# Patient Record
Sex: FEMALE | Race: WHITE | Employment: OTHER | URBAN - METROPOLITAN AREA
[De-identification: names, ages, dates, MRNs, and addresses within clinical notes are randomized per-mention and may not be internally consistent; named-entity substitution may affect disease eponyms.]

---

## 2014-05-15 LAB — EF %, EXTERNAL: NORMAL

## 2017-08-31 LAB — EF %, EXTERNAL: NORMAL

## 2018-05-02 ENCOUNTER — OFFICE VISIT (OUTPATIENT)
Dept: INTERNAL MEDICINE CLINIC | Age: 68
End: 2018-05-02

## 2018-05-02 VITALS
TEMPERATURE: 97.5 F | HEART RATE: 85 BPM | DIASTOLIC BLOOD PRESSURE: 99 MMHG | RESPIRATION RATE: 19 BRPM | OXYGEN SATURATION: 98 % | SYSTOLIC BLOOD PRESSURE: 178 MMHG | HEIGHT: 60 IN | BODY MASS INDEX: 26.03 KG/M2 | WEIGHT: 132.6 LBS

## 2018-05-02 DIAGNOSIS — I42.8 OTHER CARDIOMYOPATHY (HCC): Primary | Chronic | ICD-10-CM

## 2018-05-02 DIAGNOSIS — Z12.11 COLON CANCER SCREENING: ICD-10-CM

## 2018-05-02 DIAGNOSIS — I10 ESSENTIAL HYPERTENSION: ICD-10-CM

## 2018-05-02 DIAGNOSIS — E78.2 MIXED HYPERLIPIDEMIA: ICD-10-CM

## 2018-05-02 DIAGNOSIS — I50.22 CHRONIC SYSTOLIC HEART FAILURE (HCC): Chronic | ICD-10-CM

## 2018-05-02 DIAGNOSIS — Z11.59 ENCOUNTER FOR HEPATITIS C SCREENING TEST FOR LOW RISK PATIENT: ICD-10-CM

## 2018-05-02 DIAGNOSIS — G24.9 DYSTONIA: ICD-10-CM

## 2018-05-02 DIAGNOSIS — E11.65 TYPE 2 DIABETES MELLITUS WITH HYPERGLYCEMIA, WITHOUT LONG-TERM CURRENT USE OF INSULIN (HCC): ICD-10-CM

## 2018-05-02 RX ORDER — ASPIRIN 81 MG/1
81 TABLET ORAL DAILY
COMMUNITY

## 2018-05-02 RX ORDER — GLIPIZIDE 5 MG/1
TABLET ORAL
Refills: 1 | COMMUNITY
Start: 2018-03-08 | End: 2018-06-06 | Stop reason: SDUPTHER

## 2018-05-02 RX ORDER — MIRTAZAPINE 15 MG/1
TABLET, FILM COATED ORAL
Refills: 5 | COMMUNITY
Start: 2018-04-01 | End: 2018-11-08 | Stop reason: SDUPTHER

## 2018-05-02 NOTE — MR AVS SNAPSHOT
[address] [address] 
[phone] Patient: Chucky Harvey MRN: [id number] AVF:3/9/2443 Visit Information Date & Time Provider Department Dept. Phone Encounter #  
 5/2/2018  2:15 PM Heath Nagy MD [address] [address] Internal Medicine [phone] [id number] Follow-up Instructions Return in about 1 month (around 6/2/2018) for Medicare Wellness Visit- 30 minute appointment. Upcoming Health Maintenance Date Due Hepatitis C Screening 1950 FOOT EXAM Q1 5/3/1960 MICROALBUMIN Q1 5/3/1960 EYE EXAM RETINAL OR DILATED Q1 5/3/1960 DTaP/Tdap/Td series (1 - Tdap) 5/3/1971 BREAST CANCER SCRN MAMMOGRAM 5/3/2000 FOBT Q 1 YEAR AGE 50-75 5/3/2000 ZOSTER VACCINE AGE 60> 3/3/2010 HEMOGLOBIN A1C Q6M 10/23/2010 GLAUCOMA SCREENING Q2Y 5/3/2015 Bone Densitometry (Dexa) Screening 5/3/2015 Pneumococcal 65+ Low/Medium Risk (1 of 2 - PCV13) 5/3/2015 LIPID PANEL Q1 5/15/2015 MEDICARE YEARLY EXAM 5/2/2018 Influenza Age 5 to Adult 8/1/2018 Allergies as of 5/2/2018  Review Complete On: 5/2/2018 By: Heath Nagy MD  
  
 Severity Noted Reaction Type Reactions Codeine  04/21/2010    Hives Pcn [Penicillins]  04/21/2010    Hives Current Immunizations  Never Reviewed No immunizations on file. Not reviewed this visit You Were Diagnosed With   
  
 Codes Comments Other cardiomyopathy ([facility]    -  Primary ICD-10-CM: I42.8 ICD-9-CM: 425. 4 Chronic systolic heart failure (HCC)     ICD-10-CM: I50.22 ICD-9-CM: 428.22 Type 2 diabetes mellitus with hyperglycemia, without long-term current use of insulin (HCC)     ICD-10-CM: E11.65 ICD-9-CM: 250.00, 790.29 Essential hypertension     ICD-10-CM: I10 
ICD-9-CM: 401.9 Mixed hyperlipidemia     ICD-10-CM: E78.2 ICD-9-CM: 272.2 Dystonia     ICD-10-CM: G24.9 ICD-9-CM: 781.0 Colon cancer screening     ICD-10-CM: Z12.11 ICD-9-CM: V76.51 Encounter for hepatitis C screening test for low risk patient     ICD-10-CM: Z11.59 
ICD-9-CM: V73.89 Vitals BP Pulse Temp Resp Height(growth percentile) Weight(growth percentile) (!) 178/99 (BP 1 Location: Right arm, BP Patient Position: Sitting) 85 97.5 °F (36.4 °C) (Oral) 19 5' 0.08\" (1.526 m) 132 lb 9.6 oz (60.1 kg) SpO2 BMI OB Status Smoking Status 98% 25.83 kg/m2 Hysterectomy Never Smoker Vitals History BMI and BSA Data Body Mass Index Body Surface Area  
 25.83 kg/m 2 1.6 m 2 Preferred Pharmacy Pharmacy Name Phone [facility]/PHARMACY #[id number] [address]/ [facility] [phone] Your Updated Medication List  
  
   
This list is accurate as of 5/2/18  3:30 PM.  Always use your most recent med list.  
  
  
  
  
 aspirin delayed-release 81 mg tablet Take  by mouth daily. benazepril 20 mg tablet Commonly known as:  LOTENSIN Take 1 Tab by mouth daily. carvedilol 25 mg tablet Commonly known as:  Rocio Solid Take 1 Tab by mouth two (2) times daily (with meals). furosemide 40 mg tablet Commonly known as:  LASIX Take 1 Tab by mouth daily. glipiZIDE 5 mg tablet Commonly known as:  GLUCOTROL  
TAKE 1/2 TABLET BY MOUTH ONCE A DAY  
  
 metFORMIN 1,000 mg tablet Commonly known as:  GLUCOPHAGE Take 1 Tab by mouth two (2) times daily (with meals). mirtazapine 15 mg tablet Commonly known as:  REMERON  
TAKE 1 TABLET BY MOUTH AT BEDTIME FOR 1 WEEK, THEN 2 TABLETS NIGHTLY  
  
 simvastatin 20 mg tablet Commonly known as:  ZOCOR Take 1 Tab by mouth nightly. We Performed the Following CBC WITH AUTOMATED DIFF [32727 CPT(R)] HEMOGLOBIN A1C WITH EAG [04552 CPT(R)] HEPATITIS C AB [86469 CPT(R)] LIPID PANEL [02777 CPT(R)] METABOLIC PANEL, COMPREHENSIVE [23920 CPT(R)] MICROALBUMIN, UR, RAND W/ MICROALB/CREAT RATIO [id number] CPT(R)] OCCULT BLOOD, IMMUNOASSAY (FIT) [id number] CPT(R)] REFERRAL TO CARDIOLOGY [YMX88 Custom] REFERRAL TO NEUROLOGY [AVY63 Custom] TSH 3RD GENERATION [38835 CPT(R)] Follow-up Instructions Return in about 1 month (around 6/2/2018) for Medicare Wellness Visit- 30 minute appointment. Referral Information Referral ID Referred By Referred To  
  
 [id number] Espinoza Wilkerson MD   
   [address] [facility] [address] [facility], [address] Phone: [phone] Fax: [phone] Visits Status Start Date End Date 1 New Request 5/2/18 5/2/19 If your referral has a status of pending review or denied, additional information will be sent to support the outcome of this decision. Referral ID Referred By Referred To  
 [id number] Cathy Archuleta MD  
   [place] [address] [address] [facility], [address] [place] [address] Avenue Phone: [phone] Fax: [phone] Visits Status Start Date End Date 1 New Request 5/2/18 5/2/19 If your referral has a status of pending review or denied, additional information will be sent to support the outcome of this decision. Introducing [facility] & HEALTH SERVICES! [facility] introduces [employer] patient portal. Now you can access parts of your medical record, email your doctor's office, and request medication refills online. 1. In your internet browser, go to [email or web address] 2. Click on the First Time User? Click Here link in the Sign In box. You will see the New Member Sign Up page. 3. Enter your Mind[employer]ites Access Code exactly as it appears below. You will not need to use this code after youve completed the sign-up process. If you do not sign up before the expiration date, you must request a new code. · Mind[employer] Access Code: [email or web address] Expires: 7/31/2018  1:47 PM 
 
 4. Enter the last four digits of your Social Security Number (xxxx) and Date of Birth (mm/dd/yyyy) as indicated and click Submit. You will be taken to the next sign-up page. 5. Create a [facility] ID. This will be your [facility] login ID and cannot be changed, so think of one that is secure and easy to remember. 6. Create a [facility] password. You can change your password at any time. 7. Enter your Password Reset Question and Answer. This can be used at a later time if you forget your password. 8. Enter your e-mail address. You will receive e-mail notification when new information is available in [address] 9. Click Sign Up. You can now view and download portions of your medical record. 10. Click the Download Summary menu link to download a portable copy of your medical information. If you have questions, please visit the Frequently Asked Questions section of the [facility] website. Remember, [facility] is NOT to be used for urgent needs. For medical emergencies, dial 911. Now available from your iPhone and Android! Please provide this summary of care documentation to your next provider. Your primary care clinician is listed as Eron Mccabe. If you have any questions after today's visit, please call [phone].

## 2018-05-02 NOTE — PROGRESS NOTES
New Patient Evaluation    Jillian Celeste is a 79 y.o. female. They are here to establish care with the group and me as a primary care provider. She has CHF--diagnosed in 2011. She was managed by [facility] at a time. She had some improvement on medications. She stopped seeing doctors and taking medications for 3 years. Then had a severe shortness of breath issue in 2014. She then was seeing cardiology again. Dr. Wally White. She was back on medications and stopped shortly after. In 2017 she had another exacerbation of CHF and has since being back with cardiology. In Jan of 2017 the patient fell outside of her home. She hit her head, had hand pain and broke her collarbone. She went to [facility] and was d/c'd. She returned with pain and was found to have a broken hand and collarbone. Her speech changed as well. She fell again in June of 2017 and broke her tailbone. It was at that time that she had another CHF exacerbation. She had a defibrillator placed in 2017. In Feb, the patient went in the hospital.  She had a Cr of 3.05 on routine blood work. She was referred to nephrology. She has seen Dr. Ricke Meckel for this. Her kidney function has been better. She sees Dr. Ricke Meckel again in June 26th. He removed all blood pressure medications. She takes her blood pressures at home. They average 140's/70's. She actively avoids sodium and per the daughter is \"pretty good about'. She has diabetes as well. She has had this many years. She has seen Dr. Zena Dawkins for IM and endocrine. She has a most recent A1c of 5.5. She checks sugars daily on M/W/F. Her level runs from 70's to low 100's. She sees Dr. Deidre Montoya for neurology. 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 She is on Remeron. This helps her sleep. She also sees Dr. Ladi Couch for dermatology. She had a skin cancer. She will continue to follow.          Patient Active Problem List    Diagnosis Date Noted    Chronic systolic heart failure (HCC)     Essential hypertension, benign     Cardiomyopathy ([facility]     DM (diabetes mellitus) ([facility] 02/28/2011    HTN (hypertension) 02/28/2011    Pure hypercholesterolemia 02/28/2011    Chronic systolic heart failure (HCC)     Cardiomyopathy (HCC)      Current Outpatient Prescriptions   Medication Sig Dispense Refill    glipiZIDE (GLUCOTROL) 5 mg tablet TAKE 1/2 TABLET BY MOUTH ONCE A DAY  1    mirtazapine (REMERON) 15 mg tablet TAKE 1 TABLET BY MOUTH AT BEDTIME FOR 1 WEEK, THEN 2 TABLETS NIGHTLY  5    aspirin delayed-release 81 mg tablet Take  by mouth daily.  simvastatin (ZOCOR) 20 mg tablet Take 1 Tab by mouth nightly. 30 Tab 0    carvedilol (COREG) 25 mg tablet Take 1 Tab by mouth two (2) times daily (with meals). 60 Tab 6    benazepril (LOTENSIN) 20 mg tablet Take 1 Tab by mouth daily. 30 Tab 6    metformin (GLUCOPHAGE) 1,000 mg tablet Take 1 Tab by mouth two (2) times daily (with meals). 60 Tab 0    furosemide (LASIX) 40 mg tablet Take 1 Tab by mouth daily.  30 Tab 6     Allergies   Allergen Reactions    Codeine Hives    Pcn [Penicillins] Hives     Past Medical History:   Diagnosis Date    Calculus of kidney     Cardiomyopathy ([facility]     idiopathic    Chronic systolic heart failure [facility])     April 2010 ef 20%, by Dec 2010 up to 50% on meds, cath with normal cors    Diabetes [facility])     Essential hypertension, benign     Hypercholesterolemia      Past Surgical History:   Procedure Laterality Date    HX HYSTERECTOMY      HX OTHER SURGICAL  10/2017    difibulator     HX UROLOGICAL      stone removal     Family History   Problem Relation Age of Onset    Stroke Mother     Hypertension Mother     Lung Disease Father      emphysema    Heart Disease Brother     Stroke Brother     Cancer Brother      bone    Cancer Maternal Grandfather      colon ca    Cancer Other      breast ca     Social History   Substance Use Topics    Smoking status: Never Smoker    Smokeless tobacco: Never Used    Alcohol use No        Health Maintenance   Topic Date Due    Hepatitis C Screening  1950    FOOT EXAM Q1  05/03/1960    MICROALBUMIN Q1  05/03/1960    EYE EXAM RETINAL OR DILATED Q1  05/03/1960    DTaP/Tdap/Td series (1 - Tdap) 05/03/1971    BREAST CANCER SCRN MAMMOGRAM  05/03/2000    FOBT Q 1 YEAR AGE 50-75  05/03/2000    ZOSTER VACCINE AGE 60>  03/03/2010    HEMOGLOBIN A1C Q6M  10/23/2010    GLAUCOMA SCREENING Q2Y  05/03/2015    Bone Densitometry (Dexa) Screening  05/03/2015    Pneumococcal 65+ Low/Medium Risk (1 of 2 - PCV13) 05/03/2015    LIPID PANEL Q1  05/15/2015    MEDICARE YEARLY EXAM  05/02/2018    Influenza Age 9 to Adult  08/01/2018       Review of Systems   Constitutional: Negative. Respiratory: Negative. Cardiovascular: Negative. Gastrointestinal: Negative. Visit Vitals    BP (!) 178/99 (BP 1 Location: Right arm, BP Patient Position: Sitting)    Pulse 85    Temp 97.5 °F (36.4 °C) (Oral)    Resp 19    Ht 5' 0.08\" (1.526 m)    Wt 132 lb 9.6 oz (60.1 kg)    SpO2 98%    BMI 25.83 kg/m2       Physical Exam   Constitutional: No distress. Cardiovascular: Normal rate and regular rhythm. Pulmonary/Chest: Effort normal and breath sounds normal. She has no wheezes. Musculoskeletal: Normal range of motion. ASSESSMENT/PLAN    Diagnoses and all orders for this visit:    1. Other cardiomyopathy ([address]  -     CBC WITH AUTOMATED DIFF  -     REFERRAL TO CARDIOLOGY    2. Chronic systolic heart failure ([address]    3. Type 2 diabetes mellitus with hyperglycemia, without long-term current use of insulin (HCC)  -     TSH 3RD GENERATION  -     HEMOGLOBIN A1C WITH EAG  -     MICROALBUMIN, UR, RAND W/ MICROALB/CREAT RATIO    4. Essential hypertension  -     METABOLIC PANEL, COMPREHENSIVE    5. Mixed hyperlipidemia  -     LIPID PANEL    6. Dystonia  -     [address] [facility]    7.  Colon cancer screening  -     OCCULT BLOOD, IMMUNOASSAY (FIT)    8. Encounter for hepatitis C screening test for low risk patient  -     HEPATITIS C AB      Follow-up Disposition:  Return in about 1 month (around 6/2/2018) for Medicare Wellness Visit- 30 minute appointment.    -Discussed with the patient to continue the current plan of care. We will obtain baseline labwork and determine if any adjustments need to be done. We will also await the records of the previous PCP to ascertain further details of the patient's history. The patient agrees with and understands the plan of care. All questions have been answered.

## 2018-05-03 ENCOUNTER — HOSPITAL ENCOUNTER (OUTPATIENT)
Dept: LAB | Age: 68
Discharge: HOME | End: 2018-05-03
Payer: MEDICARE

## 2018-05-03 PROCEDURE — 85025 COMPLETE CBC W/AUTO DIFF WBC: CPT

## 2018-05-03 PROCEDURE — 83036 HEMOGLOBIN GLYCOSYLATED A1C: CPT

## 2018-05-03 PROCEDURE — 84443 ASSAY THYROID STIM HORMONE: CPT

## 2018-05-03 PROCEDURE — 36415 COLL VENOUS BLD VENIPUNCTURE: CPT

## 2018-05-03 PROCEDURE — 80053 COMPREHEN METABOLIC PANEL: CPT

## 2018-05-03 PROCEDURE — 86803 HEPATITIS C AB TEST: CPT

## 2018-05-03 PROCEDURE — 80061 LIPID PANEL: CPT

## 2018-05-03 PROCEDURE — 82043 UR ALBUMIN QUANTITATIVE: CPT

## 2018-05-04 LAB
ALBUMIN SERPL-MCNC: 4.1 G/DL (ref 3.6–4.8)
ALBUMIN/CREAT UR: 939.6 MG/G CREAT (ref 0–30)
ALBUMIN/GLOB SERPL: 1.5 {RATIO} (ref 1.2–2.2)
ALP SERPL-CCNC: 67 IU/L (ref 39–117)
ALT SERPL-CCNC: 19 IU/L (ref 0–32)
AST SERPL-CCNC: 23 IU/L (ref 0–40)
BASOPHILS # BLD AUTO: 0 X10E3/UL (ref 0–0.2)
BASOPHILS NFR BLD AUTO: 1 %
BILIRUB SERPL-MCNC: 0.3 MG/DL (ref 0–1.2)
BUN SERPL-MCNC: 37 MG/DL (ref 8–27)
BUN/CREAT SERPL: 26 (ref 12–28)
CALCIUM SERPL-MCNC: 9.1 MG/DL (ref 8.7–10.3)
CHLORIDE SERPL-SCNC: 101 MMOL/L (ref 96–106)
CHOLEST SERPL-MCNC: 141 MG/DL (ref 100–199)
CO2 SERPL-SCNC: 24 MMOL/L (ref 18–29)
CREAT SERPL-MCNC: 1.4 MG/DL (ref 0.57–1)
CREAT UR-MCNC: 25 MG/DL
EOSINOPHIL # BLD AUTO: 0.2 X10E3/UL (ref 0–0.4)
EOSINOPHIL NFR BLD AUTO: 4 %
ERYTHROCYTE [DISTWIDTH] IN BLOOD BY AUTOMATED COUNT: 14.6 % (ref 12.3–15.4)
EST. AVERAGE GLUCOSE BLD GHB EST-MCNC: 120 MG/DL
GFR SERPLBLD CREATININE-BSD FMLA CKD-EPI: 39 ML/MIN/1.73
GFR SERPLBLD CREATININE-BSD FMLA CKD-EPI: 45 ML/MIN/1.73
GLOBULIN SER CALC-MCNC: 2.8 G/DL (ref 1.5–4.5)
GLUCOSE SERPL-MCNC: 119 MG/DL (ref 65–99)
HBA1C MFR BLD: 5.8 % (ref 4.8–5.6)
HCT VFR BLD AUTO: 34.9 % (ref 34–46.6)
HCV AB S/CO SERPL IA: <0.1 S/CO RATIO (ref 0–0.9)
HDLC SERPL-MCNC: 33 MG/DL
HGB BLD-MCNC: 11.4 G/DL (ref 11.1–15.9)
IMM GRANULOCYTES # BLD: 0 X10E3/UL (ref 0–0.1)
IMM GRANULOCYTES NFR BLD: 0 %
LDLC SERPL CALC-MCNC: 76 MG/DL (ref 0–99)
LYMPHOCYTES # BLD AUTO: 1.4 X10E3/UL (ref 0.7–3.1)
LYMPHOCYTES NFR BLD AUTO: 29 %
MCH RBC QN AUTO: 29.6 PG (ref 26.6–33)
MCHC RBC AUTO-ENTMCNC: 32.7 G/DL (ref 31.5–35.7)
MCV RBC AUTO: 91 FL (ref 79–97)
MICROALBUMIN UR-MCNC: 234.9 UG/ML
MONOCYTES # BLD AUTO: 0.5 X10E3/UL (ref 0.1–0.9)
MONOCYTES NFR BLD AUTO: 10 %
NEUTROPHILS # BLD AUTO: 2.7 X10E3/UL (ref 1.4–7)
NEUTROPHILS NFR BLD AUTO: 56 %
PLATELET # BLD AUTO: 184 X10E3/UL (ref 150–379)
POTASSIUM SERPL-SCNC: 4.6 MMOL/L (ref 3.5–5.2)
PROT SERPL-MCNC: 6.9 G/DL (ref 6–8.5)
RBC # BLD AUTO: 3.85 X10E6/UL (ref 3.77–5.28)
SODIUM SERPL-SCNC: 141 MMOL/L (ref 134–144)
TRIGL SERPL-MCNC: 158 MG/DL (ref 0–149)
TSH SERPL DL<=0.005 MIU/L-ACNC: 3.68 UIU/ML (ref 0.45–4.5)
VLDLC SERPL CALC-MCNC: 32 MG/DL (ref 5–40)
WBC # BLD AUTO: 4.8 X10E3/UL (ref 3.4–10.8)

## 2018-05-10 ENCOUNTER — OFFICE VISIT (OUTPATIENT)
Dept: CARDIOLOGY CLINIC | Age: 68
End: 2018-05-10

## 2018-05-10 ENCOUNTER — TELEPHONE (OUTPATIENT)
Dept: CARDIOLOGY CLINIC | Age: 68
End: 2018-05-10

## 2018-05-10 VITALS
BODY MASS INDEX: 26.11 KG/M2 | HEART RATE: 85 BPM | RESPIRATION RATE: 16 BRPM | DIASTOLIC BLOOD PRESSURE: 70 MMHG | HEIGHT: 60 IN | SYSTOLIC BLOOD PRESSURE: 110 MMHG | OXYGEN SATURATION: 98 % | WEIGHT: 133 LBS

## 2018-05-10 DIAGNOSIS — I50.22 CHRONIC SYSTOLIC HEART FAILURE (HCC): Primary | Chronic | ICD-10-CM

## 2018-05-10 DIAGNOSIS — Z95.810 AICD (AUTOMATIC CARDIOVERTER/DEFIBRILLATOR) PRESENT: ICD-10-CM

## 2018-05-10 DIAGNOSIS — I13.0 CARDIORENAL SYNDROME WITH RENAL FAILURE, STAGE 1-4 OR UNSPECIFIED CHRONIC KIDNEY DISEASE, WITH HEART FAILURE (HCC): ICD-10-CM

## 2018-05-10 DIAGNOSIS — I10 ESSENTIAL HYPERTENSION, BENIGN: ICD-10-CM

## 2018-05-10 DIAGNOSIS — I42.9 CARDIOMYOPATHY, UNSPECIFIED TYPE (HCC): ICD-10-CM

## 2018-05-10 PROBLEM — E11.21 TYPE 2 DIABETES WITH NEPHROPATHY (HCC): Status: ACTIVE | Noted: 2018-05-10

## 2018-05-10 RX ORDER — BUMETANIDE 0.5 MG/1
0.5 TABLET ORAL
Qty: 30 TAB | Refills: 5 | Status: SHIPPED | OUTPATIENT
Start: 2018-05-10 | End: 2018-05-25 | Stop reason: SDUPTHER

## 2018-05-10 NOTE — PATIENT INSTRUCTIONS
You will need to follow up in clinic with Dr. Meng Lr in 2-3 weeks. Please schedule an Echo and pacer check prior to office visit. Okay for same day appointment. Please have lab work completed a few days prior to follow up in office. Please schedule an appointment with Dr. Ran Quintero in 3 months. There were changes made to your medications at this appointment. Please note the following:  START Bumex 0.5 mg.   Please take 0.5 mg of Bumex for 2 days. If your weight is not below 130 lbs call the office. Weigh yourself everyday. If your weight is 130 lbs, 4 lbs above baseline (126 lbs), take a dose of Bumex. If you are below 130 lbs do not take your Bumex.

## 2018-05-10 NOTE — MR AVS SNAPSHOT
[address] [facility] [address] [facility] 57 
[phone] Patient: Shun Santacruz MRN: [id number] RET:3/7/[birth year] Visit Information Date & Time Provider Department Dept. Phone Encounter #  
 5/10/2018 10:00 AM Mohinder Garrison MD [facility] [phone] [id number] Your Appointments 5/11/2018  1:00 PM  
New Patient with Pablo Cohn MD  
[facility]) Appt Note: new pt referred by Dr. Deidre Pettit (P)[phone], dystonia [address] [facility] [address] [facility] [address]  
[address] [facility] [address] [facility] [address] [address]  
  
    
 6/27/2018  3:00 PM  
PHYSICAL with Neva Chaidez MD  
[facility]) Appt Note: CPE Per Dr Anna Lloyd [address] [facility] [address] [facility] [address]  
[address] [facility] Upcoming Health Maintenance Date Due  
 FOOT EXAM Q1 5/3/1960 EYE EXAM RETINAL OR DILATED Q1 5/3/1960 DTaP/Tdap/Td series (1 - Tdap) 5/3/1971 BREAST CANCER SCRN MAMMOGRAM 5/3/2000 FOBT Q 1 YEAR AGE 50-75 5/3/2000 ZOSTER VACCINE AGE 60> 3/3/2010 GLAUCOMA SCREENING Q2Y 5/3/2015 Bone Densitometry (Dexa) Screening 5/3/2015 Pneumococcal 65+ Low/Medium Risk (1 of 2 - PCV13) 5/3/2015 MEDICARE YEARLY EXAM 5/2/2018 Influenza Age 5 to Adult 8/1/2018 HEMOGLOBIN A1C Q6M 11/3/2018 MICROALBUMIN Q1 5/3/2019 LIPID PANEL Q1 5/3/2019 Allergies as of 5/10/2018  Review Complete On: 5/10/2018 By: Mohinder Garrison MD  
  
 Severity Noted Reaction Type Reactions Codeine  04/21/2010    Hives Pcn [Penicillins]  04/21/2010    Hives Current Immunizations  Never Reviewed No immunizations on file. Not reviewed this visit You Were Diagnosed With   
  
 Codes Comments Chronic systolic heart failure (HCC)    -  Primary ICD-10-CM: Y20.32 ICD-9-CM: 428.22 Cardiomyopathy, unspecified type ([facility]     ICD-10-CM: I42.9 ICD-9-CM: 425.4 Vitals BP Pulse Resp Height(growth percentile) Weight(growth percentile) SpO2  
 110/70 (BP 1 Location: Left arm, BP Patient Position: Sitting) 85 16 5' (1.524 m) 133 lb (60.3 kg) 98% BMI OB Status Smoking Status 25.97 kg/m2 Hysterectomy Never Smoker Vitals History BMI and BSA Data Body Mass Index Body Surface Area  
 25.97 kg/m 2 1.6 m 2 Preferred Pharmacy Pharmacy Name Phone [facility]/PHARMACY #[id number] [address]/ [facility] [phone] Your Updated Medication List  
  
   
This list is accurate as of 5/10/18 10:52 AM.  Always use your most recent med list.  
  
  
  
  
 aspirin delayed-release 81 mg tablet Take  by mouth daily. bumetanide 0.5 mg tablet Commonly known as:  Crenshaw Cooke Take 1 Tab by mouth daily as needed. Take 1 tab daily as needed for weight > 130lbs  
  
 carvedilol 25 mg tablet Commonly known as:  Brady Peoples Take 1 Tab by mouth two (2) times daily (with meals). furosemide 40 mg tablet Commonly known as:  LASIX Take 1 Tab by mouth daily. glipiZIDE 5 mg tablet Commonly known as:  GLUCOTROL  
TAKE 1/2 TABLET BY MOUTH ONCE A DAY  
  
 mirtazapine 15 mg tablet Commonly known as:  REMERON  
TAKE 1 TABLET BY MOUTH AT BEDTIME FOR 1 WEEK, THEN 2 TABLETS NIGHTLY  
  
 simvastatin 20 mg tablet Commonly known as:  ZOCOR Take 1 Tab by mouth nightly. Prescriptions Sent to Pharmacy Refills  
 bumetanide (BUMEX) 0.5 mg tablet 5 Sig: Take 1 Tab by mouth daily as needed. Take 1 tab daily as needed for weight > 130lbs Class: Normal  
 Pharmacy: [facility]/pharmacy #[id number] - [address]  #: [phone] Route: Oral  
  
We Performed the Following AMB POC EKG ROUTINE W/ 12 LEADS, INTER & REP [82455 CPT(R)] HEMOGLOBIN [id number] CPT(R)] MAGNESIUM [id number] CPT(R)] METABOLIC PANEL, BASIC [36896 CPT(R)] To-Do List   
 05/27/2018 ECHO:  2D ECHO COMPLETE ADULT (TTE) W OR WO CONTR Patient Instructions You will need to follow up in clinic with Dr. Kathy Lynn in 2-3 weeks. Please schedule an Echo and pacer check prior to office visit. Okay for same day appointment. Please have lab work completed a few days prior to follow up in office. Please schedule an appointment with Dr. Domenic Srinivasan in 3 months. There were changes made to your medications at this appointment. Please note the following: START Bumex 0.5 mg.  
Please take 0.5 mg of Bumex for 2 days. If your weight is not below 130 lbs call the office. Weigh yourself everyday. If your weight is 130 lbs, 4 lbs above baseline (126 lbs), take a dose of Bumex. If you are below 130 lbs do not take your Bumex. Introducing [facility] & HEALTH SERVICES! Dear Petra Mazariegos: 
Thank you for requesting a [employer] account. Our records indicate that you already have an active [employer] account. You can access your account anytime at [email or web address] Did you know that you can access your hospital and ER discharge instructions at any time in [employer]? You can also review all of your test results from your hospital stay or ER visit. Additional Information If you have questions, please visit the Frequently Asked Questions section of the [employer] website at [email or web address] Remember, [employer] is NOT to be used for urgent needs. For medical emergencies, dial 911. Now available from your iPhone and Android! Please provide this summary of care documentation to your next provider. Your primary care clinician is listed as Gabriel Callejas. If you have any questions after today's visit, please call [phone].

## 2018-05-10 NOTE — PROGRESS NOTES
BMP, Mg, Hgb ordered  Echo ordered    Bumex 0.5 mg, daily PRN ordered  Verbal order per Dr. Veena Herron

## 2018-05-10 NOTE — PROGRESS NOTES
Den Joseph     1950       Sage Rivas MD, [facility]  Date of Visit-5/10/2018   PCP is Trey Case MD   [facility]  [facility]  HPI:  Den Joseph is a 76 y.o. female   I had seen Ms. Noreen Johnson in 2011, she had an EF of 20% in 2010 that had improved. I have not seen her since 2011, in the meantime she has gotten her care at [address] In 2010 she had normal coronaries by cath. She is now seeing Trey Case MD for PCP and had her first visit 1 week ago. He has requested records from her several other physicians but these are not yet available in [address] [place] [address]. His note indicates that she has had CHF exacerbation and acute renal failure up to a creatinine of 3. She does not take an ACE and seems to have had multiple CHF admissions in the past year. She also had an AICD placed in October 2017. Soc: non smoker, no alcohol, drinks 1 cup of coffee per day, 2 children, retired. Fam: brother with heart disease, mother passed of stroke. The pt is here with her daughter today. Her daughter feels that she was overmedicated at [address] as the pt was very out of it and fatigued. Now the pt is only on Coreg. Her daughter reports that her EF is around 23%. They have noticed that her weight has gone up and she is feeling short of breath. The pt has also noticed some swelling in her right ankle. Her daughter reports that at one point she stopped taking all her medications and fell on January 2017 and broke her left hand and collarbone at that time. She also hit her head at that time because she had slurred speech. The pt reports that she is short of breath and her weight has gone up 6 lbs from baseline in the last week to week and a half. She believes that her kidney's are fine. The pt denies any chest pain or pressure. She states that she has not had a cath since hers in 2010. Th pt reports that she has the defibrillator because of her low EF.  She has not been on digoxin in the past. When she has gone in for her increased fluid retention she usually stays in the hospital for about 5 days to be diuresed. The pt states that she is diabetic and her sugars are under good control. Her baseline weight by her home scales in 125. Creatine 2/2018 was 2.9 had gone up to 3.05. She has anemia with a HGB of 9.4 in February. Previous defibrillator checks with Dr. Bryan Fischer at [facility], her device is a [employer] AICD. Was discharged on 4/2017 on Bumex, BiDil, ACE and Coreg for CHF. Denies chest pain, edema, syncope, has no tachycardia, palpitations or sense of arrhythmia. EKG: Sinus at 85 with anterior T wave inversion. Assessment/Plan:     1. Cardiomyopathy and chronic systolic CHF NYHA class II since at least 2010. They deny cath's since the one I did in 2010 so I assume that this is a non-ischemic cardiomyopathy. 2. Now in acute mild exacerbation with 6 lbs weight gain. I don't find a lot of edema or ascites and she has had significant renal failure so we are going to cautious on diuretics. Will use Bumex 0.5 mg for 2 days and then use a baseline weight of 126. She will take bumex on days she is 130 or above. 2. Cardiorenal syndrome at risk for complete renal failure or hyperkalemia. Avoid ACE, avoid BiDil regimen as it seemed to cause hypotension. 3. Follow up with Dr. Pankaj Burton closely. I went over the mechanism of cardiorenal syndrome and CHF with them. 4. On statin and aspirin, unclear if she has CAD, will need records from [facility]. 5. [employer] AICD. Will check device now here with device check in 2 weeks and an appointment with Dr. Homar Cook in 3 months    6. Follow up in 2 weeks with labs, echo, pacer check and see response to diuretic.          Future Appointments  Date Time Provider Chava Gabriel   5/31/2018 2:45 PM PACEMAKER3, [address]   5/31/2018 3:00 PM ECHOTWO, [address]   5/31/2018 3:20 PM Anju Ely  [address] 6/27/2018 3:00 PM Marina Dukes MD [id number] [facility]   8/7/2018 3:40 PM Radha Mcdonald  [address] 8/13/2018 3:00 PM Parker Benitez MD [address]      Patient Instructions   You will need to follow up in clinic with Dr. Giovana Yap in 2-3 weeks. Please schedule an Echo and pacer check prior to office visit. Okay for same day appointment. Please have lab work completed a few days prior to follow up in office. Please schedule an appointment with Dr. Camila Hayes in 3 months. There were changes made to your medications at this appointment. Please note the following:  START Bumex 0.5 mg.   Please take 0.5 mg of Bumex for 2 days. If your weight is not below 130 lbs call the office. Weigh yourself everyday. If your weight is 130 lbs, 4 lbs above baseline (126 lbs), take a dose of Bumex. If you are below 130 lbs do not take your Bumex. Key CAD CHF Meds             bumetanide (BUMEX) 0.5 mg tablet  (Taking/Discontinued) Take 1 Tab by mouth daily as needed. Take 1 tab daily as needed for weight > 130lbs    aspirin delayed-release 81 mg tablet  (Taking) Take  by mouth daily. simvastatin (ZOCOR) 20 mg tablet  (Taking) Take 1 Tab by mouth nightly. carvedilol (COREG) 25 mg tablet  (Taking) Take 1 Tab by mouth two (2) times daily (with meals). furosemide (LASIX) 40 mg tablet (Discontinued) Take 1 Tab by mouth daily. Impression:   1. Chronic systolic heart failure (HCC)    2. Cardiomyopathy, unspecified type ([address]    3. Cardiorenal syndrome with renal failure, stage 1-4 or unspecified chronic kidney disease, with heart failure ([address]    4. AICD (automatic cardioverter/defibrillator) present       Review of Systems   Constitutional: Negative for diaphoresis, fever and malaise/fatigue. HENT: Negative for ear pain, hearing loss, nosebleeds and tinnitus. Eyes: Negative for blurred vision, double vision and pain.    Respiratory: Positive for shortness of breath. Negative for cough, hemoptysis, sputum production, wheezing and stridor. Cardiovascular: Positive for leg swelling. Negative for chest pain, palpitations, orthopnea and claudication. Gastrointestinal: Positive for diarrhea. Negative for abdominal pain, blood in stool, constipation, heartburn, melena, nausea and vomiting. Genitourinary: Negative for dysuria, frequency and urgency. Musculoskeletal: Positive for joint pain. Negative for back pain, falls, myalgias and neck pain. Skin: Negative for rash. Neurological: Negative for dizziness, sensory change, seizures, loss of consciousness, weakness and headaches. Endo/Heme/Allergies: Does not bruise/bleed easily. Psychiatric/Behavioral: Positive for memory loss. Negative for depression and hallucinations. The patient is not nervous/anxious and does not have insomnia. see supplement sheet, initialed and to be scanned by staff  Past Medical History:   Diagnosis Date    Calculus of kidney     Cardiomyopathy [facility]     idiopathic    Chronic systolic heart failure [facility])     April 2010 ef 20%, by Dec 2010 up to 50% on meds, cath with normal cors    Diabetes [facility])     Essential hypertension, benign     Hypercholesterolemia         Family History   Problem Relation Age of Onset    Stroke Mother     Hypertension Mother     Lung Disease Father      emphysema    Heart Disease Brother     Stroke Brother     Cancer Brother      bone    Cancer Maternal Grandfather      colon ca    Cancer Other      breast ca     Social Hx= reports that she has never smoked. She has never used smokeless tobacco. She reports that she does not drink alcohol or use illicit drugs. Exam and Labs:  /70 (BP 1 Location: Left arm, BP Patient Position: Sitting)  Pulse 85  Resp 16  Ht 5' (1.524 m)  Wt 133 lb (60.3 kg)  SpO2 98%  BMI 25.97 kg/b1Hqiwvsyzcxswdk:  NAD, comfortable  Head: NC,AT. Eyes: No scleral icterus.  Neck:  Neck supple. No JVD present. Throat: moist mucous membranes. Chest: Effort normal & normal respiratory excursion . Neurological: alert, conversant and oriented . Skin: Skin is not cold. No obvious systemic rash noted. Not diaphoretic. No erythema. Psychiatric:  Grossly normal mood and affect. Behavior appears normal. Extremities:  no clubbing or cyanosis. Abdomen: non tender, non distended. Lungs:breath sounds normal. No stridor. distress, wheezes or  Rales. Heart: normal rate, regular rhythm, normal S1, S2, no murmurs, rubs, clicks or gallops , PMI non displaced. Edema: Edema is none. Lab Results   Component Value Date/Time    Cholesterol, total 141 05/03/2018 08:27 AM    HDL Cholesterol 33 (L) 05/03/2018 08:27 AM    LDL, calculated 76 05/03/2018 08:27 AM    Triglyceride 158 (H) 05/03/2018 08:27 AM    CHOL/HDL Ratio 5.1 (H) 04/22/2010 12:40 AM     Lab Results   Component Value Date/Time    Sodium 141 05/03/2018 08:27 AM    Potassium 4.6 05/03/2018 08:27 AM    Chloride 101 05/03/2018 08:27 AM    CO2 24 05/03/2018 08:27 AM    Anion gap 10 05/21/2010 09:43 AM    Glucose 119 (H) 05/03/2018 08:27 AM    BUN 37 (H) 05/03/2018 08:27 AM    Creatinine 1.40 (H) 05/03/2018 08:27 AM    BUN/Creatinine ratio 26 05/03/2018 08:27 AM    GFR est AA 45 (L) 05/03/2018 08:27 AM    GFR est non-AA 39 (L) 05/03/2018 08:27 AM    Calcium 9.1 05/03/2018 08:27 AM      Wt Readings from Last 3 Encounters:   05/10/18 133 lb (60.3 kg)   05/02/18 132 lb 9.6 oz (60.1 kg)   07/28/11 145 lb (65.8 kg)      BP Readings from Last 3 Encounters:   05/10/18 110/70   05/02/18 (!) 178/99   07/28/11 122/68      Current Outpatient Prescriptions   Medication Sig    bumetanide (BUMEX) 0.5 mg tablet Take 1 Tab by mouth daily as needed.  Take 1 tab daily as needed for weight > 130lbs    glipiZIDE (GLUCOTROL) 5 mg tablet TAKE 1/2 TABLET BY MOUTH ONCE A DAY    mirtazapine (REMERON) 15 mg tablet TAKE 1 TABLET BY MOUTH AT BEDTIME FOR 1 WEEK, THEN 2 TABLETS NIGHTLY    aspirin delayed-release 81 mg tablet Take  by mouth daily.  simvastatin (ZOCOR) 20 mg tablet Take 1 Tab by mouth nightly.  carvedilol (COREG) 25 mg tablet Take 1 Tab by mouth two (2) times daily (with meals).  furosemide (LASIX) 40 mg tablet Take 1 Tab by mouth daily. No current facility-administered medications for this visit. Impression see above.       Written by Paula Pack, as dictated by Kvng Saucedo MD.

## 2018-05-10 NOTE — TELEPHONE ENCOUNTER
Faxed Request for Medical Records to [facility] at fax number [phone]. Fax confirmation received.

## 2018-05-11 ENCOUNTER — OFFICE VISIT (OUTPATIENT)
Dept: NEUROLOGY | Age: 68
End: 2018-05-11

## 2018-05-11 VITALS
DIASTOLIC BLOOD PRESSURE: 90 MMHG | BODY MASS INDEX: 26.31 KG/M2 | HEIGHT: 60 IN | OXYGEN SATURATION: 98 % | HEART RATE: 69 BPM | RESPIRATION RATE: 14 BRPM | SYSTOLIC BLOOD PRESSURE: 140 MMHG | WEIGHT: 134 LBS

## 2018-05-11 DIAGNOSIS — G25.5 HEMICHOREA: Primary | ICD-10-CM

## 2018-05-11 RX ORDER — TETRABENAZINE 12.5 MG/1
TABLET, COATED ORAL
Qty: 60 TAB | Refills: 1 | Status: SHIPPED | OUTPATIENT
Start: 2018-05-11 | End: 2019-08-21

## 2018-05-11 NOTE — PATIENT INSTRUCTIONS

## 2018-05-11 NOTE — PROGRESS NOTES
Chief Complaint   Patient presents with    Neurologic Problem         HISTORY OF PRESENT ILLNESS  Emily Judd is a 76 y.o. female who came in for neurological evaluation requested by Dr. Marimar Silvestre. She tells me that she has had abnormal involuntary movements involving her left leg and arm for the past year or so and relates it to starting after a fall. She has seen Dr. Faustino Hodgkin in the past and was diagnosed with Chorea. Upon reviewing records, it appears that she has had MRI scan of the brain which showed T1 hyperintensity in the basal ganglia region on the right side and the cause was suspected to be hyperglycemia. Her diabetes control is now much better but the tremors have not improved. It does not bother her but will makes her uneasy socially. She is currently not on any medications in this regard. She is on Remeron which was apparently started to help her sleep and possibly with the pain that she also has on the left side. Other medical problems include congestive heart failure, status post defibrillator placement and hypertension. Past Medical History:   Diagnosis Date    Calculus of kidney     Cardiomyopathy [facility])     idiopathic    Chronic systolic heart failure [facility])     April 2010 ef 20%, by Dec 2010 up to 50% on meds, cath with normal cors    Diabetes [facility])     Essential hypertension, benign     Hypercholesterolemia      Current Outpatient Prescriptions   Medication Sig    Tetrabenazine 12.5 mg tab daily for 1 wk, then 2 daily    bumetanide (BUMEX) 0.5 mg tablet Take 1 Tab by mouth daily as needed. Take 1 tab daily as needed for weight > 130lbs    glipiZIDE (GLUCOTROL) 5 mg tablet TAKE 1/2 TABLET BY MOUTH ONCE A DAY    mirtazapine (REMERON) 15 mg tablet TAKE 1 TABLET BY MOUTH AT BEDTIME FOR 1 WEEK, THEN 2 TABLETS NIGHTLY    aspirin delayed-release 81 mg tablet Take  by mouth daily.  simvastatin (ZOCOR) 20 mg tablet Take 1 Tab by mouth nightly.     carvedilol (COREG) 25 mg tablet Take 1 Tab by mouth two (2) times daily (with meals).  furosemide (LASIX) 40 mg tablet Take 1 Tab by mouth daily. No current facility-administered medications for this visit. Allergies   Allergen Reactions    Codeine Hives    Pcn [Penicillins] Hives     Family History   Problem Relation Age of Onset    Stroke Mother     Hypertension Mother     Lung Disease Father      emphysema    Heart Disease Brother     Stroke Brother     Cancer Brother      bone    Cancer Maternal Grandfather      colon ca    Cancer Other      breast ca     Social History   Substance Use Topics    Smoking status: Never Smoker    Smokeless tobacco: Never Used    Alcohol use No     Past Surgical History:   Procedure Laterality Date    CARDIAC SURG PROCEDURE UNLIST      HX HYSTERECTOMY      HX ORTHOPAEDIC      HX OTHER SURGICAL  10/2017    difibulator     HX UROLOGICAL      stone removal         REVIEW OF SYSTEMS  Review of Systems - History obtained from the patient  Psychological ROS: negative  ENT ROS: negative  Hematological and Lymphatic ROS: negative  Endocrine ROS: negative  Respiratory ROS: no cough, shortness of breath, or wheezing  Cardiovascular ROS: no chest pain or dyspnea on exertion  Gastrointestinal ROS: no abdominal pain, change in bowel habits, or black or bloody stools  Genito-Urinary ROS: no dysuria, trouble voiding, or hematuria  Musculoskeletal ROS: negative  Dermatological ROS: negative      PHYSICAL EXAMINATION:    Visit Vitals    /90    Pulse 69    Resp 14    Ht 5' (1.524 m)    Wt 60.8 kg (134 lb)    SpO2 98%    BMI 26.17 kg/m2     General:  Well defined, nourished, and groomed individual in no acute distress. Neck: Supple, nontender, thyroid within normal limits, no JVD, no bruits, no pain with resistance to active range of motion. Heart: Regular rate and rhythm, no murmurs, rub, or gallop. Normal S1S2.   Lungs:  Clear to auscultation bilaterally with equal chest expansion, no cough, no wheeze  Musculoskeletal:  Extremities revealed no edema and had full range of motion of joints. Psych:  Good mood and bright affect    NEUROLOGICAL EXAMINATION:     Mental Status:   Alert and oriented to person, place, and time with recent and remote memory intact. Attention span and concentration are normal. Speech is fluent with a full fund of knowledge. Cranial Nerves:    II, III, IV, VI:  Visual acuity grossly intact. Visual fields are normal.    Pupils are equal, round, and reactive to light and accommodation. Extra-ocular movements are full and fluid. Fundoscopic exam was benign, no ptosis or nystagmus. V-XII: Hearing is grossly intact. Facial features are symmetric, with normal sensation and strength. The palate rises symmetrically and the tongue protrudes midline. Sternocleidomastoids 5/5. Motor Examination: Normal tone, bulk, and strength. 5/5 muscle strength throughout. No cogwheel rigidity or clonus present. Sensory exam:  Normal throughout to pinprick, temperature, and vibration sense. Normal proprioception. Coordination:  Finger to nose and rapid arm movement testing was amelia on the right side. She has choreiform movements in the left arm and leg. No resting or intention tremor    Gait and Station: Ambulates with the help of a walker. No Rhomberg or pronator drift. No muscle wasting or fasiculations noted. Reflexes:  DTRs 2+ throughout. Toes downgoing. 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 Chucky Harvey has choreiform movements involving the left upper and lower extremity. I agree that this could be related to hyperglycemia induced basal ganglia injury.    She is currently on Remeron which has not made any difference and uses it primarily for sleep  We will try her on tetrabenazine 12.5 mg daily for 1 week and then 25 mg daily  The side effect profile of this medication was reviewed including somnolence, mood issues, gait abnormality and suicidality. I do not notice any QT prolongation on EKG but she does have congestive heart failure but I have asked her daughter to discuss with cardiology at the next visit regarding using this medication, in case we need to use increased dosage    Thank you for allowing me to participate in the care of Ms. Felipe De Guzman. Please feel free to contact me if you have any questions. I will be happy to follow to follow her along with you.       Luis Miguel Zhang MD  Diplomate, American Board of Psychiatry & Neurology (Neurology)  Tony Paredes Board of Psychiatry & Neurology (Clinical Neurophysiology)  Diplomate, American Board of Electrodiagnostic Medicine

## 2018-05-14 ENCOUNTER — TELEPHONE (OUTPATIENT)
Dept: CARDIOLOGY CLINIC | Age: 68
End: 2018-05-14

## 2018-05-14 NOTE — TELEPHONE ENCOUNTER
Pt's daughter calling in regards to the fluid medication Dr. Giovana Yap gave the pt when she was in the office on 5/10. Pt's daughter states that the fluid has not gone anywhere and was told to call back if there was no decrease after taking meds. SHe can be reached @ [phone]. Thanks!

## 2018-05-14 NOTE — TELEPHONE ENCOUNTER
Verified patient with two types of identifiers. Spoke with pt's daughter, Jake Mendez. Reports that pt has lost a pound, and gained a pound, more or less maintaining her weight. Daughter concerned regarding fluid build up.      Will update MD and return call with recommendation

## 2018-05-15 NOTE — TELEPHONE ENCOUNTER
Pt daughter Alejandra Saavedra called to report her mom has gained 2 Ibs of fluid over night.   Pt daughter is very worried and would like a call back as soon as possible at [phone]  Thanks

## 2018-05-15 NOTE — TELEPHONE ENCOUNTER
Verified patient with two types of identifiers. Spoke with Hira Collado, pt's daughter. Per Dr. Arben Foster patient is to take Bumex 1.5 mg, daily until pt's weight is <130 lbs. When pt's weight is below 130 lbs, pt is to take Bumex 0.5 mg, daily. If pt's weight is unchanged or increased daughter is to call office Thursday am with update and we will see pt in office. Verbalizes understanding. And will call with any questions or concerns.

## 2018-05-15 NOTE — TELEPHONE ENCOUNTER
Increase bumex but doubling dose to 1.5 mg daily until weight below 130#  Future Appointments  Date Time Provider Chava Richi   5/31/2018 2:45  [place], [address]   5/31/2018 3:00 PM CHRISTINE STOREY [address] 5/31/2018 3:20 PM Uriel Tom  [address] 6/27/2018 3:00 PM Benedicto Whitehead [place] [address] [facility]   8/7/2018 3:40 PM Narciso Gasotn  [address] 8/13/2018 3:00 PM Lo Fitch [facility]

## 2018-05-16 DIAGNOSIS — Z78.0 POST-MENOPAUSAL: Primary | ICD-10-CM

## 2018-05-16 DIAGNOSIS — Z12.39 SCREENING FOR BREAST CANCER: ICD-10-CM

## 2018-05-16 DIAGNOSIS — M85.9 DISORDER OF BONE DENSITY AND STRUCTURE, UNSPECIFIED: ICD-10-CM

## 2018-05-23 ENCOUNTER — TELEPHONE (OUTPATIENT)
Dept: NEUROLOGY | Age: 68
End: 2018-05-23

## 2018-05-23 NOTE — TELEPHONE ENCOUNTER
[facility] checking on status of forms for Tetrabenazine that were faxed over to office.     Their phone number is [phone]

## 2018-05-25 ENCOUNTER — HOSPITAL ENCOUNTER (OUTPATIENT)
Dept: LAB | Age: 68
Discharge: HOME | End: 2018-05-25
Payer: MEDICARE

## 2018-05-25 ENCOUNTER — TELEPHONE (OUTPATIENT)
Dept: CARDIOLOGY CLINIC | Age: 68
End: 2018-05-25

## 2018-05-25 PROCEDURE — 80048 BASIC METABOLIC PNL TOTAL CA: CPT

## 2018-05-25 PROCEDURE — 36415 COLL VENOUS BLD VENIPUNCTURE: CPT

## 2018-05-25 PROCEDURE — 83735 ASSAY OF MAGNESIUM: CPT

## 2018-05-25 PROCEDURE — 85018 HEMOGLOBIN: CPT

## 2018-05-25 RX ORDER — BUMETANIDE 1 MG/1
1 TABLET ORAL DAILY
Qty: 30 TAB | Refills: 0 | Status: SHIPPED | OUTPATIENT
Start: 2018-05-25 | End: 2018-06-20 | Stop reason: SDUPTHER

## 2018-05-25 NOTE — TELEPHONE ENCOUNTER
Pt daughter Kam Dillon called to discuss pt medication Bumex and her weight. Pt daughter can be reached at #[phone].     Thanks

## 2018-05-25 NOTE — TELEPHONE ENCOUNTER
Verified patient with two types of identifiers. Patient's daughter, Josh Rock reports that pt's weight is maintaining 130 lbs. Sometimes up a pound or so then sometimes down a pound or so. Patient has been taking 2-3 (0.5 mg) Bumex each day and is almost out of medication. Will send in refill to pharmacy. Instructed for patient to take Bumex 1 mg daily and monitor weight. Current weight is 129 lbs Patient has appointment with Dr. Giovana Yap on Thursday 5/31/18. Ordered labs were completed today. Will discuss diuretic with MD at [address] Daughter to call office if pt has more weight gain over the weekend with update. Verbalizes understanding. And will call with any questions or concerns.       Will update MD.

## 2018-05-26 LAB
BUN SERPL-MCNC: 60 MG/DL (ref 8–27)
BUN/CREAT SERPL: 31 (ref 12–28)
CALCIUM SERPL-MCNC: 9.4 MG/DL (ref 8.7–10.3)
CHLORIDE SERPL-SCNC: 98 MMOL/L (ref 96–106)
CO2 SERPL-SCNC: 24 MMOL/L (ref 18–29)
CREAT SERPL-MCNC: 1.92 MG/DL (ref 0.57–1)
GFR SERPLBLD CREATININE-BSD FMLA CKD-EPI: 26 ML/MIN/1.73
GFR SERPLBLD CREATININE-BSD FMLA CKD-EPI: 30 ML/MIN/1.73
GLUCOSE SERPL-MCNC: 128 MG/DL (ref 65–99)
HGB BLD-MCNC: 11.3 G/DL (ref 11.1–15.9)
INTERPRETATION: NORMAL
MAGNESIUM SERPL-MCNC: 2.2 MG/DL (ref 1.6–2.3)
POTASSIUM SERPL-SCNC: 5 MMOL/L (ref 3.5–5.2)
SODIUM SERPL-SCNC: 137 MMOL/L (ref 134–144)

## 2018-05-31 ENCOUNTER — CLINICAL SUPPORT (OUTPATIENT)
Dept: CARDIOLOGY CLINIC | Age: 68
End: 2018-05-31

## 2018-05-31 ENCOUNTER — TELEPHONE (OUTPATIENT)
Dept: NEUROLOGY | Age: 68
End: 2018-05-31

## 2018-05-31 ENCOUNTER — OFFICE VISIT (OUTPATIENT)
Dept: CARDIOLOGY CLINIC | Age: 68
End: 2018-05-31

## 2018-05-31 VITALS
WEIGHT: 131 LBS | HEIGHT: 61 IN | SYSTOLIC BLOOD PRESSURE: 150 MMHG | BODY MASS INDEX: 24.73 KG/M2 | HEART RATE: 79 BPM | RESPIRATION RATE: 16 BRPM | DIASTOLIC BLOOD PRESSURE: 70 MMHG | OXYGEN SATURATION: 99 %

## 2018-05-31 DIAGNOSIS — I10 ESSENTIAL HYPERTENSION, BENIGN: ICD-10-CM

## 2018-05-31 DIAGNOSIS — I42.0 DILATED CARDIOMYOPATHY (HCC): Chronic | ICD-10-CM

## 2018-05-31 DIAGNOSIS — Z95.810 PRESENCE OF AUTOMATIC CARDIOVERTER/DEFIBRILLATOR (AICD): Primary | ICD-10-CM

## 2018-05-31 DIAGNOSIS — I42.9 CARDIOMYOPATHY, UNSPECIFIED TYPE (HCC): ICD-10-CM

## 2018-05-31 DIAGNOSIS — I13.0 CARDIORENAL SYNDROME WITH RENAL FAILURE, STAGE 1-4 OR UNSPECIFIED CHRONIC KIDNEY DISEASE, WITH HEART FAILURE (HCC): ICD-10-CM

## 2018-05-31 DIAGNOSIS — I50.22 CHRONIC SYSTOLIC HEART FAILURE (HCC): Chronic | ICD-10-CM

## 2018-05-31 DIAGNOSIS — E11.21 TYPE 2 DIABETES WITH NEPHROPATHY (HCC): ICD-10-CM

## 2018-05-31 DIAGNOSIS — E78.00 PURE HYPERCHOLESTEROLEMIA: ICD-10-CM

## 2018-05-31 DIAGNOSIS — I50.22 CHRONIC SYSTOLIC HEART FAILURE (HCC): Primary | Chronic | ICD-10-CM

## 2018-05-31 NOTE — MR AVS SNAPSHOT
[address] [facility] [address] [place] 
[phone] Patient: Shaun Villatoro MRN: [id number] LUB:[id number]/4/3792 Visit Information Date & Time Provider Department Dept. Phone Encounter #  
 5/31/2018  3:20 PM Petra Alvares MD [facility] [phone] [id number] Your Appointments 6/27/2018  3:00 PM  
PHYSICAL with Julieta Zuñiga MD  
[address] [address] Internal Medicine [address] [facility]) Appt Note: CPE Per Dr Corrinne Mill [address] [facility] [address]  
[facility] [address] [address] [address] [address]  
  
    
 8/7/2018  3:40 PM  
New Patient with Adrienne Alexis MD  
[facility] ([address]) Appt Note: 3 month  f/u  
 [address] [place]  
[phone]  
  
   
 [address] [address]  
  
    
 8/13/2018  3:00 PM  
Follow Up with Summer Dickson MD  
[facility] [address] [place] [address]) Appt Note: 3 month follow up [address] [facility] [address]  
[phone]  
  
   
 [address] [place] [address] [address] [address]  
  
    
 6/20/2019  3:30 PM  
PACEMAKER with Jurgen Stewart [facility] ([place] SCHEDULING) Appt Note: irina sci I(CD/rc/Sab  annual b   LAT  
 [address] [place]  
[phone]  
  
   
 [address] [place]  
  
    
 6/20/2019  3:40 PM  
ESTABLISHED PATIENT with Petra Alvares MD  
[facility] ([address]) Appt Note: irina sci I(CD/rc/Sab  annual b   LAT  
 [address] [place]  
[address] [address]  
  
    
  
 9/12/2018  1:45 PM  
REMOTE OFFICE VISIT with Davy Silva  
 [facility] ([place] SCHEDULING) Appt Note: bsci ICD/rc b 5-31-18  
 [address] [place] [address]  
[address] [place] [address]  
  
    
 12/12/2018 11:30 AM  
REMOTE OFFICE VISIT with [facility] SCHEDULING) Appt Note: LAT ICD/rc  
 [address] [facility] [address] [place]  
[phone]  
  
    
 3/18/2019  9:15 AM  
REMOTE OFFICE VISIT with [facility] ([place] SCHEDULING) Appt Note: LAT ICD/rc  
 [address] [facility] [address] [place]  
[phone] Upcoming Health Maintenance Date Due  
 FOOT EXAM Q1 5/3/1960 EYE EXAM RETINAL OR DILATED Q1 5/3/1960 DTaP/Tdap/Td series (1 - Tdap) 5/3/1971 BREAST CANCER SCRN MAMMOGRAM 5/3/2000 FOBT Q 1 YEAR AGE 50-75 5/3/2000 ZOSTER VACCINE AGE 60> 3/3/2010 GLAUCOMA SCREENING Q2Y 5/3/2015 Bone Densitometry (Dexa) Screening 5/3/2015 Pneumococcal 65+ High/Highest Risk (1 of 2 - PCV13) 5/3/2015 MEDICARE YEARLY EXAM 5/2/2018 Influenza Age 5 to Adult 8/1/2018 HEMOGLOBIN A1C Q6M 11/3/2018 MICROALBUMIN Q1 5/3/2019 LIPID PANEL Q1 5/3/2019 Allergies as of 5/31/2018  Review Complete On: 5/31/2018 By: Gertrude Queen Severity Noted Reaction Type Reactions Codeine  04/21/2010    Hives Pcn [Penicillins]  04/21/2010    Hives Current Immunizations  Never Reviewed No immunizations on file. Not reviewed this visit Vitals BP Pulse Resp Height(growth percentile) Weight(growth percentile) SpO2  
 150/70 (BP 1 Location: Left arm, BP Patient Position: Sitting) 79 16 5' 1\" (1.549 m) 131 lb (59.4 kg) 99% BMI OB Status Smoking Status 24.75 kg/m2 Hysterectomy Never Smoker BMI and BSA Data Body Mass Index Body Surface Area 24.75 kg/m 2 1.6 m 2 Preferred Pharmacy Pharmacy Name Phone [id number] [address]/ Elieser Webb. [facility] [phone] Your Updated Medication List  
  
   
This list is accurate as of 5/31/18  4:33 PM.  Always use your most recent med list.  
  
  
  
  
 aspirin delayed-release 81 mg tablet Take  by mouth daily. bumetanide 1 mg tablet Commonly known as:  Jaya Patron Take 1 Tab by mouth daily. carvedilol 25 mg tablet Commonly known as:  Abel Beets Take 1 Tab by mouth two (2) times daily (with meals). glipiZIDE 5 mg tablet Commonly known as:  GLUCOTROL  
TAKE 1/2 TABLET BY MOUTH ONCE A DAY  
  
 mirtazapine 15 mg tablet Commonly known as:  REMERON  
TAKE 1 TABLET BY MOUTH AT BEDTIME  
  
 simvastatin 20 mg tablet Commonly known as:  ZOCOR Take 1 Tab by mouth nightly. Tetrabenazine 12.5 mg Tab  
daily for 1 wk, then 2 daily Patient Instructions You will need to follow up in clinic with Dr. Victoria Lazo in 3 months. Introducing [facility] SERVICES! Dear Xavier Dunaway: 
Thank you for requesting a [employer]r account. Our records indicate that you already have an active Jobr account. You can access your account anytime at [email or web address]. "Shahab P. Tabatabai, Broker"/Glowpoint Did you know that you can access your hospital and ER discharge instructions at any time in Jobr? You can also review all of your test results from your hospital stay or ER visit. Additional Information If you have questions, please visit the Frequently Asked Questions section of the Jobr website at [email or web address] "Shahab P. Tabatabai, Broker"/[employer]low[employer]/. Remember, [employer]r is NOT to be used for urgent needs. For medical emergencies, dial 911. Now available from your iPhone and Android! Please provide this summary of care documentation to your next provider. Your primary care clinician is listed as Kelsea Carlisle. If you have any questions after today's visit, please call [phone].

## 2018-05-31 NOTE — TELEPHONE ENCOUNTER
Re: Tetrabenazine    PA submitted via [employer] to [employer]. Pending status:  \"[facility] is reviewing your PA request and will respond within 24-72 hours. To check for an update later, open this request from your dashboard. \"    Will update when determination is made.

## 2018-06-04 ENCOUNTER — TELEPHONE (OUTPATIENT)
Dept: NEUROLOGY | Age: 68
End: 2018-06-04

## 2018-06-04 NOTE — TELEPHONE ENCOUNTER
Re: Tetrabenazine    Approved by [employer] via [employer] [facility]. DON WILSON Josias ALMEIDA Case ID: [id number] Need help? Call us at [phone]   Outcome   Approved on May 31   CaseId:[id number];Status:Approved; Review Type:Prior Auth; Coverage Start Date:05/31/2018; Coverage End Date:05/31/2019; Auth good 5/31/18 - 5/31/19. Faxed approval to [facility]. Fax confirmation received 6/4/18. Forward to nurse for [address] [address] [place].

## 2018-06-05 ENCOUNTER — TELEPHONE (OUTPATIENT)
Dept: INTERNAL MEDICINE CLINIC | Age: 68
End: 2018-06-05

## 2018-06-05 DIAGNOSIS — E11.65 TYPE 2 DIABETES MELLITUS WITH HYPERGLYCEMIA, WITHOUT LONG-TERM CURRENT USE OF INSULIN (HCC): Primary | ICD-10-CM

## 2018-06-05 RX ORDER — GLIPIZIDE 5 MG/1
TABLET ORAL
Qty: 30 TAB | Refills: 1 | Status: CANCELLED | OUTPATIENT
Start: 2018-06-05

## 2018-06-05 NOTE — TELEPHONE ENCOUNTER
Rebekah w/ Marcel Mendoza verified patient 2 id's would like refill for Glipizide 5 mg , takes 1/2 tab daily to be sent to [facility] on file . She inquires about the Glipizide 2.5 mg , informed available only as 24 hr . She will discuss at upcoming appt.

## 2018-06-06 RX ORDER — GLIPIZIDE 5 MG/1
TABLET ORAL
Qty: 45 TAB | Refills: 1 | Status: SHIPPED | OUTPATIENT
Start: 2018-06-06 | End: 2018-10-09 | Stop reason: SDUPTHER

## 2018-06-20 NOTE — TELEPHONE ENCOUNTER
Request for bumex 1 mg, daily. Last office visit 5/31/18,   Next office visit 8/30/18.      Will ask MD regarding okay to refill

## 2018-06-20 NOTE — TELEPHONE ENCOUNTER
Pt called requesting refill on selected medication. #30 day supply. Pt can be reached at #[phone].   Thanks

## 2018-06-21 RX ORDER — BUMETANIDE 1 MG/1
1 TABLET ORAL DAILY
Qty: 30 TAB | Refills: 5 | Status: SHIPPED | OUTPATIENT
Start: 2018-06-21 | End: 2018-08-07

## 2018-06-24 PROBLEM — I13.10 CARDIORENAL SYNDROME: Status: ACTIVE | Noted: 2018-06-24

## 2018-06-27 ENCOUNTER — OFFICE VISIT (OUTPATIENT)
Dept: INTERNAL MEDICINE CLINIC | Age: 68
End: 2018-06-27

## 2018-06-27 VITALS
WEIGHT: 133.6 LBS | RESPIRATION RATE: 17 BRPM | SYSTOLIC BLOOD PRESSURE: 143 MMHG | DIASTOLIC BLOOD PRESSURE: 80 MMHG | OXYGEN SATURATION: 96 % | TEMPERATURE: 98.1 F | HEIGHT: 61 IN | HEART RATE: 86 BPM | BODY MASS INDEX: 25.22 KG/M2

## 2018-06-27 DIAGNOSIS — I50.22 CHRONIC SYSTOLIC HEART FAILURE (HCC): Primary | Chronic | ICD-10-CM

## 2018-06-27 DIAGNOSIS — Z12.39 BREAST CANCER SCREENING: ICD-10-CM

## 2018-06-27 DIAGNOSIS — M81.0 POSTMENOPAUSAL BONE LOSS: ICD-10-CM

## 2018-06-27 DIAGNOSIS — Z23 ENCOUNTER FOR IMMUNIZATION: ICD-10-CM

## 2018-06-27 DIAGNOSIS — I10 ESSENTIAL HYPERTENSION, BENIGN: ICD-10-CM

## 2018-06-27 DIAGNOSIS — E11.21 TYPE 2 DIABETES WITH NEPHROPATHY (HCC): ICD-10-CM

## 2018-06-27 DIAGNOSIS — Z23 NEED FOR TDAP VACCINATION: ICD-10-CM

## 2018-06-27 DIAGNOSIS — E78.00 PURE HYPERCHOLESTEROLEMIA: ICD-10-CM

## 2018-06-27 NOTE — PROGRESS NOTES
This is a Subsequent Medicare Annual Wellness Visit providing Personalized Prevention Plan Services (PPPS) (Performed 12 months after initial AWV and PPPS )    I have reviewed the patient's medical history in detail and updated the computerized patient record. She has had follow up with Dr. Lynsey Kimble. Stable CHF. She drinks 2-3 16oz bottles of water every 24 hours. This seems to maintain her hydration without her having fluid overload symptoms. 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 At that time, the patient was prescribed tetrabenazine. She was unable to get this medication as it was too expensive for her. She is currently working with neurology in order to be able to obtain the medication. She was supposed to see nephrology [facility]). She had missed that appointment and this was subsequently rescheduled. History     Past Medical History:   Diagnosis Date    Calculus of kidney     Cardiomyopathy [facility])     idiopathic    Chronic systolic heart failure [facility])     April 2010 ef 20%, by Dec 2010 up to 50% on meds, cath with normal cors    Diabetes [facility])     Essential hypertension, benign     Hypercholesterolemia       Past Surgical History:   Procedure Laterality Date    CARDIAC SURG PROCEDURE UNLIST      HX HYSTERECTOMY      HX ORTHOPAEDIC      HX OTHER SURGICAL  10/2017    difibulator     HX UROLOGICAL      stone removal     Current Outpatient Prescriptions   Medication Sig Dispense Refill    bumetanide (BUMEX) 1 mg tablet Take 1 Tab by mouth daily. 30 Tab 5    glipiZIDE (GLUCOTROL) 5 mg tablet TAKE 1/2 TABLET BY MOUTH ONCE A DAY 45 Tab 1    Tetrabenazine 12.5 mg tab daily for 1 wk, then 2 daily 60 Tab 1    mirtazapine (REMERON) 15 mg tablet TAKE 1 TABLET BY MOUTH AT BEDTIME  5    aspirin delayed-release 81 mg tablet Take  by mouth daily.  simvastatin (ZOCOR) 20 mg tablet Take 1 Tab by mouth nightly.  30 Tab 0    carvedilol (COREG) 25 mg tablet Take 1 Tab by mouth two (2) times daily (with meals). 60 Tab 6     Allergies   Allergen Reactions    Codeine Hives    Pcn [Penicillins] Hives     Family History   Problem Relation Age of Onset    Stroke Mother     Hypertension Mother     Lung Disease Father      emphysema    Heart Disease Brother     Stroke Brother     Cancer Brother      bone    Cancer Maternal Grandfather      colon ca    Cancer Other      breast ca     Social History   Substance Use Topics    Smoking status: Never Smoker    Smokeless tobacco: Never Used    Alcohol use No     Patient Active Problem List   Diagnosis Code    Chronic systolic heart failure (HCC) I50.22    Cardiomyopathy ([facility] [address] I42.9    Pure hypercholesterolemia E78.00    Essential hypertension, benign I10    Type 2 diabetes with nephropathy ([facility] [address] E11.21    Cardiorenal syndrome I13.10       Depression Risk Factor Screening:     PHQ over the last two weeks 5/2/2018   Little interest or pleasure in doing things Not at all   Feeling down, depressed or hopeless Not at all   Total Score PHQ 2 0     Alcohol Risk Factor Screening: You do not drink alcohol or very rarely. Functional Ability and Level of Safety:     Hearing Loss   Hearing is good. Activities of Daily Living   Self-care. Requires assistance with: no ADLs    Fall Risk     Fall Risk Assessment, last 12 mths 5/2/2018   Able to walk? Yes   Fall in past 12 months? No     Abuse Screen   Patient is not abused    Review of Systems   Pertinent items are noted in HPI. Physical Examination     Evaluation of Cognitive Function:  Mood/affect:  happy  Appearance: age appropriate  Family member/caregiver input: The patient's daughter corroborates the assessment.     Visit Vitals    /80 (BP 1 Location: Right arm, BP Patient Position: Sitting)    Pulse 86    Temp 98.1 °F (36.7 °C) (Oral)    Resp 17    Ht 5' 1\" (1.549 m)    Wt 133 lb 9.6 oz (60.6 kg)    SpO2 96%    BMI 25.24 kg/m2     General appearance: alert, cooperative, no distress, appears stated age  Lungs: clear to auscultation bilaterally  Heart: regular rate and rhythm, S1, S2 normal, no murmur, click, rub or gallop    Patient Care Team:  Tre Knowles MD as PCP - General (Internal Medicine)  Lita Heredia MD (Cardiology)    Advice/Referrals/Counseling   Education and counseling provided:  Are appropriate based on today's review and evaluation      Assessment/Plan   Diagnoses and all orders for this visit:    1. Chronic systolic heart failure ([address]    2. Essential hypertension, benign    3. Type 2 diabetes with nephropathy (HCC)  -      DIABETES FOOT EXAM    4. Pure hypercholesterolemia    5. Need for Tdap vaccination  -     diph,Pertuss,Acell,,Tet Vac-PF (ADACEL) 2 Lf-(2.5-5-3-5 mcg)-5Lf/0.5 mL susp; 0.5 mL by IntraMUSCular route once for 1 dose. 6. Postmenopausal bone loss  -     DEXA BONE DENSITY STUDY AXIAL; Future    7. Breast cancer screening  -     [facility] MAMMO BI SCREENING INCL CAD; Future    8. Encounter for immunization  -     pneumococcal 13 ariane conj dip (PREVNAR-13) 0.5 mL syrg injection; 0.5 mL by IntraMUSCular route once for 1 dose. Follow-up Disposition:  Return in about 3 months (around 9/27/2018) for Follow up. Lashell Davila

## 2018-08-07 ENCOUNTER — OFFICE VISIT (OUTPATIENT)
Dept: CARDIOLOGY CLINIC | Age: 68
End: 2018-08-07

## 2018-08-07 VITALS
HEART RATE: 83 BPM | SYSTOLIC BLOOD PRESSURE: 122 MMHG | WEIGHT: 137 LBS | DIASTOLIC BLOOD PRESSURE: 64 MMHG | BODY MASS INDEX: 25.86 KG/M2 | HEIGHT: 61 IN

## 2018-08-07 DIAGNOSIS — E11.21 TYPE 2 DIABETES WITH NEPHROPATHY (HCC): ICD-10-CM

## 2018-08-07 DIAGNOSIS — Z95.810 PRESENCE OF AUTOMATIC CARDIOVERTER/DEFIBRILLATOR (AICD): Primary | ICD-10-CM

## 2018-08-07 DIAGNOSIS — I50.22 CHRONIC SYSTOLIC HEART FAILURE (HCC): ICD-10-CM

## 2018-08-07 DIAGNOSIS — I42.0 DILATED CARDIOMYOPATHY (HCC): ICD-10-CM

## 2018-08-07 DIAGNOSIS — I10 ESSENTIAL HYPERTENSION, BENIGN: ICD-10-CM

## 2018-08-07 RX ORDER — CARVEDILOL 12.5 MG/1
12.5 TABLET ORAL 2 TIMES DAILY WITH MEALS
COMMUNITY
End: 2021-09-07 | Stop reason: SDUPTHER

## 2018-08-07 RX ORDER — TORSEMIDE 20 MG/1
50 TABLET ORAL DAILY
COMMUNITY
End: 2019-08-27 | Stop reason: SDUPTHER

## 2018-08-07 NOTE — PROGRESS NOTES
Cardiac Electrophysiology OFFICE Consultation Note     Subjective: Rupinder Alejandro is a 76 y.o. patient who is seen as a new patient, is s/p [employer] single lead ICD (DOI 10/10/2017) for NICM & CHF (NYHA II). She was kindly referred by Dr. Sanjeev Nash for device management. Last remote check on 05/31/2018 showed proper function, no pacing required, programmed VVI 40. Generator longevity approx 12 years. No ICD shocks. Her CHF (NYHA II) symptoms have been stable, though she has been concerned about some weight gain. Weight today +4 lbs compared to 06/27/2018. She denies chest pain, palpitations, PND, orthopnea, lightheadedness, or syncope. Able to ambulate without difficulty with walker. Bilateral lower extremity edema at the end of the day, resolves by morning. Dr. Yareli Zimmerman (nephrology) recently changed diuretic, now taking Torsemide 40 mg po bid per patient; she states this has not made much difference. Renal function monitored by Dr. Yareli Zimmerman. Previous:  Echo (05/31/2018): LVEF 25%, LV mildly dilated, severe diffuse LV hypokinesis with distinct regional wall motion abnormalities, LV wall thickness upper limits of normal.  LA moderately dilated. Mild MR. Trivial AR, TR, & AZ.       Problem List  Date Reviewed: 8/7/2018          Codes Class Noted    Cardiorenal syndrome ICD-10-CM: I13.10  ICD-9-CM: 404.90  6/24/2018        Type 2 diabetes with nephropathy [facility]) ICD-10-CM: E11.21  ICD-9-CM: 250.40, 583.81  5/10/2018        Essential hypertension, benign ICD-10-CM: I10  ICD-9-CM: 401.1  Unknown        Pure hypercholesterolemia ICD-10-CM: E78.00  ICD-9-CM: 272.0  2/28/2011        Chronic systolic heart failure (HCC) (Chronic) ICD-10-CM: I50.22  ICD-9-CM: 428.22  Unknown        Cardiomyopathy ([facility] [address] (Chronic) ICD-10-CM: I42.9  ICD-9-CM: 425.4  Unknown              Current Outpatient Prescriptions   Medication Sig Dispense Refill    torsemide (DEMADEX) 20 mg tablet Take 40 mg by mouth daily.  carvedilol (COREG) 12.5 mg tablet Take 12.5 mg by mouth two (2) times daily (with meals).  glipiZIDE (GLUCOTROL) 5 mg tablet TAKE 1/2 TABLET BY MOUTH ONCE A DAY 45 Tab 1    Tetrabenazine 12.5 mg tab daily for 1 wk, then 2 daily (Patient taking differently: Take 1 tablet every day) 60 Tab 1    mirtazapine (REMERON) 15 mg tablet TAKE 1 TABLET BY MOUTH AT BEDTIME  5    aspirin delayed-release 81 mg tablet Take  by mouth daily.  simvastatin (ZOCOR) 20 mg tablet Take 1 Tab by mouth nightly. 30 Tab 0     Allergies   Allergen Reactions    Codeine Hives    Pcn [Penicillins] Hives     Past Medical History:   Diagnosis Date    Calculus of kidney     Cardiomyopathy ([facility] [address]     idiopathic    Chronic systolic heart failure [facility])     April 2010 ef 20%, by Dec 2010 up to 50% on meds, cath with normal cors    Diabetes [facility])     Essential hypertension, benign     Hypercholesterolemia      Past Surgical History:   Procedure Laterality Date    CARDIAC SURG PROCEDURE UNLIST      HX HYSTERECTOMY      HX ORTHOPAEDIC      HX OTHER SURGICAL  10/2017    difibulator     HX UROLOGICAL      stone removal     Family History   Problem Relation Age of Onset    Stroke Mother     Hypertension Mother     Lung Disease Father      emphysema    Heart Disease Brother     Stroke Brother     Cancer Brother      bone    Cancer Maternal Grandfather      colon ca    Cancer Other      breast ca     Social History   Substance Use Topics    Smoking status: Never Smoker    Smokeless tobacco: Never Used    Alcohol use No        Review of Systems:   Constitutional: Negative for fever, chills, weight loss, malaise/fatigue. + slight weight gain  HEENT: Negative for nosebleeds, vision changes. Respiratory: Negative for cough, hemoptysis  Cardiovascular: Negative for chest pain, palpitations, orthopnea, claudication, + leg swelling, no syncope, and no PND.    Gastrointestinal: Negative for nausea, vomiting, diarrhea, blood in stool and melena. Genitourinary: Negative for dysuria, and hematuria. Musculoskeletal: Negative for myalgias, arthralgia. Skin: Negative for rash. Heme: Does not bleed or bruise easily. Neurological: Negative for speech change and focal weakness     Objective:     Visit Vitals    /64 (BP 1 Location: Left arm, BP Patient Position: Sitting)    Pulse 83    Ht 5' 1\" (1.549 m)    Wt 137 lb (62.1 kg)    BMI 25.89 kg/m2      Physical Exam:   Constitutional: well-developed and well-nourished. No respiratory distress. Head: Normocephalic and atraumatic. Eyes: Pupils are equal, round  ENT: hearing normal  Neck: supple. No JVD present. Cardiovascular: Normal rate, regular rhythm. Exam reveals no gallop and no friction rub. No murmur heard. Pulmonary/Chest: Effort normal and breath sounds normal. No wheezes. Abdominal: Soft, no tenderness. Musculoskeletal: No edema. FROM  Neurological: Alert,oriented. Skin: Skin is warm and dry, unremarkable left sided ICD site  Psychiatric: normal mood and affect. Behavior is normal. Judgment and thought content normal.      EKG (05/10/2018): NSR, QRSd 98 ms. Diffuse anterior T wave inversions. Assessment/Plan:          ICD-10-CM ICD-9-CM    1. Presence of automatic cardioverter/defibrillator (AICD) Z95.810 V45.02    2. Chronic systolic heart failure (HCC) I50.22 428.22    3. Dilated cardiomyopathy (HCC) I42.0 425.4    4. Essential hypertension, benign I10 401.1    5. Type 2 diabetes with nephropathy (HCC) E11.21 250.40      583.81    NYHA class 2  She uses walker today  Ms. Iesha Johnston has NICM with stable EF & CHF (NYHA II) symptoms. Her nephrologist is currently managing her diuresis and monitor creatinine GFR. Reviewed daily weights, indications for increased diuretic requirement. Currently taking Carvedilol 12.5 mg po bid; no ACE/ARB due to decreased renal function. No hydralazine or Isordil, as BP does not allow.     Recent ICD check showed proper function, no pacing required. Review of ECG shows no delayed conduction, QRSd 98 ms so no indication for biventricular device upgrade at this time. Continue remote checks q 3 months, annual follow up in EP clinic. Continue visits with Dr. Thuy Larson as previously scheduled. Future Appointments  Date Time Provider Chava Gabriel   9/4/2018 10:40 AM Charlotte Huizar  [address]   9/6/2018 10:00 AM Marissa James MD [facility] [place] SCHED   9/12/2018 1:45 PM REMOTE1, [address]   9/28/2018 9:00 AM Nona Bucio MD [address] [facility]   12/12/2018 11:30 AM REMOTE1, [facility] ALLEY SCHED   3/18/2019 9:15 AM REMOTE1, [address]   6/20/2019 3:30 PM PACEMAKER3, [address]   6/20/2019 3:40 PM Charlotte Huizar  [address]       Follow-up Disposition:  Return in about 1 year (around 8/7/2019). Thank you for involving me in this patient's care and please call with further concerns or questions. Mortimer Hug, M.D.   Electrophysiology/Cardiology  [address] [facility] [place]  [facility] [address]  [phone]

## 2018-08-07 NOTE — PROGRESS NOTES
Verified patient with two types of identifiers. Verified medications with the patient. Verified pharmacy with patient. Per Dr Cornelious Felty discontinued all medications not taken.

## 2018-08-07 NOTE — MR AVS SNAPSHOT
[address] [facility] [address] 
[phone] Patient: Homer Aguayo MRN: [id number] LVT:6/9/4416 Visit Information Date & Time Provider Department Dept. Phone Encounter #  
 8/7/2018 10:00 AM Christopher Coughlin MD [facility] Shan Ruiz [phone] [id number] Your Appointments 8/7/2018 10:00 AM  
New Patient with Christopher Coughlin MD  
[address] ([facility] Appt Note: 3 month  f/u; 3 month f/u; pt's daughter r/s  
 [address] Count includes the [facility] [address]  
[address] [facility]  
  
    
 9/4/2018 10:40 AM  
ESTABLISHED PATIENT with Cruz Jose MD  
[facility] ([facility]) Appt Note: 3 month f/u; 3 month f/u; pt's daughter r/s from 8/30 to 9/4  
 [address]  
[address] [facility] [address] [address]  
  
    
 9/6/2018 10:00 AM  
Follow Up with Montana Chavarria MD  
[facility] at [address] [facility]) Appt Note: 3 month follow up KRU 5/11; r/s \"same\" 3 mo f/up $ 06/29/[address] [address]  
[phone]  
  
   
 [facility] [address] [facility] [address]  
  
    
 9/28/2018  9:00 AM  
ROUTINE CARE with Shaun Carias MD  
[address] [facility]) Appt Note: f/u 3m  
 [address]  
[address] [address] [facility] [address]  
  
    
 6/20/2019  3:30 PM  
PACEMAKER with Erma Clarke [facility] ([facility] SCH[facility]) Appt Note: irina sci I(CD/rc/Sab  annual b   LAT  
 [address]  
[address] [address]  
  
    
 6/20/2019  3:40 PM  
 ESTABLISHED PATIENT with Radha Ramirez MD  
[facility] (ALLEY SCHEDULING) Appt Note: irina sci I(CD/rc/Sab  annual b   LAT  
 [address] [place]  
[phone]  
  
    
  
 9/12/2018  1:45 PM  
REMOTE OFFICE VISIT with Towana Ormond [facility] (ALLEY SCHEDULING) Appt Note: bsci ICD/rc b 5-31-18  
 [address] [place]  
[address] [facility] [address]  
  
    
 12/12/2018 11:30 AM  
REMOTE OFFICE VISIT with Towana Ormond [facility] ([facility]) Appt Note: LAT ICD/rc  
 [address] [facility] [address] [place]  
[phone]  
  
    
 3/18/2019  9:15 AM  
REMOTE OFFICE VISIT with Towana Ormond [facility] (ALLEY SCHEDULING) Appt Note: LAT ICD/rc  
 [address] [facility] [address] [place]  
[phone] Upcoming Health Maintenance Date Due  
 EYE EXAM RETINAL OR DILATED Q1 5/3/1960 DTaP/Tdap/Td series (1 - Tdap) 5/3/1971 BREAST CANCER SCRN MAMMOGRAM 5/3/2000 FOBT Q 1 YEAR AGE 50-75 5/3/2000 ZOSTER VACCINE AGE 60> 3/3/2010 GLAUCOMA SCREENING Q2Y 5/3/2015 Bone Densitometry (Dexa) Screening 5/3/2015 Pneumococcal 65+ High/Highest Risk (1 of 2 - PCV13) 5/3/2015 Influenza Age 5 to Adult 8/1/2018 HEMOGLOBIN A1C Q6M 11/3/2018 MICROALBUMIN Q1 5/3/2019 LIPID PANEL Q1 5/3/2019 FOOT EXAM Q1 6/27/2019 MEDICARE YEARLY EXAM 6/28/2019 Allergies as of 8/7/2018  Review Complete On: 8/7/2018 By: Patric Baumgarten, MD  
  
 Severity Noted Reaction Type Reactions Codeine  04/21/2010    Hives Pcn [Penicillins]  04/21/2010    Hives Current Immunizations  Never Reviewed No immunizations on file. Not reviewed this visit You Were Diagnosed With   
  
 Codes Comments Presence of automatic cardioverter/defibrillator (AICD)    -  Primary ICD-10-CM: Z95.810 ICD-9-CM: V45.02 Chronic systolic heart failure (HCC)     ICD-10-CM: I50.22 ICD-9-CM: 428.22 Dilated cardiomyopathy ([facility] [address]     ICD-10-CM: I42.0 ICD-9-CM: 425.4 Essential hypertension, benign     ICD-10-CM: I10 
ICD-9-CM: 401.1 Vitals BP Pulse Height(growth percentile) Weight(growth percentile) BMI OB Status 122/64 (BP 1 Location: Left arm, BP Patient Position: Sitting) 83 5' 1\" (1.549 m) 137 lb (62.1 kg) 25.89 kg/m2 Hysterectomy Smoking Status Never Smoker Vitals History BMI and BSA Data Body Mass Index Body Surface Area  
 25.89 kg/m 2 1.63 m 2 Preferred Pharmacy Pharmacy Name Phone [facility]/PHARMACY #[id number] [address] Reyes/ Elieser Pederson [facility] [phone] Your Updated Medication List  
  
   
This list is accurate as of 8/7/18  9:59 AM.  Always use your most recent med list.  
  
  
  
  
 aspirin delayed-release 81 mg tablet Take  by mouth daily. COREG 12.5 mg tablet Generic drug:  carvedilol Take 12.5 mg by mouth two (2) times daily (with meals). glipiZIDE 5 mg tablet Commonly known as:  GLUCOTROL  
TAKE 1/2 TABLET BY MOUTH ONCE A DAY  
  
 mirtazapine 15 mg tablet Commonly known as:  REMERON  
TAKE 1 TABLET BY MOUTH AT BEDTIME  
  
 simvastatin 20 mg tablet Commonly known as:  ZOCOR Take 1 Tab by mouth nightly. Tetrabenazine 12.5 mg Tab  
daily for 1 wk, then 2 daily  
  
 torsemide 20 mg tablet Commonly known as:  DEMADEX Take 40 mg by mouth daily. Introducing [facility]! Dear [facility]: 
Thank you for requesting a [employer] account. Our records indicate that you already have an active [employer] account. You can access your account anytime at [email or web address]. [employer]/[email or web address] Did you know that you can access your hospital and ER discharge instructions at any time in [employer]? You can also review all of your test results from your hospital stay or ER visit. Additional Information If you have questions, please visit the Frequently Asked Questions section of the [employer] website at [email or web address]/[email or web address] Fit For A Kid!t/. Remember, [employer] is NOT to be used for urgent needs. For medical emergencies, dial 911. Now available from your iPhone and Android! Please provide this summary of care documentation to your next provider. Your primary care clinician is listed as Pamella Ceballos. If you have any questions after today's visit, please call [phone].

## 2018-08-08 ENCOUNTER — TELEPHONE (OUTPATIENT)
Dept: INTERNAL MEDICINE CLINIC | Age: 68
End: 2018-08-08

## 2018-08-08 DIAGNOSIS — E78.00 PURE HYPERCHOLESTEROLEMIA: ICD-10-CM

## 2018-08-08 DIAGNOSIS — E11.21 TYPE 2 DIABETES WITH NEPHROPATHY (HCC): Primary | ICD-10-CM

## 2018-08-08 DIAGNOSIS — I42.0 DILATED CARDIOMYOPATHY (HCC): Chronic | ICD-10-CM

## 2018-08-08 DIAGNOSIS — I10 ESSENTIAL HYPERTENSION, BENIGN: ICD-10-CM

## 2018-08-08 NOTE — TELEPHONE ENCOUNTER
Patient's daughter requesting a labslip for her mother's 3mo diabetes check. She would like to pick it up next week. Appt isn't until October, but she wants to have it now.

## 2018-09-04 ENCOUNTER — OFFICE VISIT (OUTPATIENT)
Dept: CARDIOLOGY CLINIC | Age: 68
End: 2018-09-04

## 2018-09-04 VITALS
HEIGHT: 61 IN | SYSTOLIC BLOOD PRESSURE: 140 MMHG | HEART RATE: 83 BPM | WEIGHT: 137 LBS | BODY MASS INDEX: 25.86 KG/M2 | DIASTOLIC BLOOD PRESSURE: 80 MMHG | OXYGEN SATURATION: 97 % | RESPIRATION RATE: 18 BRPM

## 2018-09-04 DIAGNOSIS — I10 ESSENTIAL HYPERTENSION, BENIGN: ICD-10-CM

## 2018-09-04 DIAGNOSIS — I13.0 CARDIORENAL SYNDROME WITH RENAL FAILURE, STAGE 1-4 OR UNSPECIFIED CHRONIC KIDNEY DISEASE, WITH HEART FAILURE (HCC): ICD-10-CM

## 2018-09-04 DIAGNOSIS — E78.00 PURE HYPERCHOLESTEROLEMIA: ICD-10-CM

## 2018-09-04 DIAGNOSIS — I50.22 CHRONIC SYSTOLIC HEART FAILURE (HCC): Primary | Chronic | ICD-10-CM

## 2018-09-04 DIAGNOSIS — I42.0 DILATED CARDIOMYOPATHY (HCC): Chronic | ICD-10-CM

## 2018-09-04 DIAGNOSIS — E11.21 TYPE 2 DIABETES WITH NEPHROPATHY (HCC): ICD-10-CM

## 2018-09-04 NOTE — MR AVS SNAPSHOT
[address] [facility] [address] [place] 
[phone] Patient: Ilsa Bowens MRN: [id number] DJC:[birth year] Visit Information Date & Time Provider Department Dept. Phone Encounter #  
 9/4/2018 10:40 AM Lauren Huff MD [facility] Meron Bejarano [phone] [id number] Your Appointments 10/5/2018  9:00 AM  
ROUTINE CARE with Celestino Newman MD  
[facility]) Appt Note: f/u  
 [address] [place]  
[address] [address] [facility] [place] [address]  
  
    
 6/20/2019  3:30 PM  
PACEMAKER with Micky Keen [facility] (ALLEY SCHEDULING) Appt Note: irina sci I(CD/rc/Sab  annual b   LAT  
 [address] [place]  
[phone]  
  
   
 [address]  
  
    
 6/20/2019  3:40 PM  
ESTABLISHED PATIENT with Lauren Huff MD  
[facility] ([facility]) Appt Note: irina sci I(CD/rc/Sab  annual b   LAT  
 [address] [place]  
[address] [address]  
  
    
  
 9/12/2018  1:45 PM  
REMOTE OFFICE VISIT with Genie Jay [facility] (ALLEY SCHEDULING) Appt Note: bsci ICD/rc b 5-31-18  
 [address] [place]  
[address] [address]  
  
    
 12/12/2018 11:30 AM  
REMOTE OFFICE VISIT with Genie Jay [facility] (ALLEY SCHEDULING) Appt Note: LAT ICD/rc  
 [address] [facility] [address] [place]  
[phone]  
  
    
 3/18/2019  9:15 AM  
REMOTE OFFICE VISIT with Genie Jay [facility] (ALLEY SCHEDULING) Appt Note: LAT ICD/rc  
 [address] [facility]  
[phone] Upcoming Health Maintenance Date Due  
 EYE EXAM RETINAL OR DILATED Q1 5/3/1960 DTaP/Tdap/Td series (1 - Tdap) 5/3/1971 BREAST CANCER SCRN MAMMOGRAM 5/3/2000 FOBT Q 1 YEAR AGE 50-75 5/3/2000 ZOSTER VACCINE AGE 60> 3/3/2010 GLAUCOMA SCREENING Q2Y 5/3/2015 Bone Densitometry (Dexa) Screening 5/3/2015 Pneumococcal 65+ High/Highest Risk (1 of 2 - PCV13) 5/3/2015 Influenza Age 5 to Adult 8/1/2018 HEMOGLOBIN A1C Q6M 11/3/2018 MICROALBUMIN Q1 5/3/2019 LIPID PANEL Q1 5/3/2019 FOOT EXAM Q1 6/27/2019 MEDICARE YEARLY EXAM 6/28/2019 Allergies as of 9/4/2018  Review Complete On: 9/4/2018 By: Mulugeta Roque MD  
  
 Severity Noted Reaction Type Reactions Codeine  04/21/2010    Hives Pcn [Penicillins]  04/21/2010    Hives Current Immunizations  Never Reviewed No immunizations on file. Not reviewed this visit You Were Diagnosed With   
  
 Codes Comments Chronic systolic heart failure (HCC)    -  Primary ICD-10-CM: Z51.83 ICD-9-CM: 428.22 Dilated cardiomyopathy ([facility] [address]     ICD-10-CM: I42.0 ICD-9-CM: 425.4 Pure hypercholesterolemia     ICD-10-CM: E78.00 ICD-9-CM: 272.0 Essential hypertension, benign     ICD-10-CM: I10 
ICD-9-CM: 401.1 Cardiorenal syndrome with renal failure, stage 1-4 or unspecified chronic kidney disease, with heart failure (HCC)     ICD-10-CM: I13.0 ICD-9-CM: 404.91 Type 2 diabetes with nephropathy (HCC)     ICD-10-CM: E11.21 
ICD-9-CM: 250.40, 583.81 Vitals BP Pulse Resp Height(growth percentile) Weight(growth percentile) SpO2  
 140/80 (BP 1 Location: Left arm, BP Patient Position: Sitting) 83 18 5' 1\" (1.549 m) 137 lb (62.1 kg) 97% BMI OB Status Smoking Status 25.89 kg/m2 Hysterectomy Never Smoker Vitals History BMI and BSA Data Body Mass Index Body Surface Area  
 25.89 kg/m 2 1.63 m 2 Preferred Pharmacy Pharmacy Name Phone [facility]/PHARMACY #[id number] Vivien Christopher [facility]/ Elieser Pederson [facility] [phone] Your Updated Medication List  
  
   
This list is accurate as of 9/4/18 11:25 AM.  Always use your most recent med list.  
  
  
  
  
 aspirin delayed-release 81 mg tablet Take  by mouth daily. COREG 12.5 mg tablet Generic drug:  carvedilol Take 12.5 mg by mouth two (2) times daily (with meals). glipiZIDE 5 mg tablet Commonly known as:  GLUCOTROL  
TAKE 1/2 TABLET BY MOUTH ONCE A DAY  
  
 mirtazapine 15 mg tablet Commonly known as:  REMERON  
TAKE 1 TABLET BY MOUTH AT BEDTIME  
  
 simvastatin 20 mg tablet Commonly known as:  ZOCOR Take 1 Tab by mouth nightly. Tetrabenazine 12.5 mg Tab  
daily for 1 wk, then 2 daily  
  
 torsemide 20 mg tablet Commonly known as:  DEMADEX Take 50 mg by mouth daily. Patient Instructions Follow up in 4 months. Introducing [facility]! Dear [facility]: 
Thank you for requesting a [employer] account. Our records indicate that you already have an active [employer] account. You can access your account anytime at [email or web address] Butt. Smart[employer]ants [employer]/Lil Monkey Butt Did you know that you can access your hospital and ER discharge instructions at any time in [employer]? You can also review all of your test results from your hospital stay or ER visit. Additional Information If you have questions, please visit the Frequently Asked Questions section of the [employer] website at [email or web address] Monkey Butt. Smarty[employer]ants Vitamins/Lil Monkey Butt/. Remember, [employer] is NOT to be used for urgent needs. For medical emergencies, dial 911. Now available from your iPhone and Android! Please provide this summary of care documentation to your next provider. Your primary care clinician is listed as Evan Alejandra. If you have any questions after today's visit, please call [phone].

## 2018-09-04 NOTE — PROGRESS NOTES
Jon Callejas     1950       Sage Khoury MD, [facility]  Date of Visit-9/4/2018   PCP is Sukumar Duenas MD   [address] [place] [address] [facility]  [facility]  HPI:  Jon Callejas is a 76 y.o. female   1 m CHF fu  Seen in 2011 with an EF of 20% and previous normal coronaries, had noted fluid increase when seen for the first time on 5/10/18 as she was changing doctors. We increased her Bumex and were cautious in the use of diuretics due to her renal failure. We arranged for her to have follow up with EP clinic. She had blood work that showed a GFR of 26 and a normal Hgb and Mag.  s/p [employer] single lead ICD (DOI 10/10/2017) for NICM & CHF (NYHA II). Dr. Kaitlynn Smith (nephrology) recently changed diuretic, now taking Torsemide 40 mg po bid per patient      Today returns noting. Roselia Roberto She's feeling well. She's not noticed any lower extremity edema, shortness of breath or chest pain. She said she'd gone up in weight so her diuretic was increased. She's here with her daughter in law. We looked through records and she only gained about a pound a month over the past six months. It wasn't all of a sudden. I think this is not CHF, but just weight gain. She denies other cardiac symptoms, palpitations. Assessment/Plan:       1. CHF, ejection fraction 25%. Stable, NYHA2, chronic systolic heart failure. Has AICD. No ACE due to CKD  2. CKD3. Continue diuretics. I don't think she needs acceleration of diuretic. She's not in heart failure, she has clear lungs, no edema and no complaints of shortness of breath. 3. Cardiorenal syndrome, stable. Follows with Dr. Kaitlynn Smith. Has blood work with Dr. Osmany beltran. 4. High potency statin. Continue statin and aspirin. 5. AICD. Seeing Dr. Nataliia Reagan in regular follow up. 6. Overall continue diuretic, beta blocker, aspirin, cholesterol agent and follow up in six months. Patient Instructions   Follow up in 4 months.      Key CAD CHF Meds             torsemide (DEMADEX) 20 mg tablet  (Taking) Take 50 mg by mouth daily. carvedilol (COREG) 12.5 mg tablet  (Taking) Take 12.5 mg by mouth two (2) times daily (with meals). aspirin delayed-release 81 mg tablet  (Taking) Take  by mouth daily. simvastatin (ZOCOR) 20 mg tablet  (Taking) Take 1 Tab by mouth nightly. Impression:   1. Chronic systolic heart failure ([address]    2. Dilated cardiomyopathy ([address]    3. Pure hypercholesterolemia    4. Essential hypertension, benign    5. Cardiorenal syndrome with renal failure, stage 1-4 or unspecified chronic kidney disease, with heart failure ([address]    6. Type 2 diabetes with nephropathy (HCC)       Cardiac History:   2011 with an EF of 20% and previous normal coronaries  ECHO 5-31-18 EF 25%  Cardiorenal syndrome creat 2.9 2/2018  Anemia hgb 9.4 Feb 2018  AICD for NICM--Track the Bet  Non smoker, 2 children  Family hx brother with heart disease , mother passed of stroke     ROS-except as noted above. . A complete cardiac and respiratory are reviewed and negative except as above ; Resp-denies wheezing  or productive cough,. Const- No unusual weight loss or fever; Neuro-no recent seizure or CVA ; GI- No BRBPR, abdom pain, bloating ; - no  hematuria   see supplement sheet, initialed and to be scanned by staff  Past Medical History:   Diagnosis Date    Calculus of kidney     Cardiomyopathy ([address]     idiopathic    Chronic systolic heart failure [facility])     April 2010 ef 20%, by Dec 2010 up to 50% on meds, cath with normal cors    Diabetes [facility])     Essential hypertension, benign     Hypercholesterolemia       Social Hx= reports that she has never smoked. She has never used smokeless tobacco. She reports that she does not drink alcohol or use illicit drugs.      Exam and Labs:  /80 (BP 1 Location: Left arm, BP Patient Position: Sitting)  Pulse 83  Resp 18  Ht 5' 1\" (1.549 m)  Wt 137 lb (62.1 kg)  SpO2 97%  BMI 25.89 kg/y9Xoywjstyxbrqbv:  NAD, comfortable  Head: NC,AT. Eyes: No scleral icterus. Neck:  Neck supple. No JVD present. Throat: moist mucous membranes. Chest: Effort normal & normal respiratory excursion . Neurological: alert, conversant and oriented . Skin: Skin is not cold. No obvious systemic rash noted. Not diaphoretic. No erythema. Psychiatric:  Grossly normal mood and affect. Behavior appears normal. Extremities:  no clubbing or cyanosis. Abdomen: non distended    Lungs:breath sounds normal. No stridor. distress, wheezes or  Rales. Heart:    normal rate, regular rhythm, normal S1, S2, no murmurs, rubs, clicks or gallops , PMI non displaced. Edema: Edema is none. Lab Results   Component Value Date/Time    Cholesterol, total 141 05/03/2018 08:27 AM    HDL Cholesterol 33 (L) 05/03/2018 08:27 AM    LDL, calculated 76 05/03/2018 08:27 AM    Triglyceride 158 (H) 05/03/2018 08:27 AM    CHOL/HDL Ratio 5.1 (H) 04/22/2010 12:40 AM     Lab Results   Component Value Date/Time    Sodium 137 05/25/2018 07:51 AM    Potassium 5.0 05/25/2018 07:51 AM    Chloride 98 05/25/2018 07:51 AM    CO2 24 05/25/2018 07:51 AM    Anion gap 10 05/21/2010 09:43 AM    Glucose 128 (H) 05/25/2018 07:51 AM    BUN 60 (H) 05/25/2018 07:51 AM    Creatinine 1.92 (H) 05/25/2018 07:51 AM    BUN/Creatinine ratio 31 (H) 05/25/2018 07:51 AM    GFR est AA 30 (L) 05/25/2018 07:51 AM    GFR est non-AA 26 (L) 05/25/2018 07:51 AM    Calcium 9.4 05/25/2018 07:51 AM      Wt Readings from Last 3 Encounters:   09/04/18 137 lb (62.1 kg)   08/07/18 137 lb (62.1 kg)   06/27/18 133 lb 9.6 oz (60.6 kg)      BP Readings from Last 3 Encounters:   09/04/18 140/80   08/07/18 122/64   06/27/18 143/80      Current Outpatient Prescriptions   Medication Sig    torsemide (DEMADEX) 20 mg tablet Take 50 mg by mouth daily.  carvedilol (COREG) 12.5 mg tablet Take 12.5 mg by mouth two (2) times daily (with meals).     glipiZIDE (GLUCOTROL) 5 mg tablet TAKE 1/2 TABLET BY MOUTH ONCE A DAY    mirtazapine (REMERON) 15 mg tablet TAKE 1 TABLET BY MOUTH AT BEDTIME    aspirin delayed-release 81 mg tablet Take  by mouth daily.  simvastatin (ZOCOR) 20 mg tablet Take 1 Tab by mouth nightly.  Tetrabenazine 12.5 mg tab daily for 1 wk, then 2 daily (Patient taking differently: Take 1 tablet every day)     No current facility-administered medications for this visit. Impression see above.

## 2018-09-12 ENCOUNTER — OFFICE VISIT (OUTPATIENT)
Dept: CARDIOLOGY CLINIC | Age: 68
End: 2018-09-12

## 2018-09-12 DIAGNOSIS — Z95.810 PRESENCE OF AUTOMATIC CARDIOVERTER/DEFIBRILLATOR (AICD): Primary | ICD-10-CM

## 2018-09-14 ENCOUNTER — HOSPITAL ENCOUNTER (OUTPATIENT)
Dept: LAB | Age: 68
Discharge: HOME | End: 2018-09-14
Payer: MEDICARE

## 2018-09-14 PROCEDURE — 80061 LIPID PANEL: CPT

## 2018-09-14 PROCEDURE — 83036 HEMOGLOBIN GLYCOSYLATED A1C: CPT

## 2018-09-14 PROCEDURE — 80053 COMPREHEN METABOLIC PANEL: CPT

## 2018-09-14 PROCEDURE — 36415 COLL VENOUS BLD VENIPUNCTURE: CPT

## 2018-09-15 LAB
ALBUMIN SERPL-MCNC: 4.3 G/DL (ref 3.6–4.8)
ALBUMIN/GLOB SERPL: 1.5 {RATIO} (ref 1.2–2.2)
ALP SERPL-CCNC: 75 IU/L (ref 39–117)
ALT SERPL-CCNC: 16 IU/L (ref 0–32)
AST SERPL-CCNC: 20 IU/L (ref 0–40)
BILIRUB SERPL-MCNC: 0.3 MG/DL (ref 0–1.2)
BUN SERPL-MCNC: 53 MG/DL (ref 8–27)
BUN/CREAT SERPL: 28 (ref 12–28)
CALCIUM SERPL-MCNC: 9.3 MG/DL (ref 8.7–10.3)
CHLORIDE SERPL-SCNC: 93 MMOL/L (ref 96–106)
CHOLEST SERPL-MCNC: 163 MG/DL (ref 100–199)
CO2 SERPL-SCNC: 21 MMOL/L (ref 20–29)
CREAT SERPL-MCNC: 1.89 MG/DL (ref 0.57–1)
EST. AVERAGE GLUCOSE BLD GHB EST-MCNC: 194 MG/DL
GLOBULIN SER CALC-MCNC: 2.9 G/DL (ref 1.5–4.5)
GLUCOSE SERPL-MCNC: 122 MG/DL (ref 65–99)
HBA1C MFR BLD: 8.4 % (ref 4.8–5.6)
HDLC SERPL-MCNC: 28 MG/DL
LDLC SERPL CALC-MCNC: ABNORMAL MG/DL (ref 0–99)
POTASSIUM SERPL-SCNC: 4.3 MMOL/L (ref 3.5–5.2)
PROT SERPL-MCNC: 7.2 G/DL (ref 6–8.5)
SODIUM SERPL-SCNC: 138 MMOL/L (ref 134–144)
TRIGL SERPL-MCNC: 428 MG/DL (ref 0–149)
VLDLC SERPL CALC-MCNC: ABNORMAL MG/DL (ref 5–40)

## 2018-10-09 ENCOUNTER — OFFICE VISIT (OUTPATIENT)
Dept: INTERNAL MEDICINE CLINIC | Age: 68
End: 2018-10-09

## 2018-10-09 VITALS
TEMPERATURE: 98.6 F | WEIGHT: 137.4 LBS | BODY MASS INDEX: 25.94 KG/M2 | OXYGEN SATURATION: 96 % | RESPIRATION RATE: 19 BRPM | HEIGHT: 61 IN | DIASTOLIC BLOOD PRESSURE: 80 MMHG | SYSTOLIC BLOOD PRESSURE: 110 MMHG | HEART RATE: 86 BPM

## 2018-10-09 DIAGNOSIS — M81.0 POSTMENOPAUSAL BONE LOSS: ICD-10-CM

## 2018-10-09 DIAGNOSIS — Z23 ENCOUNTER FOR IMMUNIZATION: ICD-10-CM

## 2018-10-09 DIAGNOSIS — Z12.39 BREAST CANCER SCREENING: ICD-10-CM

## 2018-10-09 DIAGNOSIS — E78.1 HYPERTRIGLYCERIDEMIA: ICD-10-CM

## 2018-10-09 DIAGNOSIS — E11.65 TYPE 2 DIABETES MELLITUS WITH HYPERGLYCEMIA, WITHOUT LONG-TERM CURRENT USE OF INSULIN (HCC): Primary | ICD-10-CM

## 2018-10-09 RX ORDER — GLIPIZIDE 5 MG/1
5 TABLET ORAL DAILY
Qty: 90 TAB | Refills: 1 | Status: SHIPPED | OUTPATIENT
Start: 2018-10-09 | End: 2019-04-15 | Stop reason: SDUPTHER

## 2018-10-09 NOTE — PATIENT INSTRUCTIONS
Vaccine Information Statement    Influenza (Flu) Vaccine (Inactivated or Recombinant): What you need to know    Many Vaccine Information Statements are available in Turkish and other languages. See www.immunize.org/vis  Hojas de Información Sobre Vacunas están disponibles en Español y en muchos otros idiomas. Visite www.immunize.org/vis    1. Why get vaccinated? Influenza (flu) is a contagious disease that spreads around the [place] every year, usually between October and May. Flu is caused by influenza viruses, and is spread mainly by coughing, sneezing, and close contact. Anyone can get flu. Flu strikes suddenly and can last several days. Symptoms vary by age, but can include:   fever/chills   sore throat   muscle aches   fatigue   cough   headache    runny or stuffy nose    Flu can also lead to pneumonia and blood infections, and cause diarrhea and seizures in children. If you have a medical condition, such as heart or lung disease, flu can make it worse. Flu is more dangerous for some people. Infants and young children, people 72years of age and older, pregnant women, and people with certain health conditions or a weakened immune system are at greatest risk. Each year thousands of people in the [facility] die from flu, and many more are hospitalized. Flu vaccine can:   keep you from getting flu,   make flu less severe if you do get it, and   keep you from spreading flu to your family and other people. 2. Inactivated and recombinant flu vaccines    A dose of flu vaccine is recommended every flu season. Children 6 months through 6years of age may need two doses during the same flu season. Everyone else needs only one dose each flu season.        Some inactivated flu vaccines contain a very small amount of a mercury-based preservative called thimerosal. Studies have not shown thimerosal in vaccines to be harmful, but flu vaccines that do not contain thimerosal are available. There is no live flu virus in flu shots. They cannot cause the flu. There are many flu viruses, and they are always changing. Each year a new flu vaccine is made to protect against three or four viruses that are likely to cause disease in the upcoming flu season. But even when the vaccine doesnt exactly match these viruses, it may still provide some protection    Flu vaccine cannot prevent:   flu that is caused by a virus not covered by the vaccine, or   illnesses that look like flu but are not. It takes about 2 weeks for protection to develop after vaccination, and protection lasts through the flu season. 3. Some people should not get this vaccine    Tell the person who is giving you the vaccine:     If you have any severe, life-threatening allergies. If you ever had a life-threatening allergic reaction after a dose of flu vaccine, or have a severe allergy to any part of this vaccine, you may be advised not to get vaccinated. Most, but not all, types of flu vaccine contain a small amount of egg protein.  If you ever had Guillain-Barré Syndrome (also called GBS). Some people with a history of GBS should not get this vaccine. This should be discussed with your doctor.  If you are not feeling well. It is usually okay to get flu vaccine when you have a mild illness, but you might be asked to come back when you feel better. 4. Risks of a vaccine reaction    With any medicine, including vaccines, there is a chance of reactions. These are usually mild and go away on their own, but serious reactions are also possible. Most people who get a flu shot do not have any problems with it.      Minor problems following a flu shot include:    soreness, redness, or swelling where the shot was given     hoarseness   sore, red or itchy eyes   cough   fever   aches   headache   itching   fatigue  If these problems occur, they usually begin soon after the shot and last 1 or 2 days. More serious problems following a flu shot can include the following:     There may be a small increased risk of Guillain-Barré Syndrome (GBS) after inactivated flu vaccine. This risk has been estimated at 1 or 2 additional cases per million people vaccinated. This is much lower than the risk of severe complications from flu, which can be prevented by flu vaccine.  Young children who get the flu shot along with pneumococcal vaccine (PCV13) and/or DTaP vaccine at the same time might be slightly more likely to have a seizure caused by fever. Ask your doctor for more information. Tell your doctor if a child who is getting flu vaccine has ever had a seizure. Problems that could happen after any injected vaccine:      People sometimes faint after a medical procedure, including vaccination. Sitting or lying down for about 15 minutes can help prevent fainting, and injuries caused by a fall. Tell your doctor if you feel dizzy, or have vision changes or ringing in the ears.  Some people get severe pain in the shoulder and have difficulty moving the arm where a shot was given. This happens very rarely.  Any medication can cause a severe allergic reaction. Such reactions from a vaccine are very rare, estimated at about 1 in a million doses, and would happen within a few minutes to a few hours after the vaccination. As with any medicine, there is a very remote chance of a vaccine causing a serious injury or death. The safety of vaccines is always being monitored. For more information, visit: www.cdc.gov/vaccinesafety/    5. What if there is a serious reaction? What should I look for?  Look for anything that concerns you, such as signs of a severe allergic reaction, very high fever, or unusual behavior.     Signs of a severe allergic reaction can include hives, swelling of the face and throat, difficulty breathing, a fast heartbeat, dizziness, and weakness - usually within a few minutes to a few hours after the vaccination. What should I do?  If you think it is a severe allergic reaction or other emergency that cant wait, call 9-1-1 and get the person to the nearest hospital. Otherwise, call your doctor.  Reactions should be reported to the Vaccine Adverse Event Reporting System (VAERS). Your doctor should file this report, or you can do it yourself through  the VAERS web site at www.vaers. [facility].gov, or by calling 7-498.578.5037. VAERS does not give medical advice. 6. The National Vaccine Injury Compensation Program    The [facility] Vaccine Injury Compensation Program (VICP) is a federal program that was created to compensate people who may have been injured by certain vaccines. Persons who believe they may have been injured by a vaccine can learn about the program and about filing a claim by calling 6-705.603.3779 or visiting the [employer] website at www.[facility].gov/vaccinecompensation. There is a time limit to file a claim for compensation. 7. How can I learn more?  Ask your healthcare provider. He or she can give you the vaccine package insert or suggest other sources of information.  Call your local or state health department.  Contact the Centers for Disease Control and Prevention (CDC):  - Call 8-356.437.9414 (1-800-CDC-INFO) or  - Visit CDCs website at www.cdc.gov/flu    Vaccine Information Statement   Inactivated Influenza Vaccine   8/7/2015  42 [address] 256LI-    Department of Health and Human Services  Centers for Disease Control and Prevention    Office Use Only

## 2018-10-09 NOTE — PROGRESS NOTES
Jake Kaur is a 76 y.o. female who presents for routine immunizations. She denies any symptoms , reactions or allergies that would exclude them from being immunized today. Risks and adverse reactions were discussed and the VIS was given to them. All questions were addressed. She was observed for 5 min post injection. There were no reactions observed.     Sandy Barrientos

## 2018-10-09 NOTE — PROGRESS NOTES
Follow Up Visit    Christiano Johnson is a 76 y.o. female. she presents for Diabetes and Hypertension    Endocrine Review  She is seen for diabetes. Since last visit: she has had a vacation 2-3 weeks ago and noted some worsening of her blood sugars. Home glucose monitoring: is performed regularly, fasting values range in the 150's to 190's. She reports medication compliance: noncompliant some of the time. She reports the following medication side effects: none. She is currently taking 2.5mg of glipizide daily. Diabetic diet compliance: compliant most of the time. Further diabetic ROS: no chest pain or dyspnea, no numbness, tingling or pain in extremities, no hypoglycemia. Lab review: labs reviewed, I note that glycosylated hemoglobin normal, abnormal.  She has had an increase in her A1c to 8.4 from a last level of 5.8. She does not feel as if she has eaten large amounts of carbohydrate. Discussed with the patient. Additionally, labs show a very much elevated triglyceride level. She has a level of greater than 400 at this time. She notes again that she has tried to improve her diet. Her last triglyceride level was 158 in May. Patient Active Problem List   Diagnosis Code    Chronic systolic heart failure (HCC) I50.22    Cardiomyopathy ([facility] [place] [address] I42.9    Pure hypercholesterolemia E78.00    Essential hypertension, benign I10    Type 2 diabetes with nephropathy ([facility] E11.21    Cardiorenal syndrome I13.10         Prior to Admission medications    Medication Sig Start Date End Date Taking? Authorizing Provider   torsemide (DEMADEX) 20 mg tablet Take 50 mg by mouth daily. Yes Historical Provider   carvedilol (COREG) 12.5 mg tablet Take 12.5 mg by mouth two (2) times daily (with meals).    Yes Historical Provider   glipiZIDE (GLUCOTROL) 5 mg tablet TAKE 1/2 TABLET BY MOUTH ONCE A DAY 6/6/18  Yes Virginie Dominguez MD   mirtazapine (REMERON) 15 mg tablet TAKE 1 TABLET BY MOUTH AT BEDTIME 4/1/18  Yes Historical Provider   aspirin delayed-release 81 mg tablet Take  by mouth daily. Yes Historical Provider   simvastatin (ZOCOR) 20 mg tablet Take 1 Tab by mouth nightly. 8/23/11  Yes Krystyna Doran MD   Tetrabenazine 12.5 mg tab daily for 1 wk, then 2 daily  Patient taking differently: Take 1 tablet every day 5/11/18   Deidre Soriano MD         Health Maintenance   Topic Date Due    EYE EXAM RETINAL OR DILATED Q1  05/03/1960    DTaP/Tdap/Td series (1 - Tdap) 05/03/1971    Shingrix Vaccine Age 50> (1 of 2) 05/03/2000    BREAST CANCER SCRN MAMMOGRAM  05/03/2000    FOBT Q 1 YEAR AGE 50-75  05/03/2000    GLAUCOMA SCREENING Q2Y  05/03/2015    Bone Densitometry (Dexa) Screening  05/03/2015    Pneumococcal 65+ High/Highest Risk (1 of 2 - PCV13) 05/03/2015    Influenza Age 9 to Adult  08/01/2018    HEMOGLOBIN A1C Q6M  03/14/2019    MICROALBUMIN Q1  05/03/2019    FOOT EXAM Q1  06/27/2019    MEDICARE YEARLY EXAM  06/28/2019    LIPID PANEL Q1  09/14/2019    Hepatitis C Screening  Completed       Review of Systems   Constitutional: Negative. Respiratory: Negative. Cardiovascular: Negative. Gastrointestinal: Negative. Visit Vitals    /80 (BP 1 Location: Right arm, BP Patient Position: Sitting)    Pulse 86    Temp 98.6 °F (37 °C) (Oral)    Resp 19    Ht 5' 1\" (1.549 m)    Wt 137 lb 6.4 oz (62.3 kg)    SpO2 96%    BMI 25.96 kg/m2       Physical Exam   HENT:   Mouth/Throat: Oropharynx is clear and moist.   Cardiovascular: Normal rate and regular rhythm. Pulmonary/Chest: Effort normal and breath sounds normal. She has no wheezes. ASSESSMENT/PLAN    Diagnoses and all orders for this visit:    1. Type 2 diabetes mellitus with hyperglycemia, without long-term current use of insulin (HCC) - Discussed with the patient and her daughter. For now, we will increase her glipizide to 5mg daily.   She will need to check her blood sugars daily.    -     glipiZIDE (GLUCOTROL) 5 mg tablet; Take 1 Tab by mouth daily. 2. Encounter for immunization  -     diphtheria-pertussis, acellular,-tetanus (BOOSTRIX TDAP) 2.5-8-5 Lf-mcg-Lf/0.5mL susp susp; 0.5 mL by IntraMUSCular route once for 1 dose. -     pneumococcal 13 ariane conj dip (PREVNAR-13) 0.5 mL syrg injection; 0.5 mL by IntraMUSCular route once for 1 dose. -     Influenza Vaccine Inactivated (IIV)(FLUAD), Subunit, Adjuvanted, IM, (29058)    3. Hypertriglyceridemia - I am as yet unsure if this is a spurious value. We will repeat this at the end of Oct.    -     LIPID PANEL    4. Breast cancer screening  -     [facility] MAMMO BI SCREENING INCL CAD; Future    5. Postmenopausal bone loss  -     DEXA BONE DENSITY STUDY AXIAL; Future        Follow-up Disposition:  Return in about 3 months (around 1/9/2019), or if symptoms worsen or fail to improve.

## 2018-10-09 NOTE — MR AVS SNAPSHOT
[address] [facility] [address] [place] 
[phone] Patient: Venecia Nino MRN: [id number] Visit Information Date & Time Provider Department Dept. Phone Encounter #  
 10/9/2018  9:00 AM Dona Galeana MD [address] [address] Internal Medicine [phone] [id number] Your Appointments 1/8/2019  8:40 AM  
ESTABLISHED PATIENT with Achille Claude, MD  
[facility] (AL[facility] SCHEDULING) Appt Note: 4 month follow up.  
 [address] [facility] [address] [place]  
[address] [address]  
  
    
 6/20/2019  3:30 PM  
PACEMAKER with Cam Helm [facility] (AL[facility] SCHEDULING) Appt Note: irina sci I(CD/rc/Sab  annual b   LAT  
 [address] [place]  
[phone]  
  
   
 [address]  
  
    
 6/20/2019  3:40 PM  
ESTABLISHED PATIENT with Achille Claude, MD  
[facility] ([facility]) Appt Note: irina sci I(CD/rc/Sab  annual b   LAT  
 [address] [place]  
[phone]  
  
    
  
 12/12/2018 11:30 AM  
REMOTE OFFICE VISIT with Ann William [facility] (AL[facility] SCHEDULING) Appt Note: LAT ICD/rc  
 [address] [facility] [address] [address]  
[address] [address]  
  
    
 3/18/2019  9:15 AM  
REMOTE OFFICE VISIT with Kaprica Securitynik[employer] Power [facility] (AL[facility] SCHEDULING) Appt Note: LAT ICD/rc  
 [address] [facility] [address] [place]  
[phone] Upcoming Health Maintenance Date Due  
 EYE EXAM RETINAL OR DILATED Q1 5/3/1960 DTaP/Tdap/Td series (1 - Tdap) 5/3/1971 Shingrix Vaccine Age 50> (1 of 2) 5/3/2000 BREAST CANCER SCRN MAMMOGRAM 5/3/2000 FOBT Q 1 YEAR AGE 50-75 5/3/2000 GLAUCOMA SCREENING Q2Y 5/3/2015 Bone Densitometry (Dexa) Screening 5/3/2015 Pneumococcal 65+ High/Highest Risk (1 of 2 - PCV13) 5/3/2015 Influenza Age 5 to Adult 8/1/2018 HEMOGLOBIN A1C Q6M 3/14/2019 MICROALBUMIN Q1 5/3/2019 FOOT EXAM Q1 6/27/2019 MEDICARE YEARLY EXAM 6/28/2019 LIPID PANEL Q1 9/14/2019 Allergies as of 10/9/2018  Review Complete On: 10/9/2018 By: Marika Maier MD  
  
 Severity Noted Reaction Type Reactions Codeine  04/21/2010    Hives Pcn [Penicillins]  04/21/2010    Hives Current Immunizations  Reviewed on 10/9/2018 Name Date Influenza Vaccine (Tri) Adjuvanted 10/9/2018 Reviewed by Sandy Barrientos on 10/9/2018 at  9:10 AM  
You Were Diagnosed With   
  
 Codes Comments Encounter for immunization    -  Primary ICD-10-CM: M00 ICD-9-CM: V03.89 Type 2 diabetes mellitus with hyperglycemia, without long-term current use of insulin (HCC)     ICD-10-CM: E11.65 ICD-9-CM: 250.00, 790.29 Hypertriglyceridemia     ICD-10-CM: E78.1 ICD-9-CM: 272.1 Mammogram reminder not needed forever     ICD-10-CM: Z53.8 ICD-9-CM: V64.3 Breast cancer screening     ICD-10-CM: Z12.31 
ICD-9-CM: V76.10 Postmenopausal bone loss     ICD-10-CM: M81.0 ICD-9-CM: 733.01 Vitals BP Pulse Temp Resp Height(growth percentile) Weight(growth percentile) 110/80 (BP 1 Location: Right arm, BP Patient Position: Sitting) 86 98.6 °F (37 °C) (Oral) 19 5' 1\" (1.549 m) 137 lb 6.4 oz (62.3 kg) SpO2 BMI OB Status Smoking Status 96% 25.96 kg/m2 Hysterectomy Never Smoker Vitals History BMI and BSA Data Body Mass Index Body Surface Area  
 25.96 kg/m 2 1.64 m 2 Preferred Pharmacy Pharmacy Name Phone [id number] [phone] Your Updated Medication List  
  
   
 This list is accurate as of 10/9/18  9:40 AM.  Always use your most recent med list.  
  
  
  
  
 aspirin delayed-release 81 mg tablet Take  by mouth daily. COREG 12.5 mg tablet Generic drug:  carvedilol Take 12.5 mg by mouth two (2) times daily (with meals). diphtheria-pertussis (acellular)-tetanus 2.5-8-5 Lf-mcg-Lf/0.5mL Susp susp Commonly known as:  BOOSTRIX TDAP  
0.5 mL by IntraMUSCular route once for 1 dose. glipiZIDE 5 mg tablet Commonly known as:  Terri Gondola Take 1 Tab by mouth daily. mirtazapine 15 mg tablet Commonly known as:  REMERON  
TAKE 1 TABLET BY MOUTH AT BEDTIME pneumococcal 13 ariane conj dip 0.5 mL Syrg injection Commonly known as:  PREVNAR-13  
0.5 mL by IntraMUSCular route once for 1 dose. simvastatin 20 mg tablet Commonly known as:  ZOCOR Take 1 Tab by mouth nightly. tetrabenazine 12.5 mg Tab tablet Commonly known as:  Eugene Glass  
daily for 1 wk, then 2 daily  
  
 torsemide 20 mg tablet Commonly known as:  DEMADEX Take 50 mg by mouth daily. Prescriptions Printed Refills diphtheria-pertussis, acellular,-tetanus (BOOSTRIX TDAP) 2.5-8-5 Lf-mcg-Lf/0.5mL susp susp 0 Si.5 mL by IntraMUSCular route once for 1 dose. Class: Print Route: IntraMUSCular  
 pneumococcal 13 ariane conj dip (PREVNAR-13) 0.5 mL syrg injection 0 Si.5 mL by IntraMUSCular route once for 1 dose. Class: Print Route: IntraMUSCular Prescriptions Sent to Pharmacy Refills  
 glipiZIDE (GLUCOTROL) 5 mg tablet 1 Sig: Take 1 Tab by mouth daily. Class: Normal  
 Pharmacy: [id number] [address] Ph #: [phone] Route: Oral  
  
We Performed the Following INFLUENZA VACCINE INACTIVATED (IIV), SUBUNIT, ADJUVANTED, IM [id number] CPT(R)] LIPID PANEL [00945 CPT(R)] To-Do List   
 10/19/2018   Imaging:  JOSE CARLOS MAMMO BI SCREENING INCL CAD   
  
 04/09/2019 Imaging:  DEXA BONE DENSITY STUDY AXIAL Patient Instructions Vaccine Information Statement Influenza (Flu) Vaccine (Inactivated or Recombinant): What you need to know Many Vaccine Information Statements are available in Slovak and other languages. See www.immunize.org/vis Hojas de Información Sobre Vacunas están disponibles en Español y en muchos otros idiomas. Visite www.immunize.org/vis 1. Why get vaccinated? Influenza (flu) is a contagious disease that spreads around the [facility] every year, usually between October and May. Flu is caused by influenza viruses, and is spread mainly by coughing, sneezing, and close contact. Anyone can get flu. Flu strikes suddenly and can last several days. Symptoms vary by age, but can include: 
 fever/chills  sore throat  muscle aches  fatigue  cough  headache  runny or stuffy nose Flu can also lead to pneumonia and blood infections, and cause diarrhea and seizures in children. If you have a medical condition, such as heart or lung disease, flu can make it worse. Flu is more dangerous for some people. Infants and young children, people 72years of age and older, pregnant women, and people with certain health conditions or a weakened immune system are at greatest risk. Each year thousands of people in the [facility] die from flu, and many more are hospitalized. Flu vaccine can: 
 keep you from getting flu, 
 make flu less severe if you do get it, and 
 keep you from spreading flu to your family and other people. 2. Inactivated and recombinant flu vaccines A dose of flu vaccine is recommended every flu season. Children 6 months through 6years of age may need two doses during the same flu season. Everyone else needs only one dose each flu season.   
 
 
Some inactivated flu vaccines contain a very small amount of a mercury-based preservative called thimerosal. Studies have not shown thimerosal in vaccines to be harmful, but flu vaccines that do not contain thimerosal are available. There is no live flu virus in flu shots. They cannot cause the flu. There are many flu viruses, and they are always changing. Each year a new flu vaccine is made to protect against three or four viruses that are likely to cause disease in the upcoming flu season. But even when the vaccine doesnt exactly match these viruses, it may still provide some protection Flu vaccine cannot prevent: 
 flu that is caused by a virus not covered by the vaccine, or 
 illnesses that look like flu but are not. It takes about 2 weeks for protection to develop after vaccination, and protection lasts through the flu season. 3. Some people should not get this vaccine Tell the person who is giving you the vaccine:  If you have any severe, life-threatening allergies. If you ever had a life-threatening allergic reaction after a dose of flu vaccine, or have a severe allergy to any part of this vaccine, you may be advised not to get vaccinated. Most, but not all, types of flu vaccine contain a small amount of egg protein.  If you ever had Guillain-Barré Syndrome (also called GBS). Some people with a history of GBS should not get this vaccine. This should be discussed with your doctor.  If you are not feeling well. It is usually okay to get flu vaccine when you have a mild illness, but you might be asked to come back when you feel better. 4. Risks of a vaccine reaction With any medicine, including vaccines, there is a chance of reactions. These are usually mild and go away on their own, but serious reactions are also possible. Most people who get a flu shot do not have any problems with it. Minor problems following a flu shot include:  
 soreness, redness, or swelling where the shot was given  hoarseness  sore, red or itchy eyes  cough  fever  aches  headache  itching  fatigue If these problems occur, they usually begin soon after the shot and last 1 or 2 days. More serious problems following a flu shot can include the following:  There may be a small increased risk of Guillain-Barré Syndrome (GBS) after inactivated flu vaccine. This risk has been estimated at 1 or 2 additional cases per million people vaccinated. This is much lower than the risk of severe complications from flu, which can be prevented by flu vaccine.  Young children who get the flu shot along with pneumococcal vaccine (PCV13) and/or DTaP vaccine at the same time might be slightly more likely to have a seizure caused by fever. Ask your doctor for more information. Tell your doctor if a child who is getting flu vaccine has ever had a seizure. Problems that could happen after any injected vaccine:  People sometimes faint after a medical procedure, including vaccination. Sitting or lying down for about 15 minutes can help prevent fainting, and injuries caused by a fall. Tell your doctor if you feel dizzy, or have vision changes or ringing in the ears.  Some people get severe pain in the shoulder and have difficulty moving the arm where a shot was given. This happens very rarely.  Any medication can cause a severe allergic reaction. Such reactions from a vaccine are very rare, estimated at about 1 in a million doses, and would happen within a few minutes to a few hours after the vaccination. As with any medicine, there is a very remote chance of a vaccine causing a serious injury or death. The safety of vaccines is always being monitored. For more information, visit: www.cdc.gov/vaccinesafety/ 
 
5. What if there is a serious reaction? What should I look for?  Look for anything that concerns you, such as signs of a severe allergic reaction, very high fever, or unusual behavior.  
 
Signs of a severe allergic reaction can include hives, swelling of the face and throat, difficulty breathing, a fast heartbeat, dizziness, and weakness  usually within a few minutes to a few hours after the vaccination. What should I do?  If you think it is a severe allergic reaction or other emergency that cant wait, call 9-1-1 and get the person to the nearest hospital. Otherwise, call your doctor.  Reactions should be reported to the Vaccine Adverse Event Reporting System (VAERS). Your doctor should file this report, or you can do it yourself through  the VAERS web site at www.vaers. [facility].gov, or by calling 7-138.895.1748. VAERS does not give medical advice. 6. The National Vaccine Injury Compensation Program 
 
The [facility] Vaccine Injury Compensation Program (VICP) is a federal program that was created to compensate people who may have been injured by certain vaccines. Persons who believe they may have been injured by a vaccine can learn about the program and about filing a claim by calling 7-815.206.4871 or visiting the [employer] website at www.[facility].gov/vaccinecompensation. There is a time limit to file a claim for compensation. 7. How can I learn more?  Ask your healthcare provider. He or she can give you the vaccine package insert or suggest other sources of information.  Call your local or state health department.  Contact the Centers for Disease Control and Prevention (CDC): 
- Call 4-535.473.6509 (1-800-CDC-INFO) or 
- Visit CDCs website at www.cdc.gov/flu Vaccine Information Statement Inactivated Influenza Vaccine 8/7/2015 
[address] [facility] [id number] [facility] and [facility] Centers for Disease Control and Prevention Office Use Only Introducing [facility] & HEALTH SERVICES! Dear Karrie Pedersen: 
Thank you for requesting a [employer] account. Our records indicate that you already have an active [employer] account. You can access your account anytime at [email or web address] Did you know that you can access your hospital and ER discharge instructions at any time in ImageTag? You can also review all of your test results from your hospital stay or ER visit. Additional Information If you have questions, please visit the Frequently Asked Questions section of the Image[employer] website at [email or web address] Remember, ImageTag is NOT to be used for urgent needs. For medical emergencies, dial 911. Now available from your iPhone and Android! Please provide this summary of care documentation to your next provider. Your primary care clinician is listed as Jesse Padilla. If you have any questions after today's visit, please call [phone].

## 2018-10-29 ENCOUNTER — HOSPITAL ENCOUNTER (OUTPATIENT)
Dept: LAB | Age: 68
Discharge: HOME | End: 2018-10-29
Payer: MEDICARE

## 2018-10-29 PROCEDURE — 80061 LIPID PANEL: CPT

## 2018-10-29 PROCEDURE — 36415 COLL VENOUS BLD VENIPUNCTURE: CPT

## 2018-10-30 LAB
CHOLEST SERPL-MCNC: 136 MG/DL (ref 100–199)
HDLC SERPL-MCNC: 25 MG/DL
LDLC SERPL CALC-MCNC: 45 MG/DL (ref 0–99)
TRIGL SERPL-MCNC: 332 MG/DL (ref 0–149)
VLDLC SERPL CALC-MCNC: 66 MG/DL (ref 5–40)

## 2018-11-09 RX ORDER — MIRTAZAPINE 15 MG/1
TABLET, FILM COATED ORAL
Qty: 45 TAB | Refills: 5 | Status: SHIPPED | OUTPATIENT
Start: 2018-11-09 | End: 2022-05-13 | Stop reason: SDUPTHER

## 2018-12-06 NOTE — MR AVS SNAPSHOT
[place] [address] [address] [place] [address] 
[phone] Patient: Shlomo Billingsley MRN: [id number] Visit Information Date & Time Provider Department Dept. Phone Encounter #  
 5/11/2018  1:00 PM David Bonner MD [facility] at [address] [id number] Follow-up Instructions Return in about 3 months (around 8/11/2018). Your Appointments 5/31/2018  2:45 PM  
PROCEDURE with Vanessa Adan [facility] ([place] SCHED[facility]) Appt Note: 2 to 3 week f/u withc echo dx chf  pacer clinic  see Dr Avila Thomas after  
 [address]  
[phone]  
  
   
 [address]  [address] [address]  
  
    
 5/31/2018  3:00 PM  
ECHO CARDIOGRAMS 2D with John Chaney [facility] ([place] SCHEDULING) Appt Note: 2 to 3 week f/u withc echo dx chf  pacer clinic  see Dr Avila Thomas after  
 [address]  
[address] [facility]  
  
    
 5/31/2018  3:20 PM  
ESTABLISHED PATIENT with Author Gera MD  
[facility] ([place] SCHEDULING) Appt Note: 2 to 3 week f/u withc echo dx chf  pacer clinic  see Dr Avila Thomas after  
 [address]  
[address] [facility] [address]  
  
    
 6/27/2018  3:00 PM  
PHYSICAL with Tomer Morse MD  
[facility]) Appt Note: CPE Per Dr Deangelo Crawley [address] [facility] [address]  
[address] [address] [address] [address]  
  
    
 8/7/2018  3:40 PM  
New Patient with Xuan Reaves MD  
[facility] ([facility] Appt Note: 3 month  f/u  
 [address]  [address] [address] Telephone Encounter by Henrietta Chung NCMA at 09/15/17 03:26 PM     Author:  Henrietta Chung NCMA Service:  (none) Author Type:  Certified Medical Assistant     Filed:  09/15/17 03:27 PM Encounter Date:  9/15/2017 Status:  Signed     :  Henrietta Chung NCMA (Certified Medical Assistant)            Patient states that Jose England) never got the referral for PT.  [facility] [phone].[MA1.1M]      Revision History        User Key Date/Time User Provider Type Action    > MA1.1 09/15/17 03:27 PM Henrietta Chung NCMA Certified Medical Assistant Sign    M - Manual             [address] Upcoming Health Maintenance Date Due  
 FOOT EXAM Q1 5/3/1960 EYE EXAM RETINAL OR DILATED Q1 5/3/1960 DTaP/Tdap/Td series (1 - Tdap) 5/3/1971 BREAST CANCER SCRN MAMMOGRAM 5/3/2000 FOBT Q 1 YEAR AGE 50-75 5/3/2000 ZOSTER VACCINE AGE 60> 3/3/2010 GLAUCOMA SCREENING Q2Y 5/3/2015 Bone Densitometry (Dexa) Screening 5/3/2015 Pneumococcal 65+ High/Highest Risk (1 of 2 - PCV13) 5/3/2015 MEDICARE YEARLY EXAM 5/2/2018 Influenza Age 5 to Adult 8/1/2018 HEMOGLOBIN A1C Q6M 11/3/2018 MICROALBUMIN Q1 5/3/2019 LIPID PANEL Q1 5/3/2019 Allergies as of 5/11/2018  Review Complete On: 5/11/2018 By: Cecilia August MD  
  
 Severity Noted Reaction Type Reactions Codeine  04/21/2010    Hives Pcn [Penicillins]  04/21/2010    Hives Current Immunizations  Never Reviewed No immunizations on file. Not reviewed this visit You Were Diagnosed With   
  
 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  
 140/90 69 14 5' (1.524 m) 134 lb (60.8 kg) 98% BMI OB Status Smoking Status 26.17 kg/m2 Hysterectomy Never Smoker Vitals History BMI and BSA Data Body Mass Index Body Surface Area  
 26.17 kg/m 2 1.6 m 2 Preferred Pharmacy Pharmacy Name Phone [id number]/ Elieser Pederson [facility] [phone] Your Updated Medication List  
  
   
This list is accurate as of 5/11/18  1:40 PM.  Always use your most recent med list.  
  
  
  
  
 aspirin delayed-release 81 mg tablet Take  by mouth daily. bumetanide 0.5 mg tablet Commonly known as:  Jaya Patron Take 1 Tab by mouth daily as needed. Take 1 tab daily as needed for weight > 130lbs  
  
 carvedilol 25 mg tablet Commonly known as:  Agusto Place Take 1 Tab by mouth two (2) times daily (with meals). furosemide 40 mg tablet Commonly known as:  LASIX Take 1 Tab by mouth daily. glipiZIDE 5 mg tablet Commonly known as:  GLUCOTROL  
TAKE 1/2 TABLET BY MOUTH ONCE A DAY  
  
 mirtazapine 15 mg tablet Commonly known as:  REMERON  
TAKE 1 TABLET BY MOUTH AT BEDTIME FOR 1 WEEK, THEN 2 TABLETS NIGHTLY  
  
 simvastatin 20 mg tablet Commonly known as:  ZOCOR Take 1 Tab by mouth nightly. Tetrabenazine 12.5 mg Tab  
daily for 1 wk, then 2 daily Prescriptions Sent to Pharmacy Refills Tetrabenazine 12.5 mg tab 1 Sig: daily for 1 wk, then 2 daily Class: Normal  
 Pharmacy: [facility]/pharmacy #[id number] - Marlene MENDEZ [facility] #: [phone] Follow-up Instructions Return in about 3 months (around 8/11/2018). Patient Instructions A Healthy Lifestyle: Care Instructions Your Care Instructions A healthy lifestyle can help you feel good, stay at a healthy weight, and have plenty of energy for both work and play. A healthy lifestyle is something you can share with your whole family. A healthy lifestyle also can lower your risk for serious health problems, such as high blood pressure, heart disease, and diabetes. You can follow a few steps listed below to improve your health and the health of your family. Follow-up care is a key part of your treatment and safety. Be sure to make and go to all appointments, and call your doctor if you are having problems. It's also a good idea to know your test results and keep a list of the medicines you take. How can you care for yourself at home? · Do not eat too much sugar, fat, or fast foods. You can still have dessert and treats now and then. The goal is moderation. · Start small to improve your eating habits.  Pay attention to portion sizes, drink less juice and soda pop, and eat more fruits and vegetables. ¨ Eat a healthy amount of food. A 3-ounce serving of meat, for example, is about the size of a deck of cards. Fill the rest of your plate with vegetables and whole grains. ¨ Limit the amount of soda and sports drinks you have every day. Drink more water when you are thirsty. ¨ Eat at least 5 servings of fruits and vegetables every day. It may seem like a lot, but it is not hard to reach this goal. A serving or helping is 1 piece of fruit, 1 cup of vegetables, or 2 cups of leafy, raw vegetables. Have an apple or some carrot sticks as an afternoon snack instead of a candy bar. Try to have fruits and/or vegetables at every meal. 
· Make exercise part of your daily routine. You may want to start with simple activities, such as walking, bicycling, or slow swimming. Try to be active 30 to 60 minutes every day. You do not need to do all 30 to 60 minutes all at once. For example, you can exercise 3 times a day for 10 or 20 minutes. Moderate exercise is safe for most people, but it is always a good idea to talk to your doctor before starting an exercise program. 
· Keep moving. Clent Cramp the lawn, work in the garden, or yetu. Take the stairs instead of the elevator at work. · If you smoke, quit. People who smoke have an increased risk for heart attack, stroke, cancer, and other lung illnesses. Quitting is hard, but there are ways to boost your chance of quitting tobacco for good. ¨ Use nicotine gum, patches, or lozenges. ¨ Ask your doctor about stop-smoking programs and medicines. ¨ Keep trying. In addition to reducing your risk of diseases in the future, you will notice some benefits soon after you stop using tobacco. If you have shortness of breath or asthma symptoms, they will likely get better within a few weeks after you quit. · Limit how much alcohol you drink.  Moderate amounts of alcohol (up to 2 drinks a day for men, 1 drink a day for women) are okay. But drinking too much can lead to liver problems, high blood pressure, and other health problems. Family health If you have a family, there are many things you can do together to improve your health. · Eat meals together as a family as often as possible. · Eat healthy foods. This includes fruits, vegetables, lean meats and dairy, and whole grains. · Include your family in your fitness plan. Most people think of activities such as jogging or tennis as the way to fitness, but there are many ways you and your family can be more active. Anything that makes you breathe hard and gets your heart pumping is exercise. Here are some tips: 
¨ Walk to do errands or to take your child to school or the bus. ¨ Go for a family bike ride after dinner instead of watching TV. Where can you learn more? Go to http://avi-magdi.info/. Enter E013 in the search box to learn more about \"A Healthy Lifestyle: Care Instructions. \" Current as of: May 12, 2017 Content Version: 11.4 © 7785-8385 [employer]. Care instructions adapted under license by [employer] (which disclaims liability or warranty for this information). If you have questions about a medical condition or this instruction, always ask your healthcare professional. [address] any warranty or liability for your use of this information. Introducing [facility]! Dear Lc Zapata: 
Thank you for requesting a [employer] account. Our records indicate that you already have an active [employer] account. You can access your account anytime at [email or web address]. [employer] Did you know that you can access your hospital and ER discharge instructions at any time in [employer]? You can also review all of your test results from your hospital stay or ER visit. Additional Information If you have questions, please visit the Frequently Asked Questions section of the [employer] website at [email or web address] Remember, Nov[employer] is NOT to be used for urgent needs. For medical emergencies, dial 911. Now available from your iPhone and Android! Please provide this summary of care documentation to your next provider. Your primary care clinician is listed as Rigo Ruiz. If you have any questions after today's visit, please call [phone].

## 2018-12-12 ENCOUNTER — OFFICE VISIT (OUTPATIENT)
Dept: CARDIOLOGY CLINIC | Age: 68
End: 2018-12-12

## 2018-12-12 DIAGNOSIS — Z95.810 PRESENCE OF AUTOMATIC CARDIOVERTER/DEFIBRILLATOR (AICD): Primary | ICD-10-CM

## 2019-01-10 ENCOUNTER — OFFICE VISIT (OUTPATIENT)
Dept: CARDIOLOGY CLINIC | Age: 69
End: 2019-01-10

## 2019-01-10 VITALS
BODY MASS INDEX: 25.3 KG/M2 | OXYGEN SATURATION: 98 % | WEIGHT: 134 LBS | HEART RATE: 82 BPM | DIASTOLIC BLOOD PRESSURE: 70 MMHG | RESPIRATION RATE: 16 BRPM | SYSTOLIC BLOOD PRESSURE: 130 MMHG | HEIGHT: 61 IN

## 2019-01-10 DIAGNOSIS — I13.0 CARDIORENAL SYNDROME WITH RENAL FAILURE, STAGE 1-4 OR UNSPECIFIED CHRONIC KIDNEY DISEASE, WITH HEART FAILURE (HCC): ICD-10-CM

## 2019-01-10 DIAGNOSIS — Z95.810 PRESENCE OF AUTOMATIC CARDIOVERTER/DEFIBRILLATOR (AICD): ICD-10-CM

## 2019-01-10 DIAGNOSIS — I42.0 DILATED CARDIOMYOPATHY (HCC): ICD-10-CM

## 2019-01-10 DIAGNOSIS — I50.22 CHRONIC SYSTOLIC HEART FAILURE (HCC): Primary | ICD-10-CM

## 2019-01-10 DIAGNOSIS — E11.21 TYPE 2 DIABETES WITH NEPHROPATHY (HCC): ICD-10-CM

## 2019-01-10 DIAGNOSIS — I10 ESSENTIAL HYPERTENSION, BENIGN: ICD-10-CM

## 2019-01-10 DIAGNOSIS — E78.00 PURE HYPERCHOLESTEROLEMIA: ICD-10-CM

## 2019-01-10 NOTE — PROGRESS NOTES
Shira Alexander     1950       Sage Oh MD, [facility]  Date of Visit-1/10/2019   PCP is Rena Long MD   [address] [place] [address] [facility] [place]  [facility]  HPI:  Shira Alexander is a 76 y.o. female   F/u of CHF  Seen in 2011 with an EF of 20% and previous normal coronaries, had noted fluid increase when seen for the first time on 5/10/18 as she was changing doctors. We increased her Bumex and were cautious in the use of diuretics due to her renal failure. We arranged for her to have follow up with EP clinic. She had blood work that showed a GFR of 26 and a normal Hgb and Mag.  s/p [employer] single lead ICD (DOI 10/10/2017) for NICM & CHF (NYHA II).  Dr. Jarrell Allen (nephrology) recently changed diuretic, now taking Torsemide 40 mg po bid per patient    Overall the pt states she is doing well. Pt's weight has been stable and home BP has been at goal.  Pt does not experience any dizziness when standing up. Pt is accompanied by her daughter. Denies chest pain, edema, syncope or shortness of breath at rest, has no tachycardia, palpitations or sense of arrhythmia. Assessment/Plan:     1. Chronic systolic heart failure (HCC)  EF 25%. Stable. NYHA 1-II. Chronic. No ACE due to CKD. Continue BB. 2. Dilated cardiomyopathy ([address]  Stable. As above Non-ischemic cardiomyopathy (NICM)   Wt Readings from Last 3 Encounters:   01/10/19 134 lb (60.8 kg)   10/09/18 137 lb 6.4 oz (62.3 kg)   09/04/18 137 lb (62.1 kg)      3. Cardiorenal syndrome with renal failure, stage 1-4 or unspecified chronic kidney disease, with heart failure ([address]  F/u with Dr. Jarrell Allen. Stable kidney function. Lab Results   Component Value Date/Time    Creatinine 1.89 (H) 09/14/2018 08:43 AM      4. Presence of automatic cardioverter/defibrillator (AICD)  Pacer check showed no arrhythmia. Device checked 12/12/18 showed no detection or discharges.      5. Type 2 diabetes with nephropathy ([facility]  Followed by PCP. Lab Results   Component Value Date/Time    Hemoglobin A1c 8.4 (H) 09/14/2018 08:43 AM    Hemoglobin A1c, External 5.5 03/27/2018         6. Pure hypercholesterolemia  Discussed last lipids. TG is at lower and relates to Diabetes. LDL is at goal and HDL is low. 7. Essential hypertension, benign  Stable at goal.    BP Readings from Last 3 Encounters:   01/10/19 130/70   10/09/18 110/80   09/04/18 140/80      F/u in 4 months with same medications. Impression:   1. Chronic systolic heart failure ([facility]    2. Dilated cardiomyopathy ([facility]    3. Cardiorenal syndrome with renal failure, stage 1-4 or unspecified chronic kidney disease, with heart failure ([facility]    4. Presence of automatic cardioverter/defibrillator (AICD)    5. Type 2 diabetes with nephropathy (HCC)    6. Pure hypercholesterolemia    7. Essential hypertension, benign       Cardiac History:   2011 with an EF of 20% and previous normal coronaries  ECHO 5-31-18 EF 25%  Cardiorenal syndrome creat 2.9 2/2018  Anemia hgb 9.4 Feb 2018  AICD for NICM--DATY  Non smoker, 2 children  Family hx brother with heart disease , mother passed of stroke     ROS-except as noted above. . A complete cardiac and respiratory are reviewed and negative except as above ; Resp-denies wheezing  or productive cough,. Const- No unusual weight loss or fever; Neuro-no recent seizure or CVA ; GI- No BRBPR, abdom pain, bloating ; - no  hematuria   see supplement sheet, initialed and to be scanned by staff  Past Medical History:   Diagnosis Date    Calculus of kidney     Cardiomyopathy ([facility]     idiopathic    Chronic systolic heart failure [facility])     April 2010 ef 20%, by Dec 2010 up to 50% on meds, cath with normal cors    Diabetes [facility])     Essential hypertension, benign     Hypercholesterolemia       Social Hx= reports that  has never smoked.  she has never used smokeless tobacco. She reports that she does not drink alcohol or use drugs.     Exam and Labs:  /70 (BP 1 Location: Left arm, BP Patient Position: Sitting)   Pulse 82   Resp 16   Ht 5' 1\" (1.549 m)   Wt 134 lb (60.8 kg)   SpO2 98%   BMI 25.32 kg/m² Constitutional:  NAD, comfortable  Head: NC,AT. Eyes: No scleral icterus. Neck:  Neck supple. No JVD present. Throat: moist mucous membranes. Chest: Effort normal & normal respiratory excursion . Neurological: alert, conversant and oriented . Skin: Skin is not cold. No obvious systemic rash noted. Not diaphoretic. No erythema. Psychiatric:  Grossly normal mood and affect. Behavior appears normal. Extremities:  no clubbing or cyanosis. Abdomen: non distended    Lungs:breath sounds normal. No stridor. distress, wheezes or  Rales. Heart: normal rate, regular rhythm, normal S1, S2, no murmurs, rubs, clicks or gallops , PMI non displaced. Edema: Edema is none.   Lab Results   Component Value Date/Time    Cholesterol, total 136 10/29/2018 10:34 AM    HDL Cholesterol 25 (L) 10/29/2018 10:34 AM    LDL, calculated 45 10/29/2018 10:34 AM    Triglyceride 332 (H) 10/29/2018 10:34 AM    CHOL/HDL Ratio 5.1 (H) 04/22/2010 12:40 AM     Lab Results   Component Value Date/Time    Sodium 138 09/14/2018 08:43 AM    Potassium 4.3 09/14/2018 08:43 AM    Chloride 93 (L) 09/14/2018 08:43 AM    CO2 21 09/14/2018 08:43 AM    Anion gap 10 05/21/2010 09:43 AM    Glucose 122 (H) 09/14/2018 08:43 AM    BUN 53 (H) 09/14/2018 08:43 AM    Creatinine 1.89 (H) 09/14/2018 08:43 AM    BUN/Creatinine ratio 28 09/14/2018 08:43 AM    GFR est AA 31 (L) 09/14/2018 08:43 AM    GFR est non-AA 27 (L) 09/14/2018 08:43 AM    Calcium 9.3 09/14/2018 08:43 AM      Wt Readings from Last 3 Encounters:   01/10/19 134 lb (60.8 kg)   10/09/18 137 lb 6.4 oz (62.3 kg)   09/04/18 137 lb (62.1 kg)      BP Readings from Last 3 Encounters:   01/10/19 130/70   10/09/18 110/80   09/04/18 140/80      Current Outpatient Medications   Medication Sig    mirtazapine (REMERON) 15 mg tablet TAKE 1 TABLET BY MOUTH AT BEDTIME FOR 1 WEEK, THEN 2 TABLETS NIGHTLY    glipiZIDE (GLUCOTROL) 5 mg tablet Take 1 Tab by mouth daily.  torsemide (DEMADEX) 20 mg tablet Take 50 mg by mouth daily.  carvedilol (COREG) 12.5 mg tablet Take 12.5 mg by mouth two (2) times daily (with meals).  Tetrabenazine 12.5 mg tab daily for 1 wk, then 2 daily (Patient taking differently: Take 1 tablet every day)    aspirin delayed-release 81 mg tablet Take  by mouth daily.  simvastatin (ZOCOR) 20 mg tablet Take 1 Tab by mouth nightly. No current facility-administered medications for this visit. Impression see above.       Written by Nena Hogue, as dictated by Chace Capps MD.

## 2019-01-10 NOTE — PROGRESS NOTES
Visit Vitals /70 (BP 1 Location: Left arm, BP Patient Position: Sitting) Pulse 82 Resp 16 Ht 5' 1\" (1.549 m) Wt 134 lb (60.8 kg) SpO2 98% BMI 25.32 kg/m²

## 2019-01-15 ENCOUNTER — OFFICE VISIT (OUTPATIENT)
Dept: INTERNAL MEDICINE CLINIC | Age: 69
End: 2019-01-15

## 2019-01-15 VITALS
RESPIRATION RATE: 17 BRPM | BODY MASS INDEX: 26.24 KG/M2 | HEART RATE: 80 BPM | WEIGHT: 139 LBS | HEIGHT: 61 IN | TEMPERATURE: 98.9 F | OXYGEN SATURATION: 98 % | SYSTOLIC BLOOD PRESSURE: 130 MMHG | DIASTOLIC BLOOD PRESSURE: 81 MMHG

## 2019-01-15 DIAGNOSIS — E78.1 HYPERTRIGLYCERIDEMIA: ICD-10-CM

## 2019-01-15 DIAGNOSIS — I10 ESSENTIAL HYPERTENSION, BENIGN: ICD-10-CM

## 2019-01-15 DIAGNOSIS — E11.21 TYPE 2 DIABETES WITH NEPHROPATHY (HCC): Primary | ICD-10-CM

## 2019-01-15 NOTE — PROGRESS NOTES
Follow Up Visit    Geri Rausch is a 76 y.o. female. she presents for Diabetes and Cholesterol Problem      Diabetes: The patient has been doing well since the change to her glipizide. She is taking 5mg of this tablet daily. No ill effects as per her and her daughter's report. She has been monitoring sugars. These have been between 120's and 150's. She is eating healthily. Triglycerides: We discussed these as she did have a significant elevation in triglyceride levels at her previous testing. This is likely secondary to her blood sugars worsening. Repeat test in October showed an improvement in the triglyceride level. We will repeat this. She has seen cardiology, doing well per Dr. Krysta Birch    CKD: Followed by Dr. Pankaj Burton. She has not been seen recently and needs an appointment. They will call for this. Patient Active Problem List   Diagnosis Code    Chronic systolic heart failure (HCC) I50.22    Cardiomyopathy ([facility] I42.9    Pure hypercholesterolemia E78.00    Essential hypertension, benign I10    Type 2 diabetes with nephropathy ([facility] E11.21    Cardiorenal syndrome I13.10         Prior to Admission medications    Medication Sig Start Date End Date Taking? Authorizing Provider   mirtazapine (REMERON) 15 mg tablet TAKE 1 TABLET BY MOUTH AT BEDTIME FOR 1 WEEK, THEN 2 TABLETS NIGHTLY 11/9/18  Yes Xavier Dotson MD   glipiZIDE (GLUCOTROL) 5 mg tablet Take 1 Tab by mouth daily. 10/9/18  Yes Xavier Dotson MD   torsemide [facility]) 20 mg tablet Take 50 mg by mouth daily. Yes Provider, Historical   carvedilol (COREG) 12.5 mg tablet Take 12.5 mg by mouth two (2) times daily (with meals). Yes Provider, Historical   Tetrabenazine 12.5 mg tab daily for 1 wk, then 2 daily  Patient taking differently: Take 1 tablet every day 5/11/18  Yes Arya Ramírez MD   aspirin delayed-release 81 mg tablet Take  by mouth daily.    Yes Provider, Historical   simvastatin (ZOCOR) 20 mg tablet Take 1 Tab by mouth nightly. 8/23/11  Yes Suhail Cazares MD         Health Maintenance   Topic Date Due    EYE EXAM RETINAL OR DILATED  05/03/1960    DTaP/Tdap/Td series (1 - Tdap) 05/03/1971    Shingrix Vaccine Age 50> (1 of 2) 05/03/2000    BREAST CANCER SCRN MAMMOGRAM  05/03/2000    FOBT Q 1 YEAR AGE 50-75  05/03/2000    GLAUCOMA SCREENING Q2Y  05/03/2015    Bone Densitometry (Dexa) Screening  05/03/2015    Pneumococcal 65+ Low/Medium Risk (1 of 2 - PCV13) 05/03/2015    HEMOGLOBIN A1C Q6M  03/14/2019    MICROALBUMIN Q1  05/03/2019    FOOT EXAM Q1  06/27/2019    MEDICARE YEARLY EXAM  06/28/2019    LIPID PANEL Q1  10/29/2019    Hepatitis C Screening  Completed    Influenza Age 5 to Adult  Completed       Review of Systems   Constitutional: Negative. Respiratory: Negative. Cardiovascular: Negative. Gastrointestinal: Negative. Visit Vitals  /81 (BP 1 Location: Right arm, BP Patient Position: Sitting)   Pulse 80   Temp 98.9 °F (37.2 °C) (Oral)   Resp 17   Ht 5' 1\" (1.549 m)   Wt 139 lb (63 kg)   SpO2 98%   BMI 26.26 kg/m²       Physical Exam   Constitutional: She is oriented to person, place, and time. No distress. Cardiovascular: Normal rate and regular rhythm. Pulmonary/Chest: Effort normal and breath sounds normal.   Neurological: She is alert and oriented to person, place, and time. ASSESSMENT/PLAN    Diagnoses and all orders for this visit:    1. Type 2 diabetes with nephropathy ([facility] -we will repeat her A1c today. Likewise, she will have her chemistry panel evaluated. We will forward this to Dr. Rebecca Dela Cruz if necessary.  -     HEMOGLOBIN A1C WITH EAG  -     METABOLIC PANEL, COMPREHENSIVE    2. Essential hypertension, benign    3. Hypertriglyceridemia -we will repeat the lipid panel for triglycerides given her improvement in blood sugars.  -     LIPID PANEL        Follow-up Disposition:  Return in about 3 months (around 4/15/2019) for Follow up Diabetes.

## 2019-01-18 ENCOUNTER — HOSPITAL ENCOUNTER (OUTPATIENT)
Dept: LAB | Age: 69
Discharge: HOME | End: 2019-01-18
Payer: MEDICARE

## 2019-01-18 PROCEDURE — 36415 COLL VENOUS BLD VENIPUNCTURE: CPT

## 2019-01-18 PROCEDURE — 83036 HEMOGLOBIN GLYCOSYLATED A1C: CPT

## 2019-01-18 PROCEDURE — 80053 COMPREHEN METABOLIC PANEL: CPT

## 2019-01-18 PROCEDURE — 80061 LIPID PANEL: CPT

## 2019-01-19 LAB
ALBUMIN SERPL-MCNC: 4.4 G/DL (ref 3.6–4.8)
ALBUMIN/GLOB SERPL: 1.5 {RATIO} (ref 1.2–2.2)
ALP SERPL-CCNC: 81 IU/L (ref 39–117)
ALT SERPL-CCNC: 13 IU/L (ref 0–32)
AST SERPL-CCNC: 18 IU/L (ref 0–40)
BILIRUB SERPL-MCNC: 0.3 MG/DL (ref 0–1.2)
BUN SERPL-MCNC: 43 MG/DL (ref 8–27)
BUN/CREAT SERPL: 21 (ref 12–28)
CALCIUM SERPL-MCNC: 9.3 MG/DL (ref 8.7–10.3)
CHLORIDE SERPL-SCNC: 96 MMOL/L (ref 96–106)
CHOLEST SERPL-MCNC: 153 MG/DL (ref 100–199)
CO2 SERPL-SCNC: 19 MMOL/L (ref 20–29)
CREAT SERPL-MCNC: 2.03 MG/DL (ref 0.57–1)
EST. AVERAGE GLUCOSE BLD GHB EST-MCNC: 180 MG/DL
GLOBULIN SER CALC-MCNC: 2.9 G/DL (ref 1.5–4.5)
GLUCOSE SERPL-MCNC: 255 MG/DL (ref 65–99)
HBA1C MFR BLD: 7.9 % (ref 4.8–5.6)
HDLC SERPL-MCNC: 24 MG/DL
LDLC SERPL CALC-MCNC: ABNORMAL MG/DL (ref 0–99)
POTASSIUM SERPL-SCNC: 4.8 MMOL/L (ref 3.5–5.2)
PROT SERPL-MCNC: 7.3 G/DL (ref 6–8.5)
SODIUM SERPL-SCNC: 141 MMOL/L (ref 134–144)
TRIGL SERPL-MCNC: 566 MG/DL (ref 0–149)
VLDLC SERPL CALC-MCNC: ABNORMAL MG/DL (ref 5–40)

## 2019-03-18 ENCOUNTER — OFFICE VISIT (OUTPATIENT)
Dept: CARDIOLOGY CLINIC | Age: 69
End: 2019-03-18

## 2019-03-18 DIAGNOSIS — Z95.810 PRESENCE OF AUTOMATIC CARDIOVERTER/DEFIBRILLATOR (AICD): Primary | ICD-10-CM

## 2019-04-15 DIAGNOSIS — E11.65 TYPE 2 DIABETES MELLITUS WITH HYPERGLYCEMIA, WITHOUT LONG-TERM CURRENT USE OF INSULIN (HCC): ICD-10-CM

## 2019-04-15 RX ORDER — GLIPIZIDE 5 MG/1
TABLET ORAL
Qty: 90 TAB | Refills: 1 | Status: SHIPPED | OUTPATIENT
Start: 2019-04-15 | End: 2019-06-26 | Stop reason: DRUGHIGH

## 2019-06-19 ENCOUNTER — TELEPHONE (OUTPATIENT)
Dept: CARDIOLOGY CLINIC | Age: 69
End: 2019-06-19

## 2019-06-19 ENCOUNTER — TELEPHONE (OUTPATIENT)
Dept: INTERNAL MEDICINE CLINIC | Age: 69
End: 2019-06-19

## 2019-06-19 DIAGNOSIS — E78.00 PURE HYPERCHOLESTEROLEMIA: ICD-10-CM

## 2019-06-19 DIAGNOSIS — E11.21 TYPE 2 DIABETES WITH NEPHROPATHY (HCC): ICD-10-CM

## 2019-06-19 DIAGNOSIS — I10 ESSENTIAL HYPERTENSION, BENIGN: ICD-10-CM

## 2019-06-19 DIAGNOSIS — I50.22 CHRONIC SYSTOLIC HEART FAILURE (HCC): Primary | Chronic | ICD-10-CM

## 2019-06-19 NOTE — TELEPHONE ENCOUNTER
melony-daughter calling to r/s the appointment that was scheduled for tomorrow with dr ONEIL. I did offer her the slot for tomorrow with the NP but she stated they need a Tuesday. Alma Rosa Tom     Phone: [phone]

## 2019-06-19 NOTE — TELEPHONE ENCOUNTER
Ova Line, daughter [phone]     Patient's daughter would like to bring her mother in for labwork this Friday morning. She has rescheduled her missed appt from April to next Weds the 26th. Would you please let daughter know by tomorrow afternoon that the labslip is ready?

## 2019-06-19 NOTE — TELEPHONE ENCOUNTER
Called Pt. Two patient identifiers confirmed. Rescheduled pt for 7/23/2019 for Dr. Jim Peres. Pt verbalized understanding of information discussed w/ no further questions at this time.

## 2019-06-21 ENCOUNTER — HOSPITAL ENCOUNTER (OUTPATIENT)
Dept: LAB | Age: 69
Discharge: HOME | End: 2019-06-21
Payer: MEDICARE

## 2019-06-21 PROCEDURE — 85025 COMPLETE CBC W/AUTO DIFF WBC: CPT

## 2019-06-21 PROCEDURE — 83036 HEMOGLOBIN GLYCOSYLATED A1C: CPT

## 2019-06-21 PROCEDURE — 80061 LIPID PANEL: CPT

## 2019-06-21 PROCEDURE — 80053 COMPREHEN METABOLIC PANEL: CPT

## 2019-06-21 PROCEDURE — 36415 COLL VENOUS BLD VENIPUNCTURE: CPT

## 2019-06-22 LAB
ALBUMIN SERPL-MCNC: 4.1 G/DL (ref 3.6–4.8)
ALBUMIN/GLOB SERPL: 1.3 {RATIO} (ref 1.2–2.2)
ALP SERPL-CCNC: 86 IU/L (ref 39–117)
ALT SERPL-CCNC: 17 IU/L (ref 0–32)
AST SERPL-CCNC: 17 IU/L (ref 0–40)
BASOPHILS # BLD AUTO: 0 X10E3/UL (ref 0–0.2)
BASOPHILS NFR BLD AUTO: 1 %
BILIRUB SERPL-MCNC: 0.3 MG/DL (ref 0–1.2)
BUN SERPL-MCNC: 39 MG/DL (ref 8–27)
BUN/CREAT SERPL: 27 (ref 12–28)
CALCIUM SERPL-MCNC: 9.1 MG/DL (ref 8.7–10.3)
CHLORIDE SERPL-SCNC: 98 MMOL/L (ref 96–106)
CHOLEST SERPL-MCNC: 152 MG/DL (ref 100–199)
CO2 SERPL-SCNC: 22 MMOL/L (ref 20–29)
CREAT SERPL-MCNC: 1.47 MG/DL (ref 0.57–1)
EOSINOPHIL # BLD AUTO: 0.2 X10E3/UL (ref 0–0.4)
EOSINOPHIL NFR BLD AUTO: 3 %
ERYTHROCYTE [DISTWIDTH] IN BLOOD BY AUTOMATED COUNT: 13.9 % (ref 12.3–15.4)
EST. AVERAGE GLUCOSE BLD GHB EST-MCNC: 200 MG/DL
GLOBULIN SER CALC-MCNC: 3.1 G/DL (ref 1.5–4.5)
GLUCOSE SERPL-MCNC: 142 MG/DL (ref 65–99)
HBA1C MFR BLD: 8.6 % (ref 4.8–5.6)
HCT VFR BLD AUTO: 36.1 % (ref 34–46.6)
HDLC SERPL-MCNC: 26 MG/DL
HGB BLD-MCNC: 11.9 G/DL (ref 11.1–15.9)
IMM GRANULOCYTES # BLD AUTO: 0 X10E3/UL (ref 0–0.1)
IMM GRANULOCYTES NFR BLD AUTO: 0 %
LDLC SERPL CALC-MCNC: ABNORMAL MG/DL (ref 0–99)
LYMPHOCYTES # BLD AUTO: 1.9 X10E3/UL (ref 0.7–3.1)
LYMPHOCYTES NFR BLD AUTO: 29 %
MCH RBC QN AUTO: 30.7 PG (ref 26.6–33)
MCHC RBC AUTO-ENTMCNC: 33 G/DL (ref 31.5–35.7)
MCV RBC AUTO: 93 FL (ref 79–97)
MONOCYTES # BLD AUTO: 0.7 X10E3/UL (ref 0.1–0.9)
MONOCYTES NFR BLD AUTO: 10 %
NEUTROPHILS # BLD AUTO: 3.8 X10E3/UL (ref 1.4–7)
NEUTROPHILS NFR BLD AUTO: 57 %
PLATELET # BLD AUTO: 193 X10E3/UL (ref 150–450)
POTASSIUM SERPL-SCNC: 4.6 MMOL/L (ref 3.5–5.2)
PROT SERPL-MCNC: 7.2 G/DL (ref 6–8.5)
RBC # BLD AUTO: 3.87 X10E6/UL (ref 3.77–5.28)
SODIUM SERPL-SCNC: 140 MMOL/L (ref 134–144)
TRIGL SERPL-MCNC: 442 MG/DL (ref 0–149)
VLDLC SERPL CALC-MCNC: ABNORMAL MG/DL (ref 5–40)
WBC # BLD AUTO: 6.5 X10E3/UL (ref 3.4–10.8)

## 2019-06-26 ENCOUNTER — OFFICE VISIT (OUTPATIENT)
Dept: INTERNAL MEDICINE CLINIC | Age: 69
End: 2019-06-26

## 2019-06-26 VITALS
BODY MASS INDEX: 25.6 KG/M2 | DIASTOLIC BLOOD PRESSURE: 80 MMHG | HEIGHT: 61 IN | HEART RATE: 81 BPM | OXYGEN SATURATION: 96 % | RESPIRATION RATE: 18 BRPM | TEMPERATURE: 97.6 F | WEIGHT: 135.6 LBS | SYSTOLIC BLOOD PRESSURE: 143 MMHG

## 2019-06-26 DIAGNOSIS — E11.21 TYPE 2 DIABETES WITH NEPHROPATHY (HCC): Primary | ICD-10-CM

## 2019-06-26 RX ORDER — GLIPIZIDE 10 MG/1
10 TABLET, FILM COATED, EXTENDED RELEASE ORAL DAILY
Qty: 30 TAB | Refills: 3 | Status: SHIPPED | OUTPATIENT
Start: 2019-06-26 | End: 2019-10-22 | Stop reason: SDUPTHER

## 2019-06-26 NOTE — PROGRESS NOTES
Follow Up Visit    Abhi Hopson is a 71 y.o. female. she presents for Diabetes and Hypertension    Diabetes: The patient is presently on glipizide. She takes 5mg once daily. Noted worsened A1c (7.9 to 8.6). She reports eating carbs and fried foods. Discussed the need to modify her diet. Triglycerides:  Remain elevated but somewhat improved from last testing. Likely due to sugars    Cardiology/Htn: She saw Dr. Debby Torres in Jan, was supposed to return in 4 months. Did not come back. She has appointments upcoming with both him and Dr. Sofía King. Dr. Demetris Valenzuela has retired, she needs a new nephrologist.  She was assigned one through that office. Will look into it. Patient Active Problem List   Diagnosis Code    Chronic systolic heart failure (HCC) I50.22    Cardiomyopathy ([facility] I42.9    Pure hypercholesterolemia E78.00    Essential hypertension, benign I10    Type 2 diabetes with nephropathy ([facility] [address] E11.21    Cardiorenal syndrome I13.10    Hypertriglyceridemia E78.1         Prior to Admission medications    Medication Sig Start Date End Date Taking? Authorizing Provider   glipiZIDE (GLUCOTROL) 5 mg tablet TAKE 1 TABLET BY MOUTH EVERY DAY 4/15/19  Yes Jessica Rodgers MD   mirtazapine (REMERON) 15 mg tablet TAKE 1 TABLET BY MOUTH AT BEDTIME FOR 1 WEEK, THEN 2 TABLETS NIGHTLY 11/9/18  Yes Jessica Rodgers MD   torsemide [facility]) 20 mg tablet Take 50 mg by mouth daily. Yes Provider, Historical   carvedilol (COREG) 12.5 mg tablet Take 12.5 mg by mouth two (2) times daily (with meals). Yes Provider, Historical   Tetrabenazine 12.5 mg tab daily for 1 wk, then 2 daily  Patient taking differently: Take 1 tablet every day 5/11/18  Yes Makayla Rodriguez MD   aspirin delayed-release 81 mg tablet Take  by mouth daily. Yes Provider, Historical   simvastatin (ZOCOR) 20 mg tablet Take 1 Tab by mouth nightly.  8/23/11  Yes Leelee Mckeon MD         Health Maintenance   Topic Date Due    EYE EXAM RETINAL OR DILATED  05/03/1960    DTaP/Tdap/Td series (1 - Tdap) 05/03/1971    Shingrix Vaccine Age 50> (1 of 2) 05/03/2000    BREAST CANCER SCRN MAMMOGRAM  05/03/2000    FOBT Q 1 YEAR AGE 50-75  05/03/2000    GLAUCOMA SCREENING Q2Y  05/03/2015    Bone Densitometry (Dexa) Screening  05/03/2015    Pneumococcal 65+ years (1 of 2 - PCV13) 05/03/2015    MICROALBUMIN Q1  05/03/2019    FOOT EXAM Q1  06/27/2019    MEDICARE YEARLY EXAM  06/28/2019    Influenza Age 9 to Adult  08/01/2019    HEMOGLOBIN A1C Q6M  12/21/2019    LIPID PANEL Q1  06/21/2020    Hepatitis C Screening  Completed       Review of Systems   Constitutional: Negative. HENT: Negative. Respiratory: Negative for cough. Cardiovascular: Negative for chest pain and palpitations. Gastrointestinal: Negative. Musculoskeletal: Negative. All other systems reviewed and are negative. Visit Vitals  /80 (BP 1 Location: Right arm, BP Patient Position: Sitting)   Pulse 81   Temp 97.6 °F (36.4 °C) (Oral)   Resp 18   Ht 5' 1\" (1.549 m)   Wt 135 lb 9.6 oz (61.5 kg)   SpO2 96%   BMI 25.62 kg/m²       Physical Exam   Constitutional: She appears well-developed and well-nourished. Cardiovascular: Normal rate and regular rhythm. Pulmonary/Chest: Effort normal and breath sounds normal.         ASSESSMENT/PLAN    Diagnoses and all orders for this visit:    1. Type 2 diabetes with nephropathy (HCC) -appears stable at this time. We will refill her glipizide. Also will see about a new nephrologist for her.  -     glipiZIDE SR (GLUCOTROL XL) 10 mg CR tablet; Take 1 Tab by mouth daily. Follow-up and Dispositions    · Return in about 1 month (around 7/26/2019) for Medicare Wellness Visit- 30 minute appointment.

## 2019-07-26 ENCOUNTER — HOSPITAL ENCOUNTER (OUTPATIENT)
Dept: LAB | Age: 69
Discharge: HOME | End: 2019-07-26
Payer: MEDICARE

## 2019-07-26 ENCOUNTER — OFFICE VISIT (OUTPATIENT)
Dept: INTERNAL MEDICINE CLINIC | Age: 69
End: 2019-07-26

## 2019-07-26 VITALS
OXYGEN SATURATION: 98 % | HEIGHT: 61 IN | TEMPERATURE: 98.2 F | HEART RATE: 73 BPM | RESPIRATION RATE: 18 BRPM | DIASTOLIC BLOOD PRESSURE: 71 MMHG | WEIGHT: 136.4 LBS | BODY MASS INDEX: 25.75 KG/M2 | SYSTOLIC BLOOD PRESSURE: 139 MMHG

## 2019-07-26 DIAGNOSIS — Z12.11 COLON CANCER SCREENING: ICD-10-CM

## 2019-07-26 DIAGNOSIS — E11.21 TYPE 2 DIABETES WITH NEPHROPATHY (HCC): ICD-10-CM

## 2019-07-26 DIAGNOSIS — Z23 ENCOUNTER FOR IMMUNIZATION: ICD-10-CM

## 2019-07-26 DIAGNOSIS — Z00.00 MEDICARE ANNUAL WELLNESS VISIT, SUBSEQUENT: Primary | ICD-10-CM

## 2019-07-26 DIAGNOSIS — Z12.39 BREAST CANCER SCREENING: ICD-10-CM

## 2019-07-26 PROCEDURE — 82043 UR ALBUMIN QUANTITATIVE: CPT

## 2019-07-26 RX ORDER — METFORMIN HYDROCHLORIDE 500 MG/1
500 TABLET ORAL 2 TIMES DAILY WITH MEALS
Qty: 60 TAB | Refills: 3 | Status: SHIPPED | OUTPATIENT
Start: 2019-07-26 | End: 2019-11-18 | Stop reason: SDUPTHER

## 2019-07-26 NOTE — PROGRESS NOTES
This is the Subsequent Medicare Annual Wellness Exam, performed 12 months or more after the Initial AWV or the last Subsequent AWV    I have reviewed the patient's medical history in detail and updated the computerized patient record. Endocrine Review  She is seen for diabetes. Since last visit: she has had poorly controlled blood sugars. Home glucose monitoring: is performed sporadically. She reports medication compliance: compliant all of the time. She reports the following medication side effects: none. Diabetic diet compliance: noncompliant some of the time. Further diabetic ROS: no hypoglycemia. Lab review: orders written for new lab studies as appropriate; see orders. History     Past Medical History:   Diagnosis Date    Calculus of kidney     Cardiomyopathy [facility])     idiopathic    Chronic systolic heart failure [facility])     April 2010 ef 20%, by Dec 2010 up to 50% on meds, cath with normal cors    Diabetes [facility])     Essential hypertension, benign     Hypercholesterolemia       Past Surgical History:   Procedure Laterality Date    CARDIAC SURG PROCEDURE UNLIST      HX HYSTERECTOMY      HX ORTHOPAEDIC      HX OTHER SURGICAL  10/2017    difibulator     HX UROLOGICAL      stone removal     Current Outpatient Medications   Medication Sig Dispense Refill    glipiZIDE SR (GLUCOTROL XL) 10 mg CR tablet Take 1 Tab by mouth daily. 30 Tab 3    mirtazapine (REMERON) 15 mg tablet TAKE 1 TABLET BY MOUTH AT BEDTIME FOR 1 WEEK, THEN 2 TABLETS NIGHTLY 45 Tab 5    torsemide (DEMADEX) 20 mg tablet Take 50 mg by mouth daily.  carvedilol (COREG) 12.5 mg tablet Take 12.5 mg by mouth two (2) times daily (with meals).  Tetrabenazine 12.5 mg tab daily for 1 wk, then 2 daily (Patient taking differently: Take 1 tablet every day) 60 Tab 1    aspirin delayed-release 81 mg tablet Take  by mouth daily.  simvastatin (ZOCOR) 20 mg tablet Take 1 Tab by mouth nightly.  30 Tab 0     Allergies Allergen Reactions    Codeine Hives    Pcn [Penicillins] Hives     Family History   Problem Relation Age of Onset    Stroke Mother     Hypertension Mother     Lung Disease Father         emphysema    Heart Disease Brother     Stroke Brother     Cancer Brother         bone    Cancer Maternal Grandfather         colon ca    Cancer Other         breast ca     Social History     Tobacco Use    Smoking status: Never Smoker    Smokeless tobacco: Never Used   Substance Use Topics    Alcohol use: No     Patient Active Problem List   Diagnosis Code    Chronic systolic heart failure (HCC) I50.22    Cardiomyopathy ([facility] [address] I42.9    Pure hypercholesterolemia E78.00    Essential hypertension, benign I10    Type 2 diabetes with nephropathy ([facility] [address] E11.21    Cardiorenal syndrome I13.10    Hypertriglyceridemia E78.1       Depression Risk Factor Screening:     3 most recent PHQ Screens 6/26/2019   Little interest or pleasure in doing things Not at all   Feeling down, depressed, irritable, or hopeless Not at all   Total Score PHQ 2 0     Alcohol Risk Factor Screening: You do not drink alcohol or very rarely. Functional Ability and Level of Safety:   Hearing Loss  Hearing is good. Activities of Daily Living  The home contains: no safety equipment. Patient does total self care    Fall Risk  Fall Risk Assessment, last 12 mths 10/9/2018   Able to walk? Yes   Fall in past 12 months? No       Abuse Screen  Patient is not abused    Cognitive Screening   Evaluation of Cognitive Function:  Has your family/caregiver stated any concerns about your memory: no  Normal    Patient Care Team   Patient Care Team:  Karrie Israel MD as PCP - General (Internal Medicine)  Tarah Calderón MD (Cardiology)  Rodrigue Child MD (Cardiology)    Assessment/Plan   Education and counseling provided:  Are appropriate based on today's review and evaluation    Diagnoses and all orders for this visit:    1.  Medicare annual wellness visit, subsequent    2. Breast cancer screening  -     [facility] MAMMO BI SCREENING INCL CAD; Future    3. Encounter for immunization  -     pneumococcal 13 ariane conj dip (PREVNAR-13) 0.5 mL syrg injection; 0.5 mL by IntraMUSCular route once for 1 dose. 4. Colon cancer screening  -     REFERRAL TO GASTROENTEROLOGY    5. Type 2 diabetes with nephropathy ([facility] [address] - given elevated A1c, we will add Metformin to her regimen. Follow up in 6 weeks for blood testing.  -     REFERRAL TO OPHTHALMOLOGY  -     MICROALBUMIN, UR, RAND W/ MICROALB/CREAT RATIO  -      DIABETES FOOT EXAM  -     metFORMIN (GLUCOPHAGE) 500 mg tablet; Take 1 Tab by mouth two (2) times daily (with meals). -     REFERRAL TO NUTRITION      Follow-up and Dispositions    · Return in about 6 weeks (around 9/6/2019), or if symptoms worsen or fail to improve, for Follow up Diabetes.          Health Maintenance Due   Topic Date Due    EYE EXAM RETINAL OR DILATED  05/03/1960    DTaP/Tdap/Td series (1 - Tdap) 05/03/1971    Shingrix Vaccine Age 50> (1 of 2) 05/03/2000    BREAST CANCER SCRN MAMMOGRAM  05/03/2000    FOBT Q 1 YEAR AGE 50-75  05/03/2000    GLAUCOMA SCREENING Q2Y  05/03/2015    Bone Densitometry (Dexa) Screening  05/03/2015    Pneumococcal 65+ years (1 of 2 - PCV13) 05/03/2015    MICROALBUMIN Q1  05/03/2019    FOOT EXAM Q1  06/27/2019    MEDICARE YEARLY EXAM  06/28/2019

## 2019-07-26 NOTE — PATIENT INSTRUCTIONS
Medicare Wellness Visit, Female     The best way to live healthy is to have a lifestyle where you eat a well-balanced diet, exercise regularly, limit alcohol use, and quit all forms of tobacco/nicotine, if applicable. Regular preventive services are another way to keep healthy. Preventive services (vaccines, screening tests, monitoring & exams) can help personalize your care plan, which helps you manage your own care. Screening tests can find health problems at the earliest stages, when they are easiest to treat. [facility] follows the current, evidence-based guidelines published by the [facility] ([facility]) when recommending preventive services for our patients. Because we follow these guidelines, sometimes recommendations change over time as research supports it. (For example, mammograms used to be recommended annually. Even though Medicare will still pay for an annual mammogram, the newer guidelines recommend a mammogram every two years for women of average risk.)  Of course, you and your doctor may decide to screen more often for some diseases, based on your risk and your health status. Preventive services for you include:  - Medicare offers their members a free annual wellness visit, which is time for you and your primary care provider to discuss and plan for your preventive service needs. Take advantage of this benefit every year!  -All adults over the age of 72 should receive the recommended pneumonia vaccines. Current USPSTF guidelines recommend a series of two vaccines for the best pneumonia protection.   -All adults should have a flu vaccine yearly and a tetanus vaccine every 10 years. All adults age 61 and older should receive a shingles vaccine once in their lifetime.    -A bone mass density test is recommended when a woman turns 65 to screen for osteoporosis. This test is only recommended one time, as a screening.  Some providers will use this same test as a disease monitoring tool if you already have osteoporosis. -All adults age 38-68 who are overweight should have a diabetes screening test once every three years.   -Other screening tests and preventive services for persons with diabetes include: an eye exam to screen for diabetic retinopathy, a kidney function test, a foot exam, and stricter control over your cholesterol.   -Cardiovascular screening for adults with routine risk involves an electrocardiogram (ECG) at intervals determined by your doctor.   -Colorectal cancer screenings should be done for adults age 54-65 with no increased risk factors for colorectal cancer. There are a number of acceptable methods of screening for this type of cancer. Each test has its own benefits and drawbacks. Discuss with your doctor what is most appropriate for you during your annual wellness visit. The different tests include: colonoscopy (considered the best screening method), a fecal occult blood test, a fecal DNA test, and sigmoidoscopy. -Breast cancer screenings are recommended every other year for women of normal risk, age 54-69.  -Cervical cancer screenings for women over age 72 are only recommended with certain risk factors.   -All adults born between [facility] should be screened once for Hepatitis C.      Here is a list of your current Health Maintenance items (your personalized list of preventive services) with a due date:  Health Maintenance Due   Topic Date Due    Eye Exam  05/03/1960    DTaP/Tdap/Td  (1 - Tdap) 05/03/1971    Shingles Vaccine (1 of 2) 05/03/2000    Mammogram  05/03/2000    Stool testing for trace blood  05/03/2000    Glaucoma Screening   05/03/2015    Bone Mineral Density   05/03/2015    Pneumococcal Vaccine (1 of 2 - PCV13) 05/03/2015    Albumin Urine Test  05/03/2019    Diabetic Foot Care  06/27/2019    Annual Well Visit  06/28/2019

## 2019-07-27 LAB
ALBUMIN/CREAT UR: 706.6 MG/G CREAT (ref 0–30)
CREAT UR-MCNC: 16.7 MG/DL
MICROALBUMIN UR-MCNC: 118 UG/ML

## 2019-08-21 ENCOUNTER — OFFICE VISIT (OUTPATIENT)
Dept: CARDIOLOGY CLINIC | Age: 69
End: 2019-08-21

## 2019-08-21 VITALS
WEIGHT: 136 LBS | DIASTOLIC BLOOD PRESSURE: 70 MMHG | BODY MASS INDEX: 25.68 KG/M2 | SYSTOLIC BLOOD PRESSURE: 120 MMHG | HEART RATE: 82 BPM | HEIGHT: 61 IN | OXYGEN SATURATION: 99 % | RESPIRATION RATE: 18 BRPM

## 2019-08-21 DIAGNOSIS — I42.0 DILATED CARDIOMYOPATHY (HCC): ICD-10-CM

## 2019-08-21 DIAGNOSIS — I13.0 CARDIORENAL SYNDROME WITH RENAL FAILURE, STAGE 1-4 OR UNSPECIFIED CHRONIC KIDNEY DISEASE, WITH HEART FAILURE (HCC): ICD-10-CM

## 2019-08-21 DIAGNOSIS — Z95.810 PRESENCE OF AUTOMATIC CARDIOVERTER/DEFIBRILLATOR (AICD): ICD-10-CM

## 2019-08-21 DIAGNOSIS — I10 ESSENTIAL HYPERTENSION, BENIGN: ICD-10-CM

## 2019-08-21 DIAGNOSIS — I50.22 CHRONIC SYSTOLIC HEART FAILURE (HCC): Primary | ICD-10-CM

## 2019-08-21 NOTE — Clinical Note
8/21/19 Patient: Arash Polk YOB: 1950 Date of Visit: 8/21/2019 Rita Goins MD 
[address] [facility] Internal Medicine [place] [id number] VIA In Basket Dear Rita Goins MD, Thank you for referring Ms. Adrian Giles to [facility] for evaluation. My notes for this consultation are attached. If you have questions, please do not hesitate to call me. I look forward to following your patient along with you.  
 
 
Sincerely, 
 
Ilir Trammell MD

## 2019-08-21 NOTE — PROGRESS NOTES
Visit Vitals  /70 (BP 1 Location: Left arm, BP Patient Position: Sitting)   Pulse 82   Resp 18   Ht 5' 1\" (1.549 m)   Wt 136 lb (61.7 kg)   SpO2 99%   BMI 25.70 kg/m²

## 2019-08-21 NOTE — PROGRESS NOTES
Suly Aguirre     1950       Sage Rock MD, [facility]  Date of Visit-8/21/2019   PCP is Imelda Griffiths MD   [facility] [place]  [facility]  HPI:  Suly Aguirre is a 71 y.o. female   F/u from January. EF 20% and normal coronaries in the past.   Seen in 2011 with an EF of 20% and previous normal coronaries, had noted fluid increase when seen for the first time on 5/10/18 as she was changing doctors. We increased her Bumex and were cautious in the use of diuretics due to her renal failure. We arranged for her to have follow up with EP clinic. She had blood work that showed a GFR of 26 and a normal Hgb and Mag.  s/p [employer] single lead ICD (DOI 10/10/2017) for NICM & CHF (NYHA II).  Dr. Bolivar Singh (nephrology) . Here in f/u of systolic HF. Since last visit has followed up with DM with PCP. Pt is present with her daughter. Pt uses a walker. Pt's daughter states that the most strenuous work the pt does is cooking, and the pt states that she has no problems while doing it. Denies chest pain, edema, syncope or shortness of breath at rest, has no tachycardia, palpitations or sense of arrhythmia. EKG: Sinus at 84 with minimal T-wave changes. Assessment/Plan:     1. Chronic systolic heart failure (HCC)  NYHA 2. Tolerating meds well. F/u in 6 months  No ACE due to CKD. Continue BB. 2. Dilated cardiomyopathy (HCC)  Non-ischemic cardiomyopathy (NICM) stable  Key CAD CHF Meds             torsemide (DEMADEX) 20 mg tablet (Taking) Take 50 mg by mouth daily. carvedilol (COREG) 12.5 mg tablet (Taking) Take 12.5 mg by mouth two (2) times daily (with meals). aspirin delayed-release 81 mg tablet (Taking) Take  by mouth daily. simvastatin (ZOCOR) 20 mg tablet (Taking) Take 1 Tab by mouth nightly.           3. Cardiorenal syndrome with renal failure, stage 1-4 or unspecified chronic kidney disease, with heart failure [facility])  Lab Results   Component Value Date/Time    Creatinine 1.47 (H) 06/21/2019 12:52 PM       4. Essential hypertension, benign controlled  BP Readings from Last 6 Encounters:   08/21/19 120/70   07/26/19 139/71   06/26/19 143/80   01/15/19 130/81   01/10/19 130/70   10/09/18 110/80         5. Presence of automatic cardioverter/defibrillator (AICD)  F/u with Dr. Angie Burgess in October.   - AMB POC EKG ROUTINE W/ 12 LEADS, INTER & REP        Future Appointments   Date Time Provider Chava Gabriel   9/6/2019 10:00 AM Alberto Mukherjee MD [id number] [facility]   10/22/2019  8:40 AM Cruz Cervantes  [address]   2/21/2020  9:20 AM Earl Miller  [address]      Patient Instructions   You will need to follow up in clinic with Dr. Venita Brooks in 6 months. Impression:   1. Chronic systolic heart failure ([address]    2. Dilated cardiomyopathy ([address]    3. Cardiorenal syndrome with renal failure, stage 1-4 or unspecified chronic kidney disease, with heart failure ([address]    4. Essential hypertension, benign    5. Presence of automatic cardioverter/defibrillator (AICD)       Cardiac History:   2011 with an EF of 20% and previous normal coronaries  ECHO 5-31-18 EF 25%  Cardiorenal syndrome creat 2.9 2/2018  Anemia hgb 9.4 Feb 2018  AICD for NICM--[employer]  Non smoker, 2 children  Family hx brother with heart disease , mother passed of stroke     ROS-except as noted above. . A complete cardiac and respiratory are reviewed and negative except as above ; Resp-denies wheezing  or productive cough,.  Const- No unusual weight loss or fever; Neuro-no recent seizure or CVA ; GI- No BRBPR, abdom pain, bloating ; - no  hematuria   see supplement sheet, initialed and to be scanned by staff  Past Medical History:   Diagnosis Date    Calculus of kidney     Cardiomyopathy ([address]     idiopathic    Chronic systolic heart failure [facility])     April 2010 ef 20%, by Dec 2010 up to 50% on meds, cath with normal cors    Diabetes [facility])     Essential hypertension, benign     Hypercholesterolemia       Social Hx= reports that she has never smoked. She has never used smokeless tobacco. She reports that she does not drink alcohol or use drugs. Exam and Labs:  /70 (BP 1 Location: Left arm, BP Patient Position: Sitting)   Pulse 82   Resp 18   Ht 5' 1\" (1.549 m)   Wt 136 lb (61.7 kg)   SpO2 99%   BMI 25.70 kg/m² Constitutional:  NAD, comfortable  Head: NC,AT. Eyes: No scleral icterus. Neck:  Neck supple. No JVD present. Throat: moist mucous membranes. Chest: Effort normal & normal respiratory excursion . Neurological: alert, conversant and oriented . Skin: Skin is not cold. No obvious systemic rash noted. Not diaphoretic. No erythema. Psychiatric:  Grossly normal mood and affect. Behavior appears normal. Extremities:  no clubbing or cyanosis. Abdomen: non distended    Lungs:breath sounds normal. No stridor. distress, wheezes or  Rales. Heart: normal rate, regular rhythm, normal S1, S2, no murmurs, rubs, clicks or gallops , PMI non displaced. Edema: Edema is none.   Lab Results   Component Value Date/Time    Cholesterol, total 152 06/21/2019 12:52 PM    HDL Cholesterol 26 (L) 06/21/2019 12:52 PM    LDL, calculated Comment 06/21/2019 12:52 PM    Triglyceride 442 (H) 06/21/2019 12:52 PM    CHOL/HDL Ratio 5.1 (H) 04/22/2010 12:40 AM     Lab Results   Component Value Date/Time    Sodium 140 06/21/2019 12:52 PM    Potassium 4.6 06/21/2019 12:52 PM    Chloride 98 06/21/2019 12:52 PM    CO2 22 06/21/2019 12:52 PM    Anion gap 10 05/21/2010 09:43 AM    Glucose 142 (H) 06/21/2019 12:52 PM    BUN 39 (H) 06/21/2019 12:52 PM    Creatinine 1.47 (H) 06/21/2019 12:52 PM    BUN/Creatinine ratio 27 06/21/2019 12:52 PM    GFR est AA 42 (L) 06/21/2019 12:52 PM    GFR est non-AA 36 (L) 06/21/2019 12:52 PM    Calcium 9.1 06/21/2019 12:52 PM      Wt Readings from Last 3 Encounters:   08/21/19 136 lb (61.7 kg)   07/26/19 136 lb 6.4 oz (61.9 kg)   06/26/19 135 lb 9.6 oz (61.5 kg)      BP Readings from Last 3 Encounters:   08/21/19 120/70   07/26/19 139/71   06/26/19 143/80      Current Outpatient Medications   Medication Sig    metFORMIN (GLUCOPHAGE) 500 mg tablet Take 1 Tab by mouth two (2) times daily (with meals).  glipiZIDE SR (GLUCOTROL XL) 10 mg CR tablet Take 1 Tab by mouth daily.  mirtazapine (REMERON) 15 mg tablet TAKE 1 TABLET BY MOUTH AT BEDTIME FOR 1 WEEK, THEN 2 TABLETS NIGHTLY    torsemide (DEMADEX) 20 mg tablet Take 50 mg by mouth daily.  carvedilol (COREG) 12.5 mg tablet Take 12.5 mg by mouth two (2) times daily (with meals).  aspirin delayed-release 81 mg tablet Take  by mouth daily.  simvastatin (ZOCOR) 20 mg tablet Take 1 Tab by mouth nightly. No current facility-administered medications for this visit. Impression see above.       Written by Terrie Espinoza, as dictated by Ana Laura Cardoza MD.

## 2019-08-27 DIAGNOSIS — I50.22 CHRONIC SYSTOLIC CONGESTIVE HEART FAILURE (HCC): Primary | ICD-10-CM

## 2019-08-27 RX ORDER — TORSEMIDE 20 MG/1
50 TABLET ORAL DAILY
Qty: 75 TAB | Refills: 3 | Status: SHIPPED | OUTPATIENT
Start: 2019-08-27 | End: 2019-12-19

## 2019-08-27 NOTE — TELEPHONE ENCOUNTER
Request for torsemide 50mg daily. Last office visit 8-21-19, next office visit 2-21-20.  Refills per verbal order from Dr. Ino Aggarwal.

## 2019-09-06 ENCOUNTER — OFFICE VISIT (OUTPATIENT)
Dept: INTERNAL MEDICINE CLINIC | Age: 69
End: 2019-09-06

## 2019-09-06 VITALS
SYSTOLIC BLOOD PRESSURE: 130 MMHG | TEMPERATURE: 98.7 F | HEIGHT: 61 IN | RESPIRATION RATE: 17 BRPM | WEIGHT: 136.4 LBS | HEART RATE: 92 BPM | BODY MASS INDEX: 25.75 KG/M2 | DIASTOLIC BLOOD PRESSURE: 78 MMHG | OXYGEN SATURATION: 98 %

## 2019-09-06 DIAGNOSIS — E11.21 TYPE 2 DIABETES WITH NEPHROPATHY (HCC): ICD-10-CM

## 2019-09-06 DIAGNOSIS — A08.4 VIRAL GASTROENTERITIS: Primary | ICD-10-CM

## 2019-09-06 RX ORDER — ONDANSETRON 8 MG/1
8 TABLET, ORALLY DISINTEGRATING ORAL
Qty: 30 TAB | Refills: 0 | Status: SHIPPED | OUTPATIENT
Start: 2019-09-06 | End: 2019-10-22

## 2019-09-06 NOTE — PROGRESS NOTES
Follow Up Visit    Agustín Arce is a 71 y.o. female. she presents for Diabetes and GI Problem    Endocrine Review  She is seen for diabetes. Since last visit: she has continued management of diabetes. Home glucose monitoring: is not performed. She reports medication compliance: compliant most of the time. She reports the following medication side effects: none. Diabetic diet compliance: compliant most of the time. Further diabetic ROS: no chest pain or dyspnea, no numbness, tingling or pain in extremities, no hypoglycemia. Lab review: orders written for new lab studies as appropriate; see orders. Eye exam: pending. We discussed the need to take her glipizide prior to breakfast and to ensure that she has the food available when she takes the medication. She has had some recent nausea. Thought to be a stomach virus. She has been improving but the nausea persists. Patient Active Problem List   Diagnosis Code    Chronic systolic heart failure (HCC) I50.22    Cardiomyopathy ([facility] I42.9    Pure hypercholesterolemia E78.00    Essential hypertension, benign I10    [facility] E11.21    Cardiorenal syndrome I13.10    Hypertriglyceridemia E78.1         Prior to Admission medications    Medication Sig Start Date End Date Taking? Authorizing Provider   ondansetron (ZOFRAN ODT) 8 mg disintegrating tablet Take 1 Tab by mouth every eight (8) hours as needed for Nausea. 9/6/19  Yes Gomez Xavier MD   torsemide [facility]) 20 mg tablet Take 2.5 Tabs by mouth daily. 8/27/19  Yes Ariane Dodson MD   metFORMIN (GLUCOPHAGE) 500 mg tablet Take 1 Tab by mouth two (2) times daily (with meals). 7/26/19  Yes Gomez Xavier MD   glipiZIDE SR (GLUCOTROL XL) 10 mg CR tablet Take 1 Tab by mouth daily.  6/26/19  Yes Gomez Xavier MD   mirtazapine (REMERON) 15 mg tablet TAKE 1 TABLET BY MOUTH AT BEDTIME FOR 1 WEEK, THEN 2 TABLETS NIGHTLY 11/9/18  Yes Gomez Xavier MD carvedilol (COREG) 12.5 mg tablet Take 12.5 mg by mouth two (2) times daily (with meals). Yes Provider, Historical   aspirin delayed-release 81 mg tablet Take  by mouth daily. Yes Provider, Historical   simvastatin (ZOCOR) 20 mg tablet Take 1 Tab by mouth nightly. 8/23/11  Yes Zuleyma Beavers MD         Health Maintenance   Topic Date Due    EYE EXAM RETINAL OR DILATED  05/03/1960    DTaP/Tdap/Td series (1 - Tdap) 05/03/1971    Shingrix Vaccine Age 50> (1 of 2) 05/03/2000    BREAST CANCER SCRN MAMMOGRAM  05/03/2000    FOBT Q 1 YEAR AGE 50-75  05/03/2000    GLAUCOMA SCREENING Q2Y  05/03/2015    Bone Densitometry (Dexa) Screening  05/03/2015    Influenza Age 5 to Adult  08/01/2019    HEMOGLOBIN A1C Q6M  12/21/2019    LIPID PANEL Q1  06/21/2020    MEDICARE YEARLY EXAM  07/26/2020    FOOT EXAM Q1  07/26/2020    MICROALBUMIN Q1  07/26/2020    Pneumococcal 65+ years (2 of 2 - PPSV23) 07/28/2020    Hepatitis C Screening  Completed       Review of Systems   Constitutional: Negative. Respiratory: Negative. Cardiovascular: Negative. Gastrointestinal: Negative. Visit Vitals  /78 (BP 1 Location: Right arm, BP Patient Position: Sitting)   Pulse 92   Temp 98.7 °F (37.1 °C) (Oral)   Resp 17   Ht 5' 1\" (1.549 m)   Wt 136 lb 6.4 oz (61.9 kg)   SpO2 98%   BMI 25.77 kg/m²       Physical Exam   Constitutional: She is oriented to person, place, and time. No distress. Cardiovascular: Normal rate and regular rhythm. Pulmonary/Chest: Effort normal and breath sounds normal.   Neurological: She is alert and oriented to person, place, and time. ASSESSMENT/PLAN    Diagnoses and all orders for this visit:    1. Viral gastroenteritis  -     ondansetron (ZOFRAN ODT) 8 mg disintegrating tablet; Take 1 Tab by mouth every eight (8) hours as needed for Nausea.     2. Type 2 diabetes with nephropathy (HCC)  -     HEMOGLOBIN A1C WITH EAG           Follow-up and Dispositions    · Return in about 6 weeks (around 10/18/2019).

## 2019-09-20 ENCOUNTER — HOSPITAL ENCOUNTER (OUTPATIENT)
Dept: LAB | Age: 69
Discharge: HOME | End: 2019-09-20
Payer: MEDICARE

## 2019-09-20 PROCEDURE — 36415 COLL VENOUS BLD VENIPUNCTURE: CPT

## 2019-09-20 PROCEDURE — 83036 HEMOGLOBIN GLYCOSYLATED A1C: CPT

## 2019-09-21 LAB
EST. AVERAGE GLUCOSE BLD GHB EST-MCNC: 148 MG/DL
HBA1C MFR BLD: 6.8 % (ref 4.8–5.6)

## 2019-10-18 ENCOUNTER — OFFICE VISIT (OUTPATIENT)
Dept: INTERNAL MEDICINE CLINIC | Age: 69
End: 2019-10-18

## 2019-10-18 VITALS
SYSTOLIC BLOOD PRESSURE: 110 MMHG | RESPIRATION RATE: 18 BRPM | DIASTOLIC BLOOD PRESSURE: 70 MMHG | WEIGHT: 134.4 LBS | HEIGHT: 61 IN | HEART RATE: 79 BPM | TEMPERATURE: 97.8 F | OXYGEN SATURATION: 98 % | BODY MASS INDEX: 25.37 KG/M2

## 2019-10-18 DIAGNOSIS — Z23 ENCOUNTER FOR IMMUNIZATION: ICD-10-CM

## 2019-10-18 DIAGNOSIS — E11.21 TYPE 2 DIABETES WITH NEPHROPATHY (HCC): Primary | ICD-10-CM

## 2019-10-18 NOTE — PROGRESS NOTES
Alyse Block is a 71 y.o. female who presents for routine immunizations. She denies any symptoms , reactions or allergies that would exclude them from being immunized today. Risks and adverse reactions were discussed and the VIS was given to them. All questions were addressed. She was observed for 5 min post injection. There were no reactions observed.     Ival Moment

## 2019-10-18 NOTE — Clinical Note
Jairo Blanca, I'm sending Ms. Washington Geronimo to you. She is generally controlled but has been elevated post-prandially. Hoping we can improve her diet generally. Joan Conley Thanks!

## 2019-10-18 NOTE — PATIENT INSTRUCTIONS
Vaccine Information Statement    Influenza (Flu) Vaccine (Inactivated or Recombinant): What You Need to Know    Many Vaccine Information Statements are available in Urdu and other languages. See www.immunize.org/vis  Hojas de información sobre vacunas están disponibles en español y en muchos otros idiomas. Visite www.immunize.org/vis    1. Why get vaccinated? Influenza vaccine can prevent influenza (flu). Flu is a contagious disease that spreads around the [place] [facility] every year, usually between October and May. Anyone can get the flu, but it is more dangerous for some people. Infants and young children, people 72years of age and older, pregnant women, and people with certain health conditions or a weakened immune system are at greatest risk of flu complications. Pneumonia, bronchitis, sinus infections and ear infections are examples of flu-related complications. If you have a medical condition, such as heart disease, cancer or diabetes, flu can make it worse. Flu can cause fever and chills, sore throat, muscle aches, fatigue, cough, headache, and runny or stuffy nose. Some people may have vomiting and diarrhea, though this is more common in children than adults. Each year thousands of people in the [facility] die from flu, and many more are hospitalized. Flu vaccine prevents millions of illnesses and flu-related visits to the doctor each year. 2. Influenza vaccines     CDC recommends everyone 10months of age and older get vaccinated every flu season. Children 6 months through 6years of age may need 2 doses during a single flu season. Everyone else needs only 1 dose each flu season. It takes about 2 weeks for protection to develop after vaccination. There are many flu viruses, and they are always changing. Each year a new flu vaccine is made to protect against three or four viruses that are likely to cause disease in the upcoming flu season.  Even when the vaccine doesnt exactly match these viruses, it may still provide some protection. Influenza vaccine does not cause flu. Influenza vaccine may be given at the same time as other vaccines. 3. Talk with your health care provider    Tell your vaccine provider if the person getting the vaccine:   Has had an allergic reaction after a previous dose of influenza vaccine, or has any severe, life-threatening allergies.  Has ever had Guillain-Barré Syndrome (also called GBS). In some cases, your health care provider may decide to postpone influenza vaccination to a future visit. People with minor illnesses, such as a cold, may be vaccinated. People who are moderately or severely ill should usually wait until they recover before getting influenza vaccine. Your health care provider can give you more information. 4. Risks of a reaction     Soreness, redness, and swelling where shot is given, fever, muscle aches, and headache can happen after influenza vaccine.  There may be a very small increased risk of Guillain-Barré Syndrome (GBS) after inactivated influenza vaccine (the flu shot). [facility] children who get the flu shot along with pneumococcal vaccine (PCV13), and/or DTaP vaccine at the same time might be slightly more likely to have a seizure caused by fever. Tell your health care provider if a child who is getting flu vaccine has ever had a seizure. People sometimes faint after medical procedures, including vaccination. Tell your provider if you feel dizzy or have vision changes or ringing in the ears. As with any medicine, there is a very remote chance of a vaccine causing a severe allergic reaction, other serious injury, or death. 5. What if there is a serious problem? An allergic reaction could occur after the vaccinated person leaves the clinic.  If you see signs of a severe allergic reaction (hives, swelling of the face and throat, difficulty breathing, a fast heartbeat, dizziness, or weakness), call 9-1-1 and get the person to the nearest hospital.    For other signs that concern you, call your health care provider. Adverse reactions should be reported to the Vaccine Adverse Event Reporting System (VAERS). Your health care provider will usually file this report, or you can do it yourself. Visit the VAERS website at www.vaers. [facility].gov or call 1-842.761.2220. VAERS is only for reporting reactions, and VAERS staff do not give medical advice. 6. The National Vaccine Injury Compensation Program    The [facility] Vaccine Injury Compensation Program (VICP) is a federal program that was created to compensate people who may have been injured by certain vaccines. Visit the VICP website at www.hrsa.gov/vaccinecompensation or call 2-615.693.1234 to learn about the program and about filing a claim. There is a time limit to file a claim for compensation. 7. How can I learn more?  Ask your health care provider.  Call your local or state health department.  Contact the Centers for Disease Control and Prevention (CDC):  - Call 9-316.887.4458 (1-800-CDC-INFO) or  - Visit CDCs influenza website at www.cdc.gov/flu    Vaccine Information Statement (Interim)  Inactivated Influenza Vaccine   8/15/2019  [address] April Shaniakami [id number]   Department of Health and Human Services  Centers for Disease Control and Prevention    Office Use Only

## 2019-10-18 NOTE — PROGRESS NOTES
Follow Up Visit    Julio Mendoza is a 71 y.o. female. she presents for Diabetes    Endocrine Review  She is seen for diabetes. The patient feels that they have been doing better since the last visit. Home glucose monitoring: is performed regularly. She has noted that her post-prandial sugars are elevated. She reports medication compliance: compliant most of the time. She reports the following medication side effects: none. Diabetic diet compliance: noncompliant some of the time. Further diabetic ROS: no chest pain or dyspnea, no numbness, tingling or pain in extremities, no hypoglycemia. Lab review: A1c today is 6.8 (improved from her previous level of 8.4). Patient Active Problem List   Diagnosis Code    Chronic systolic heart failure (HCC) I50.22    Cardiomyopathy ([facility] I42.9    Pure hypercholesterolemia E78.00    Essential hypertension, benign I10    Type 2 diabetes with nephropathy ([facility] E11.21    Cardiorenal syndrome I13.10    Hypertriglyceridemia E78.1         Prior to Admission medications    Medication Sig Start Date End Date Taking? Authorizing Provider   ondansetron (ZOFRAN ODT) 8 mg disintegrating tablet Take 1 Tab by mouth every eight (8) hours as needed for Nausea. 9/6/19  Yes Issac Gagnon MD   torsemide [facility]) 20 mg tablet Take 2.5 Tabs by mouth daily. 8/27/19  Yes Arian Alexander MD   metFORMIN (GLUCOPHAGE) 500 mg tablet Take 1 Tab by mouth two (2) times daily (with meals). 7/26/19  Yes Issac Gagnon MD   glipiZIDE SR (GLUCOTROL XL) 10 mg CR tablet Take 1 Tab by mouth daily. 6/26/19  Yes Issac Gagnon MD   mirtazapine (REMERON) 15 mg tablet TAKE 1 TABLET BY MOUTH AT BEDTIME FOR 1 WEEK, THEN 2 TABLETS NIGHTLY 11/9/18  Yes Issac Gagnon MD   carvedilol (COREG) 12.5 mg tablet Take 12.5 mg by mouth two (2) times daily (with meals). Yes Provider, Historical   aspirin delayed-release 81 mg tablet Take  by mouth daily.    Yes Provider, Historical   simvastatin (ZOCOR) 20 mg tablet Take 1 Tab by mouth nightly. 8/23/11  Yes John Berg MD         Health Maintenance   Topic Date Due    EYE EXAM RETINAL OR DILATED  05/03/1960    DTaP/Tdap/Td series (1 - Tdap) 05/03/1971    Shingrix Vaccine Age 50> (1 of 2) 05/03/2000    BREAST CANCER SCRN MAMMOGRAM  05/03/2000    FOBT Q 1 YEAR AGE 50-75  05/03/2000    GLAUCOMA SCREENING Q2Y  05/03/2015    Bone Densitometry (Dexa) Screening  05/03/2015    Influenza Age 5 to Adult  08/01/2019    HEMOGLOBIN A1C Q6M  03/20/2020    LIPID PANEL Q1  06/21/2020    MEDICARE YEARLY EXAM  07/26/2020    FOOT EXAM Q1  07/26/2020    MICROALBUMIN Q1  07/26/2020    Pneumococcal 65+ years (2 of 2 - PPSV23) 07/28/2020    Hepatitis C Screening  Completed       Review of Systems   Constitutional: Negative. Respiratory: Negative. Cardiovascular: Negative. Visit Vitals  /70 (BP 1 Location: Right arm, BP Patient Position: Sitting)   Pulse 79   Temp 97.8 °F (36.6 °C) (Oral)   Resp 18   Ht 5' 1\" (1.549 m)   Wt 134 lb 6.4 oz (61 kg)   SpO2 98%   BMI 25.39 kg/m²       Physical Exam   Constitutional: No distress. Cardiovascular: Normal rate and regular rhythm. Pulmonary/Chest: Effort normal and breath sounds normal.         ASSESSMENT/PLAN    Diagnoses and all orders for this visit:    1. Type 2 diabetes with nephropathy ([facility] - The patient is having normal blood sugars but is having elevated post-prandial sugars (200's-300's). She is making inappropriate dietary choices as well. I advise she follow up with nutrition prior to our changing her regimen. If diet can improve, hopefully we can improve the situation. Referral to nutrition given previously.      2. Encounter for immunization  -     INFLUENZA VACCINE INACTIVATED (IIV), SUBUNIT, ADJUVANTED, IM  -     ADMIN INFLUENZA VIRUS VAC

## 2019-10-22 ENCOUNTER — CLINICAL SUPPORT (OUTPATIENT)
Dept: CARDIOLOGY CLINIC | Age: 69
End: 2019-10-22

## 2019-10-22 ENCOUNTER — OFFICE VISIT (OUTPATIENT)
Dept: CARDIOLOGY CLINIC | Age: 69
End: 2019-10-22

## 2019-10-22 VITALS
RESPIRATION RATE: 14 BRPM | SYSTOLIC BLOOD PRESSURE: 144 MMHG | HEIGHT: 61 IN | HEART RATE: 78 BPM | DIASTOLIC BLOOD PRESSURE: 70 MMHG | BODY MASS INDEX: 25.3 KG/M2 | WEIGHT: 134 LBS

## 2019-10-22 DIAGNOSIS — E11.21 TYPE 2 DIABETES WITH NEPHROPATHY (HCC): ICD-10-CM

## 2019-10-22 DIAGNOSIS — Z95.810 PRESENCE OF AUTOMATIC CARDIOVERTER/DEFIBRILLATOR (AICD): Primary | ICD-10-CM

## 2019-10-22 DIAGNOSIS — I50.22 CHRONIC SYSTOLIC CONGESTIVE HEART FAILURE (HCC): ICD-10-CM

## 2019-10-22 DIAGNOSIS — I42.0 DILATED CARDIOMYOPATHY (HCC): ICD-10-CM

## 2019-10-22 DIAGNOSIS — I10 ESSENTIAL HYPERTENSION, BENIGN: ICD-10-CM

## 2019-10-22 DIAGNOSIS — Z95.810 AUTOMATIC IMPLANTABLE CARDIAC DEFIBRILLATOR IN SITU: Primary | ICD-10-CM

## 2019-10-22 NOTE — PROGRESS NOTES
Cardiac Electrophysiology OFFICE Consultation Note Subjective: Ben Presley is a 71 y.o. patient who is seen as a new patient, is s/p [employer] single lead ICD (DOI 10/10/2017) for NICM & CHF (NYHA II). Device check today shows proper lead & generator function, approx 12 years generator longevity. No interim detections or discharges. BP slightly elevated today, 144/70 on recheck. States home SBP tends to run 120s. NYHA II chronic systolic CHF symptoms stable, has had no change in activity tolerance. No chest pain, palpitations, PND, orthopnea, lightheadedness, or syncope. Ambulates with walker without difficulty. Previous: 
Echo (05/31/2018): LVEF 25%, LV mildly dilated, severe diffuse LV hypokinesis with distinct regional wall motion abnormalities, LV wall thickness upper limits of normal.  LA moderately dilated. Mild MR. Trivial AR, TR, & TX. Previously saw Dr. Zeeshan Boyce (nephrology), but he has now retired. Primary cardiologist is Dr. Jorge A Malagon. 
 
 
Problem List  Date Reviewed: 10/18/2019 Codes Class Noted Hypertriglyceridemia ICD-10-CM: E78.1 ICD-9-CM: 272.1  1/15/2019 Cardiorenal syndrome ICD-10-CM: I13.10 ICD-9-CM: 404.90  6/24/2018 Type 2 diabetes with nephropathy ([facility] ICD-10-CM: E11.21 
ICD-9-CM: 250.40, 583.81  5/10/2018 Essential hypertension, benign ICD-10-CM: I10 
ICD-9-CM: 401.1  Unknown Pure hypercholesterolemia ICD-10-CM: E78.00 ICD-9-CM: 272.0  2/28/2011 Chronic systolic heart failure (HCC) (Chronic) ICD-10-CM: P94.89 ICD-9-CM: 428.22  Unknown Cardiomyopathy ([facility] (Chronic) ICD-10-CM: I42.9 ICD-9-CM: 425.4  Unknown Current Outpatient Medications Medication Sig Dispense Refill  torsemide (DEMADEX) 20 mg tablet Take 2.5 Tabs by mouth daily. 75 Tab 3  
 metFORMIN (GLUCOPHAGE) 500 mg tablet Take 1 Tab by mouth two (2) times daily (with meals).  60 Tab 3  
  glipiZIDE SR (GLUCOTROL XL) 10 mg CR tablet Take 1 Tab by mouth daily. 30 Tab 3  
 mirtazapine (REMERON) 15 mg tablet TAKE 1 TABLET BY MOUTH AT BEDTIME FOR 1 WEEK, THEN 2 TABLETS NIGHTLY 45 Tab 5  carvedilol (COREG) 12.5 mg tablet Take 12.5 mg by mouth two (2) times daily (with meals).  aspirin delayed-release 81 mg tablet Take  by mouth daily.  simvastatin (ZOCOR) 20 mg tablet Take 1 Tab by mouth nightly. 30 Tab 0  
 ondansetron (ZOFRAN ODT) 8 mg disintegrating tablet Take 1 Tab by mouth every eight (8) hours as needed for Nausea. 30 Tab 0 Allergies Allergen Reactions  Codeine Hives  Pcn [Penicillins] Hives Past Medical History:  
Diagnosis Date  Calculus of kidney  Cardiomyopathy ([facility] idiopathic  Chronic systolic heart failure ([facility] April 2010 ef 20%, by Dec 2010 up to 50% on meds, cath with normal cors  Diabetes ([facility] [address] [address]  Essential hypertension, benign  Hypercholesterolemia Past Surgical History:  
Procedure Laterality Date  CARDIAC SURG PROCEDURE UNLIST  HX HYSTERECTOMY  HX ORTHOPAEDIC    
 HX OTHER SURGICAL  10/2017  
 difibulator  HX UROLOGICAL    
 stone removal  
 
Family History Problem Relation Age of Onset  Stroke Mother  Hypertension Mother  Lung Disease Father   
     emphysema  Heart Disease Brother  Stroke Brother  Cancer Brother   
     bone  Cancer Maternal Grandfather   
     colon ca  Cancer Other   
     breast ca Social History Tobacco Use  Smoking status: Never Smoker  Smokeless tobacco: Never Used Substance Use Topics  Alcohol use: No  
  
 
Review of Systems:  
Constitutional: Negative for fever, chills, weight loss, malaise/fatigue. HEENT: Negative for nosebleeds, vision changes. Respiratory: Negative for cough, hemoptysis Cardiovascular: Negative for chest pain, palpitations, orthopnea, claudication, leg swelling, no syncope, and no PND. + dyspnea with significant exertion Gastrointestinal: Negative for nausea, vomiting, diarrhea, blood in stool and melena. Genitourinary: Negative for dysuria, and hematuria. Musculoskeletal: Negative for myalgias, arthralgia. Skin: Negative for rash. Heme: Does not bleed or bruise easily. Neurological: Negative for speech change and focal weakness Objective:  
 
Visit Vitals /88 (BP 1 Location: Left arm, BP Patient Position: Sitting) Pulse 78 Resp 14 Ht 5' 1\" (1.549 m) Wt 134 lb (60.8 kg) BMI 25.32 kg/m² Physical Exam:  
Constitutional: well-developed and well-nourished. No respiratory distress. Head: Normocephalic and atraumatic. Eyes: Pupils are equal, round ENT: hearing normal 
Neck: supple. No JVD present. Cardiovascular: Normal rate, regular rhythm. Exam reveals no gallop and no friction rub. No murmur heard. Pulmonary/Chest: Effort normal and breath sounds normal. No wheezes. Abdominal: Soft, no tenderness. Musculoskeletal: No edema. Neurological: Alert,oriented. Skin: Skin is warm and dry. Unremarkable left sided ICD site, well healed. Psychiatric: Normal mood and affect. Behavior is normal. Judgment and thought content normal.   
 
EKG (08/21/2019): NSR 84, QRSd 102 ms. Nonspecific T wave abnormalities. Assessment/Plan: ICD-10-CM ICD-9-CM 1. Presence of automatic cardioverter/defibrillator (AICD) Z95.810 V45.02   
2. Chronic systolic congestive heart failure (HCC) I50.22 428.22   
  428.0 3. Dilated cardiomyopathy (HCC) I42.0 425.4 4. Essential hypertension, benign I10 401.1 ICD check today shows proper lead & generator function, approx 12 years generator longevity. No interim detections or discharges. NICM with NYHA II chronic systolic heart failure stable. On carvedilol, but no ACEi/ARB due to renal function. Managed by Dr. Brian Allen. 
 
BP slightly elevated today.   She will continue to check at home, call if she notes elevation at home as well. Would consider hydralazine or Isordil if she continues to note elevation. Continue remote checks q 3 months, annual follow up in EP clinic. Continue visits with Dr. Arlette Stanley as previously scheduled. Future Appointments Date Time Provider Chava Nery 12/20/2019  9:15 [place] [id number] [facility]  
1/29/2020 11:00 AM Daniela Downs, [address]  
2/21/2020  9:20 AM Dalia Harrison  [address]  
4/29/2020  2:00 PM REMOTE1, [address]  
7/29/2020  9:00 AM REMOTE1, CHRISTINE HAGER SCHED  
11/10/2020  8:45 AM PACEMAKER3, CHRISTINE HAGER SCHED  
11/10/2020  9:20 AM Garfield Johnston  [address] Thank you for involving me in this patient's care and please call with further concerns or questions. Nadia Abraham M.D. Electrophysiology/Cardiology [address] [facility] Vascular [facility] [address], [address] 
[phone]                                        [phone]

## 2019-10-22 NOTE — PROGRESS NOTES
Cardiac Electrophysiology OFFICE Note     Subjective: Andrea Magana is a 71 y.o. patient who is seen as a new patient, is s/p [employer] single lead ICD (DOI 10/10/2017) for NICM & CHF (NYHA II). Device check today shows proper lead & generator function, approx 12 years generator longevity. No interim detections or discharges. BP slightly elevated today, 144/70 on recheck. States home SBP tends to run 120s. NYHA II chronic systolic CHF symptoms stable, has had no change in activity tolerance. No chest pain, palpitations, PND, orthopnea, lightheadedness, or syncope. Ambulates with walker without difficulty. Previous:  Echo (05/31/2018): LVEF 25%, LV mildly dilated, severe diffuse LV hypokinesis with distinct regional wall motion abnormalities, LV wall thickness upper limits of normal.  LA moderately dilated. Mild MR. Trivial AR, TR, & OR. Previously saw Dr. Aris Mcdonnell (nephrology), but he has now retired. Primary cardiologist is Dr. Uma Chandra.      Problem List  Date Reviewed: 10/22/2019          Codes Class Noted    Hypertriglyceridemia ICD-10-CM: E78.1  ICD-9-CM: 272.1  1/15/2019        Cardiorenal syndrome ICD-10-CM: I13.10  ICD-9-CM: 404.90  6/24/2018        Type 2 diabetes with nephropathy ([facility] ICD-10-CM: E11.21  ICD-9-CM: 250.40, 583.81  5/10/2018        Essential hypertension, benign ICD-10-CM: I10  ICD-9-CM: 401.1  Unknown        Pure hypercholesterolemia ICD-10-CM: E78.00  ICD-9-CM: 272.0  2/28/2011        Chronic systolic heart failure (HCC) (Chronic) ICD-10-CM: I50.22  ICD-9-CM: 428.22  Unknown        Cardiomyopathy ([facility] (Chronic) ICD-10-CM: I42.9  ICD-9-CM: 425.4  Unknown              Current Outpatient Medications   Medication Sig Dispense Refill    torsemide (DEMADEX) 20 mg tablet Take 2.5 Tabs by mouth daily. 75 Tab 3    metFORMIN (GLUCOPHAGE) 500 mg tablet Take 1 Tab by mouth two (2) times daily (with meals).  60 Tab 3    glipiZIDE SR (GLUCOTROL XL) 10 mg CR tablet Take 1 Tab by mouth daily. 30 Tab 3    mirtazapine (REMERON) 15 mg tablet TAKE 1 TABLET BY MOUTH AT BEDTIME FOR 1 WEEK, THEN 2 TABLETS NIGHTLY 45 Tab 5    carvedilol (COREG) 12.5 mg tablet Take 12.5 mg by mouth two (2) times daily (with meals).  aspirin delayed-release 81 mg tablet Take  by mouth daily.  simvastatin (ZOCOR) 20 mg tablet Take 1 Tab by mouth nightly. 30 Tab 0     Allergies   Allergen Reactions    Codeine Hives    Pcn [Penicillins] Hives     Past Medical History:   Diagnosis Date    Calculus of kidney     Cardiomyopathy ([facility]     idiopathic    Chronic systolic heart failure [facility])     April 2010 ef 20%, by Dec 2010 up to 50% on meds, cath with normal cors    Diabetes [facility])     Essential hypertension, benign     Hypercholesterolemia      Past Surgical History:   Procedure Laterality Date    CARDIAC SURG PROCEDURE UNLIST      HX HYSTERECTOMY      HX ORTHOPAEDIC      HX OTHER SURGICAL  10/2017    difibulator     HX UROLOGICAL      stone removal     Family History   Problem Relation Age of Onset    Stroke Mother     Hypertension Mother     Lung Disease Father         emphysema    Heart Disease Brother     Stroke Brother     Cancer Brother         bone    Cancer Maternal Grandfather         colon ca    Cancer Other         breast ca     Social History     Tobacco Use    Smoking status: Never Smoker    Smokeless tobacco: Never Used   Substance Use Topics    Alcohol use: No        Review of Systems:   Constitutional: Negative for fever, chills, weight loss, malaise/fatigue. HEENT: Negative for nosebleeds, vision changes. Respiratory: Negative for cough, hemoptysis  Cardiovascular: Negative for chest pain, palpitations, orthopnea, claudication, leg swelling, no syncope, and no PND. + dyspnea with significant exertion  Gastrointestinal: Negative for nausea, vomiting, diarrhea, blood in stool and melena. Genitourinary: Negative for dysuria, and hematuria. Musculoskeletal: Negative for myalgias, arthralgia. Skin: Negative for rash. Heme: Does not bleed or bruise easily. Neurological: Negative for speech change and focal weakness     Objective:     Visit Vitals  /70 (BP 1 Location: Left arm, BP Patient Position: Sitting)   Pulse 78   Resp 14   Ht 5' 1\" (1.549 m)   Wt 134 lb (60.8 kg)   BMI 25.32 kg/m²      Physical Exam:   Constitutional: well-developed and well-nourished. No respiratory distress. Head: Normocephalic and atraumatic. Eyes: Pupils are equal, round  ENT: hearing normal  Neck: supple. No JVD present. Cardiovascular: Normal rate, regular rhythm. Exam reveals no gallop and no friction rub. No murmur heard. Pulmonary/Chest: Effort normal and breath sounds normal. No wheezes. Abdominal: Soft, no tenderness. Musculoskeletal: No edema. Neurological: Alert,oriented. Skin: Skin is warm and dry. Unremarkable left sided ICD site, well healed. Psychiatric: Normal mood and affect. Behavior is normal. Judgment and thought content normal.      EKG (08/21/2019): NSR 84, QRSd 102 ms. Nonspecific T wave abnormalities. Assessment/Plan:          ICD-10-CM ICD-9-CM    1. Presence of automatic cardioverter/defibrillator (AICD) Z95.810 V45.02    2. Chronic systolic congestive heart failure (HCC) I50.22 428.22      428.0    3. Dilated cardiomyopathy (HCC) I42.0 425.4    4. Essential hypertension, benign I10 401.1      ICD check today shows proper lead & generator function, approx 12 years generator longevity. No interim detections or discharges. NICM with NYHA II chronic systolic heart failure stable. On carvedilol, but no ACEi/ARB due to renal function. She and her daughter will contact Dr Arnulfo Means for kidney failure follow up    BP slightly elevated today. She will continue to check at home, call if she notes elevation at home as well. Would consider hydralazine or Isordil if she continues to note elevation.   She said she checks it at home and not this high    Continue remote checks q 3 months, annual follow up in EP clinic. Continue visits with Dr. Ann Mary as previously scheduled. Future Appointments   Date Time Provider Chava Richi   12/20/2019  9:15 AM Yin Crenshaw MD [id number] [facility]   1/29/2020 11:00 AM REMOTE1, [address]   2/21/2020  9:20 AM Nicolle Nicolas  [address]   4/29/2020  2:00 PM REMOTE1, [address]   7/29/2020  9:00 AM REMOTE1, CHRISTINE PANIAGUA ALLEY SCHED   11/10/2020  8:45 AM PACEMAKER3, [address]   11/10/2020  9:20 AM Damion Mantilla  [address]       Thank you for involving me in this patient's care and please call with further concerns or questions. Melvern Apley, M.D.   Electrophysiology/Cardiology  [facility] [place]  [facility], [facility] [address], [place] [address]  [address]  [phone]

## 2019-10-25 RX ORDER — GLIPIZIDE 10 MG/1
TABLET, FILM COATED, EXTENDED RELEASE ORAL
Qty: 30 TAB | Refills: 3 | Status: SHIPPED | OUTPATIENT
Start: 2019-10-25 | End: 2020-02-14

## 2019-11-18 DIAGNOSIS — E11.21 TYPE 2 DIABETES WITH NEPHROPATHY (HCC): ICD-10-CM

## 2019-11-18 RX ORDER — METFORMIN HYDROCHLORIDE 500 MG/1
TABLET ORAL
Qty: 60 TAB | Refills: 3 | Status: SHIPPED | OUTPATIENT
Start: 2019-11-18 | End: 2020-03-17

## 2019-12-18 DIAGNOSIS — I50.22 CHRONIC SYSTOLIC CONGESTIVE HEART FAILURE (HCC): ICD-10-CM

## 2019-12-19 RX ORDER — TORSEMIDE 20 MG/1
TABLET ORAL
Qty: 225 TAB | Refills: 4 | Status: SHIPPED | OUTPATIENT
Start: 2019-12-19 | End: 2021-03-19

## 2019-12-19 NOTE — TELEPHONE ENCOUNTER
Request for torsemide 50mg daily. Last office visit 8-21-19, next office visit 2-21-20.  Refills per verbal order from Dr. Uma Chandra.

## 2019-12-20 ENCOUNTER — OFFICE VISIT (OUTPATIENT)
Dept: INTERNAL MEDICINE CLINIC | Age: 69
End: 2019-12-20

## 2019-12-20 VITALS
RESPIRATION RATE: 19 BRPM | BODY MASS INDEX: 25.07 KG/M2 | WEIGHT: 132.8 LBS | HEIGHT: 61 IN | HEART RATE: 78 BPM | TEMPERATURE: 97.1 F | SYSTOLIC BLOOD PRESSURE: 140 MMHG | DIASTOLIC BLOOD PRESSURE: 80 MMHG | OXYGEN SATURATION: 98 %

## 2019-12-20 DIAGNOSIS — E78.1 HYPERTRIGLYCERIDEMIA: ICD-10-CM

## 2019-12-20 DIAGNOSIS — E11.21 TYPE 2 DIABETES WITH NEPHROPATHY (HCC): Primary | ICD-10-CM

## 2019-12-20 RX ORDER — FUROSEMIDE 40 MG/1
TABLET ORAL
COMMUNITY
End: 2019-12-20 | Stop reason: ALTCHOICE

## 2019-12-20 RX ORDER — DIAZEPAM 5 MG/1
TABLET ORAL
COMMUNITY
End: 2019-12-20

## 2019-12-20 NOTE — PROGRESS NOTES
Follow Up Visit    Sharon Connors is a 71 y.o. female. she presents for Diabetes    Endocrine Review  She is seen for diabetes. Since last visit: she has been trying to eat better. Home glucose monitoring: is performed regularly. She reports medication compliance: compliant all of the time. She reports the following medication side effects: none. Diabetic diet compliance: compliant most of the time. Further diabetic ROS: no chest pain, dyspnea or TIA's, no hypoglycemia. Lab review: orders written for new lab studies as appropriate; see orders. Patient Active Problem List   Diagnosis Code    Chronic systolic heart failure (HCC) I50.22    Cardiomyopathy ([facility] I42.9    Pure hypercholesterolemia E78.00    Essential hypertension, benign I10    Type 2 diabetes with nephropathy ([facility] E11.21    Cardiorenal syndrome I13.10    Hypertriglyceridemia E78.1         Prior to Admission medications    Medication Sig Start Date End Date Taking? Authorizing Provider   torsemide (DEMADEX) 20 mg tablet TAKE 2.5 TABLETS BY MOUTH DAILY 12/19/19  Yes Sara Baltazar MD   metFORMIN (GLUCOPHAGE) 500 mg tablet TAKE 1 TABLET BY MOUTH TWICE A DAY WITH MEALS 11/18/19  Yes Geraldine Kearns MD   glipiZIDE SR (GLUCOTROL XL) 10 mg CR tablet TAKE 1 TABLET BY MOUTH EVERY DAY 10/25/19  Yes Geraldine Kearns MD   mirtazapine (REMERON) 15 mg tablet TAKE 1 TABLET BY MOUTH AT BEDTIME FOR 1 WEEK, THEN 2 TABLETS NIGHTLY 11/9/18  Yes Geraldine Kearns MD   carvedilol (COREG) 12.5 mg tablet Take 12.5 mg by mouth two (2) times daily (with meals). Yes Provider, Historical   aspirin delayed-release 81 mg tablet Take  by mouth daily. Yes Provider, Historical   simvastatin (ZOCOR) 20 mg tablet Take 1 Tab by mouth nightly.  8/23/11  Yes Sara Baltazar MD   diazePAM (VALIUM) 5 mg tablet diazepam 5 mg tablet  12/20/19  Provider, Historical   furosemide (LASIX) 40 mg tablet furosemide 40 mg tablet  12/20/19  Provider, Historical Health Maintenance   Topic Date Due    EYE EXAM RETINAL OR DILATED  05/03/1960    DTaP/Tdap/Td series (1 - Tdap) 05/03/1961    Shingrix Vaccine Age 50> (1 of 2) 05/03/2000    BREAST CANCER SCRN MAMMOGRAM  05/03/2000    FOBT Q 1 YEAR AGE 50-75  05/03/2000    GLAUCOMA SCREENING Q2Y  05/03/2015    Bone Densitometry (Dexa) Screening  05/03/2015    HEMOGLOBIN A1C Q6M  03/20/2020    LIPID PANEL Q1  06/21/2020    MEDICARE YEARLY EXAM  07/26/2020    FOOT EXAM Q1  07/26/2020    MICROALBUMIN Q1  07/26/2020    Pneumococcal 65+ years (2 of 2 - PPSV23) 07/28/2020    Hepatitis C Screening  Completed    Influenza Age 5 to Adult  Completed       Review of Systems   Constitutional: Negative. Respiratory: Negative. Cardiovascular: Negative. Genitourinary: Negative. Visit Vitals  /80 (BP 1 Location: Left arm, BP Patient Position: Sitting)   Pulse 78   Temp 97.1 °F (36.2 °C) (Oral)   Resp 19   Ht 5' 1\" (1.549 m)   Wt 132 lb 12.8 oz (60.2 kg)   SpO2 98%   BMI 25.09 kg/m²       Physical Exam  Constitutional:       Appearance: She is well-developed. Cardiovascular:      Rate and Rhythm: Normal rate and regular rhythm. Pulmonary:      Effort: Pulmonary effort is normal.      Breath sounds: Normal breath sounds. ASSESSMENT/PLAN    Diagnoses and all orders for this visit:    1. Type 2 diabetes with nephropathy (HCC)  -     HEMOGLOBIN A1C WITH EAG  -     METABOLIC PANEL, COMPREHENSIVE    2. Hypertriglyceridemia  -     LIPID PANEL        Follow-up and Dispositions    · Return in about 3 months (around 3/20/2020).

## 2019-12-20 NOTE — PROGRESS NOTES
The following screenings were updated at today's office visit: Fall/Depression.          Medications Discontinued During This Encounter   Medication Reason    furosemide (LASIX) 40 mg tablet Alternate Therapy    diazePAM (VALIUM) 5 mg tablet Not A Current Medication     Verbal order read back Dr. Claire cao to remove medication from list.

## 2020-01-16 PROBLEM — N18.30 CKD (CHRONIC KIDNEY DISEASE) STAGE 3, GFR 30-59 ML/MIN (HCC): Status: ACTIVE | Noted: 2020-01-16

## 2020-01-29 ENCOUNTER — OFFICE VISIT (OUTPATIENT)
Dept: CARDIOLOGY CLINIC | Age: 70
End: 2020-01-29

## 2020-01-29 DIAGNOSIS — Z95.810 AUTOMATIC IMPLANTABLE CARDIAC DEFIBRILLATOR IN SITU: Primary | ICD-10-CM

## 2020-02-14 DIAGNOSIS — E11.21 TYPE 2 DIABETES WITH NEPHROPATHY (HCC): ICD-10-CM

## 2020-02-14 RX ORDER — GLIPIZIDE 10 MG/1
TABLET, FILM COATED, EXTENDED RELEASE ORAL
Qty: 30 TAB | Refills: 3 | Status: SHIPPED | OUTPATIENT
Start: 2020-02-14 | End: 2020-06-17

## 2020-02-21 ENCOUNTER — OFFICE VISIT (OUTPATIENT)
Dept: CARDIOLOGY CLINIC | Age: 70
End: 2020-02-21

## 2020-02-21 VITALS
RESPIRATION RATE: 16 BRPM | WEIGHT: 133 LBS | SYSTOLIC BLOOD PRESSURE: 140 MMHG | HEART RATE: 79 BPM | HEIGHT: 61 IN | DIASTOLIC BLOOD PRESSURE: 68 MMHG | OXYGEN SATURATION: 98 % | BODY MASS INDEX: 25.11 KG/M2

## 2020-02-21 DIAGNOSIS — I42.0 DILATED CARDIOMYOPATHY (HCC): ICD-10-CM

## 2020-02-21 DIAGNOSIS — I10 ESSENTIAL HYPERTENSION, BENIGN: ICD-10-CM

## 2020-02-21 DIAGNOSIS — I13.0 CARDIORENAL SYNDROME WITH RENAL FAILURE, STAGE 1-4 OR UNSPECIFIED CHRONIC KIDNEY DISEASE, WITH HEART FAILURE (HCC): ICD-10-CM

## 2020-02-21 DIAGNOSIS — Z95.810 PRESENCE OF AUTOMATIC CARDIOVERTER/DEFIBRILLATOR (AICD): ICD-10-CM

## 2020-02-21 DIAGNOSIS — I50.22 CHRONIC SYSTOLIC HEART FAILURE (HCC): Primary | ICD-10-CM

## 2020-02-21 NOTE — Clinical Note
2/21/20 Patient: Arti Morales YOB: 1950 Date of Visit: 2/21/2020 Cruz Mckeon MD 
[address] [facility] [address] [facility] [address] [phone] VIA In Basket Dear Cruz Mckeon MD, Thank you for referring Ms. Gabriel Taylor to [facility] for evaluation. My notes for this consultation are attached. If you have questions, please do not hesitate to call me. I look forward to following your patient along with you.  
 
 
Sincerely, 
 
Ananda Wynnr, MD

## 2020-02-21 NOTE — PROGRESS NOTES
Julianna Stallworth     1950       Sage Bautista MD, [facility]  Date of Visit-2020   PCP is Brooke Esquivel MD   [facility] [place]  [facility]  HPI:  Julianna Stallworth is a 71 y.o. female   6 month f/u of CHF systolic EF 82% with normal coronaries. Patient saw Dr. Dolores Schroeder since last visit on  with proper function of ICD. He noted elevated BP. Patient is seeing Brooke Esquivel MD regularly for DM. Her Bps have varied from 110-140 in the offices. · Seen in 2010 with an EF of 20% and previous normal coronaries, had pleural effusions with thoracentesis, cath 5/26/10 ef 25%, normal hemodynamics, CO 4.4, normal cors  . .fu echo 5/13/10 ef 35%  · had noted fluid increase when seen for the first time on 5/10/18 as she was changing doctors. We increased her Bumex and were cautious in the use of diuretics due to her renal failure. We arranged for her to have follow up with EP clinic. She had blood work that showed a GFR of 26 and a normal Hgb and Mag.  · Echo 18 EF 25% mod LAE, mild MR  · AICD placed in 2017. s/p [employer] single lead ICD (DOI 10/10/2017) for NICM & CHF (NYHA II).  Dr. Facundo Javier (nephrology) . SOCIAL HISTORY:  Nonsmoker. FAMILY HISTORY:  Grandmother  of heart failure at 58.  Mother  of a stroke in her 62s.  Father  of  COPD in his 62s; he was a smoker. Today,  Overall the pt states she is doing well. She is present with her daughter. Patient states her BP generally run around 120 at home. She denies any wheezing. Denies chest pain, edema, syncope or shortness of breath at rest, has no tachycardia, palpitations or sense of arrhythmia. Assessment/Plan:     Patient Instructions   We will see you back in 6 months. 1. Chronic systolic heart failure (HCC)  EF 25% Non-ischemic cardiomyopathy (NICM)   NYHA II.  Patient seems to be NYHA Class 2 ( Comfortable at rest; Ordinary physical activity results in fatigue, dyspnea, palpitations or anginal pain.)     Asymptomatic currently. We have had to balance her kidney function because of a need to diuretics, but on torsemide she is doing well. There is no ACE due to CKD. We could consider adding an ACE, but she had CKD severe enough to b followed by nephrology. So at this time, we will hold off. Continue Coreg. Note that her creatinine went to 2.9 in 2018 thus no ACE inhibitor. 2. Dilated cardiomyopathy (HCC)  NICM, stable. Key CAD CHF Meds             torsemide (DEMADEX) 20 mg tablet (Taking) TAKE 2.5 TABLETS BY MOUTH DAILY    carvedilol (COREG) 12.5 mg tablet (Taking) Take 12.5 mg by mouth two (2) times daily (with meals). aspirin delayed-release 81 mg tablet (Taking) Take  by mouth daily. simvastatin (ZOCOR) 20 mg tablet (Taking) Take 1 Tab by mouth nightly. 3. Cardiorenal syndrome with renal failure, stage 1-4 or unspecified chronic kidney disease, with heart failure (HCC)  Lab Results   Component Value Date/Time    Creatinine 1.47 (H) 06/21/2019 12:52 PM      4. Essential hypertension, benign  At goal , meds and possible side effects reviewed and patient denies  Key CAD CHF Meds             torsemide (DEMADEX) 20 mg tablet (Taking) TAKE 2.5 TABLETS BY MOUTH DAILY    carvedilol (COREG) 12.5 mg tablet (Taking) Take 12.5 mg by mouth two (2) times daily (with meals). aspirin delayed-release 81 mg tablet (Taking) Take  by mouth daily. simvastatin (ZOCOR) 20 mg tablet (Taking) Take 1 Tab by mouth nightly. BP Readings from Last 6 Encounters:   02/21/20 140/68   12/20/19 140/80   10/22/19 144/70   10/18/19 110/70   09/06/19 130/78   08/21/19 120/70        She has been checking this at home and they run at an acceptable range. 5. Presence of automatic cardioverter/defibrillator (AICD)  Reviewed EP note and device checks. Follow up in 6 months.   Future Appointments   Date Time Provider Chava Gabriel   3/20/2020  9:15 AM Cheko Clarke Luigi Feldman MD [id number] [facility]   4/29/2020  2:00 PM REMOTE1, [address]   7/29/2020  9:00 AM REMOTE1, CHRISTINE HAGER SCHED   8/21/2020 10:40 AM MD ARCELIA Goff SCHED   11/10/2020  8:45 AM PACEMAKER3, CHRISTINE ARCELIA HAGER SCHED   11/10/2020  9:20 AM MD ARCELIA Solis ALLEY SCHED         Impression:   1. Chronic systolic heart failure ([address]    2. Dilated cardiomyopathy ([address]    3. Cardiorenal syndrome with renal failure, stage 1-4 or unspecified chronic kidney disease, with heart failure ([address]    4. Essential hypertension, benign    5. Presence of automatic cardioverter/defibrillator (AICD)       Cardiac History:   2011 with an EF of 20% and previous normal coronaries  ECHO 5-31-18 EF 25%  Cardiorenal syndrome creat 2.9 2/2018  Anemia hgb 9.4 Feb 2018  AICD for NICM--[employer]  Non smoker, 2 children  Family hx brother with heart disease , mother passed of stroke     ROS-except as noted above. . A complete cardiac and respiratory are reviewed and negative except as above ; Resp-denies wheezing  or productive cough,. Const- No unusual weight loss or fever; Neuro-no recent seizure or CVA ; GI- No BRBPR, abdom pain, bloating ; - no  hematuria   see supplement sheet, initialed and to be scanned by staff  Past Medical History:   Diagnosis Date    Calculus of kidney     Cardiomyopathy ([facility]     idiopathic    Chronic systolic heart failure [facility])     April 2010 ef 20%, by Dec 2010 up to 50% on meds, cath with normal cors    Diabetes [facility])     Essential hypertension, benign     Hypercholesterolemia       Social Hx= reports that she has never smoked. She has never used smokeless tobacco. She reports that she does not drink alcohol or use drugs.      Exam and Labs:  /68 (BP 1 Location: Left arm, BP Patient Position: Sitting)   Pulse 79   Resp 16   Ht 5' 1\" (1.549 m)   Wt 133 lb (60.3 kg)   SpO2 98%   BMI 25.13 kg/m² Constitutional:  NAD, comfortable  Head: NC,AT. Eyes: No scleral icterus. Neck:  Neck supple. No JVD present. Throat: moist mucous membranes. Chest: Effort normal & normal respiratory excursion . Neurological: alert, conversant and oriented . Skin: Skin is not cold. No obvious systemic rash noted. Not diaphoretic. No erythema. Psychiatric:  Grossly normal mood and affect. Behavior appears normal. Extremities:  no clubbing or cyanosis. Abdomen: non distended    Lungs:breath sounds normal. No stridor. distress, wheezes or  Rales. Heart: normal rate, regular rhythm, normal S1, S2, no murmurs, rubs, clicks or gallops , PMI non displaced. Edema: Edema is none.   Lab Results   Component Value Date/Time    Cholesterol, total 152 06/21/2019 12:52 PM    HDL Cholesterol 26 (L) 06/21/2019 12:52 PM    LDL, calculated Comment 06/21/2019 12:52 PM    Triglyceride 442 (H) 06/21/2019 12:52 PM    CHOL/HDL Ratio 5.1 (H) 04/22/2010 12:40 AM     Lab Results   Component Value Date/Time    Sodium 140 06/21/2019 12:52 PM    Potassium 4.6 06/21/2019 12:52 PM    Chloride 98 06/21/2019 12:52 PM    CO2 22 06/21/2019 12:52 PM    Anion gap 10 05/21/2010 09:43 AM    Glucose 142 (H) 06/21/2019 12:52 PM    BUN 39 (H) 06/21/2019 12:52 PM    Creatinine 1.47 (H) 06/21/2019 12:52 PM    BUN/Creatinine ratio 27 06/21/2019 12:52 PM    GFR est AA 42 (L) 06/21/2019 12:52 PM    GFR est non-AA 36 (L) 06/21/2019 12:52 PM    Calcium 9.1 06/21/2019 12:52 PM      Wt Readings from Last 3 Encounters:   02/21/20 133 lb (60.3 kg)   12/20/19 132 lb 12.8 oz (60.2 kg)   10/22/19 134 lb (60.8 kg)      BP Readings from Last 3 Encounters:   02/21/20 140/68   12/20/19 140/80   10/22/19 144/70      Current Outpatient Medications   Medication Sig    glipiZIDE SR (GLUCOTROL XL) 10 mg CR tablet TAKE 1 TABLET BY MOUTH EVERY DAY    torsemide (DEMADEX) 20 mg tablet TAKE 2.5 TABLETS BY MOUTH DAILY    metFORMIN (GLUCOPHAGE) 500 mg tablet TAKE 1 TABLET BY MOUTH TWICE A DAY WITH MEALS    mirtazapine (REMERON) 15 mg tablet TAKE 1 TABLET BY MOUTH AT BEDTIME FOR 1 WEEK, THEN 2 TABLETS NIGHTLY    carvedilol (COREG) 12.5 mg tablet Take 12.5 mg by mouth two (2) times daily (with meals).  aspirin delayed-release 81 mg tablet Take  by mouth daily.  simvastatin (ZOCOR) 20 mg tablet Take 1 Tab by mouth nightly. No current facility-administered medications for this visit. Impression see above.       Written by Octavia Monet, as dictated by Julianna Gaviria MD.

## 2020-02-21 NOTE — PROGRESS NOTES
Visit Vitals  /68 (BP 1 Location: Left arm, BP Patient Position: Sitting)   Pulse 79   Resp 16   Ht 5' 1\" (1.549 m)   Wt 133 lb (60.3 kg)   SpO2 98%   BMI 25.13 kg/m²

## 2020-03-17 DIAGNOSIS — E11.21 TYPE 2 DIABETES WITH NEPHROPATHY (HCC): ICD-10-CM

## 2020-03-17 RX ORDER — METFORMIN HYDROCHLORIDE 500 MG/1
TABLET ORAL
Qty: 180 TAB | Refills: 0 | Status: SHIPPED | OUTPATIENT
Start: 2020-03-17 | End: 2020-06-17

## 2020-04-29 ENCOUNTER — OFFICE VISIT (OUTPATIENT)
Dept: CARDIOLOGY CLINIC | Age: 70
End: 2020-04-29

## 2020-04-29 DIAGNOSIS — Z95.810 AUTOMATIC IMPLANTABLE CARDIAC DEFIBRILLATOR IN SITU: Primary | ICD-10-CM

## 2020-06-13 DIAGNOSIS — E11.21 TYPE 2 DIABETES WITH NEPHROPATHY (HCC): ICD-10-CM

## 2020-06-14 DIAGNOSIS — E11.21 TYPE 2 DIABETES WITH NEPHROPATHY (HCC): ICD-10-CM

## 2020-06-17 RX ORDER — GLIPIZIDE 10 MG/1
10 TABLET, FILM COATED, EXTENDED RELEASE ORAL DAILY
Qty: 30 TAB | Refills: 3 | Status: SHIPPED | OUTPATIENT
Start: 2020-06-17 | End: 2020-10-20

## 2020-06-17 RX ORDER — METFORMIN HYDROCHLORIDE 500 MG/1
500 TABLET ORAL 2 TIMES DAILY WITH MEALS
Qty: 60 TAB | Refills: 0 | Status: SHIPPED | OUTPATIENT
Start: 2020-06-17 | End: 2020-07-28

## 2020-07-24 DIAGNOSIS — E11.21 TYPE 2 DIABETES WITH NEPHROPATHY (HCC): ICD-10-CM

## 2020-07-28 RX ORDER — METFORMIN HYDROCHLORIDE 500 MG/1
TABLET ORAL
Qty: 60 TAB | Refills: 0 | Status: SHIPPED | OUTPATIENT
Start: 2020-07-28 | End: 2020-09-01

## 2020-07-29 ENCOUNTER — OFFICE VISIT (OUTPATIENT)
Dept: CARDIOLOGY CLINIC | Age: 70
End: 2020-07-29

## 2020-07-29 DIAGNOSIS — Z95.810 AUTOMATIC IMPLANTABLE CARDIAC DEFIBRILLATOR IN SITU: Primary | ICD-10-CM

## 2020-08-28 ENCOUNTER — OFFICE VISIT (OUTPATIENT)
Dept: CARDIOLOGY CLINIC | Age: 70
End: 2020-08-28
Payer: MEDICARE

## 2020-08-28 VITALS
HEIGHT: 61 IN | DIASTOLIC BLOOD PRESSURE: 78 MMHG | OXYGEN SATURATION: 97 % | WEIGHT: 131 LBS | BODY MASS INDEX: 24.73 KG/M2 | SYSTOLIC BLOOD PRESSURE: 140 MMHG | HEART RATE: 83 BPM | RESPIRATION RATE: 18 BRPM

## 2020-08-28 DIAGNOSIS — Z95.810 PRESENCE OF AUTOMATIC CARDIOVERTER/DEFIBRILLATOR (AICD): ICD-10-CM

## 2020-08-28 DIAGNOSIS — I10 ESSENTIAL HYPERTENSION, BENIGN: ICD-10-CM

## 2020-08-28 DIAGNOSIS — I50.22 CHRONIC SYSTOLIC HEART FAILURE (HCC): Primary | ICD-10-CM

## 2020-08-28 DIAGNOSIS — I42.0 DILATED CARDIOMYOPATHY (HCC): ICD-10-CM

## 2020-08-28 DIAGNOSIS — I13.0 CARDIORENAL SYNDROME WITH RENAL FAILURE, STAGE 1-4 OR UNSPECIFIED CHRONIC KIDNEY DISEASE, WITH HEART FAILURE (HCC): ICD-10-CM

## 2020-08-28 PROCEDURE — G8536 NO DOC ELDER MAL SCRN: HCPCS | Performed by: SPECIALIST

## 2020-08-28 PROCEDURE — 3017F COLORECTAL CA SCREEN DOC REV: CPT | Performed by: SPECIALIST

## 2020-08-28 PROCEDURE — G8420 CALC BMI NORM PARAMETERS: HCPCS | Performed by: SPECIALIST

## 2020-08-28 PROCEDURE — G8427 DOCREV CUR MEDS BY ELIG CLIN: HCPCS | Performed by: SPECIALIST

## 2020-08-28 PROCEDURE — 93000 ELECTROCARDIOGRAM COMPLETE: CPT | Performed by: SPECIALIST

## 2020-08-28 PROCEDURE — 1090F PRES/ABSN URINE INCON ASSESS: CPT | Performed by: SPECIALIST

## 2020-08-28 PROCEDURE — 99214 OFFICE O/P EST MOD 30 MIN: CPT | Performed by: SPECIALIST

## 2020-08-28 PROCEDURE — G8432 DEP SCR NOT DOC, RNG: HCPCS | Performed by: SPECIALIST

## 2020-08-28 PROCEDURE — G8753 SYS BP > OR = 140: HCPCS | Performed by: SPECIALIST

## 2020-08-28 PROCEDURE — G8754 DIAS BP LESS 90: HCPCS | Performed by: SPECIALIST

## 2020-08-28 PROCEDURE — G8400 PT W/DXA NO RESULTS DOC: HCPCS | Performed by: SPECIALIST

## 2020-08-28 PROCEDURE — 1101F PT FALLS ASSESS-DOCD LE1/YR: CPT | Performed by: SPECIALIST

## 2020-08-28 NOTE — Clinical Note
8/28/20 Patient: Viviane George YOB: 1950 Date of Visit: 8/28/2020 Randa Zepeda MD 
[address] [facility] [address] [place] [phone] VIA In Basket Dear Randa Zepeda MD, Thank you for referring Ms. Thomas Barcenas to [facility] for evaluation. My notes for this consultation are attached. If you have questions, please do not hesitate to call me. I look forward to following your patient along with you.  
 
 
Sincerely, 
 
Jess Landaverde MD

## 2020-08-28 NOTE — PROGRESS NOTES
Rodri Dalal     1950       Sage Blake MD, [facility]  Date of Visit-8/28/2020   PCP is Thalia Barcenas MD   [facility]  [facility]  HPI:  Rodri Dalal is a 79 y.o. female   11 month f/u of CHF systolic EF 75% with normal coronaries. Patient saw Dr. Niki Smith on October 22 with proper function of ICD. He noted elevated BP. Patient is seeing Thalia Barcenas MD regularly for DM. Her Bps have varied from 110-140 in the offices. · Seen in 4/2010 with an EF of 20% and previous normal coronaries, had pleural effusions with thoracentesis, cath 5/26/10 ef 25%, normal hemodynamics, CO 4.4, normal cors  . .fu echo 5/13/10 ef 35%  · had noted fluid increase when seen for the first time on 5/10/18 as she was changing doctors. We increased her Bumex and were cautious in the use of diuretics due to her renal failure. We arranged for her to have follow up with EP clinic. She had blood work that showed a GFR of 26 and a normal Hgb and Mag.  · Echo 5-31-18 EF 25% mod LAE, mild MR  · AICD placed in October 2017. s/p [employer] single lead ICD (DOI 10/10/2017) for NICM & CHF (NYHA II).  Dr. Gustavo Schafer (nephrology) . Device check 7/29/20 showed no abnormalities. Pt ambulates with a walker. Pt is accompanied by her daughter. Overall the pt states she is doing well. Pt's home BP's average around 130/70. Denies chest pain, edema, syncope or shortness of breath at rest, has no tachycardia, palpitations or sense of arrhythmia. EKG: SR, Diffuse nonspecific T-abnormality. Assessment/Plan:     Patient Instructions   We will see you back in 6 months. Future Appointments   Date Time Provider Chava Gabriel   11/10/2020  8:45 AM Carlos Williamson BS AMB   11/10/2020  9:20 AM MD ARCELIA Mcclellan BS AMB   2/26/2021  1:40 PM Sage Daniels MD CAVREY BS AMB       1. Chronic systolic heart failure (HCC)  Doing well. NYHA I-II.  Has been stable even during the pandemic. . We have had to balance her kidney function because of a need to diuretics, but on torsemide she is doing well. There is no ACE due to CKD. We could consider adding an ACE, but she had CKD severe enough to b followed by nephrology. So at this time, we will hold off. Continue Coreg. Note that her creatinine went to 2.9 in 2018 thus no ACE inhibitor. 2. Dilated cardiomyopathy (HCC)  NICM, stable    3. Cardiorenal syndrome with renal failure, stage 1-4 or unspecified chronic kidney disease, with heart failure (HCC)  Lab Results   Component Value Date/Time    Creatinine 1.47 (H) 06/21/2019 12:52 PM        4. Essential hypertension, benign  At goal , meds and possible side effects reviewed and patient denies  She has been checking this at home and they run at an acceptable range  At goal , meds and possible side effects reviewed and patient denies  Key CAD CHF Meds             torsemide (DEMADEX) 20 mg tablet (Taking) TAKE 2.5 TABLETS BY MOUTH DAILY    carvedilol (COREG) 12.5 mg tablet (Taking) Take 12.5 mg by mouth two (2) times daily (with meals). aspirin delayed-release 81 mg tablet (Taking) Take  by mouth daily. simvastatin (ZOCOR) 20 mg tablet (Taking/Discontinued) Take 1 Tab by mouth nightly. BP Readings from Last 5 Encounters:   08/28/20 140/78   02/21/20 140/68   12/20/19 140/80   10/22/19 144/70   10/18/19 110/70        5. Presence of automatic cardioverter/defibrillator (AICD)  Reviewed EP note and device checks. F/u in 6 months. Impression:   1. Chronic systolic heart failure ([address]    2. Dilated cardiomyopathy ([address]    3. Cardiorenal syndrome with renal failure, stage 1-4 or unspecified chronic kidney disease, with heart failure ([address]    4. Essential hypertension, benign    5.  Presence of automatic cardioverter/defibrillator (AICD)       Cardiac History:   2011 with an EF of 20% and previous normal coronaries  ECHO 5-31-18 EF 25%  Cardiorenal syndrome creat 2.9 2/2018  Anemia hgb 9.4 Feb 2018  AICD for NICM--[employer]  Non smoker, 2 children  Family hx brother with heart disease , mother passed of stroke     ROS-except as noted above. . A complete cardiac and respiratory are reviewed and negative except as above ; Resp-denies wheezing  or productive cough,. Const- No unusual weight loss or fever; Neuro-no recent seizure or CVA ; GI- No BRBPR, abdom pain, bloating ; - no  hematuria   see supplement sheet, initialed and to be scanned by staff  Past Medical History:   Diagnosis Date    Calculus of kidney     Cardiomyopathy ([facility]     idiopathic    Chronic systolic heart failure [facility])     April 2010 ef 20%, by Dec 2010 up to 50% on meds, cath with normal cors    Diabetes [facility])     Essential hypertension, benign     Hypercholesterolemia       Social Hx= reports that she has never smoked. She has never used smokeless tobacco. She reports that she does not drink alcohol or use drugs. Exam and Labs:  /78 (BP 1 Location: Left arm, BP Patient Position: Sitting)   Pulse 83   Resp 18   Ht 5' 1\" (1.549 m)   Wt 131 lb (59.4 kg)   SpO2 97%   BMI 24.75 kg/m² Constitutional:  NAD, comfortable  Head: NC,AT. Eyes: No scleral icterus. Neck:  Neck supple. No JVD present. Throat: moist mucous membranes. Chest: Effort normal & normal respiratory excursion . Neurological: alert, conversant and oriented . Skin: Skin is not cold. No obvious systemic rash noted. Not diaphoretic. No erythema. Psychiatric:  Grossly normal mood and affect. Behavior appears normal. Extremities:  no clubbing or cyanosis. Abdomen: non distended    Lungs:breath sounds normal. No stridor. distress, wheezes or  Rales. Heart: normal rate, regular rhythm, normal S1, S2, no murmurs, rubs, clicks or gallops , PMI non displaced. Edema: Edema is none.   Lab Results   Component Value Date/Time    Cholesterol, total 152 06/21/2019 12:52 PM    HDL Cholesterol 26 (L) 06/21/2019 12:52 PM LDL, calculated Comment 06/21/2019 12:52 PM    Triglyceride 442 (H) 06/21/2019 12:52 PM    CHOL/HDL Ratio 5.1 (H) 04/22/2010 12:40 AM     Lab Results   Component Value Date/Time    Sodium 140 06/21/2019 12:52 PM    Potassium 4.6 06/21/2019 12:52 PM    Chloride 98 06/21/2019 12:52 PM    CO2 22 06/21/2019 12:52 PM    Anion gap 10 05/21/2010 09:43 AM    Glucose 142 (H) 06/21/2019 12:52 PM    BUN 39 (H) 06/21/2019 12:52 PM    Creatinine 1.47 (H) 06/21/2019 12:52 PM    BUN/Creatinine ratio 27 06/21/2019 12:52 PM    GFR est AA 42 (L) 06/21/2019 12:52 PM    GFR est non-AA 36 (L) 06/21/2019 12:52 PM    Calcium 9.1 06/21/2019 12:52 PM      Wt Readings from Last 3 Encounters:   08/28/20 131 lb (59.4 kg)   02/21/20 133 lb (60.3 kg)   12/20/19 132 lb 12.8 oz (60.2 kg)      BP Readings from Last 3 Encounters:   08/28/20 140/78   02/21/20 140/68   12/20/19 140/80      Current Outpatient Medications   Medication Sig    metFORMIN (GLUCOPHAGE) 500 mg tablet TAKE 1 TAB BY MOUTH TWICE A DAY WITH MEALS    glipiZIDE SR (GLUCOTROL XL) 10 mg CR tablet Take 1 Tab by mouth daily. LABS & FOLLOW UP REQUIRED PRIOR TO NEXT REFILL    torsemide (DEMADEX) 20 mg tablet TAKE 2.5 TABLETS BY MOUTH DAILY    mirtazapine (REMERON) 15 mg tablet TAKE 1 TABLET BY MOUTH AT BEDTIME FOR 1 WEEK, THEN 2 TABLETS NIGHTLY    carvedilol (COREG) 12.5 mg tablet Take 12.5 mg by mouth two (2) times daily (with meals).  aspirin delayed-release 81 mg tablet Take  by mouth daily.  simvastatin (ZOCOR) 20 mg tablet Take 1 Tab by mouth nightly. No current facility-administered medications for this visit. Impression see above.       Written by Jimena Lopez, as dictated by Hubert Mitchell MD.

## 2020-09-16 ENCOUNTER — TELEPHONE (OUTPATIENT)
Dept: INTERNAL MEDICINE CLINIC | Age: 70
End: 2020-09-16

## 2020-09-16 DIAGNOSIS — E78.00 PURE HYPERCHOLESTEROLEMIA: ICD-10-CM

## 2020-09-16 DIAGNOSIS — E11.21 TYPE 2 DIABETES WITH NEPHROPATHY (HCC): Primary | ICD-10-CM

## 2020-09-16 NOTE — TELEPHONE ENCOUNTER
Patient has appt for diabetes f/u next thurs, 9/24. Daughter asked if a labslip could be done, A1C and whatever else Dr. Reyna Infante wants. Will be going to [facility] on 9/22.

## 2020-09-16 NOTE — TELEPHONE ENCOUNTER
Spoke with daughter and let her know that the lab slip was printed. She will pick it on before her appointment.

## 2020-09-23 LAB
ALBUMIN SERPL-MCNC: 4.1 G/DL (ref 3.8–4.8)
ALBUMIN/GLOB SERPL: 1.5 {RATIO} (ref 1.2–2.2)
ALP SERPL-CCNC: 63 IU/L (ref 39–117)
ALT SERPL-CCNC: 10 IU/L (ref 0–32)
AST SERPL-CCNC: 12 IU/L (ref 0–40)
BASOPHILS # BLD AUTO: 0 X10E3/UL (ref 0–0.2)
BASOPHILS NFR BLD AUTO: 1 %
BILIRUB SERPL-MCNC: 0.3 MG/DL (ref 0–1.2)
BUN SERPL-MCNC: 31 MG/DL (ref 8–27)
BUN/CREAT SERPL: 18 (ref 12–28)
CALCIUM SERPL-MCNC: 8.9 MG/DL (ref 8.7–10.3)
CHLORIDE SERPL-SCNC: 99 MMOL/L (ref 96–106)
CHOLEST SERPL-MCNC: 216 MG/DL (ref 100–199)
CO2 SERPL-SCNC: 26 MMOL/L (ref 20–29)
CREAT SERPL-MCNC: 1.75 MG/DL (ref 0.57–1)
EOSINOPHIL # BLD AUTO: 0.2 X10E3/UL (ref 0–0.4)
EOSINOPHIL NFR BLD AUTO: 3 %
ERYTHROCYTE [DISTWIDTH] IN BLOOD BY AUTOMATED COUNT: 12.8 % (ref 11.7–15.4)
EST. AVERAGE GLUCOSE BLD GHB EST-MCNC: 148 MG/DL
GLOBULIN SER CALC-MCNC: 2.7 G/DL (ref 1.5–4.5)
GLUCOSE SERPL-MCNC: 108 MG/DL (ref 65–99)
HBA1C MFR BLD: 6.8 % (ref 4.8–5.6)
HCT VFR BLD AUTO: 32.9 % (ref 34–46.6)
HDLC SERPL-MCNC: 29 MG/DL
HGB BLD-MCNC: 11 G/DL (ref 11.1–15.9)
IMM GRANULOCYTES # BLD AUTO: 0 X10E3/UL (ref 0–0.1)
IMM GRANULOCYTES NFR BLD AUTO: 0 %
LDLC SERPL CALC-MCNC: 100 MG/DL (ref 0–99)
LYMPHOCYTES # BLD AUTO: 2.1 X10E3/UL (ref 0.7–3.1)
LYMPHOCYTES NFR BLD AUTO: 28 %
MCH RBC QN AUTO: 31.1 PG (ref 26.6–33)
MCHC RBC AUTO-ENTMCNC: 33.4 G/DL (ref 31.5–35.7)
MCV RBC AUTO: 93 FL (ref 79–97)
MONOCYTES # BLD AUTO: 0.7 X10E3/UL (ref 0.1–0.9)
MONOCYTES NFR BLD AUTO: 9 %
NEUTROPHILS # BLD AUTO: 4.5 X10E3/UL (ref 1.4–7)
NEUTROPHILS NFR BLD AUTO: 59 %
PLATELET # BLD AUTO: 231 X10E3/UL (ref 150–450)
POTASSIUM SERPL-SCNC: 5.1 MMOL/L (ref 3.5–5.2)
PROT SERPL-MCNC: 6.8 G/DL (ref 6–8.5)
RBC # BLD AUTO: 3.54 X10E6/UL (ref 3.77–5.28)
SODIUM SERPL-SCNC: 139 MMOL/L (ref 134–144)
TRIGL SERPL-MCNC: 514 MG/DL (ref 0–149)
VLDLC SERPL CALC-MCNC: 87 MG/DL (ref 5–40)
WBC # BLD AUTO: 7.5 X10E3/UL (ref 3.4–10.8)

## 2020-09-24 ENCOUNTER — OFFICE VISIT (OUTPATIENT)
Dept: INTERNAL MEDICINE CLINIC | Age: 70
End: 2020-09-24
Payer: MEDICARE

## 2020-09-24 VITALS
WEIGHT: 133.2 LBS | DIASTOLIC BLOOD PRESSURE: 60 MMHG | RESPIRATION RATE: 16 BRPM | HEIGHT: 61 IN | SYSTOLIC BLOOD PRESSURE: 130 MMHG | TEMPERATURE: 97.3 F | OXYGEN SATURATION: 99 % | BODY MASS INDEX: 25.15 KG/M2 | HEART RATE: 87 BPM

## 2020-09-24 DIAGNOSIS — I10 ESSENTIAL HYPERTENSION, BENIGN: ICD-10-CM

## 2020-09-24 DIAGNOSIS — E11.21 TYPE 2 DIABETES WITH NEPHROPATHY (HCC): Primary | ICD-10-CM

## 2020-09-24 DIAGNOSIS — E78.1 HYPERTRIGLYCERIDEMIA: ICD-10-CM

## 2020-09-24 PROBLEM — G24.01 TARDIVE DYSKINESIA: Status: ACTIVE | Noted: 2017-06-27

## 2020-09-24 PROBLEM — M54.2 CERVICALGIA: Status: ACTIVE | Noted: 2017-06-27

## 2020-09-24 PROCEDURE — G8536 NO DOC ELDER MAL SCRN: HCPCS | Performed by: INTERNAL MEDICINE

## 2020-09-24 PROCEDURE — G8754 DIAS BP LESS 90: HCPCS | Performed by: INTERNAL MEDICINE

## 2020-09-24 PROCEDURE — 99214 OFFICE O/P EST MOD 30 MIN: CPT | Performed by: INTERNAL MEDICINE

## 2020-09-24 PROCEDURE — 3017F COLORECTAL CA SCREEN DOC REV: CPT | Performed by: INTERNAL MEDICINE

## 2020-09-24 PROCEDURE — G8427 DOCREV CUR MEDS BY ELIG CLIN: HCPCS | Performed by: INTERNAL MEDICINE

## 2020-09-24 PROCEDURE — 1101F PT FALLS ASSESS-DOCD LE1/YR: CPT | Performed by: INTERNAL MEDICINE

## 2020-09-24 PROCEDURE — G8400 PT W/DXA NO RESULTS DOC: HCPCS | Performed by: INTERNAL MEDICINE

## 2020-09-24 PROCEDURE — G8432 DEP SCR NOT DOC, RNG: HCPCS | Performed by: INTERNAL MEDICINE

## 2020-09-24 PROCEDURE — 2022F DILAT RTA XM EVC RTNOPTHY: CPT | Performed by: INTERNAL MEDICINE

## 2020-09-24 PROCEDURE — G8752 SYS BP LESS 140: HCPCS | Performed by: INTERNAL MEDICINE

## 2020-09-24 PROCEDURE — 1090F PRES/ABSN URINE INCON ASSESS: CPT | Performed by: INTERNAL MEDICINE

## 2020-09-24 PROCEDURE — 3044F HG A1C LEVEL LT 7.0%: CPT | Performed by: INTERNAL MEDICINE

## 2020-09-24 PROCEDURE — G8419 CALC BMI OUT NRM PARAM NOF/U: HCPCS | Performed by: INTERNAL MEDICINE

## 2020-09-24 RX ORDER — SIMVASTATIN 20 MG/1
20 TABLET, FILM COATED ORAL
Qty: 30 TAB | Refills: 1 | Status: SHIPPED | OUTPATIENT
Start: 2020-09-24 | End: 2020-11-16

## 2020-09-24 NOTE — PROGRESS NOTES
Chief Complaint   Patient presents with    Diabetes     Reviewed record in preparation for visit and have obtained necessary documentation. Identified pt with two pt identifiers(name and ). Health Maintenance Due   Topic    Eye Exam Retinal or Dilated     DTaP/Tdap/Td series (1 - Tdap)    Shingrix Vaccine Age 49> (1 of 2)    Breast Cancer Screen Mammogram     FOBT Q1Y Age 54-65    Voncile Searing GLAUCOMA SCREENING Q2Y     Bone Densitometry (Dexa) Screening     Lipid Screen     Medicare Yearly Exam     Foot Exam Q1     MICROALBUMIN Q1     Pneumococcal 65+ years (2 of 2 - PPSV23)    Flu Vaccine (1)    A1C test (Diabetic or Prediabetic)          Chief Complaint   Patient presents with    Diabetes        Wt Readings from Last 3 Encounters:   20 133 lb 3.2 oz (60.4 kg)   20 131 lb (59.4 kg)   20 133 lb (60.3 kg)     Temp Readings from Last 3 Encounters:   20 97.3 °F (36.3 °C) (Oral)   19 97.1 °F (36.2 °C) (Oral)   10/18/19 97.8 °F (36.6 °C) (Oral)     BP Readings from Last 3 Encounters:   20 130/60   20 140/78   20 140/68     Pulse Readings from Last 3 Encounters:   20 87   20 83   20 79           Learning Assessment:  :     Learning Assessment 2018   PRIMARY LEARNER Patient   HIGHEST LEVEL OF EDUCATION - PRIMARY LEARNER  DID NOT GRADUATE HIGH SCHOOL   BARRIERS PRIMARY LEARNER NONE   PRIMARY LANGUAGE ENGLISH   LEARNER PREFERENCE PRIMARY LISTENING   ANSWERED BY pt   RELATIONSHIP SELF       Depression Screening:  :     3 most recent PHQ Screens 2019   Little interest or pleasure in doing things Not at all   Feeling down, depressed, irritable, or hopeless Not at all   Total Score PHQ 2 0       Fall Risk Assessment:  :     Fall Risk Assessment, last 12 mths 2019   Able to walk? Yes   Fall in past 12 months? No       Abuse Screening:  :     Abuse Screening Questionnaire 10/9/2018 2018   Do you ever feel afraid of your partner?  Madina Messina N   Are you in a relationship with someone who physically or mentally threatens you? N N   Is it safe for you to go home? Y Y       Coordination of Care Questionnaire:  :     1) Have you been to an emergency room, urgent care clinic since your last visit? no   Hospitalized since your last visit? no             2) Have you seen or consulted any other health care providers outside of [address] [place] [address] since your last visit? no  (Include any pap smears or colon screenings in this section.)    3) Do you have an Advance Directive on file? no    4) Are you interested in receiving information on Advance Directives? NO      Patient is accompanied by self I have received verbal consent from Elliott Kendall to discuss any/all medical information while they are present in the room. Reviewed record  In preparation for visit and have obtained necessary documentation.

## 2020-09-24 NOTE — PROGRESS NOTES
Follow Up Visit    Julio Mendoza is a 79 y.o. female. she presents for Diabetes    Endocrine Review  She is seen for diabetes. Since last visit: she has been working on appropriate dietary management. Home glucose monitoring: is performed regularly, values are usually normal.   She reports medication compliance: compliant all of the time. She reports the following medication side effects: none. Diabetic diet compliance: compliant most of the time. Further diabetic ROS: no polyuria or polydipsia, no chest pain, dyspnea or TIA's, no numbness, tingling or pain in extremities, no hypoglycemia. Lab review: labs are reviewed, noted a very elevated triglyceride. We have discussed this in the past.  She was on a statin at a time. Otherwise, labs reviewed and discussed with patient, A1c today is 6.8. Patient Active Problem List   Diagnosis Code    Chronic systolic heart failure (HCC) I50.22    Cardiomyopathy ([facility] I42.9    Pure hypercholesterolemia E78.00    Essential hypertension, benign I10    Type 2 diabetes with nephropathy ([facility] E11.21    Cardiorenal syndrome I13.10    Hypertriglyceridemia E78.1    CKD (chronic kidney disease) stage 3, GFR 30-59 ml/min ([facility]) N18.3    Cervicalgia M54.2    Tardive dyskinesia G24.01         Prior to Admission medications    Medication Sig Start Date End Date Taking? Authorizing Provider   metFORMIN (GLUCOPHAGE) 500 mg tablet TAKE 1 TABLET BY MOUTH TWICE A DAY WITH MEALS 9/1/20  Yes Issac Gagnon MD   glipiZIDE SR (GLUCOTROL XL) 10 mg CR tablet Take 1 Tab by mouth daily.  LABS & FOLLOW UP REQUIRED PRIOR TO NEXT REFILL 6/17/20  Yes Issac Gagnon MD   torsemide (DEMADEX) 20 mg tablet TAKE 2.5 TABLETS BY MOUTH DAILY 12/19/19  Yes Arian Alexander MD   mirtazapine (REMERON) 15 mg tablet TAKE 1 TABLET BY MOUTH AT BEDTIME FOR 1 WEEK, THEN 2 TABLETS NIGHTLY 11/9/18  Yes Issac Gagnon MD   carvedilol (COREG) 12.5 mg tablet Take 12.5 mg by mouth two (2) times daily (with meals). Yes Provider, Historical   aspirin delayed-release 81 mg tablet Take  by mouth daily. Yes Provider, Historical   simvastatin (ZOCOR) 20 mg tablet Take 1 Tab by mouth nightly. 8/23/11  Yes Griselda Mu, MD         Health Maintenance   Topic Date Due    Eye Exam Retinal or Dilated  05/03/1960    DTaP/Tdap/Td series (1 - Tdap) 05/03/1971    Shingrix Vaccine Age 50> (1 of 2) 05/03/2000    Breast Cancer Screen Mammogram  05/03/2000    FOBT Q1Y Age 50-75  05/03/2000    GLAUCOMA SCREENING Q2Y  05/03/2015    Bone Densitometry (Dexa) Screening  05/03/2015    Lipid Screen  06/21/2020    Medicare Yearly Exam  07/26/2020    Foot Exam Q1  07/26/2020    MICROALBUMIN Q1  07/26/2020    Flu Vaccine (1) 09/01/2020    A1C test (Diabetic or Prediabetic)  09/20/2020    Pneumococcal 65+ years (2 of 2 - PPSV23) 09/05/2025    Hepatitis C Screening  Completed       Review of Systems   Constitutional: Negative. Respiratory: Negative. Cardiovascular: Negative. Gastrointestinal: Negative. Visit Vitals  /60 (BP 1 Location: Right arm, BP Patient Position: Sitting)   Pulse 87   Temp 97.3 °F (36.3 °C) (Oral)   Resp 16   Ht 5' 1\" (1.549 m)   Wt 133 lb 3.2 oz (60.4 kg)   SpO2 99%   BMI 25.17 kg/m²       Physical Exam  Constitutional:       Appearance: Normal appearance. Cardiovascular:      Rate and Rhythm: Normal rate and regular rhythm. Pulmonary:      Effort: Pulmonary effort is normal.      Breath sounds: Normal breath sounds. Neurological:      Mental Status: She is alert. ASSESSMENT/PLAN    Diagnoses and all orders for this visit:    1. Type 2 diabetes with nephropathy ([address] - Discussed with the patient to continue her present medications. Follow up sugars. 2. Hypertriglyceridemia- given her elevated triglyceride, we will resume her statin. She has not been on this for several years. -     simvastatin (ZOCOR) 20 mg tablet; Take 1 Tab by mouth nightly.     3. Essential hypertension, benign    Follow-up and Dispositions    · Return in about 6 months (around 3/24/2021) for Medicare Wellness Visit- 30 minute appointment.

## 2020-10-18 DIAGNOSIS — E11.21 TYPE 2 DIABETES WITH NEPHROPATHY (HCC): ICD-10-CM

## 2020-10-20 RX ORDER — GLIPIZIDE 10 MG/1
TABLET, FILM COATED, EXTENDED RELEASE ORAL
Qty: 30 TAB | Refills: 3 | Status: SHIPPED | OUTPATIENT
Start: 2020-10-20 | End: 2021-02-24

## 2020-11-18 ENCOUNTER — CLINICAL SUPPORT (OUTPATIENT)
Dept: CARDIOLOGY CLINIC | Age: 70
End: 2020-11-18
Payer: MEDICARE

## 2020-11-18 ENCOUNTER — OFFICE VISIT (OUTPATIENT)
Dept: CARDIOLOGY CLINIC | Age: 70
End: 2020-11-18
Payer: MEDICARE

## 2020-11-18 VITALS
BODY MASS INDEX: 25.3 KG/M2 | DIASTOLIC BLOOD PRESSURE: 76 MMHG | WEIGHT: 134 LBS | RESPIRATION RATE: 16 BRPM | SYSTOLIC BLOOD PRESSURE: 136 MMHG | HEIGHT: 61 IN | HEART RATE: 81 BPM

## 2020-11-18 DIAGNOSIS — Z95.810 AUTOMATIC IMPLANTABLE CARDIAC DEFIBRILLATOR IN SITU: Primary | ICD-10-CM

## 2020-11-18 DIAGNOSIS — I12.9 CKD STAGE 4 SECONDARY TO HYPERTENSION (HCC): ICD-10-CM

## 2020-11-18 DIAGNOSIS — I50.22 CHRONIC SYSTOLIC HEART FAILURE (HCC): ICD-10-CM

## 2020-11-18 DIAGNOSIS — I42.0 DILATED CARDIOMYOPATHY (HCC): ICD-10-CM

## 2020-11-18 DIAGNOSIS — I10 ESSENTIAL HYPERTENSION, BENIGN: ICD-10-CM

## 2020-11-18 DIAGNOSIS — N18.4 CKD STAGE 4 SECONDARY TO HYPERTENSION (HCC): ICD-10-CM

## 2020-11-18 PROCEDURE — 99214 OFFICE O/P EST MOD 30 MIN: CPT | Performed by: INTERNAL MEDICINE

## 2020-11-18 PROCEDURE — 3017F COLORECTAL CA SCREEN DOC REV: CPT | Performed by: INTERNAL MEDICINE

## 2020-11-18 PROCEDURE — 1101F PT FALLS ASSESS-DOCD LE1/YR: CPT | Performed by: INTERNAL MEDICINE

## 2020-11-18 PROCEDURE — G8400 PT W/DXA NO RESULTS DOC: HCPCS | Performed by: INTERNAL MEDICINE

## 2020-11-18 PROCEDURE — G8419 CALC BMI OUT NRM PARAM NOF/U: HCPCS | Performed by: INTERNAL MEDICINE

## 2020-11-18 PROCEDURE — 1090F PRES/ABSN URINE INCON ASSESS: CPT | Performed by: INTERNAL MEDICINE

## 2020-11-18 PROCEDURE — G8752 SYS BP LESS 140: HCPCS | Performed by: INTERNAL MEDICINE

## 2020-11-18 PROCEDURE — 93282 PRGRMG EVAL IMPLANTABLE DFB: CPT | Performed by: INTERNAL MEDICINE

## 2020-11-18 PROCEDURE — G8427 DOCREV CUR MEDS BY ELIG CLIN: HCPCS | Performed by: INTERNAL MEDICINE

## 2020-11-18 PROCEDURE — G8754 DIAS BP LESS 90: HCPCS | Performed by: INTERNAL MEDICINE

## 2020-11-18 PROCEDURE — G8432 DEP SCR NOT DOC, RNG: HCPCS | Performed by: INTERNAL MEDICINE

## 2020-11-18 PROCEDURE — G8536 NO DOC ELDER MAL SCRN: HCPCS | Performed by: INTERNAL MEDICINE

## 2020-11-18 NOTE — PROGRESS NOTES
Cardiac Electrophysiology OFFICE Note     Subjective: Uri Lentz is a [address] y.o. patient who is seen for s/p [employer] single lead ICD (DOI 10/10/2017) for NICM & CHF (NYHA II). This was implanted at [facility] by Dr Kike Avendaño check today shows proper lead & generator function. Generator longevity estimated beginning of service 12 years left. No episodes of VT since 10/22/2019. NICM, last LVEF 25% in 05/2018. NYHA II chronic systolic CHF symptoms stable, has had no change in activity tolerance. On carvedilol, but no ACEi/ARB due to CKD stage IV. BP controlled. No chest pain, palpitations, PND, orthopnea, lightheadedness, or syncope. Ambulates with walker without difficulty. Daughter with her today    Previous:  Echo (05/31/2018): LVEF 25%, LV mildly dilated, severe diffuse LV hypokinesis with distinct regional wall motion abnormalities, LV wall thickness upper limits of normal.  LA moderately dilated. Mild MR. Trivial AR, TR, & OR. [employer] single lead ICD (10/10/2017) at [facility] by Dr. Dionna Vizcarra. Previously saw Dr. Jose Davila (nephrology), but he has now retired; they have since been advised to follow with Dr. Shanique Penn.     Primary cardiologist is Dr. Namita Barclay.      Problem List  Date Reviewed: 9/24/2020          Codes Class Noted    CKD (chronic kidney disease) stage 3, GFR 30-59 ml/min ICD-10-CM: N18.30  ICD-9-CM: 585.3  1/16/2020        Hypertriglyceridemia ICD-10-CM: E78.1  ICD-9-CM: 272.1  1/15/2019        Cardiorenal syndrome ICD-10-CM: I13.10  ICD-9-CM: 404.90  6/24/2018        Type 2 diabetes with nephropathy ([facility] ICD-10-CM: E11.21  ICD-9-CM: 250.40, 583.81  5/10/2018        Cervicalgia ICD-10-CM: M54.2  ICD-9-CM: 723.1  6/27/2017        Tardive dyskinesia ICD-10-CM: G24.01  ICD-9-CM: 333.85  6/27/2017        Essential hypertension, benign ICD-10-CM: I10  ICD-9-CM: 401.1  Unknown        Pure hypercholesterolemia ICD-10-CM: E78.00  ICD-9-CM: 272.0  2/28/2011 Chronic systolic heart failure (HCC) (Chronic) ICD-10-CM: I50.22  ICD-9-CM: 428.22  Unknown        Cardiomyopathy (HCC) (Chronic) ICD-10-CM: I42.9  ICD-9-CM: 425.4  Unknown              Current Outpatient Medications   Medication Sig Dispense Refill    simvastatin (ZOCOR) 20 mg tablet TAKE 1 TABLET BY MOUTH EVERY DAY AT NIGHT 90 Tab 1    glipiZIDE SR (GLUCOTROL XL) 10 mg CR tablet TAKE 1 TABLET BY MOUTH DAILY. LABS & FOLLOW UP REQUIRED PRIOR TO NEXT REFILL 30 Tab 3    metFORMIN (GLUCOPHAGE) 500 mg tablet TAKE 1 TABLET BY MOUTH TWICE A DAY WITH MEALS 60 Tab 3    torsemide (DEMADEX) 20 mg tablet TAKE 2.5 TABLETS BY MOUTH DAILY 225 Tab 4    mirtazapine (REMERON) 15 mg tablet TAKE 1 TABLET BY MOUTH AT BEDTIME FOR 1 WEEK, THEN 2 TABLETS NIGHTLY 45 Tab 5    carvedilol (COREG) 12.5 mg tablet Take 12.5 mg by mouth two (2) times daily (with meals).  aspirin delayed-release 81 mg tablet Take  by mouth daily.        Allergies   Allergen Reactions    Codeine Hives    Pcn [Penicillins] Hives     Past Medical History:   Diagnosis Date    Calculus of kidney     Cardiomyopathy ([facility] [address]     idiopathic    Chronic systolic heart failure [facility])     April 2010 ef 20%, by Dec 2010 up to 50% on meds, cath with normal cors    Diabetes [facility])     Essential hypertension, benign     Hypercholesterolemia      Past Surgical History:   Procedure Laterality Date    CARDIAC SURG PROCEDURE UNLIST      HX HYSTERECTOMY      HX ORTHOPAEDIC      HX OTHER SURGICAL  10/2017    difibulator     HX UROLOGICAL      stone removal     Family History   Problem Relation Age of Onset    Stroke Mother     Hypertension Mother     Lung Disease Father         emphysema    Heart Disease Brother     Stroke Brother     Cancer Brother         bone    Cancer Maternal Grandfather         colon ca    Cancer Other         breast ca     Social History     Tobacco Use    Smoking status: Never Smoker    Smokeless tobacco: Never Used   Substance Use Topics    Alcohol use: No        Review of Systems: All other review of systems otherwise negative. Constitutional: Negative for fever, chills, weight loss, malaise/fatigue. HEENT: Negative for nosebleeds, vision changes. Respiratory: Negative for cough, hemoptysis  Cardiovascular: Negative for chest pain, palpitations, orthopnea, claudication, leg swelling, no syncope, and no PND. + mild dyspnea with significant exertion  Gastrointestinal: Negative for nausea, vomiting, diarrhea, blood in stool and melena. Genitourinary: Negative for dysuria, and hematuria. Musculoskeletal: Negative for myalgias, arthralgia. Skin: Negative for rash. Heme: Does not bleed or bruise easily. Neurological: Negative for speech change and focal weakness. Objective:     Visit Vitals  /76 (BP 1 Location: Left arm, BP Patient Position: Sitting)   Pulse 81   Resp 16   Ht 5' 1\" (1.549 m)   Wt 134 lb (60.8 kg)   BMI 25.32 kg/m²      Physical Exam:   Constitutional: Well-developed and well-nourished. No respiratory distress. Head: Normocephalic and atraumatic. Eyes: Pupils are equal, round. ENT: Hearing grossly normal.  Neck: Supple. No JVD present. Cardiovascular: Normal rate, regular rhythm. Exam reveals no gallop and no friction rub. No murmur heard. Pulmonary/Chest: Effort normal and breath sounds normal. No wheezes. Abdominal: Soft, no tenderness. Musculoskeletal: Moves extremities independently. Ambulates with walker. Vasc/lymphatic: No edema. Neurological: Alert,oriented. Skin: Skin is warm and dry. Unremarkable left sided ICD site, well healed. Psychiatric: Normal mood and affect. Behavior is normal. Judgment and thought content normal.      EKG (08/21/2019): NSR 84, QRSd 102 ms. Nonspecific T wave abnormalities. Assessment/Plan:        ICD-10-CM ICD-9-CM    1. Automatic implantable cardiac defibrillator in situ  Z95.810 V45.02    2.  Chronic systolic heart failure (HCC)  I50.22 428.22 3. Dilated cardiomyopathy (HCC)  I42.0 425.4    4. Cardiorenal syndrome with renal failure, stage 1-4 or unspecified chronic kidney disease, with heart failure (HCC)  I13.0 404.91    5. Essential hypertension, benign  I10 401.1         Single chamber ICD check today shows proper lead & generator function, approx 12 years generator longevity. No interim detections or discharges. NICM (LVEF 25% in 2018) with stable NYHA II chronic systolic heart failure. Continue carvedilol, but no ACEi/ARB due to renal function. BP well controlled. May be able to tolerate bidil if bp remains the same    Continue remote checks q 3 months, annual follow up in EP clinic. Follow up with Dr. Carolina Thomas as previously scheduled. Future Appointments   Date Time Provider Chava Gabriel   2/26/2021  1:40 PM Sage Daniels MD CAVREY BS AMB   3/17/2021  2:15 PM REMOTE1, CHRISTINE PANIAGUA BS AMB   6/23/2021  8:30 AM REMOTE1, CHRISTINE PANIAGUA BS AMB   9/29/2021 10:30 AM REMOTE1, CHRISTINE BOSE AMB   12/9/2021  8:40 AM PACEMAKER3, CHRISTINE PANIAGUA BS AMB   12/9/2021  9:20 AM MD ARCELIA Quintero BS AMB       Thank you for involving me in this patient's care and please call with further concerns or questions. Tre Galeana M.D.   Electrophysiology/Cardiology  [facility] [place]  [address], [facility] [address] , [address]  [phone]

## 2021-01-14 DIAGNOSIS — E11.21 TYPE 2 DIABETES WITH NEPHROPATHY (HCC): ICD-10-CM

## 2021-01-14 RX ORDER — METFORMIN HYDROCHLORIDE 500 MG/1
TABLET ORAL
Qty: 60 TAB | Refills: 3 | Status: SHIPPED | OUTPATIENT
Start: 2021-01-14 | End: 2021-04-29

## 2021-02-23 DIAGNOSIS — E11.21 TYPE 2 DIABETES WITH NEPHROPATHY (HCC): ICD-10-CM

## 2021-02-24 RX ORDER — GLIPIZIDE 10 MG/1
TABLET, FILM COATED, EXTENDED RELEASE ORAL
Qty: 30 TAB | Refills: 3 | Status: SHIPPED | OUTPATIENT
Start: 2021-02-24 | End: 2021-06-30

## 2021-03-17 ENCOUNTER — OFFICE VISIT (OUTPATIENT)
Dept: CARDIOLOGY CLINIC | Age: 71
End: 2021-03-17
Payer: MEDICARE

## 2021-03-17 DIAGNOSIS — Z95.810 AUTOMATIC IMPLANTABLE CARDIAC DEFIBRILLATOR IN SITU: Primary | ICD-10-CM

## 2021-03-17 PROCEDURE — 93296 REM INTERROG EVL PM/IDS: CPT | Performed by: INTERNAL MEDICINE

## 2021-03-17 PROCEDURE — 93295 DEV INTERROG REMOTE 1/2/MLT: CPT | Performed by: INTERNAL MEDICINE

## 2021-03-18 DIAGNOSIS — I50.22 CHRONIC SYSTOLIC CONGESTIVE HEART FAILURE (HCC): ICD-10-CM

## 2021-03-19 RX ORDER — TORSEMIDE 20 MG/1
TABLET ORAL
Qty: 225 TAB | Refills: 1 | Status: SHIPPED | OUTPATIENT
Start: 2021-03-19 | End: 2021-09-13

## 2021-03-19 NOTE — TELEPHONE ENCOUNTER
Requested Prescriptions     Signed Prescriptions Disp Refills    torsemide (DEMADEX) 20 mg tablet 225 Tab 1     Sig: TAKE 2 AND 1/2 TABLETS BY MOUTH DAILY     Authorizing Provider: ROSA ONEIL     Ordering User: Herson Tillman     Verbal order per Dr. Roxane Gonzalez.     Future Appointments   Date Time Provider Chava Gabriel   4/15/2021  1:40 PM Sage Daniels MD CAVREY BS AMB   6/23/2021  8:30 AM REMOTE1, CHRISTINE BOSE AMB   9/29/2021 10:30 AM REMOTE1, CHRISTINE BOSE AMB   12/9/2021  8:40 AM PACEMAKER3, CHRISTINE PANIAGUA BS AMB   12/9/2021  9:20 AM MD ARCELIA Mooney BS AMB

## 2021-03-23 ENCOUNTER — TELEPHONE (OUTPATIENT)
Dept: INTERNAL MEDICINE CLINIC | Age: 71
End: 2021-03-23

## 2021-03-26 ENCOUNTER — OFFICE VISIT (OUTPATIENT)
Dept: INTERNAL MEDICINE CLINIC | Age: 71
End: 2021-03-26
Payer: MEDICARE

## 2021-03-26 VITALS
DIASTOLIC BLOOD PRESSURE: 80 MMHG | TEMPERATURE: 97.6 F | RESPIRATION RATE: 16 BRPM | OXYGEN SATURATION: 98 % | BODY MASS INDEX: 25.19 KG/M2 | HEIGHT: 61 IN | HEART RATE: 85 BPM | WEIGHT: 133.4 LBS | SYSTOLIC BLOOD PRESSURE: 140 MMHG

## 2021-03-26 DIAGNOSIS — Z13.39 SCREENING FOR ALCOHOLISM: ICD-10-CM

## 2021-03-26 DIAGNOSIS — E78.1 HYPERTRIGLYCERIDEMIA: ICD-10-CM

## 2021-03-26 DIAGNOSIS — E11.21 TYPE 2 DIABETES WITH NEPHROPATHY (HCC): ICD-10-CM

## 2021-03-26 DIAGNOSIS — M81.0 POSTMENOPAUSAL BONE LOSS: ICD-10-CM

## 2021-03-26 DIAGNOSIS — Z12.11 COLON CANCER SCREENING: ICD-10-CM

## 2021-03-26 DIAGNOSIS — Z00.00 MEDICARE ANNUAL WELLNESS VISIT, SUBSEQUENT: Primary | ICD-10-CM

## 2021-03-26 DIAGNOSIS — Z12.31 ENCOUNTER FOR SCREENING MAMMOGRAM FOR MALIGNANT NEOPLASM OF BREAST: ICD-10-CM

## 2021-03-26 DIAGNOSIS — R41.3 MEMORY LOSS: ICD-10-CM

## 2021-03-26 PROCEDURE — G8510 SCR DEP NEG, NO PLAN REQD: HCPCS | Performed by: INTERNAL MEDICINE

## 2021-03-26 PROCEDURE — 1101F PT FALLS ASSESS-DOCD LE1/YR: CPT | Performed by: INTERNAL MEDICINE

## 2021-03-26 PROCEDURE — 2022F DILAT RTA XM EVC RTNOPTHY: CPT | Performed by: INTERNAL MEDICINE

## 2021-03-26 PROCEDURE — G8427 DOCREV CUR MEDS BY ELIG CLIN: HCPCS | Performed by: INTERNAL MEDICINE

## 2021-03-26 PROCEDURE — 3046F HEMOGLOBIN A1C LEVEL >9.0%: CPT | Performed by: INTERNAL MEDICINE

## 2021-03-26 PROCEDURE — G8754 DIAS BP LESS 90: HCPCS | Performed by: INTERNAL MEDICINE

## 2021-03-26 PROCEDURE — 3017F COLORECTAL CA SCREEN DOC REV: CPT | Performed by: INTERNAL MEDICINE

## 2021-03-26 PROCEDURE — G8753 SYS BP > OR = 140: HCPCS | Performed by: INTERNAL MEDICINE

## 2021-03-26 PROCEDURE — G0439 PPPS, SUBSEQ VISIT: HCPCS | Performed by: INTERNAL MEDICINE

## 2021-03-26 PROCEDURE — G8419 CALC BMI OUT NRM PARAM NOF/U: HCPCS | Performed by: INTERNAL MEDICINE

## 2021-03-26 PROCEDURE — G9899 SCRN MAM PERF RSLTS DOC: HCPCS | Performed by: INTERNAL MEDICINE

## 2021-03-26 PROCEDURE — G8536 NO DOC ELDER MAL SCRN: HCPCS | Performed by: INTERNAL MEDICINE

## 2021-03-26 NOTE — PROGRESS NOTES
Chief Complaint   Patient presents with   [facility] Wellness Visit     Reviewed record in preparation for visit and have obtained necessary documentation. Identified pt with two pt identifiers(name and ). Health Maintenance Due   Topic    Eye Exam Retinal or Dilated     COVID-19 Vaccine (1)    DTaP/Tdap/Td series (1 - Tdap)    Colorectal Cancer Screening Combo     Breast Cancer Screen Mammogram     Bone Densitometry (Dexa) Screening     Medicare Yearly Exam     Foot Exam Q1     MICROALBUMIN Q1     Shingrix Vaccine Age 49> (2 of 2)         Chief Complaint   Patient presents with    Annual Wellness Visit        Wt Readings from Last 3 Encounters:   21 133 lb 6.4 oz (60.5 kg)   20 134 lb (60.8 kg)   20 133 lb 3.2 oz (60.4 kg)     Temp Readings from Last 3 Encounters:   21 97.6 °F (36.4 °C) (Temporal)   20 97.3 °F (36.3 °C) (Oral)   19 97.1 °F (36.2 °C) (Oral)     BP Readings from Last 3 Encounters:   21 (!) 140/80   20 136/76   20 130/60     Pulse Readings from Last 3 Encounters:   21 85   20 81   20 87           Learning Assessment:  :     Learning Assessment 2018   PRIMARY LEARNER Patient   HIGHEST LEVEL OF EDUCATION - PRIMARY LEARNER  DID NOT GRADUATE HIGH SCHOOL   BARRIERS PRIMARY LEARNER NONE   PRIMARY LANGUAGE ENGLISH   LEARNER PREFERENCE PRIMARY LISTENING   ANSWERED BY pt   RELATIONSHIP SELF       Depression Screening:  :     3 most recent PHQ Screens 3/26/2021   Little interest or pleasure in doing things Not at all   Feeling down, depressed, irritable, or hopeless Not at all   Total Score PHQ 2 0       Fall Risk Assessment:  :     Fall Risk Assessment, last 12 mths 3/26/2021   Able to walk? Yes   Fall in past 12 months? 0   Do you feel unsteady? 0   Are you worried about falling 0       Abuse Screening:  :     Abuse Screening Questionnaire 10/9/2018 2018   Do you ever feel afraid of your partner?  N N   Are you in a relationship with someone who physically or mentally threatens you? N N   Is it safe for you to go home? Y Y       Coordination of Care Questionnaire:  :     1) Have you been to an emergency room, urgent care clinic since your last visit? no   Hospitalized since your last visit? no             2) Have you seen or consulted any other health care providers outside of [address] [place] [address] since your last visit? no  (Include any pap smears or colon screenings in this section.)    3) Do you have an Advance Directive on file? no    4) Are you interested in receiving information on Advance Directives? NO      Patient is accompanied by daughter I have received verbal consent from Abby Kirk to discuss any/all medical information while they are present in the room. Reviewed record  In preparation for visit and have obtained necessary documentation.

## 2021-03-26 NOTE — PATIENT INSTRUCTIONS
Medicare Wellness Visit, Female     The best way to live healthy is to have a lifestyle where you eat a well-balanced diet, exercise regularly, limit alcohol use, and quit all forms of tobacco/nicotine, if applicable. Regular preventive services are another way to keep healthy. Preventive services (vaccines, screening tests, monitoring & exams) can help personalize your care plan, which helps you manage your own care. Screening tests can find health problems at the earliest stages, when they are easiest to treat. [facility] follows the current, evidence-based guidelines published by the [facility] ([facility]) when recommending preventive services for our patients. Because we follow these guidelines, sometimes recommendations change over time as research supports it. (For example, mammograms used to be recommended annually. Even though Medicare will still pay for an annual mammogram, the newer guidelines recommend a mammogram every two years for women of average risk). Of course, you and your doctor may decide to screen more often for some diseases, based on your risk and your co-morbidities (chronic disease you are already diagnosed with). Preventive services for you include:  - Medicare offers their members a free annual wellness visit, which is time for you and your primary care provider to discuss and plan for your preventive service needs. Take advantage of this benefit every year!  -All adults over the age of 72 should receive the recommended pneumonia vaccines. Current USPSTF guidelines recommend a series of two vaccines for the best pneumonia protection.   -All adults should have a flu vaccine yearly and a tetanus vaccine every 10 years.   -All adults age 48 and older should receive the shingles vaccines (series of two vaccines).       -All adults age 38-68 who are overweight should have a diabetes screening test once every three years.   -All adults born between [birth year] and 1965 should be screened once for Hepatitis C.  -Other screening tests and preventive services for persons with diabetes include: an eye exam to screen for diabetic retinopathy, a kidney function test, a foot exam, and stricter control over your cholesterol.   -Cardiovascular screening for adults with routine risk involves an electrocardiogram (ECG) at intervals determined by your doctor.   -Colorectal cancer screenings should be done for adults age 54-65 with no increased risk factors for colorectal cancer. There are a number of acceptable methods of screening for this type of cancer. Each test has its own benefits and drawbacks. Discuss with your doctor what is most appropriate for you during your annual wellness visit. The different tests include: colonoscopy (considered the best screening method), a fecal occult blood test, a fecal DNA test, and sigmoidoscopy.    -A bone mass density test is recommended when a woman turns 65 to screen for osteoporosis. This test is only recommended one time, as a screening. Some providers will use this same test as a disease monitoring tool if you already have osteoporosis. -Breast cancer screenings are recommended every other year for women of normal risk, age 54-69.  -Cervical cancer screenings for women over age 72 are only recommended with certain risk factors.      Here is a list of your current Health Maintenance items (your personalized list of preventive services) with a due date:  Health Maintenance Due   Topic Date Due    Eye Exam  Never done    COVID-19 Vaccine (1) Never done    DTaP/Tdap/Td  (1 - Tdap) Never done    Colorectal Screening  Never done    Mammogram  Never done    Bone Mineral Density   Never done    Diabetic Foot Care  07/26/2020    Albumin Urine Test  07/26/2020    Shingles Vaccine (2 of 2) 11/19/2020

## 2021-03-26 NOTE — PROGRESS NOTES
This is the Subsequent Medicare Annual Wellness Exam, performed 12 months or more after the Initial AWV or the last Subsequent AWV    I have reviewed the patient's medical history in detail and updated the computerized patient record. We discussed her present status. She reports feeling well. She has maintained her weight. She has been monitoring her sugars. Avoiding excessive carbs. She denies any acute issues. Reviewed health maintenance:  Need to see eye doctor  Need mammogram  Need bone density    Depression Risk Factor Screening:     3 most recent PHQ Screens 3/26/2021   Little interest or pleasure in doing things Not at all   Feeling down, depressed, irritable, or hopeless Not at all   Total Score PHQ 2 0       Alcohol Risk Screen    Do you average more than 1 drink per night or more than 7 drinks a week:  No    On any one occasion in the past three months have you have had more than 3 drinks containing alcohol:  No        Functional Ability and Level of Safety:    Hearing: Hearing is good. Activities of Daily Living: The home contains: no safety equipment. Patient does total self care      Ambulation: with no difficulty     Fall Risk:  Fall Risk Assessment, last 12 mths 3/26/2021   Able to walk? Yes   Fall in past 12 months? 0   Do you feel unsteady? 0   Are you worried about falling 0      Abuse Screen:  Patient is not abused       Cognitive Screening    Has your family/caregiver stated any concerns about your memory: yes - her daughter notes that she has been increasingly forgetful. Discussed the need to follow up with neurology         Assessment/Plan   Education and counseling provided:  Are appropriate based on today's review and evaluation    Diagnoses and all orders for this visit:    1. Medicare annual wellness visit, subsequent    2. Screening for alcoholism  -     [place] ANNUAL ALCOHOL SCREEN 15 MIN    3.  Colon cancer screening  -     REFERRAL TO GASTROENTEROLOGY    4. Encounter for screening mammogram for malignant neoplasm of breast  -     [facility] MAMMO BI SCREENING INCL CAD; Future    5. Postmenopausal bone loss    6. Type 2 diabetes with nephropathy (HCC)  -     HEMOGLOBIN A1C WITH EAG; Future  -     CBC WITH AUTOMATED DIFF; Future  -     METABOLIC PANEL, COMPREHENSIVE; Future  -     MICROALBUMIN, UR, RAND W/ MICROALB/CREAT RATIO; Future    7. Hypertriglyceridemia  -     LIPID PANEL; Future    8.  Memory loss  -     REFERRAL TO NEUROLOGY        Health Maintenance Due     Health Maintenance Due   Topic Date Due    Eye Exam Retinal or Dilated  Never done    COVID-19 Vaccine (1) Never done    DTaP/Tdap/Td series (1 - Tdap) Never done    Colorectal Cancer Screening Combo  Never done    Breast Cancer Screen Mammogram  Never done    Bone Densitometry (Dexa) Screening  Never done    Foot Exam Q1  07/26/2020    MICROALBUMIN Q1  07/26/2020    Shingrix Vaccine Age 50> (2 of 2) 11/19/2020       Patient Care Team   Patient Care Team:  Indy Tony MD as PCP - General (Internal Medicine)  Indy Tony MD as PCP - [facility]  Sury Martinez MD (Cardiology)  Mino Newsome MD (Cardiology)  Kendell Cr MD (Gastroenterology)    History     Patient Active Problem List   Diagnosis Code    Chronic systolic heart failure ([address] I50.22    Cardiomyopathy ([address] I42.9    Pure hypercholesterolemia E78.00    Essential hypertension, benign I10    Type 2 diabetes with nephropathy ([address] E11.21    Cardiorenal syndrome I13.10    Hypertriglyceridemia E78.1    CKD (chronic kidney disease) stage 3, GFR 30-59 ml/min N18.30    Cervicalgia M54.2    Tardive dyskinesia G24.01     Past Medical History:   Diagnosis Date    Calculus of kidney     Cardiomyopathy ([address]     idiopathic    Chronic systolic heart failure [facility])     April 2010 ef 20%, by Dec 2010 up to 50% on meds, cath with normal cors    Diabetes [facility])     Essential hypertension, benign     Hypercholesterolemia Past Surgical History:   Procedure Laterality Date    CARDIAC SURG PROCEDURE UNLIST      HX HYSTERECTOMY      HX ORTHOPAEDIC      HX OTHER SURGICAL  10/2017    difibulator     HX UROLOGICAL      stone removal     Current Outpatient Medications   Medication Sig Dispense Refill    torsemide (DEMADEX) 20 mg tablet TAKE 2 AND 1/2 TABLETS BY MOUTH DAILY 225 Tab 1    glipiZIDE SR (GLUCOTROL XL) 10 mg CR tablet TAKE 1 TABLET BY MOUTH DAILY 30 Tab 3    metFORMIN (GLUCOPHAGE) 500 mg tablet TAKE 1 TABLET BY MOUTH TWICE A DAY WITH MEALS 60 Tab 3    simvastatin (ZOCOR) 20 mg tablet TAKE 1 TABLET BY MOUTH EVERY DAY AT NIGHT 90 Tab 1    mirtazapine (REMERON) 15 mg tablet TAKE 1 TABLET BY MOUTH AT BEDTIME FOR 1 WEEK, THEN 2 TABLETS NIGHTLY 45 Tab 5    carvedilol (COREG) 12.5 mg tablet Take 12.5 mg by mouth two (2) times daily (with meals).  aspirin delayed-release 81 mg tablet Take  by mouth daily. Allergies   Allergen Reactions    Codeine Hives    Pcn [Penicillins] Hives       Family History   Problem Relation Age of Onset   Heather Alvarado Stroke Mother     Hypertension Mother     Lung Disease Father         emphysema    Heart Disease Brother     Stroke Brother     Cancer Brother         bone    Cancer Maternal Grandfather         colon ca    Cancer Other         breast ca     Social History     Tobacco Use    Smoking status: Never Smoker    Smokeless tobacco: Never Used   Substance Use Topics    Alcohol use:  No

## 2021-04-13 ENCOUNTER — OFFICE VISIT (OUTPATIENT)
Dept: CARDIOLOGY CLINIC | Age: 71
End: 2021-04-13
Payer: MEDICARE

## 2021-04-13 VITALS
HEART RATE: 83 BPM | WEIGHT: 132.6 LBS | OXYGEN SATURATION: 97 % | HEIGHT: 61 IN | SYSTOLIC BLOOD PRESSURE: 110 MMHG | RESPIRATION RATE: 18 BRPM | DIASTOLIC BLOOD PRESSURE: 70 MMHG | BODY MASS INDEX: 25.04 KG/M2

## 2021-04-13 DIAGNOSIS — Z95.810 PRESENCE OF AUTOMATIC CARDIOVERTER/DEFIBRILLATOR (AICD): ICD-10-CM

## 2021-04-13 DIAGNOSIS — I13.0 CARDIORENAL SYNDROME WITH RENAL FAILURE, STAGE 1-4 OR UNSPECIFIED CHRONIC KIDNEY DISEASE, WITH HEART FAILURE (HCC): ICD-10-CM

## 2021-04-13 DIAGNOSIS — I50.22 CHRONIC SYSTOLIC HEART FAILURE (HCC): Primary | ICD-10-CM

## 2021-04-13 DIAGNOSIS — I42.0 DILATED CARDIOMYOPATHY (HCC): ICD-10-CM

## 2021-04-13 DIAGNOSIS — I10 ESSENTIAL HYPERTENSION, BENIGN: ICD-10-CM

## 2021-04-13 PROCEDURE — G8427 DOCREV CUR MEDS BY ELIG CLIN: HCPCS | Performed by: SPECIALIST

## 2021-04-13 PROCEDURE — 93000 ELECTROCARDIOGRAM COMPLETE: CPT | Performed by: SPECIALIST

## 2021-04-13 PROCEDURE — G8510 SCR DEP NEG, NO PLAN REQD: HCPCS | Performed by: SPECIALIST

## 2021-04-13 PROCEDURE — G8752 SYS BP LESS 140: HCPCS | Performed by: SPECIALIST

## 2021-04-13 PROCEDURE — G9899 SCRN MAM PERF RSLTS DOC: HCPCS | Performed by: SPECIALIST

## 2021-04-13 PROCEDURE — G8419 CALC BMI OUT NRM PARAM NOF/U: HCPCS | Performed by: SPECIALIST

## 2021-04-13 PROCEDURE — 1101F PT FALLS ASSESS-DOCD LE1/YR: CPT | Performed by: SPECIALIST

## 2021-04-13 PROCEDURE — 1090F PRES/ABSN URINE INCON ASSESS: CPT | Performed by: SPECIALIST

## 2021-04-13 PROCEDURE — G8400 PT W/DXA NO RESULTS DOC: HCPCS | Performed by: SPECIALIST

## 2021-04-13 PROCEDURE — G8754 DIAS BP LESS 90: HCPCS | Performed by: SPECIALIST

## 2021-04-13 PROCEDURE — 3017F COLORECTAL CA SCREEN DOC REV: CPT | Performed by: SPECIALIST

## 2021-04-13 PROCEDURE — G8536 NO DOC ELDER MAL SCRN: HCPCS | Performed by: SPECIALIST

## 2021-04-13 PROCEDURE — 99214 OFFICE O/P EST MOD 30 MIN: CPT | Performed by: SPECIALIST

## 2021-04-13 NOTE — Clinical Note
4/13/2021 Patient: Pepe Dunbar YOB: 1950 Date of Visit: 4/13/2021 Agusto Hoover MD 
[address] [place] [address] [address] [place] [address] [phone] [id number] [address] [employer] Dear Agusto Hoover MD, Thank you for referring Ms. Gonzalo Medel to [facility] for evaluation. My notes for this consultation are attached. If you have questions, please do not hesitate to call me. I look forward to following your patient along with you.  
 
 
Sincerely, 
 
Alyssia Dumont MD

## 2021-04-13 NOTE — PROGRESS NOTES
Visit Vitals  /70 (BP 1 Location: Left upper arm, BP Patient Position: Sitting, BP Cuff Size: Adult)   Pulse 83   Resp 18   Ht 5' 1\" (1.549 m)   Wt 132 lb 9.6 oz (60.1 kg)   SpO2 97%   BMI 25.05 kg/m²

## 2021-04-13 NOTE — PROGRESS NOTES
Erica Alexis     1950       Vipal K. Reyes Ingles MD, [facility]  Date of Visit-4/13/2021   PCP is Sugey Mason MD   [facility] [place]  [facility]  HPI:  Erica Alexis is a 79 y.o. female   11 month f/u of CHF systolic EF 55% with normal coronaries. Patient saw Dr. Dima Cooley on October 22 with proper function of ICD. He noted elevated BP. Patient is Lauren Jones MD regularly for DM. Her Bps have varied from 110-140 in the offices. · Seen in 4/2010 with an EF of 20% and previous normal coronaries, had pleural effusions with thoracentesis, cath 5/26/10 ef 25%, normal hemodynamics, CO 4.4, normal cors  . .fu echo 5/13/10 ef 35%  · had noted fluid increase when seen for the first time on 5/10/18 as she was changing doctors. We increased her Bumex and were cautious in the use of diuretics due to her renal failure. We arranged for her to have follow up with EP clinic. She had blood work that showed a GFR of 26 and a normal Hgb and Mag.  · Echo 5-31-18 EF 25% mod LAE, mild MR  · AICD placed in October 2017. s/p [facility] [employer] single lead ICD (DOI 10/10/2017) for NICM & CHF (NYHA II).  Dr. Nicola Colby (nephrology) .     Device check 7/29/20 showed no abnormalities. Pt ambulates with a walker. Pt is accompanied by her daughter. Overall the pt states she is doing well. She denies any falls. She is getting blood work with her PCP next week. She does not want to get the COVID vaccine. Denies chest pain, edema, syncope or shortness of breath at rest, has no tachycardia, palpitations or sense of arrhythmia. EKG: Sinus  Rhythm   -  Diffuse nonspecific T-abnormality. Heart rate 83   QRS 98    Assessment/Plan:     1. Chronic systolic heart failure (HCC)  NYHA 1. Kidney function has been stable but since it is impaired we are not using an ACE or ARB. Continue Coreg.    Lab Results   Component Value Date/Time    Creatinine 1.75 (H) 09/22/2020 08:48 AM Key CAD CHF Meds             torsemide (DEMADEX) 20 mg tablet (Taking) TAKE 2 AND 1/2 TABLETS BY MOUTH DAILY    simvastatin (ZOCOR) 20 mg tablet (Taking) TAKE 1 TABLET BY MOUTH EVERY DAY AT NIGHT    carvedilol (COREG) 12.5 mg tablet (Taking) Take 12.5 mg by mouth two (2) times daily (with meals). aspirin delayed-release 81 mg tablet (Taking) Take  by mouth daily.         - AMB POC EKG ROUTINE W/ 12 LEADS, INTER & REP       2. Dilated cardiomyopathy (HCC)  NICM, stable    3. Cardiorenal syndrome with renal failure, stage 1-4 or unspecified chronic kidney disease, with heart failure (HCC)  Lab Results   Component Value Date/Time    Creatinine 1.75 (H) 09/22/2020 08:48 AM       4. Essential hypertension, benign  stable  At goal , meds and possible side effects reviewed and patient denies  Key CAD CHF Meds             torsemide (DEMADEX) 20 mg tablet (Taking) TAKE 2 AND 1/2 TABLETS BY MOUTH DAILY    simvastatin (ZOCOR) 20 mg tablet (Taking) TAKE 1 TABLET BY MOUTH EVERY DAY AT NIGHT    carvedilol (COREG) 12.5 mg tablet (Taking) Take 12.5 mg by mouth two (2) times daily (with meals). aspirin delayed-release 81 mg tablet (Taking) Take  by mouth daily. BP Readings from Last 6 Encounters:   04/13/21 110/70   03/26/21 (!) 140/80   11/18/20 136/76   09/24/20 130/60   08/28/20 140/78   02/21/20 140/68        5. Presence of automatic cardioverter/defibrillator (AICD)  Marli vaccine at length she does not want to get it she is very and not trusting of with the effects may be went over the method of how the vaccine works and what its past history and current findings are she at this time declines vaccine  F/u in 6 months     Impression:   1. Chronic systolic heart failure ([address]    2. Dilated cardiomyopathy ([address]    3. Cardiorenal syndrome with renal failure, stage 1-4 or unspecified chronic kidney disease, with heart failure ([address]    4. Essential hypertension, benign    5.  Presence of automatic cardioverter/defibrillator (AICD)       Cardiac History:   2011 with an EF of 20% and previous normal coronaries  ECHO 5-31-18 EF 25%  Cardiorenal syndrome creat 2.9 2/2018  Anemia hgb 9.4 Feb 2018  AICD for NICM--In Flow  Non smoker, 2 children  Family hx brother with heart disease , mother passed of stroke     ROS-except as noted above. . A complete cardiac and respiratory are reviewed and negative except as above ; Resp-denies wheezing  or productive cough,. Const- No unusual weight loss or fever; Neuro-no recent seizure or CVA ; GI- No BRBPR, abdom pain, bloating ; - no  hematuria   see supplement sheet, initialed and to be scanned by staff  Past Medical History:   Diagnosis Date    Calculus of kidney     Cardiomyopathy [facility]     idiopathic    Chronic systolic heart failure [facility])     April 2010 ef 20%, by Dec 2010 up to 50% on meds, cath with normal cors    Diabetes [facility])     Essential hypertension, benign     Hypercholesterolemia       Social Hx= reports that she has never smoked. She has never used smokeless tobacco. She reports that she does not drink alcohol or use drugs. Exam and Labs:  /70 (BP 1 Location: Left upper arm, BP Patient Position: Sitting, BP Cuff Size: Adult)   Pulse 83   Resp 18   Ht 5' 1\" (1.549 m)   Wt 132 lb 9.6 oz (60.1 kg)   SpO2 97%   BMI 25.05 kg/m² Constitutional:  NAD, comfortable  Head: NC,AT. Eyes: No scleral icterus. Neck:  Neck supple. No JVD present. Throat: moist mucous membranes. Chest: Effort normal & normal respiratory excursion . Neurological: alert, conversant and oriented . Skin: Skin is not cold. No obvious systemic rash noted. Not diaphoretic. No erythema. Psychiatric:  Grossly normal mood and affect. Behavior appears normal. Extremities:  no clubbing or cyanosis. Abdomen: non distended    Lungs:breath sounds normal. No stridor. distress, wheezes or  Rales.   Heart: normal rate, regular rhythm, normal S1, S2, no murmurs, rubs, clicks or gallops , PMI non displaced. Edema: Edema is none. Lab Results   Component Value Date/Time    Cholesterol, total 216 (H) 09/22/2020 08:48 AM    HDL Cholesterol 29 (L) 09/22/2020 08:48 AM    LDL, calculated 100 (H) 09/22/2020 08:48 AM    LDL, calculated Comment 06/21/2019 12:52 PM    Triglyceride 514 (H) 09/22/2020 08:48 AM    CHOL/HDL Ratio 5.1 (H) 04/22/2010 12:40 AM     Lab Results   Component Value Date/Time    Sodium 139 09/22/2020 08:48 AM    Potassium 5.1 09/22/2020 08:48 AM    Chloride 99 09/22/2020 08:48 AM    CO2 26 09/22/2020 08:48 AM    Anion gap 10 05/21/2010 09:43 AM    Glucose 108 (H) 09/22/2020 08:48 AM    BUN 31 (H) 09/22/2020 08:48 AM    Creatinine 1.75 (H) 09/22/2020 08:48 AM    BUN/Creatinine ratio 18 09/22/2020 08:48 AM    GFR est AA 34 (L) 09/22/2020 08:48 AM    GFR est non-AA 29 (L) 09/22/2020 08:48 AM    Calcium 8.9 09/22/2020 08:48 AM      Wt Readings from Last 3 Encounters:   04/13/21 132 lb 9.6 oz (60.1 kg)   03/26/21 133 lb 6.4 oz (60.5 kg)   11/18/20 134 lb (60.8 kg)      BP Readings from Last 3 Encounters:   04/13/21 110/70   03/26/21 (!) 140/80   11/18/20 136/76      Current Outpatient Medications   Medication Sig    torsemide (DEMADEX) 20 mg tablet TAKE 2 AND 1/2 TABLETS BY MOUTH DAILY    glipiZIDE SR (GLUCOTROL XL) 10 mg CR tablet TAKE 1 TABLET BY MOUTH DAILY    metFORMIN (GLUCOPHAGE) 500 mg tablet TAKE 1 TABLET BY MOUTH TWICE A DAY WITH MEALS    simvastatin (ZOCOR) 20 mg tablet TAKE 1 TABLET BY MOUTH EVERY DAY AT NIGHT    mirtazapine (REMERON) 15 mg tablet TAKE 1 TABLET BY MOUTH AT BEDTIME FOR 1 WEEK, THEN 2 TABLETS NIGHTLY    carvedilol (COREG) 12.5 mg tablet Take 12.5 mg by mouth two (2) times daily (with meals).  aspirin delayed-release 81 mg tablet Take  by mouth daily. No current facility-administered medications for this visit. Impression see above.       Written by Charles Palumbo, as dictated by Christina Bowers MD.

## 2021-04-16 ENCOUNTER — HOSPITAL ENCOUNTER (OUTPATIENT)
Dept: MAMMOGRAPHY | Age: 71
Discharge: HOME | End: 2021-04-16
Attending: INTERNAL MEDICINE
Payer: MEDICARE

## 2021-04-16 DIAGNOSIS — Z12.31 ENCOUNTER FOR SCREENING MAMMOGRAM FOR MALIGNANT NEOPLASM OF BREAST: ICD-10-CM

## 2021-04-16 DIAGNOSIS — M81.0 POST-MENOPAUSAL OSTEOPOROSIS: ICD-10-CM

## 2021-04-16 DIAGNOSIS — N95.1 POST MENOPAUSAL SYNDROME: Primary | ICD-10-CM

## 2021-04-16 PROCEDURE — 77067 SCR MAMMO BI INCL CAD: CPT

## 2021-04-23 ENCOUNTER — APPOINTMENT (OUTPATIENT)
Dept: INTERNAL MEDICINE CLINIC | Age: 71
End: 2021-04-23

## 2021-04-23 ENCOUNTER — HOSPITAL ENCOUNTER (OUTPATIENT)
Dept: BONE DENSITY | Age: 71
Discharge: HOME | End: 2021-04-23
Attending: INTERNAL MEDICINE
Payer: MEDICARE

## 2021-04-23 DIAGNOSIS — M81.0 POST-MENOPAUSAL OSTEOPOROSIS: ICD-10-CM

## 2021-04-23 DIAGNOSIS — N95.1 POST MENOPAUSAL SYNDROME: ICD-10-CM

## 2021-04-23 DIAGNOSIS — E11.21 TYPE 2 DIABETES WITH NEPHROPATHY (HCC): ICD-10-CM

## 2021-04-23 DIAGNOSIS — E78.1 HYPERTRIGLYCERIDEMIA: ICD-10-CM

## 2021-04-23 LAB
ALBUMIN SERPL-MCNC: 3.8 G/DL (ref 3.5–5)
ALBUMIN/GLOB SERPL: 1.2 {RATIO} (ref 1.1–2.2)
ALP SERPL-CCNC: 68 U/L (ref 45–117)
ALT SERPL-CCNC: 20 U/L (ref 12–78)
ANION GAP SERPL CALC-SCNC: 7 MMOL/L (ref 5–15)
AST SERPL-CCNC: 15 U/L (ref 15–37)
BASOPHILS # BLD: 0 K/UL (ref 0–0.1)
BASOPHILS NFR BLD: 0 % (ref 0–1)
BILIRUB SERPL-MCNC: 0.3 MG/DL (ref 0.2–1)
BUN SERPL-MCNC: 45 MG/DL (ref 6–20)
BUN/CREAT SERPL: 21 (ref 12–20)
CALCIUM SERPL-MCNC: 8.4 MG/DL (ref 8.5–10.1)
CHLORIDE SERPL-SCNC: 101 MMOL/L (ref 97–108)
CHOLEST SERPL-MCNC: 137 MG/DL
CO2 SERPL-SCNC: 29 MMOL/L (ref 21–32)
CREAT SERPL-MCNC: 2.1 MG/DL (ref 0.55–1.02)
CREAT UR-MCNC: 52.1 MG/DL
DIFFERENTIAL METHOD BLD: ABNORMAL
EOSINOPHIL # BLD: 0.2 K/UL (ref 0–0.4)
EOSINOPHIL NFR BLD: 3 % (ref 0–7)
ERYTHROCYTE [DISTWIDTH] IN BLOOD BY AUTOMATED COUNT: 13.1 % (ref 11.5–14.5)
EST. AVERAGE GLUCOSE BLD GHB EST-MCNC: 174 MG/DL
GLOBULIN SER CALC-MCNC: 3.3 G/DL (ref 2–4)
GLUCOSE SERPL-MCNC: 90 MG/DL (ref 65–100)
HBA1C MFR BLD: 7.7 % (ref 4–5.6)
HCT VFR BLD AUTO: 31.4 % (ref 35–47)
HDLC SERPL-MCNC: 30 MG/DL
HDLC SERPL: 4.6 {RATIO} (ref 0–5)
HGB BLD-MCNC: 9.9 G/DL (ref 11.5–16)
IMM GRANULOCYTES # BLD AUTO: 0 K/UL (ref 0–0.04)
IMM GRANULOCYTES NFR BLD AUTO: 0 % (ref 0–0.5)
LDLC SERPL CALC-MCNC: ABNORMAL MG/DL (ref 0–100)
LDLC SERPL DIRECT ASSAY-MCNC: 58 MG/DL (ref 0–100)
LIPID PROFILE,FLP: ABNORMAL
LYMPHOCYTES # BLD: 2.2 K/UL (ref 0.8–3.5)
LYMPHOCYTES NFR BLD: 42 % (ref 12–49)
MCH RBC QN AUTO: 30.7 PG (ref 26–34)
MCHC RBC AUTO-ENTMCNC: 31.5 G/DL (ref 30–36.5)
MCV RBC AUTO: 97.5 FL (ref 80–99)
MICROALBUMIN UR-MCNC: 20.8 MG/DL
MICROALBUMIN/CREAT UR-RTO: 399 MG/G (ref 0–30)
MONOCYTES # BLD: 0.6 K/UL (ref 0–1)
MONOCYTES NFR BLD: 12 % (ref 5–13)
NEUTS SEG # BLD: 2.3 K/UL (ref 1.8–8)
NEUTS SEG NFR BLD: 43 % (ref 32–75)
NRBC # BLD: 0 K/UL (ref 0–0.01)
NRBC BLD-RTO: 0 PER 100 WBC
PLATELET # BLD AUTO: 192 K/UL (ref 150–400)
PMV BLD AUTO: 11.8 FL (ref 8.9–12.9)
POTASSIUM SERPL-SCNC: 4.5 MMOL/L (ref 3.5–5.1)
PROT SERPL-MCNC: 7.1 G/DL (ref 6.4–8.2)
RBC # BLD AUTO: 3.22 M/UL (ref 3.8–5.2)
SODIUM SERPL-SCNC: 137 MMOL/L (ref 136–145)
TRIGL SERPL-MCNC: 483 MG/DL (ref ?–150)
VLDLC SERPL CALC-MCNC: ABNORMAL MG/DL
WBC # BLD AUTO: 5.4 K/UL (ref 3.6–11)

## 2021-04-23 PROCEDURE — 77080 DXA BONE DENSITY AXIAL: CPT

## 2021-04-29 DIAGNOSIS — E11.21 TYPE 2 DIABETES WITH NEPHROPATHY (HCC): ICD-10-CM

## 2021-04-29 RX ORDER — METFORMIN HYDROCHLORIDE 500 MG/1
TABLET ORAL
Qty: 60 TAB | Refills: 3 | Status: SHIPPED | OUTPATIENT
Start: 2021-04-29 | End: 2021-06-30

## 2021-05-10 DIAGNOSIS — E78.1 HYPERTRIGLYCERIDEMIA: ICD-10-CM

## 2021-05-10 RX ORDER — SIMVASTATIN 20 MG/1
TABLET, FILM COATED ORAL
Qty: 90 TAB | Refills: 1 | Status: SHIPPED | OUTPATIENT
Start: 2021-05-10 | End: 2021-10-08

## 2021-06-23 ENCOUNTER — OFFICE VISIT (OUTPATIENT)
Dept: CARDIOLOGY CLINIC | Age: 71
End: 2021-06-23
Payer: MEDICARE

## 2021-06-23 DIAGNOSIS — Z95.810 PRESENCE OF AUTOMATIC CARDIOVERTER/DEFIBRILLATOR (AICD): Primary | ICD-10-CM

## 2021-06-23 PROCEDURE — 93296 REM INTERROG EVL PM/IDS: CPT | Performed by: INTERNAL MEDICINE

## 2021-06-23 PROCEDURE — 93295 DEV INTERROG REMOTE 1/2/MLT: CPT | Performed by: INTERNAL MEDICINE

## 2021-06-23 NOTE — LETTER
2021 8:29 AM    Ms[address] [facility] [id number]          Dear Patient,    This letter is to inform you that as of  [facility] will no longer be sending out confirmation letters for remote transmissions through the [employer]. All letters in the future will be posted in an electronic medical records format therefore we highly encourage enrollment in [employer] patient's portal. Once enrolled you will have access to any letters we send as well as appointment information that can be found in your medical record. Our EP team would contact you via phone if there are significant abnormal findings so you can discuss with Dr. Hiram Farias further in office. Missed transmission letters will also be digitized in [employer] but we will continue to send those out through the mail as well. How to register for [employer]:    - Visit [employer]  - Click Create an Account  - Provide your name, address, and   - You will then be asked a short series of questions to verify your identity. This helps to ensure that no one else can access your information.   - If you have any further questions, please contact our office at [phone]. If you choose not to enroll in [employer] or do not have internet access, please kindly let device clinic know and we will accommodate your preference. We are grateful for your understanding and looking forward to this new, improved and more efficient way of communication.            Warm Regards,  [facility] Staff

## 2021-06-23 NOTE — LETTER
6/23/2021 8:29 AM    Ms. Fawn Reilly  [address] [place] [address] [address] [place] [id number]              After careful review of your remote check of your implated device on [birth year]. I have concluded that your device is working properly. Your next:                Automatic remote check ( from home) is scheduled for  9-29-21                           If you have any questions, please call [address] at [phone].      Additional Comments: ________________________________________________    __________________________________________________________________    __________________________________________________________________              Melania Boogie MD [facility]

## 2021-06-27 DIAGNOSIS — E11.21 TYPE 2 DIABETES WITH NEPHROPATHY (HCC): ICD-10-CM

## 2021-06-30 RX ORDER — METFORMIN HYDROCHLORIDE 500 MG/1
TABLET ORAL
Qty: 60 TABLET | Refills: 3 | Status: SHIPPED | OUTPATIENT
Start: 2021-06-30 | End: 2021-09-26

## 2021-06-30 RX ORDER — GLIPIZIDE 10 MG/1
TABLET, FILM COATED, EXTENDED RELEASE ORAL
Qty: 30 TABLET | Refills: 3 | Status: SHIPPED | OUTPATIENT
Start: 2021-06-30 | End: 2021-10-01

## 2021-09-07 RX ORDER — CARVEDILOL 12.5 MG/1
12.5 TABLET ORAL 2 TIMES DAILY WITH MEALS
Qty: 180 TABLET | Refills: 1 | Status: SHIPPED | OUTPATIENT
Start: 2021-09-07 | End: 2022-03-04

## 2021-09-11 DIAGNOSIS — I50.22 CHRONIC SYSTOLIC CONGESTIVE HEART FAILURE (HCC): ICD-10-CM

## 2021-09-13 RX ORDER — TORSEMIDE 20 MG/1
TABLET ORAL
Qty: 225 TABLET | Refills: 1 | Status: SHIPPED | OUTPATIENT
Start: 2021-09-13 | End: 2022-03-21

## 2021-09-13 NOTE — TELEPHONE ENCOUNTER
Received refill request for torsemide 20 mg po tabs. Last Cr in 04/2021 elevated but not very far from the year prior. K & Na WNL. Consider repeat BMP when he comes in to see Dr. Antwon Vasquez next month. Refill authorized.     Future Appointments   Date Time Provider Chava Gabriel   9/29/2021 10:30 AM REMOTE1, CHRISTINE PANIAGUA BS AMB   10/15/2021 10:40 AM Sage Daniels MD CAVREY BS AMB   12/9/2021  8:40 AM [email or web address], CHRISTINE PANIAGUA BS AMB   12/9/2021  9:20 AM MD ARCELIA Baptiste BS AMB

## 2021-09-25 DIAGNOSIS — E11.21 TYPE 2 DIABETES WITH NEPHROPATHY (HCC): ICD-10-CM

## 2021-09-26 RX ORDER — METFORMIN HYDROCHLORIDE 500 MG/1
TABLET ORAL
Qty: 180 TABLET | Refills: 1 | Status: SHIPPED | OUTPATIENT
Start: 2021-09-26 | End: 2022-03-30

## 2021-09-29 DIAGNOSIS — E11.21 TYPE 2 DIABETES WITH NEPHROPATHY (HCC): ICD-10-CM

## 2021-10-01 RX ORDER — GLIPIZIDE 10 MG/1
TABLET, FILM COATED, EXTENDED RELEASE ORAL
Qty: 90 TABLET | Refills: 1 | Status: SHIPPED | OUTPATIENT
Start: 2021-10-01 | End: 2022-03-30

## 2021-10-02 PROCEDURE — 93296 REM INTERROG EVL PM/IDS: CPT | Performed by: INTERNAL MEDICINE

## 2021-10-02 PROCEDURE — 93295 DEV INTERROG REMOTE 1/2/MLT: CPT | Performed by: INTERNAL MEDICINE

## 2021-10-04 ENCOUNTER — OFFICE VISIT (OUTPATIENT)
Dept: CARDIOLOGY CLINIC | Age: 71
End: 2021-10-04
Payer: MEDICARE

## 2021-10-04 DIAGNOSIS — Z95.810 AUTOMATIC IMPLANTABLE CARDIAC DEFIBRILLATOR IN SITU: Primary | ICD-10-CM

## 2021-10-04 NOTE — LETTER
10/4/2021 5:02 PM    Ms. Debra Cedillo  [address] [place] [id number]          This letter confirms that we have received your scheduled remote check of your implanted     device on 10-4-21 . Our EP team will contact you via phone if there are significant abnormal    findings. Your next in-clinic device check is scheduled for 12-9-21 at 8:40 AM  .                   If you have any questions, please call [address] at [phone].                Sincerely,    Lopez Blank MD [facility]

## 2021-10-08 DIAGNOSIS — E78.1 HYPERTRIGLYCERIDEMIA: ICD-10-CM

## 2021-10-08 RX ORDER — SIMVASTATIN 20 MG/1
TABLET, FILM COATED ORAL
Qty: 90 TABLET | Refills: 1 | Status: SHIPPED | OUTPATIENT
Start: 2021-10-08 | End: 2022-05-13 | Stop reason: SDUPTHER

## 2021-10-15 ENCOUNTER — OFFICE VISIT (OUTPATIENT)
Dept: CARDIOLOGY CLINIC | Age: 71
End: 2021-10-15
Payer: MEDICARE

## 2021-10-15 VITALS
HEIGHT: 61 IN | SYSTOLIC BLOOD PRESSURE: 132 MMHG | BODY MASS INDEX: 25.11 KG/M2 | OXYGEN SATURATION: 97 % | HEART RATE: 84 BPM | DIASTOLIC BLOOD PRESSURE: 70 MMHG | WEIGHT: 133 LBS | RESPIRATION RATE: 16 BRPM

## 2021-10-15 DIAGNOSIS — I10 ESSENTIAL HYPERTENSION, BENIGN: ICD-10-CM

## 2021-10-15 DIAGNOSIS — I50.22 CHRONIC SYSTOLIC CONGESTIVE HEART FAILURE (HCC): Primary | ICD-10-CM

## 2021-10-15 DIAGNOSIS — I42.0 DILATED CARDIOMYOPATHY (HCC): ICD-10-CM

## 2021-10-15 DIAGNOSIS — Z95.810 PRESENCE OF AUTOMATIC CARDIOVERTER/DEFIBRILLATOR (AICD): ICD-10-CM

## 2021-10-15 DIAGNOSIS — I13.0 CARDIORENAL SYNDROME WITH RENAL FAILURE, STAGE 1-4 OR UNSPECIFIED CHRONIC KIDNEY DISEASE, WITH HEART FAILURE (HCC): ICD-10-CM

## 2021-10-15 PROCEDURE — G8752 SYS BP LESS 140: HCPCS | Performed by: SPECIALIST

## 2021-10-15 PROCEDURE — 99214 OFFICE O/P EST MOD 30 MIN: CPT | Performed by: SPECIALIST

## 2021-10-15 PROCEDURE — 1090F PRES/ABSN URINE INCON ASSESS: CPT | Performed by: SPECIALIST

## 2021-10-15 PROCEDURE — G9899 SCRN MAM PERF RSLTS DOC: HCPCS | Performed by: SPECIALIST

## 2021-10-15 PROCEDURE — 3017F COLORECTAL CA SCREEN DOC REV: CPT | Performed by: SPECIALIST

## 2021-10-15 PROCEDURE — G8536 NO DOC ELDER MAL SCRN: HCPCS | Performed by: SPECIALIST

## 2021-10-15 PROCEDURE — G8427 DOCREV CUR MEDS BY ELIG CLIN: HCPCS | Performed by: SPECIALIST

## 2021-10-15 PROCEDURE — 1101F PT FALLS ASSESS-DOCD LE1/YR: CPT | Performed by: SPECIALIST

## 2021-10-15 PROCEDURE — G8510 SCR DEP NEG, NO PLAN REQD: HCPCS | Performed by: SPECIALIST

## 2021-10-15 PROCEDURE — G8399 PT W/DXA RESULTS DOCUMENT: HCPCS | Performed by: SPECIALIST

## 2021-10-15 PROCEDURE — G8419 CALC BMI OUT NRM PARAM NOF/U: HCPCS | Performed by: SPECIALIST

## 2021-10-15 PROCEDURE — G8754 DIAS BP LESS 90: HCPCS | Performed by: SPECIALIST

## 2021-10-15 NOTE — Clinical Note
10/15/2021    Patient: James Carrillo   YOB: 1950   Date of Visit: 10/15/2021     Chel Leon MD  [address] [place] [id number]  [address] [place]    Dear Chel Leon MD,      Thank you for referring Ms. Turner Nolasco to [facility] for evaluation. My notes for this consultation are attached. If you have questions, please do not hesitate to call me. I look forward to following your patient along with you.       Sincerely,    Oly Schultz MD

## 2021-10-15 NOTE — PROGRESS NOTES
Oz Rodriguez     1950       Vipal K. Joycie Schlatter MD, [facility]  Date of Visit-10/15/2021   PCP is David Tolentino MD   [address] [place] [address] and [facility] [place]  [facility]  HPI:  Oz Rodriguez is a 70 y.o. female   6 month f/u of   · CHF systolic EF 71% with normal coronaries. · Patient saw Dr. Rose Cosby on October 22, 2020 with proper function of ICD. · HTN- He noted elevated BP. Patient is Analia Meyer MD regularly for DM. · Her Bps have varied from 110-140 in the offices. · Seen in 4/2010 with an EF of 20% and previous normal coronaries, had pleural effusions with thoracentesis, cath 5/26/10 ef 25%, normal hemodynamics, CO 4.4, normal cors  . .fu echo 5/13/10 ef 35%  · had noted fluid increase when seen for the first time on 5/10/18 as she was changing doctors. We increased her Bumex and were cautious in the use of diuretics due to her renal failure. We arranged for her to have follow up with EP clinic. She had blood work that showed a GFR of 26 and a normal Hgb and Mag.  · Echo 5-31-18 EF 25% mod LAE, mild MR  · AICD placed in October 2017. s/p [facility] [employer] single lead ICD (DOI 10/10/2017) for NICM & CHF (NYHA II).  Dr. Adria Ware (nephrology) .       Device check 7/29/20 showed no abnormalities. Today. .. Device check 10/2/21. No episodes. BP is stable today. Last creatinine was elevated at 2.1 in April. Feels at baseline, not too active but no limits to ADLs  Pt is accompanied by her  and daughter. Denies chest pain, edema, syncope or shortness of breath at rest, has no tachycardia, palpitations or sense of arrhythmia. Assessment/Plan:   Patient Instructions   A referral has been placed for nephrology Dr. Vilma Todd office - call [phone] for an appointment. We will see you back in 6 months with an echocardiogram one week prior.           1. Chronic systolic congestive heart failure (HCC)  NYHA 1. continue with current meds which include carvedilol and torsemide. No significant edema, check echo at next visit. 2. Dilated cardiomyopathy ([address]  NICM stable. 3. Cardiorenal syndrome with renal failure, stage 1-4 or unspecified chronic kidney disease, with heart failure ([address]  Renal function has progressed to probably CKD 3-4. Last checked it in April and had a creatinine of 2.1 which gives GFR of 23. This R is not hat changed over the lasts three years but would benefit from seeing nephrology. Lab Results   Component Value Date/Time    Creatinine 2.10 (H) 04/23/2021 09:24 AM      4. Essential hypertension, benign  Stable. At goal , meds and possible side effects reviewed and patient denies  Key CAD CHF Meds             simvastatin (ZOCOR) 20 mg tablet (Taking) TAKE 1 TABLET BY MOUTH EVERY DAY AT NIGHT    torsemide (DEMADEX) 20 mg tablet (Taking) TAKE 2 AND 1/2 TABLETS BY MOUTH DAILY    carvediloL (Coreg) 12.5 mg tablet (Taking) Take 1 Tablet by mouth two (2) times daily (with meals). aspirin delayed-release 81 mg tablet (Taking) Take  by mouth daily. BP Readings from Last 6 Encounters:   10/15/21 132/70   04/13/21 110/70   03/26/21 (!) 140/80   11/18/20 136/76   09/24/20 130/60   08/28/20 140/78           5. Presence of automatic cardioverter/defibrillator (AICD)  Recent device check is unremarkable. F/u 6 months with echo     Impression:   1. Chronic systolic congestive heart failure ([address]    2. Dilated cardiomyopathy ([address]    3. Cardiorenal syndrome with renal failure, stage 1-4 or unspecified chronic kidney disease, with heart failure ([address]    4. Essential hypertension, benign    5.  Presence of automatic cardioverter/defibrillator (AICD)       Cardiac History:   2011 with an EF of 20% and previous normal coronaries  ECHO 5-31-18 EF 25%  Cardiorenal syndrome creat 2.9 2/2018  Anemia hgb 9.4 Feb 2018  AICD for NICM--[employer]  Non smoker, 2 children  Family hx brother with heart disease , mother passed of stroke Future Appointments   Date Time Provider Chava Gabriel   12/9/2021  8:40 AM PACEMAKER3, CHRISTINE BOSE AMB   12/9/2021  9:20 AM MD ARCELIA Newsome AMB   4/8/2022 10:00 AM ECHOTWO, CHRISTINE ARCELIA BOSE AMB   4/15/2022  9:00 AM Sage Daniels MD CAVREY BS AMB        ROS-except as noted above. . A complete cardiac and respiratory are reviewed and negative except as above ; Resp-denies wheezing  or productive cough,. Const- No unusual weight loss or fever; Neuro-no recent seizure or CVA ; GI- No BRBPR, abdom pain, bloating ; - no  hematuria   see supplement sheet, initialed and to be scanned by staff  Past Medical History:   Diagnosis Date    Calculus of kidney     Cardiomyopathy ([facility]     idiopathic    Chronic systolic heart failure [facility])     April 2010 ef 20%, by Dec 2010 up to 50% on meds, cath with normal cors    Diabetes [facility])     Essential hypertension, benign     Hypercholesterolemia       Social Hx= reports that she has never smoked. She has never used smokeless tobacco. She reports that she does not drink alcohol and does not use drugs. Exam and Labs:  /70 (BP 1 Location: Right arm, BP Patient Position: Sitting, BP Cuff Size: Adult)   Pulse 84   Resp 16   Ht 5' 1\" (1.549 m)   Wt 133 lb (60.3 kg)   SpO2 97%   BMI 25.13 kg/m² Constitutional:  NAD, comfortable  Head: NC,AT. Eyes: No scleral icterus. Neck:  Neck supple. No JVD present. Throat: moist mucous membranes. Chest: Effort normal & normal respiratory excursion . Neurological: alert, conversant and oriented . Skin: Skin is not cold. No obvious systemic rash noted. Not diaphoretic. No erythema. Psychiatric:  Grossly normal mood and affect. Behavior appears normal. Extremities:  no clubbing or cyanosis. Abdomen: non distended    Lungs:breath sounds normal. No stridor. distress, wheezes or  Rales. Heart: normal rate, regular rhythm, normal S1, S2, no murmurs, rubs, clicks or gallops , PMI non displaced.     Edema: Edema is none. Lab Results   Component Value Date/Time    Cholesterol, total 137 04/23/2021 09:24 AM    HDL Cholesterol 30 04/23/2021 09:24 AM    LDL,Direct 58 04/23/2021 09:24 AM    LDL, calculated Not calculated due to elevated triglyceride level 04/23/2021 09:24 AM    Triglyceride 483 (H) 04/23/2021 09:24 AM    CHOL/HDL Ratio 4.6 04/23/2021 09:24 AM     Lab Results   Component Value Date/Time    Sodium 137 04/23/2021 09:24 AM    Potassium 4.5 04/23/2021 09:24 AM    Chloride 101 04/23/2021 09:24 AM    CO2 29 04/23/2021 09:24 AM    Anion gap 7 04/23/2021 09:24 AM    Glucose 90 04/23/2021 09:24 AM    BUN 45 (H) 04/23/2021 09:24 AM    Creatinine 2.10 (H) 04/23/2021 09:24 AM    BUN/Creatinine ratio 21 (H) 04/23/2021 09:24 AM    GFR est AA 28 (L) 04/23/2021 09:24 AM    GFR est non-AA 23 (L) 04/23/2021 09:24 AM    Calcium 8.4 (L) 04/23/2021 09:24 AM      Wt Readings from Last 3 Encounters:   10/15/21 133 lb (60.3 kg)   04/13/21 132 lb 9.6 oz (60.1 kg)   03/26/21 133 lb 6.4 oz (60.5 kg)      BP Readings from Last 3 Encounters:   10/15/21 132/70   04/13/21 110/70   03/26/21 (!) 140/80      Current Outpatient Medications   Medication Sig    simvastatin (ZOCOR) 20 mg tablet TAKE 1 TABLET BY MOUTH EVERY DAY AT NIGHT    glipiZIDE SR (GLUCOTROL XL) 10 mg CR tablet TAKE 1 TABLET BY MOUTH EVERY DAY    metFORMIN (GLUCOPHAGE) 500 mg tablet TAKE 1 TABLET BY MOUTH TWICE A DAY WITH MEALS    torsemide (DEMADEX) 20 mg tablet TAKE 2 AND 1/2 TABLETS BY MOUTH DAILY    carvediloL (Coreg) 12.5 mg tablet Take 1 Tablet by mouth two (2) times daily (with meals).  mirtazapine (REMERON) 15 mg tablet TAKE 1 TABLET BY MOUTH AT BEDTIME FOR 1 WEEK, THEN 2 TABLETS NIGHTLY    aspirin delayed-release 81 mg tablet Take  by mouth daily. No current facility-administered medications for this visit. Impression see above.       Written by Ivonne Romero, as dictated by Beulah Cevallos MD.

## 2021-10-15 NOTE — PATIENT INSTRUCTIONS
A referral has been placed for nephrology Dr. Jono Walton office - call [phone] for an appointment. We will see you back in 6 months with an echocardiogram one week prior.

## 2021-12-09 ENCOUNTER — CLINICAL SUPPORT (OUTPATIENT)
Dept: CARDIOLOGY CLINIC | Age: 71
End: 2021-12-09
Payer: MEDICARE

## 2021-12-09 ENCOUNTER — OFFICE VISIT (OUTPATIENT)
Dept: CARDIOLOGY CLINIC | Age: 71
End: 2021-12-09

## 2021-12-09 VITALS
WEIGHT: 136 LBS | HEART RATE: 78 BPM | SYSTOLIC BLOOD PRESSURE: 126 MMHG | BODY MASS INDEX: 25.68 KG/M2 | HEIGHT: 61 IN | DIASTOLIC BLOOD PRESSURE: 62 MMHG

## 2021-12-09 DIAGNOSIS — I10 ESSENTIAL HYPERTENSION, BENIGN: ICD-10-CM

## 2021-12-09 DIAGNOSIS — Z95.810 AUTOMATIC IMPLANTABLE CARDIAC DEFIBRILLATOR IN SITU: Primary | ICD-10-CM

## 2021-12-09 DIAGNOSIS — I50.22 CHRONIC SYSTOLIC CONGESTIVE HEART FAILURE (HCC): ICD-10-CM

## 2021-12-09 DIAGNOSIS — Z95.810 PRESENCE OF AUTOMATIC CARDIOVERTER/DEFIBRILLATOR (AICD): Primary | ICD-10-CM

## 2021-12-09 DIAGNOSIS — N18.4 CKD (CHRONIC KIDNEY DISEASE) STAGE 4, GFR 15-29 ML/MIN (HCC): ICD-10-CM

## 2021-12-09 DIAGNOSIS — I42.0 DILATED CARDIOMYOPATHY (HCC): ICD-10-CM

## 2021-12-09 PROCEDURE — 93282 PRGRMG EVAL IMPLANTABLE DFB: CPT | Performed by: INTERNAL MEDICINE

## 2021-12-09 PROCEDURE — G8536 NO DOC ELDER MAL SCRN: HCPCS | Performed by: INTERNAL MEDICINE

## 2021-12-09 PROCEDURE — G8427 DOCREV CUR MEDS BY ELIG CLIN: HCPCS | Performed by: INTERNAL MEDICINE

## 2021-12-09 PROCEDURE — 99214 OFFICE O/P EST MOD 30 MIN: CPT | Performed by: INTERNAL MEDICINE

## 2021-12-09 PROCEDURE — 1090F PRES/ABSN URINE INCON ASSESS: CPT | Performed by: INTERNAL MEDICINE

## 2021-12-09 PROCEDURE — G8399 PT W/DXA RESULTS DOCUMENT: HCPCS | Performed by: INTERNAL MEDICINE

## 2021-12-09 PROCEDURE — G9899 SCRN MAM PERF RSLTS DOC: HCPCS | Performed by: INTERNAL MEDICINE

## 2021-12-09 PROCEDURE — G8419 CALC BMI OUT NRM PARAM NOF/U: HCPCS | Performed by: INTERNAL MEDICINE

## 2021-12-09 PROCEDURE — 3017F COLORECTAL CA SCREEN DOC REV: CPT | Performed by: INTERNAL MEDICINE

## 2021-12-09 PROCEDURE — G8754 DIAS BP LESS 90: HCPCS | Performed by: INTERNAL MEDICINE

## 2021-12-09 PROCEDURE — G8432 DEP SCR NOT DOC, RNG: HCPCS | Performed by: INTERNAL MEDICINE

## 2021-12-09 PROCEDURE — G8752 SYS BP LESS 140: HCPCS | Performed by: INTERNAL MEDICINE

## 2021-12-09 PROCEDURE — 1101F PT FALLS ASSESS-DOCD LE1/YR: CPT | Performed by: INTERNAL MEDICINE

## 2021-12-09 NOTE — PROGRESS NOTES
Cardiac Electrophysiology OFFICE Note     Subjective:       Sharon Franklin is a 70 y. o.patient who presents for follow up, is s/p [employer] single lead ICD (DOI 10/10/2017). NICM, last LVEF 25% in 05/2018.  NYHA II chronic systolic CHF.  On carvedilol, but no ACEi/ARB due to CKD stage IV. BP controlled. No chest pain, palpitations, PND, orthopnea, lightheadedness, or syncope.  Ambulates with walker without difficulty. Previous:   [employer] single lead ICD (10/10/2017) at [facility] by Dr. Cece Oviedo. Previously saw Dr. Omid Gunn (nephrology), but he has now retired; they have since been advised to follow with Dr. Lalita Zavaleta.      Primary cardiologist is Dr. Marika Dyson.       Problem List   CKD (chronic kidney disease) stage 3, GFR 30-59 ml/min (HCC) ICD-10-CM: N18.30   ICD-9-CM: 585.3 1/16/2020     Hypertriglyceridemia ICD-10-CM: E78.1   ICD-9-CM: 272.1 1/15/2019     Cardiorenal syndrome ICD-10-CM: I13.10   ICD-9-CM: 404.90 6/24/2018     Type 2 diabetes with nephropathy (HCC) ICD-10-CM: E11.21   ICD-9-CM: 250.40, 583.81 5/10/2018     Cervicalgia ICD-10-CM: M54.2   ICD-9-CM: 723.1 6/27/2017     Tardive dyskinesia ICD-10-CM: G24.01   ICD-9-CM: 333.85 6/27/2017     Essential hypertension, benign ICD-10-CM: I10   ICD-9-CM: 401.1 Unknown     Pure hypercholesterolemia ICD-10-CM: E78.00   ICD-9-CM: 272.0 2/28/2011     Chronic systolic heart failure (HCC) (Chronic) ICD-10-CM: I50.22   ICD-9-CM: 428.22 Unknown     Cardiomyopathy ([facility] (Chronic) ICD-10-CM: I42.9   ICD-9-CM: 425.4 Unknown     Current Outpatient Medications   Medication Sig Dispense Refill   · simvastatin (ZOCOR) 20 mg tablet TAKE 1 TABLET BY MOUTH EVERY DAY AT NIGHT 90 Tablet 1   · glipiZIDE SR (GLUCOTROL XL) 10 mg CR tablet TAKE 1 TABLET BY MOUTH EVERY DAY 90 Tablet 1   · metFORMIN (GLUCOPHAGE) 500 mg tablet TAKE 1 TABLET BY MOUTH TWICE A DAY WITH MEALS 180 Tablet 1   · torsemide (DEMADEX) 20 mg tablet TAKE 2 AND 1/2 TABLETS BY MOUTH DAILY 225 Tablet 1   · carvediloL (Coreg) 12.5 mg tablet Take 1 Tablet by mouth two (2) times daily (with meals). 180 Tablet 1   · mirtazapine (REMERON) 15 mg tablet TAKE 1 TABLET BY MOUTH AT BEDTIME FOR 1 WEEK, THEN 2 TABLETS NIGHTLY 45 Tab 5   · aspirin delayed-release 81 mg tablet Take  by mouth daily. Allergies   Allergen Reactions   · Codeine Hives   · Pcn [Penicillins] Hives     Past Medical History:   Diagnosis Date   · Calculus of kidney   · Cardiomy[facility] ([facility]   idiopathic   · Chronic systolic heart failure [facility])   April 2010 ef 20%, by Dec 2010 up to 50% on meds, cath with normal cors   · Diabetes ([facility]   · Essential hypertension, benign   · Hypercholesterolemia     Past Surgical History:   Procedure Laterality Date   · HX HYSTERECTOMY   · HX ORTHOPAEDIC   · HX OTHER SURGICAL 10/2017   difibulator   · HX UROLOGICAL   stone removal   · AL CARDIAC SURG PROCEDURE UNLIST     Family History   Problem Relation Age of Onset   · Stroke Mother   · Hypertension Mother   · Lung Disease Father      emphysema   · Heart Disease Brother   · Stroke Brother   · Cancer Brother      bone   · Cancer Maternal Grandfather      colon ca   · Cancer Other      breast ca   · Breast Cancer Sister 79   · Breast Cancer Daughter 28     Social History     Tobacco Use   · Smoking status: Never Smoker   · Smokeless tobacco: Never Used   Substance Use Topics   · Alcohol use: No         Review of Systems: All other review of systems otherwise negative. Constitutional: Negative for fever, chills, weight loss, malaise/fatigue. HEENT: Negative for nosebleeds, vision changes. Respiratory: Negative for cough, hemoptysis. Cardiovascular: Negative for chest pain, palpitations, orthopnea, claudication, leg swelling, no syncope, and no PND. Gastrointestinal: Negative for nausea, vomiting, diarrhea, blood in stool and melena. Genitourinary: Negative for dysuria, and hematuria. Musculoskeletal: Negative for myalgias, arthralgia.    Skin: Negative for rash. Heme: Does not bleed or bruise easily. Neurological: Negative for speech change and focal weakness.       Objective:     Visit Vitals  /62   Pulse 78   Ht 5' 1\" (1.549 m)   Wt 136 lb (61.7 kg)   BMI 25.70 kg/m²     Physical Exam:   Constitutional: Well-developed and well-nourished. No respiratory distress. Head: Normocephalic and atraumatic. Eyes: Pupils are equal, round. ENT: Hearing grossly normal.   Neck: Supple. No JVD present. Cardiovascular: Normal rate, regular rhythm. Exam reveals no gallop and no friction rub. No murmur heard. Pulmonary/Chest: Effort normal and breath sounds normal. No wheezes. Abdominal: Soft, no tenderness. Musculoskeletal: Moves extremities independently.  Ambulates with walker. Vasc/lymphatic: No edema. Neurological: Alert, oriented. Skin: Skin is warm and dry.  Unremarkable left sided ICD site, well healed. Psychiatric: Normal mood and affect. Behavior is normal. Judgment and thought content normal.         Assessment/Plan:       Imaging/Studies:   Echo (05/31/2018): LVEF 25%, LV mildly dilated, severe diffuse LV hypokinesis with distinct regional wall motion abnormalities, LV wall thickness upper limits of normal.  LA moderately dilated.  Mild MR. Trivial AR, TR, & UT. ICD-10-CM ICD-9-CM    1. Presence of automatic cardioverter/defibrillator (AICD)  Z95.810 V45.02    2. Chronic systolic congestive heart failure (HCC)  I50.22 428.22      428.0    3. Dilated cardiomyopathy (HCC)  I42.0 425.4    4. Essential hypertension, benign  I10 401.1    5. CKD (chronic kidney disease) stage 4, GFR 15-29 ml/min ([facility])  N18.4 585. 4            [email or web address] single lead ICD (DOI 10/10/2017): Device check today shows proper lead & generator function.  Generator longevity estimated 12 hrs.  No pacing.  Since 10/02/2021, no episodes noted. NICM: LVEF 25% in 2018, is scheduled for repeat echo in 04/2022.  NYHA II chronic systolic CHF. Continue carvedilol, but no ACEi/ARB due to renal function. HTN: BP well controlled.  Continue current regimen. Continue remote checks q 3 months, annual follow up in EP clinic.  Follow up with Dr. George Brewster as previously scheduled. Future Appointments   Date Time Provider Chava Gabriel   3/23/2022  3:45 PM REMOTE1, CHRISTINE PANIAGUA BS AMB   4/8/2022 10:00 AM ECHOTWDUSTIN, CHRISTINE PANIAGUA BS AMB   4/15/2022  9:00 AM Sage Daniels MD CAVREY BS AMB   6/27/2022  2:30 PM REMOTE1, CHRISTINE PANIAGUA BS AMB   10/3/2022  1:45 PM REMOTE1, CHRISTINE PANIAGUA BS AMB   1/5/2023  9:00 AM PACEMAKER3, CHRISTINE PANIAGUA BS AMB   1/5/2023  9:20 AM Yoana Fish MD CAVREY BS AMB       Thank you for involving me in this patient's care and please call with further concerns or questions. Nicci Wasserman M.D.    Electrophysiology/Cardiology   [facility] [place]   [address]                     [address] [facility] Yudy Dang, [address] [place] [address]   [phone]

## 2022-03-04 DIAGNOSIS — I10 ESSENTIAL HYPERTENSION, BENIGN: ICD-10-CM

## 2022-03-04 DIAGNOSIS — I50.22 CHRONIC SYSTOLIC CONGESTIVE HEART FAILURE (HCC): Primary | ICD-10-CM

## 2022-03-04 RX ORDER — CARVEDILOL 12.5 MG/1
12.5 TABLET ORAL 2 TIMES DAILY WITH MEALS
Qty: 180 TABLET | Refills: 3 | Status: SHIPPED | OUTPATIENT
Start: 2022-03-04

## 2022-03-04 NOTE — TELEPHONE ENCOUNTER
Per VO by MD.     Future Appointments   Date Time Provider [facility] Nery   3/23/2022  3:45 PM REMOTE1, CHRISTINE PANIAGUA BS AMB   4/8/2022 10:00 AM CHRISTINE STOREY BS AMB   4/15/2022  9:00 AM Sage Daniels MD CAVREY BS AMB   6/27/2022  2:30 PM REMOTE1, CHRISTINE PANIAGUA BS AMB   10/3/2022  1:45 PM REMOTE1, CHRISTINE PANIAGUA BS AMB   1/5/2023  9:00 AM PACEMAKER3, CHRISTINE PANIAGUA BS AMB   1/5/2023  9:20 AM MD ARCELIA Crystal BS AMB

## 2022-03-17 DIAGNOSIS — I50.22 CHRONIC SYSTOLIC CONGESTIVE HEART FAILURE (HCC): ICD-10-CM

## 2022-03-18 PROBLEM — E78.1 HYPERTRIGLYCERIDEMIA: Status: ACTIVE | Noted: 2019-01-15

## 2022-03-19 PROBLEM — I13.10 CARDIORENAL SYNDROME: Status: ACTIVE | Noted: 2018-06-24

## 2022-03-19 PROBLEM — M54.2 CERVICALGIA: Status: ACTIVE | Noted: 2017-06-27

## 2022-03-19 PROBLEM — E11.21 TYPE 2 DIABETES WITH NEPHROPATHY (HCC): Status: ACTIVE | Noted: 2018-05-10

## 2022-03-19 PROBLEM — G24.01 TARDIVE DYSKINESIA: Status: ACTIVE | Noted: 2017-06-27

## 2022-03-20 PROBLEM — N18.30 CKD (CHRONIC KIDNEY DISEASE) STAGE 3, GFR 30-59 ML/MIN (HCC): Status: ACTIVE | Noted: 2020-01-16

## 2022-03-21 RX ORDER — TORSEMIDE 20 MG/1
TABLET ORAL
Qty: 225 TABLET | Refills: 1 | Status: SHIPPED | OUTPATIENT
Start: 2022-03-21 | End: 2022-04-18 | Stop reason: SDUPTHER

## 2022-03-21 NOTE — TELEPHONE ENCOUNTER
Received refill request for torsemide 20 mg po tabs. Cr elevated on last check, not a new issue. Refill authorized.     Future Appointments   Date Time Provider Chava Nery   3/23/2022  3:45 PM REMOTE1, Zeeshan PANIAGUA BS AMB   4/8/2022 10:00 AM PHILIPPETWCHRISTINE CHAN BS AMB   4/15/2022  9:00 AM Sage Daniels MD CAVREY BS AMB   6/27/2022  2:30 PM REMOTE1, CHRISTINE PANIAGUA BS AMB   10/3/2022  1:45 PM REMOTE1, CHRISTINE PANIAGAU BS AMB   1/5/2023  9:00 AM PACEMAKER3, CHRISTINE PANIAGUA BS AMB   1/5/2023  9:20 AM MD ARCELIA Pete BS AMB

## 2022-03-23 ENCOUNTER — OFFICE VISIT (OUTPATIENT)
Dept: CARDIOLOGY CLINIC | Age: 72
End: 2022-03-23
Payer: MEDICARE

## 2022-03-23 DIAGNOSIS — Z95.810 AUTOMATIC IMPLANTABLE CARDIAC DEFIBRILLATOR IN SITU: Primary | ICD-10-CM

## 2022-03-23 PROCEDURE — 93295 DEV INTERROG REMOTE 1/2/MLT: CPT | Performed by: INTERNAL MEDICINE

## 2022-03-23 PROCEDURE — 93296 REM INTERROG EVL PM/IDS: CPT | Performed by: INTERNAL MEDICINE

## 2022-03-23 NOTE — LETTER
3/23/2022 1:57 PM    Ms. Kala Clifford  [address] [facility] [address]            This letter confirms that we have received your scheduled remote check of your implanted     device on 3-23-22  . Our EP team will contact you via phone if there are significant abnormal    findings. Your next remote check from home is scheduled for 6-27-22  . If you have any questions, please call [address] [place] [address] Drive at [phone].                Sincerely,    Kiran Guillen MD [facility]

## 2022-04-15 ENCOUNTER — VIRTUAL VISIT (OUTPATIENT)
Dept: CARDIOLOGY CLINIC | Age: 72
End: 2022-04-15
Payer: MEDICARE

## 2022-04-15 ENCOUNTER — TELEPHONE (OUTPATIENT)
Dept: CARDIOLOGY CLINIC | Age: 72
End: 2022-04-15

## 2022-04-15 DIAGNOSIS — I42.0 DILATED CARDIOMYOPATHY (HCC): ICD-10-CM

## 2022-04-15 DIAGNOSIS — Z95.810 PRESENCE OF AUTOMATIC CARDIOVERTER/DEFIBRILLATOR (AICD): ICD-10-CM

## 2022-04-15 DIAGNOSIS — I50.22 CHRONIC SYSTOLIC CONGESTIVE HEART FAILURE (HCC): Primary | ICD-10-CM

## 2022-04-15 DIAGNOSIS — I10 ESSENTIAL HYPERTENSION, BENIGN: ICD-10-CM

## 2022-04-15 DIAGNOSIS — I13.0 CARDIORENAL SYNDROME WITH RENAL FAILURE, STAGE 1-4 OR UNSPECIFIED CHRONIC KIDNEY DISEASE, WITH HEART FAILURE (HCC): ICD-10-CM

## 2022-04-15 PROCEDURE — 1090F PRES/ABSN URINE INCON ASSESS: CPT | Performed by: SPECIALIST

## 2022-04-15 PROCEDURE — G8432 DEP SCR NOT DOC, RNG: HCPCS | Performed by: SPECIALIST

## 2022-04-15 PROCEDURE — G8399 PT W/DXA RESULTS DOCUMENT: HCPCS | Performed by: SPECIALIST

## 2022-04-15 PROCEDURE — 99214 OFFICE O/P EST MOD 30 MIN: CPT | Performed by: SPECIALIST

## 2022-04-15 PROCEDURE — G9899 SCRN MAM PERF RSLTS DOC: HCPCS | Performed by: SPECIALIST

## 2022-04-15 PROCEDURE — 3017F COLORECTAL CA SCREEN DOC REV: CPT | Performed by: SPECIALIST

## 2022-04-15 PROCEDURE — G8756 NO BP MEASURE DOC: HCPCS | Performed by: SPECIALIST

## 2022-04-15 PROCEDURE — G8427 DOCREV CUR MEDS BY ELIG CLIN: HCPCS | Performed by: SPECIALIST

## 2022-04-15 PROCEDURE — 1101F PT FALLS ASSESS-DOCD LE1/YR: CPT | Performed by: SPECIALIST

## 2022-04-15 NOTE — PROGRESS NOTES
Jc Maguire     1950       Sage Del Rosario MD, [facility] [place]  Date of Visit-4/15/2022   PCP is Blanca Gillis MD   [facility] [place]  [facility]  Virtual Visit  HPI:  Jc Maguire is a 70 y.o. female   who was seen by synchronous (real-time) audio-video technology on 4/15/2022. Fu of  · CHF systolic EF 32% with normal coronaries.   · Patient saw Dr. Nav Lantigua on October 22, 2020 with proper function of ICD. · HTN- He noted elevated BP. Patient is Vesta Johnson MD regularly for DM. · Her Bps have varied from 110-140 in the offices. · Seen in 4/2010 with an EF of 20% and previous normal coronaries, had pleural effusions with thoracentesis, cath 5/26/10 ef 25%, normal hemodynamics, CO 4.4, normal cors  . .fu echo 5/13/10 ef 35%  · had noted fluid increase when seen for the first time on 5/10/18 as she was changing doctors. We increased her Bumex and were cautious in the use of diuretics due to her renal failure. We arranged for her to have follow up with EP clinic. She had blood work that showed a GFR of 26 and a normal Hgb and Mag.  · Echo 5-31-18 EF 25% mod LAE, mild MR  · AICD placed in October 2017. s/p [facility] [employer] single lead ICD (DOI 10/10/2017) for NICM & CHF (NYHA II).  Dr. Michele Oleary (nephrology) .       Device check 7/29/20 showed no abnormalities.         Today  Denies chest pain, edema, syncope or shortness of breath at rest   tachycardia for few seconds  bp 120/70  Back pain and tired from that   Denies chest pain, edema, syncope or shortness of breath at rest  1-2 + ankle swelling left leg  Right leg swollen, skin breakdown  Off torsemide for few days  dtr thinsk she is more shortness of breath        Previous hx or visit:      2011 with an EF of 20% and previous normal coronaries  ECHO 5-31-18 EF 25%  Cardiorenal syndrome creat 2.9 2/2018  Anemia hgb 9.4 Feb 2018  AICD for NICM--[facility]  Non smoker, 2 children  Family hx brother with heart disease , mother passed of stroke  Assessment/Plan:       1. Chronic systolic congestive heart failure (HCC)  NYHA 2. continue with current meds which include carvedilol and torsemide. No significant edema, check echo at next visit. Prior EF 25% in 2018 did not come in for April 2022 echocardiogram due to personal issues with her  bedbound  While her CHF is I believe compensated I am concerned about her stress level with taking care of her  and her daughter expressed the same concern      2. Dilated cardiomyopathy ([address]  NICM stable. AICD single-lead 2017     3. Cardiorenal syndrome with renal failure, stage 3  CKD 3, no recent blood work  Lab Results   Component Value Date/Time    Creatinine 2.10 (H) 04/23/2021 09:24 AM                               4. Essential hypertension, benign  Home monitor  At goal , meds and possible side effects reviewed and patient denies       Key CAD CHF Meds                 simvastatin (ZOCOR) 20 mg tablet (Taking) TAKE 1 TABLET BY MOUTH EVERY DAY AT NIGHT     torsemide (DEMADEX) 20 mg tablet (Taking) TAKE 2 AND 1/2 TABLETS BY MOUTH DAILY     carvediloL (Coreg) 12.5 mg tablet (Taking) Take 1 Tablet by mouth two (2) times daily (with meals).     aspirin delayed-release 81 mg tablet (Taking) Take  by mouth daily.              BP Readings from Last 6 Encounters:   10/15/21 132/70   04/13/21 110/70   03/26/21 (!) 140/80   11/18/20 136/76   09/24/20 130/60   08/28/20 140/78             5. Presence of automatic cardioverter/defibrillator (AICD)  Recent device check is unremarkable. Follow-up 3 to 4 months  This note was created using voice recognition software. Despite editing, there may be syntax errors. Key CAD CHF Meds             torsemide (DEMADEX) 20 mg tablet TAKE 2 AND 1/2 TABLETS BY MOUTH DAILY    carvediloL (COREG) 12.5 mg tablet Take 1 Tablet by mouth two (2) times daily (with meals).     simvastatin (ZOCOR) 20 mg tablet TAKE 1 TABLET BY MOUTH EVERY DAY AT NIGHT    aspirin delayed-release 81 mg tablet Take  by mouth daily. ROS-except as noted above. . A complete cardiac and respiratory are reviewed and negative except as above ; Resp-denies wheezing  or productive cough,. Const- No unusual weight loss or fever; Neuro-no recent seizure or CVA ; GI- No BRBPR, abdom pain, bloating ; - no  hematuria   Past Medical History:   Diagnosis Date    Calculus of kidney     Cardiomyopathy [facility]     idiopathic    Chronic systolic heart failure [facility])     April 2010 ef 20%, by Dec 2010 up to 50% on meds, cath with normal cors    Diabetes [facility])     Essential hypertension, benign     Hypercholesterolemia       Social Hx= reports that she has never smoked. She has never used smokeless tobacco. She reports that she does not drink alcohol and does not use drugs. Due to this being a TeleHealth evaluation, many elements of the physical examination are unable to be assessed. Vitals if sent, or see HPI  There were no vitals taken for this visit. General: Well developed, in no acute distress, cooperative and alert  HEENT: Pupils equal/round. No marked JVD visible on video. Respiratory: No audible wheezing, no signs of respiratory distress, lips non cyanotic  Extremities:  No edema  Neuro: A&Ox3, speech clear, no facial droop, answering questions appropriately  Skin: Skin color is normal. No rashes or lesions.  Non diaphoretic on visible skin during exam  Psych: mood and affect are appropriate and pleasant    Lab Results   Component Value Date/Time    Cholesterol, total 137 04/23/2021 09:24 AM    HDL Cholesterol 30 04/23/2021 09:24 AM    LDL,Direct 58 04/23/2021 09:24 AM    LDL, calculated Not calculated due to elevated triglyceride level 04/23/2021 09:24 AM    Triglyceride 483 (H) 04/23/2021 09:24 AM    CHOL/HDL Ratio 4.6 04/23/2021 09:24 AM     Lab Results   Component Value Date/Time    Sodium 137 04/23/2021 09:24 AM    Potassium 4.5 04/23/2021 09:24 AM    Chloride 101 04/23/2021 09:24 AM    CO2 29 04/23/2021 09:24 AM    Anion gap 7 04/23/2021 09:24 AM    Glucose 90 04/23/2021 09:24 AM    BUN 45 (H) 04/23/2021 09:24 AM    Creatinine 2.10 (H) 04/23/2021 09:24 AM    BUN/Creatinine ratio 21 (H) 04/23/2021 09:24 AM    GFR est AA 28 (L) 04/23/2021 09:24 AM    GFR est non-AA 23 (L) 04/23/2021 09:24 AM    Calcium 8.4 (L) 04/23/2021 09:24 AM      Wt Readings from Last 3 Encounters:   12/09/21 136 lb (61.7 kg)   10/15/21 133 lb (60.3 kg)   04/13/21 132 lb 9.6 oz (60.1 kg)      BP Readings from Last 3 Encounters:   12/09/21 126/62   10/15/21 132/70   04/13/21 110/70        Current Outpatient Medications   Medication Sig    metFORMIN (GLUCOPHAGE) 500 mg tablet TAKE 1 TABLET BY MOUTH TWICE A DAY WITH MEALS    glipiZIDE SR (GLUCOTROL XL) 10 mg CR tablet TAKE 1 TABLET BY MOUTH EVERY DAY    torsemide (DEMADEX) 20 mg tablet TAKE 2 AND 1/2 TABLETS BY MOUTH DAILY    carvediloL (COREG) 12.5 mg tablet Take 1 Tablet by mouth two (2) times daily (with meals).  simvastatin (ZOCOR) 20 mg tablet TAKE 1 TABLET BY MOUTH EVERY DAY AT NIGHT    mirtazapine (REMERON) 15 mg tablet TAKE 1 TABLET BY MOUTH AT BEDTIME FOR 1 WEEK, THEN 2 TABLETS NIGHTLY    aspirin delayed-release 81 mg tablet Take  by mouth daily. No current facility-administered medications for this visit. Impression see above. VIRTUAL VISIT DOCUMENTATION   Pursuant to the emergency declaration under the [facility], [facility] waiver authority and the [employer] Act, this Virtual  Visit was conducted, with patient's consent, to reduce the patient's risk of exposure to COVID-19 and provide continuity of care for an established patient. Services were provided through a video synchronous discussion virtually to substitute for in-person clinic visit.   We discussed the expected course, resolution and complications of the diagnosis(es) in detail. Medication risks, benefits, costs, interactions, and alternatives were discussed as indicated. I advised her to contact the office if her condition worsens, changes or fails to improve as anticipated. She expressed understanding with the diagnosis(es) and plan  I have reviewed the nurses notes, vitals, problem list, allergy list, medical history, family, social history and medications. FOLLOW-UP   Patient was made aware and verbalized understanding that an appointment will be scheduled for them for a virtual visit and/or office visit within the above time frame. Patient understanding his/her responsibility to call and change time/date if he/she so chooses. MD Jluio ConradLovel[facility].  [address] [place] [address]  Charlotte Lobo[address] [facility]  [phone] / [phone] Fax  [phone] / [phone] Fax  This visit was conducted using [employer] telemedicine services or similar service.

## 2022-04-15 NOTE — TELEPHONE ENCOUNTER
Patient daughter is calling in regards to her virtual appointment that she had today (4.15.22) at 1pm , she was to have a medication that was to be called in for fluid on her foot. Please Advise.      Pharmacy Verified     [phone]

## 2022-04-18 ENCOUNTER — HOME HEALTH ADMISSION (OUTPATIENT)
Dept: HOME HEALTH SERVICES | Facility: HOME HEALTH | Age: 72
End: 2022-04-18
Payer: MEDICARE

## 2022-04-18 ENCOUNTER — TELEPHONE (OUTPATIENT)
Dept: CARDIOLOGY CLINIC | Age: 72
End: 2022-04-18

## 2022-04-18 RX ORDER — TORSEMIDE 20 MG/1
50 TABLET ORAL DAILY
Qty: 225 TABLET | Refills: 3 | Status: SHIPPED | OUTPATIENT
Start: 2022-04-18 | End: 2022-05-23

## 2022-04-18 NOTE — TELEPHONE ENCOUNTER
Verified patient with two types of identifiers. Spoke to patient's daughter. She reports her mother has been out of her torsemide for longer than she told Dr. Dick Leung. A refill was sent for 90 days on 3-21-22 but it was never picked up from the pharmacy. She wants to know if Dr. Dick Leung wants her to still switch to Lasix or if she should  the torsemide and take it. Advised she resume the toresemide for now and we will check with MD. We also discussed home health. She thinks her mother will benefit from this and is agreeable. She thinks her mother will need once a week at first and then they can reduce the frequency. She thinks because her mother is overwhelmed with caring for her father she is neglecting taking care of herself. Michel Pedro will set up her pill box from now on as she does for her father.  Will notify MD.

## 2022-04-18 NOTE — TELEPHONE ENCOUNTER
Verified patient with two types of identifiers. Went over Dr. Boy Rios message with Christie Singh. She will have her mother restart torsemide at previous dose. Advised I spoke with Tessie at [facility] and she states someone will be in contact with them within 48 hours for CHF education and labs. Christie Singh was appreciative.

## 2022-04-18 NOTE — TELEPHONE ENCOUNTER
If out of torsemide then fine to go back on torsemide and hold off on lasix  Get BNP, CMP and CBC    Current Outpatient Medications   Medication Instructions    aspirin delayed-release 81 mg tablet Oral, DAILY    carvediloL (COREG) 12.5 mg, Oral, 2 TIMES DAILY WITH MEALS    glipiZIDE SR (GLUCOTROL XL) 10 mg CR tablet TAKE 1 TABLET BY MOUTH EVERY DAY    metFORMIN (GLUCOPHAGE) 500 mg tablet TAKE 1 TABLET BY MOUTH TWICE A DAY WITH MEALS    mirtazapine (REMERON) 15 mg tablet TAKE 1 TABLET BY MOUTH AT BEDTIME FOR 1 WEEK, THEN 2 TABLETS NIGHTLY    simvastatin (ZOCOR) 20 mg tablet TAKE 1 TABLET BY MOUTH EVERY DAY AT NIGHT    torsemide (DEMADEX) 20 mg tablet TAKE 2 AND 1/2 TABLETS BY MOUTH DAILY

## 2022-04-18 NOTE — TELEPHONE ENCOUNTER
Patient's daughter called back regarding previous message, to follow up on the new prescription for fluid retention.         [facility] pharmacy [phone]              AJDDL:[phone]

## 2022-04-20 ENCOUNTER — HOME CARE VISIT (OUTPATIENT)
Dept: SCHEDULING | Facility: HOME HEALTH | Age: 72
End: 2022-04-20
Payer: MEDICARE

## 2022-04-20 VITALS
OXYGEN SATURATION: 100 % | BODY MASS INDEX: 24.75 KG/M2 | HEART RATE: 74 BPM | SYSTOLIC BLOOD PRESSURE: 126 MMHG | TEMPERATURE: 97.4 F | RESPIRATION RATE: 18 BRPM | WEIGHT: 131 LBS | DIASTOLIC BLOOD PRESSURE: 70 MMHG

## 2022-04-20 PROCEDURE — A4649 SURGICAL SUPPLIES: HCPCS

## 2022-04-20 PROCEDURE — G0299 HHS/HOSPICE OF RN EA 15 MIN: HCPCS

## 2022-04-20 PROCEDURE — 400013 HH SOC

## 2022-04-20 PROCEDURE — A9270 NON-COVERED ITEM OR SERVICE: HCPCS

## 2022-04-20 PROCEDURE — A6212 FOAM DRG <=16 SQ IN W/BORDER: HCPCS

## 2022-04-20 PROCEDURE — A6216 NON-STERILE GAUZE<=16 SQ IN: HCPCS

## 2022-04-23 ENCOUNTER — HOME CARE VISIT (OUTPATIENT)
Dept: SCHEDULING | Facility: HOME HEALTH | Age: 72
End: 2022-04-23
Payer: MEDICARE

## 2022-04-23 PROCEDURE — G0299 HHS/HOSPICE OF RN EA 15 MIN: HCPCS

## 2022-04-24 VITALS
DIASTOLIC BLOOD PRESSURE: 68 MMHG | RESPIRATION RATE: 16 BRPM | SYSTOLIC BLOOD PRESSURE: 122 MMHG | WEIGHT: 128 LBS | TEMPERATURE: 97.2 F | OXYGEN SATURATION: 100 % | HEART RATE: 71 BPM | BODY MASS INDEX: 24.19 KG/M2

## 2022-04-26 ENCOUNTER — HOME CARE VISIT (OUTPATIENT)
Dept: SCHEDULING | Facility: HOME HEALTH | Age: 72
End: 2022-04-26
Payer: MEDICARE

## 2022-04-26 VITALS
DIASTOLIC BLOOD PRESSURE: 64 MMHG | WEIGHT: 125 LBS | SYSTOLIC BLOOD PRESSURE: 130 MMHG | HEART RATE: 65 BPM | OXYGEN SATURATION: 97 % | BODY MASS INDEX: 23.62 KG/M2 | TEMPERATURE: 97.9 F | RESPIRATION RATE: 16 BRPM

## 2022-04-26 PROCEDURE — G0300 HHS/HOSPICE OF LPN EA 15 MIN: HCPCS

## 2022-04-28 ENCOUNTER — HOME CARE VISIT (OUTPATIENT)
Dept: SCHEDULING | Facility: HOME HEALTH | Age: 72
End: 2022-04-28
Payer: MEDICARE

## 2022-04-28 PROCEDURE — G0151 HHCP-SERV OF PT,EA 15 MIN: HCPCS

## 2022-04-29 ENCOUNTER — HOME CARE VISIT (OUTPATIENT)
Dept: SCHEDULING | Facility: HOME HEALTH | Age: 72
End: 2022-04-29
Payer: MEDICARE

## 2022-04-29 VITALS
HEART RATE: 64 BPM | RESPIRATION RATE: 16 BRPM | OXYGEN SATURATION: 97 % | DIASTOLIC BLOOD PRESSURE: 68 MMHG | WEIGHT: 125 LBS | TEMPERATURE: 97.7 F | BODY MASS INDEX: 23.62 KG/M2 | SYSTOLIC BLOOD PRESSURE: 126 MMHG

## 2022-04-29 PROCEDURE — G0300 HHS/HOSPICE OF LPN EA 15 MIN: HCPCS

## 2022-04-30 VITALS
OXYGEN SATURATION: 99 % | TEMPERATURE: 97.9 F | HEART RATE: 72 BPM | SYSTOLIC BLOOD PRESSURE: 128 MMHG | DIASTOLIC BLOOD PRESSURE: 66 MMHG

## 2022-05-03 ENCOUNTER — HOME CARE VISIT (OUTPATIENT)
Dept: SCHEDULING | Facility: HOME HEALTH | Age: 72
End: 2022-05-03
Payer: MEDICARE

## 2022-05-03 VITALS
BODY MASS INDEX: 23.43 KG/M2 | HEART RATE: 66 BPM | TEMPERATURE: 97.4 F | OXYGEN SATURATION: 98 % | WEIGHT: 124 LBS | SYSTOLIC BLOOD PRESSURE: 128 MMHG | DIASTOLIC BLOOD PRESSURE: 66 MMHG | RESPIRATION RATE: 16 BRPM

## 2022-05-03 PROCEDURE — G0300 HHS/HOSPICE OF LPN EA 15 MIN: HCPCS

## 2022-05-06 ENCOUNTER — HOME CARE VISIT (OUTPATIENT)
Dept: HOME HEALTH SERVICES | Facility: HOME HEALTH | Age: 72
End: 2022-05-06
Payer: MEDICARE

## 2022-05-06 ENCOUNTER — HOME CARE VISIT (OUTPATIENT)
Dept: SCHEDULING | Facility: HOME HEALTH | Age: 72
End: 2022-05-06
Payer: MEDICARE

## 2022-05-06 VITALS
SYSTOLIC BLOOD PRESSURE: 122 MMHG | WEIGHT: 123.4 LBS | RESPIRATION RATE: 16 BRPM | DIASTOLIC BLOOD PRESSURE: 62 MMHG | BODY MASS INDEX: 23.32 KG/M2 | OXYGEN SATURATION: 99 % | HEART RATE: 73 BPM | TEMPERATURE: 97.3 F

## 2022-05-06 VITALS
SYSTOLIC BLOOD PRESSURE: 128 MMHG | HEART RATE: 102 BPM | DIASTOLIC BLOOD PRESSURE: 68 MMHG | TEMPERATURE: 98.2 F | OXYGEN SATURATION: 93 %

## 2022-05-06 PROCEDURE — G0300 HHS/HOSPICE OF LPN EA 15 MIN: HCPCS

## 2022-05-06 PROCEDURE — G0151 HHCP-SERV OF PT,EA 15 MIN: HCPCS

## 2022-05-09 ENCOUNTER — HOME CARE VISIT (OUTPATIENT)
Dept: SCHEDULING | Facility: HOME HEALTH | Age: 72
End: 2022-05-09
Payer: MEDICARE

## 2022-05-09 VITALS
HEART RATE: 74 BPM | TEMPERATURE: 97.9 F | RESPIRATION RATE: 16 BRPM | SYSTOLIC BLOOD PRESSURE: 134 MMHG | WEIGHT: 123 LBS | DIASTOLIC BLOOD PRESSURE: 70 MMHG | BODY MASS INDEX: 23.24 KG/M2 | OXYGEN SATURATION: 99 %

## 2022-05-09 PROCEDURE — G0300 HHS/HOSPICE OF LPN EA 15 MIN: HCPCS

## 2022-05-12 ENCOUNTER — HOME CARE VISIT (OUTPATIENT)
Dept: SCHEDULING | Facility: HOME HEALTH | Age: 72
End: 2022-05-12
Payer: MEDICARE

## 2022-05-12 VITALS
OXYGEN SATURATION: 97 % | SYSTOLIC BLOOD PRESSURE: 122 MMHG | BODY MASS INDEX: 23.81 KG/M2 | RESPIRATION RATE: 18 BRPM | HEART RATE: 77 BPM | TEMPERATURE: 97.7 F | DIASTOLIC BLOOD PRESSURE: 74 MMHG | WEIGHT: 126 LBS

## 2022-05-12 PROCEDURE — A6212 FOAM DRG <=16 SQ IN W/BORDER: HCPCS

## 2022-05-12 PROCEDURE — G0299 HHS/HOSPICE OF RN EA 15 MIN: HCPCS

## 2022-05-12 PROCEDURE — MED10158 APPLICATOR, COTTON-TIP, WOOD, 6, STRL

## 2022-05-12 PROCEDURE — A6216 NON-STERILE GAUZE<=16 SQ IN: HCPCS

## 2022-05-12 PROCEDURE — A6197 ALGINATE DRSG >16 <=48 SQ IN: HCPCS

## 2022-05-13 ENCOUNTER — TELEPHONE (OUTPATIENT)
Dept: INTERNAL MEDICINE CLINIC | Age: 72
End: 2022-05-13

## 2022-05-13 ENCOUNTER — OFFICE VISIT (OUTPATIENT)
Dept: INTERNAL MEDICINE CLINIC | Age: 72
End: 2022-05-13
Payer: MEDICARE

## 2022-05-13 VITALS
HEIGHT: 61 IN | OXYGEN SATURATION: 98 % | HEART RATE: 71 BPM | DIASTOLIC BLOOD PRESSURE: 60 MMHG | TEMPERATURE: 97.1 F | BODY MASS INDEX: 24.13 KG/M2 | SYSTOLIC BLOOD PRESSURE: 120 MMHG | WEIGHT: 127.8 LBS | RESPIRATION RATE: 16 BRPM

## 2022-05-13 DIAGNOSIS — F33.1 MAJOR DEPRESSIVE DISORDER, RECURRENT, MODERATE (HCC): ICD-10-CM

## 2022-05-13 DIAGNOSIS — F33.41 RECURRENT MAJOR DEPRESSIVE DISORDER, IN PARTIAL REMISSION (HCC): ICD-10-CM

## 2022-05-13 DIAGNOSIS — E78.1 HYPERTRIGLYCERIDEMIA: ICD-10-CM

## 2022-05-13 DIAGNOSIS — F33.0 MAJOR DEPRESSIVE DISORDER, RECURRENT, MILD (HCC): ICD-10-CM

## 2022-05-13 DIAGNOSIS — R41.3 MEMORY LOSS: ICD-10-CM

## 2022-05-13 DIAGNOSIS — E11.21 TYPE 2 DIABETES WITH NEPHROPATHY (HCC): ICD-10-CM

## 2022-05-13 DIAGNOSIS — N18.4 CKD (CHRONIC KIDNEY DISEASE) STAGE 4, GFR 15-29 ML/MIN (HCC): ICD-10-CM

## 2022-05-13 DIAGNOSIS — Z00.00 MEDICARE ANNUAL WELLNESS VISIT, SUBSEQUENT: Primary | ICD-10-CM

## 2022-05-13 PROBLEM — F33.9 MAJOR DEPRESSIVE DISORDER, RECURRENT, UNSPECIFIED (HCC): Status: ACTIVE | Noted: 2022-05-13

## 2022-05-13 PROCEDURE — 2022F DILAT RTA XM EVC RTNOPTHY: CPT | Performed by: INTERNAL MEDICINE

## 2022-05-13 PROCEDURE — G8754 DIAS BP LESS 90: HCPCS | Performed by: INTERNAL MEDICINE

## 2022-05-13 PROCEDURE — G8510 SCR DEP NEG, NO PLAN REQD: HCPCS | Performed by: INTERNAL MEDICINE

## 2022-05-13 PROCEDURE — G8752 SYS BP LESS 140: HCPCS | Performed by: INTERNAL MEDICINE

## 2022-05-13 PROCEDURE — G8399 PT W/DXA RESULTS DOCUMENT: HCPCS | Performed by: INTERNAL MEDICINE

## 2022-05-13 PROCEDURE — 3046F HEMOGLOBIN A1C LEVEL >9.0%: CPT | Performed by: INTERNAL MEDICINE

## 2022-05-13 PROCEDURE — 3017F COLORECTAL CA SCREEN DOC REV: CPT | Performed by: INTERNAL MEDICINE

## 2022-05-13 PROCEDURE — G8536 NO DOC ELDER MAL SCRN: HCPCS | Performed by: INTERNAL MEDICINE

## 2022-05-13 PROCEDURE — G8420 CALC BMI NORM PARAMETERS: HCPCS | Performed by: INTERNAL MEDICINE

## 2022-05-13 PROCEDURE — G0439 PPPS, SUBSEQ VISIT: HCPCS | Performed by: INTERNAL MEDICINE

## 2022-05-13 PROCEDURE — G9899 SCRN MAM PERF RSLTS DOC: HCPCS | Performed by: INTERNAL MEDICINE

## 2022-05-13 PROCEDURE — 1101F PT FALLS ASSESS-DOCD LE1/YR: CPT | Performed by: INTERNAL MEDICINE

## 2022-05-13 PROCEDURE — G8427 DOCREV CUR MEDS BY ELIG CLIN: HCPCS | Performed by: INTERNAL MEDICINE

## 2022-05-13 RX ORDER — SIMVASTATIN 20 MG/1
20 TABLET, FILM COATED ORAL
Qty: 90 TABLET | Refills: 1 | Status: SHIPPED | OUTPATIENT
Start: 2022-05-13

## 2022-05-13 RX ORDER — GLIPIZIDE 10 MG/1
10 TABLET, FILM COATED, EXTENDED RELEASE ORAL DAILY
Qty: 90 TABLET | Refills: 1 | Status: SHIPPED | OUTPATIENT
Start: 2022-05-13 | End: 2022-06-06 | Stop reason: SDUPTHER

## 2022-05-13 RX ORDER — METFORMIN HYDROCHLORIDE 500 MG/1
500 TABLET ORAL 2 TIMES DAILY WITH MEALS
Qty: 180 TABLET | Refills: 1 | Status: SHIPPED | OUTPATIENT
Start: 2022-05-13 | End: 2022-06-06

## 2022-05-13 RX ORDER — MIRTAZAPINE 15 MG/1
TABLET, FILM COATED ORAL
Qty: 45 TABLET | Refills: 3 | Status: SHIPPED | OUTPATIENT
Start: 2022-05-13 | End: 2022-09-29 | Stop reason: SDUPTHER

## 2022-05-13 NOTE — PROGRESS NOTES
This is the Subsequent Medicare Annual Wellness Exam, performed 12 months or more after the Initial AWV or the last Subsequent AWV    I have reviewed the patient's medical history in detail and updated the computerized patient record. She has been very stressed managing her husbands illness--he is bedbound and having difficulty now. This is a toll on the patient. We discussed that she has some help at the house with this. She saw Abad Tomlinson last year--stable    She has followed up with cardiology, on heart failure meds and tolerating these. She has brought her blood sugar logs (copied and sent to scanning). She has been well controlled in the past month. Her last A1c was 7.7    Reviewed health maintenance items. Orders placed as necessary. Assessment/Plan   Education and counseling provided:  Are appropriate based on today's review and evaluation    1. Medicare annual wellness visit, subsequent  2. Type 2 diabetes with nephropathy (HCC)  -     metFORMIN (GLUCOPHAGE) 500 mg tablet; Take 1 Tablet by mouth two (2) times daily (with meals). , Normal, Disp-180 Tablet, R-1  -     glipiZIDE SR (GLUCOTROL XL) 10 mg CR tablet; Take 1 Tablet by mouth daily. , Normal, Disp-90 Tablet, R-1  -     HEMOGLOBIN A1C WITH EAG; Future  -     MICROALBUMIN, UR, RAND W/ MICROALB/CREAT RATIO; Future  -     URINALYSIS W/ RFLX MICROSCOPIC; Future  3. CKD (chronic kidney disease) stage 4, GFR 15-29 ml/min ([facility])  -     CBC WITH AUTOMATED DIFF; Future  -     METABOLIC PANEL, COMPREHENSIVE; Future  4. Hypertriglyceridemia  -     simvastatin (ZOCOR) 20 mg tablet; Take 1 Tablet by mouth nightly., Normal, Disp-90 Tablet, R-1  -     LIPID PANEL; Future  5. Memory loss  -     mirtazapine (REMERON) 15 mg tablet; TAKE 1 TABLET BY MOUTH AT BEDTIME FOR 1 WEEK, THEN 2 TABLETS NIGHTLY, Normal, Disp-45 Tablet, R-3  6. Major depressive disorder, recurrent, mild  7. Major depressive disorder, recurrent, moderate  8.  Recurrent major depressive disorder, in partial remission (HCC)       Depression Risk Factor Screening     3 most recent PHQ Screens 5/13/2022   Little interest or pleasure in doing things Not at all   Feeling down, depressed, irritable, or hopeless Not at all   Total Score PHQ 2 0       Alcohol & Drug Abuse Risk Screen    Do you average more than 1 drink per night or more than 7 drinks a week:  No    On any one occasion in the past three months have you have had more than 3 drinks containing alcohol:  No          Functional Ability and Level of Safety    Hearing: Hearing is good. Activities of Daily Living: The home contains: no safety equipment. Patient needs help with:  transportation      Ambulation: with difficulty, uses a walker     Fall Risk:  Fall Risk Assessment, last 12 mths 5/13/2022   Able to walk? Yes   Fall in past 12 months? 0   Do you feel unsteady?  0   Are you worried about falling 0      Abuse Screen:  Patient is not abused       Cognitive Screening    Has your family/caregiver stated any concerns about your memory: no         Health Maintenance Due     Health Maintenance Due   Topic Date Due    COVID-19 Vaccine (1) Never done    Eye Exam Retinal or Dilated  Never done    DTaP/Tdap/Td series (1 - Tdap) Never done    Colorectal Cancer Screening Combo  Never done    Foot Exam Q1  07/26/2020    A1C test (Diabetic or Prediabetic)  04/23/2022    MICROALBUMIN Q1  04/23/2022    Breast Cancer Screen Mammogram  04/16/2022    Lipid Screen  04/23/2022       Patient Care Team   Patient Care Team:  Yaya Sanon MD as PCP - General (Internal Medicine Physician)  Yaya Sanon MD as PCP - [address] [place] [address] [facility]  Ethel Hopson MD (Cardiovascular Disease Physician)  John Lewis MD (Cardiovascular Disease Physician)  Royal Christian MD (Gastroenterology)    History     Patient Active Problem List   Diagnosis Code    Chronic systolic heart failure ([address] I50.22    Cardiomyopathy ([address] I42.9    Pure hypercholesterolemia E78.00    Essential hypertension, benign I10    Type 2 diabetes with nephropathy ([facility] E11.21    Cardiorenal syndrome I13.10    Hypertriglyceridemia E78.1    CKD (chronic kidney disease) stage 3, GFR 30-59 ml/min ([facility]) N18.30    Cervicalgia M54.2    Tardive dyskinesia G24.01     Past Medical History:   Diagnosis Date    Calculus of kidney     Cardiomyopathy ([facility]     idiopathic    Chronic systolic heart failure [facility])     April 2010 ef 20%, by Dec 2010 up to 50% on meds, cath with normal cors    Diabetes [facility])     Essential hypertension, benign     Hypercholesterolemia       Past Surgical History:   Procedure Laterality Date    HX HYSTERECTOMY      HX ORTHOPAEDIC      HX OTHER SURGICAL  10/2017    difibulator     HX UROLOGICAL      stone removal    ND CARDIAC SURG PROCEDURE UNLIST       Current Outpatient Medications   Medication Sig Dispense Refill    ibuprofen (MOTRIN) 200 mg tablet Take 400 mg by mouth daily as needed for Pain.  torsemide (DEMADEX) 20 mg tablet Take 2.5 Tablets by mouth daily. 225 Tablet 3    metFORMIN (GLUCOPHAGE) 500 mg tablet TAKE 1 TABLET BY MOUTH TWICE A DAY WITH MEALS 180 Tablet 0    glipiZIDE SR (GLUCOTROL XL) 10 mg CR tablet TAKE 1 TABLET BY MOUTH EVERY DAY 90 Tablet 0    carvediloL (COREG) 12.5 mg tablet Take 1 Tablet by mouth two (2) times daily (with meals). 180 Tablet 3    simvastatin (ZOCOR) 20 mg tablet TAKE 1 TABLET BY MOUTH EVERY DAY AT NIGHT 90 Tablet 1    mirtazapine (REMERON) 15 mg tablet TAKE 1 TABLET BY MOUTH AT BEDTIME FOR 1 WEEK, THEN 2 TABLETS NIGHTLY 45 Tab 5    aspirin delayed-release 81 mg tablet Take 81 mg by mouth daily.        Allergies   Allergen Reactions    Codeine Hives    Pcn [Penicillins] Hives       Family History   Problem Relation Age of Onset   [facility] Stroke Mother     Hypertension Mother     Lung Disease Father         emphysema    Heart Disease Brother     Stroke Brother     Cancer Brother         bone  Cancer Maternal Grandfather         colon ca    Cancer Other         breast ca    Breast Cancer Sister 79    Breast Cancer Daughter 28     Social History     Tobacco Use    Smoking status: Never Smoker    Smokeless tobacco: Never Used   Substance Use Topics    Alcohol use: No         Tristan Vasquez MD

## 2022-05-13 NOTE — PROGRESS NOTES
Chief Complaint   Patient presents with   [facility] Wellness Visit     Reviewed record in preparation for visit and have obtained necessary documentation. Identified pt with two pt identifiers(name and ). Health Maintenance Due   Topic    COVID-19 Vaccine (1)    Eye Exam Retinal or Dilated     DTaP/Tdap/Td series (1 - Tdap)    Colorectal Cancer Screening Combo     Foot Exam Q1     Medicare Yearly Exam     A1C test (Diabetic or Prediabetic)     MICROALBUMIN Q1     Breast Cancer Screen Mammogram     Lipid Screen          Chief Complaint   Patient presents with   Octavia Manual Annual Wellness Visit        Wt Readings from Last 3 Encounters:   22 127 lb 12.8 oz (58 kg)   22 126 lb (57.2 kg)   22 123 lb (55.8 kg)     Temp Readings from Last 3 Encounters:   22 97.1 °F (36.2 °C) (Temporal)   22 97.7 °F (36.5 °C)   22 97.9 °F (36.6 °C) (Temporal)     BP Readings from Last 3 Encounters:   22 120/60   22 122/74   22 134/70     Pulse Readings from Last 3 Encounters:   22 71   22 77   22 74           Learning Assessment:  :     Learning Assessment 2018   PRIMARY LEARNER Patient   HIGHEST LEVEL OF EDUCATION - PRIMARY LEARNER  DID NOT GRADUATE HIGH SCHOOL   BARRIERS PRIMARY LEARNER NONE   PRIMARY LANGUAGE ENGLISH   LEARNER PREFERENCE PRIMARY LISTENING   ANSWERED BY pt   RELATIONSHIP SELF       Depression Screening:  :     3 most recent PHQ Screens 2022   Little interest or pleasure in doing things Not at all   Feeling down, depressed, irritable, or hopeless Not at all   Total Score PHQ 2 0       Fall Risk Assessment:  :     Fall Risk Assessment, last 12 mths 2022   Able to walk? Yes   Fall in past 12 months? 0   Do you feel unsteady? 0   Are you worried about falling 0       Abuse Screening:  :     Abuse Screening Questionnaire 2022 10/9/2018 2018   Do you ever feel afraid of your partner?  N N N   Are you in a relationship with someone who physically or mentally threatens you? N N N   Is it safe for you to go home? Y Y Y       Coordination of Care Questionnaire:  :     1) Have you been to an emergency room, urgent care clinic since your last visit? no   Hospitalized since your last visit? no             2) Have you seen or consulted any other health care providers outside of [address] [place] [address] since your last visit? no  (Include any pap smears or colon screenings in this section.)    3) Do you have an Advance Directive on file? no    4) Are you interested in receiving information on Advance Directives? NO      Patient is accompanied by self I have received verbal consent from Ramses Kruger to discuss any/all medical information while they are present in the room. Reviewed record  In preparation for visit and have obtained necessary documentation. Irving Scherer

## 2022-05-13 NOTE — PATIENT INSTRUCTIONS
Medicare Wellness Visit, Female     The best way to live healthy is to have a lifestyle where you eat a well-balanced diet, exercise regularly, limit alcohol use, and quit all forms of tobacco/nicotine, if applicable. Regular preventive services are another way to keep healthy. Preventive services (vaccines, screening tests, monitoring & exams) can help personalize your care plan, which helps you manage your own care. Screening tests can find health problems at the earliest stages, when they are easiest to treat. [facility] follows the current, evidence-based guidelines published by the [facility] ([facility]) when recommending preventive services for our patients. Because we follow these guidelines, sometimes recommendations change over time as research supports it. (For example, mammograms used to be recommended annually. Even though Medicare will still pay for an annual mammogram, the newer guidelines recommend a mammogram every two years for women of average risk). Of course, you and your doctor may decide to screen more often for some diseases, based on your risk and your co-morbidities (chronic disease you are already diagnosed with). Preventive services for you include:  - Medicare offers their members a free annual wellness visit, which is time for you and your primary care provider to discuss and plan for your preventive service needs. Take advantage of this benefit every year!  -All adults over the age of 72 should receive the recommended pneumonia vaccines. Current USPSTF guidelines recommend a series of two vaccines for the best pneumonia protection.   -All adults should have a flu vaccine yearly and a tetanus vaccine every 10 years.   -All adults age 48 and older should receive the shingles vaccines (series of two vaccines).       -All adults age 38-68 who are overweight should have a diabetes screening test once every three years.   -All adults born between [birth year] and 1965 should be screened once for Hepatitis C.  -Other screening tests and preventive services for persons with diabetes include: an eye exam to screen for diabetic retinopathy, a kidney function test, a foot exam, and stricter control over your cholesterol.   -Cardiovascular screening for adults with routine risk involves an electrocardiogram (ECG) at intervals determined by your doctor.   -Colorectal cancer screenings should be done for adults age 54-65 with no increased risk factors for colorectal cancer. There are a number of acceptable methods of screening for this type of cancer. Each test has its own benefits and drawbacks. Discuss with your doctor what is most appropriate for you during your annual wellness visit. The different tests include: colonoscopy (considered the best screening method), a fecal occult blood test, a fecal DNA test, and sigmoidoscopy.    -A bone mass density test is recommended when a woman turns 65 to screen for osteoporosis. This test is only recommended one time, as a screening. Some providers will use this same test as a disease monitoring tool if you already have osteoporosis. -Breast cancer screenings are recommended every other year for women of normal risk, age 54-69.  -Cervical cancer screenings for women over age 72 are only recommended with certain risk factors.      Here is a list of your current Health Maintenance items (your personalized list of preventive services) with a due date:  Health Maintenance Due   Topic Date Due    COVID-19 Vaccine (1) Never done    Eye Exam  Never done    DTaP/Tdap/Td  (1 - Tdap) Never done    Colorectal Screening  Never done    Diabetic Foot Care  07/26/2020    Hemoglobin A1C    04/23/2022    Albumin Urine Test  04/23/2022    Mammogram  04/16/2022    Cholesterol Test   04/23/2022

## 2022-05-14 ENCOUNTER — HOME CARE VISIT (OUTPATIENT)
Dept: HOME HEALTH SERVICES | Facility: HOME HEALTH | Age: 72
End: 2022-05-14
Payer: MEDICARE

## 2022-05-14 ENCOUNTER — HOME CARE VISIT (OUTPATIENT)
Dept: SCHEDULING | Facility: HOME HEALTH | Age: 72
End: 2022-05-14
Payer: MEDICARE

## 2022-05-14 PROCEDURE — G0300 HHS/HOSPICE OF LPN EA 15 MIN: HCPCS

## 2022-05-16 ENCOUNTER — HOME CARE VISIT (OUTPATIENT)
Dept: SCHEDULING | Facility: HOME HEALTH | Age: 72
End: 2022-05-16
Payer: MEDICARE

## 2022-05-16 VITALS
SYSTOLIC BLOOD PRESSURE: 124 MMHG | WEIGHT: 125 LBS | DIASTOLIC BLOOD PRESSURE: 68 MMHG | BODY MASS INDEX: 23.62 KG/M2 | TEMPERATURE: 97.8 F | RESPIRATION RATE: 18 BRPM | HEART RATE: 80 BPM | OXYGEN SATURATION: 98 %

## 2022-05-16 PROCEDURE — G0299 HHS/HOSPICE OF RN EA 15 MIN: HCPCS

## 2022-05-17 VITALS
HEART RATE: 81 BPM | DIASTOLIC BLOOD PRESSURE: 76 MMHG | TEMPERATURE: 98.8 F | SYSTOLIC BLOOD PRESSURE: 122 MMHG | RESPIRATION RATE: 18 BRPM | OXYGEN SATURATION: 97 %

## 2022-05-19 ENCOUNTER — HOME CARE VISIT (OUTPATIENT)
Dept: SCHEDULING | Facility: HOME HEALTH | Age: 72
End: 2022-05-19
Payer: MEDICARE

## 2022-05-19 VITALS
SYSTOLIC BLOOD PRESSURE: 124 MMHG | OXYGEN SATURATION: 99 % | WEIGHT: 123.8 LBS | DIASTOLIC BLOOD PRESSURE: 60 MMHG | TEMPERATURE: 97.6 F | RESPIRATION RATE: 16 BRPM | HEART RATE: 70 BPM | BODY MASS INDEX: 23.39 KG/M2

## 2022-05-19 PROCEDURE — G0300 HHS/HOSPICE OF LPN EA 15 MIN: HCPCS

## 2022-05-19 RX ORDER — CIPROFLOXACIN 250 MG/1
250 TABLET, FILM COATED ORAL 2 TIMES DAILY
Qty: 14 TABLET | Refills: 0 | Status: SHIPPED | OUTPATIENT
Start: 2022-05-19 | End: 2022-05-26

## 2022-05-20 ENCOUNTER — TELEPHONE (OUTPATIENT)
Dept: INTERNAL MEDICINE CLINIC | Age: 72
End: 2022-05-20

## 2022-05-20 DIAGNOSIS — I50.22 CHRONIC SYSTOLIC CONGESTIVE HEART FAILURE (HCC): ICD-10-CM

## 2022-05-20 NOTE — TELEPHONE ENCOUNTER
Called and discussed with the patient's daughter. Creatinine elevated likely due to torsemide in the setting of chronic kidney disease. This has affected metabolism of diabetes medications. Advised holding Metformin (asked to do this yesterday) and to cut glipizide in half. Likely will need to hold/decrease torsemide. Also refer to nephrology due to her previous nephrologist (Dr. Ron Escamilla) having retired. Will forward to cardiology as well. Patient is a 87y old  Male who presents with a chief complaint of Preoperative Planning for Right above knee amputation (30 Apr 2022 10:54)      SUBJECTIVE / OVERNIGHT EVENTS: ptn transferred to the floor, vitals are stable, glucose is stable, remains on dysphagia diet, would probably advance it to dysphgagia 1 diet since ptn failed FEEST. on Abx for poss ASP PNA and or stump infection ( Zosyn), completed prednisone taper, hypoglycemia has resolved, on Hep drip being bridged to COumadin    MEDICATIONS  (STANDING):  acetaminophen     Tablet .. 975 milliGRAM(s) Oral every 6 hours  albuterol/ipratropium for Nebulization 3 milliLiter(s) Nebulizer every 6 hours  atorvastatin 40 milliGRAM(s) Oral at bedtime  buDESOnide    Inhalation Suspension 0.5 milliGRAM(s) Inhalation every 12 hours  finasteride 5 milliGRAM(s) Oral daily  furosemide    Tablet 20 milliGRAM(s) Oral two times a day  glycopyrrolate Injectable 0.1 milliGRAM(s) IV Push two times a day  guaiFENesin ER 1200 milliGRAM(s) Oral every 12 hours  heparin  Infusion 1550 Unit(s)/Hr (15.5 mL/Hr) IV Continuous <Continuous>  insulin glargine Injectable (LANTUS) 10 Unit(s) SubCutaneous at bedtime  insulin lispro (ADMELOG) corrective regimen sliding scale   SubCutaneous at bedtime  insulin lispro (ADMELOG) corrective regimen sliding scale   SubCutaneous three times a day before meals  levothyroxine 25 MICROGram(s) Oral daily  metoprolol succinate ER 25 milliGRAM(s) Oral daily  montelukast 10 milliGRAM(s) Oral daily  multivitamin/minerals 1 Tablet(s) Oral daily  pantoprazole    Tablet 40 milliGRAM(s) Oral before breakfast  piperacillin/tazobactam IVPB.. 3.375 Gram(s) IV Intermittent every 8 hours  polyethylene glycol 3350 17 Gram(s) Oral daily  senna 2 Tablet(s) Oral at bedtime  tamsulosin 0.8 milliGRAM(s) Oral at bedtime  warfarin 7.5 milliGRAM(s) Oral once    MEDICATIONS  (PRN):  oxyCODONE    IR 5 milliGRAM(s) Oral every 6 hours PRN Severe Pain (7 - 10)      Vital Signs Last 24 Hrs  T(F): 97.8 (04-30-22 @ 09:10), Max: 98.6 (04-30-22 @ 03:00)  HR: 70 (04-30-22 @ 09:10) (65 - 94)  BP: 148/70 (04-30-22 @ 09:10) (108/51 - 159/76)  RR: 20 (04-30-22 @ 09:10) (13 - 38)  SpO2: 93% (04-30-22 @ 09:10) (88% - 97%)  Telemetry:   CAPILLARY BLOOD GLUCOSE      POCT Blood Glucose.: 157 mg/dL (30 Apr 2022 09:20)  POCT Blood Glucose.: 242 mg/dL (29 Apr 2022 21:42)  POCT Blood Glucose.: 133 mg/dL (29 Apr 2022 17:40)    I&O's Summary    29 Apr 2022 07:01  -  30 Apr 2022 07:00  --------------------------------------------------------  IN: 550.5 mL / OUT: 1120 mL / NET: -569.5 mL        PHYSICAL EXAM:  GENERAL: NAD, well-developed  HEAD:  Atraumatic, Normocephalic  EYES: EOMI, PERRLA, conjunctiva and sclera clear  NECK: Supple, No JVD  CHEST/LUNG: Clear to auscultation bilaterally; No wheeze  HEART: Regular rate and rhythm; No murmurs, rubs, or gallops  ABDOMEN: Soft, Nontender, Nondistended; Bowel sounds present  EXTREMITIES:  2+ Peripheral Pulses, No clubbing, cyanosis, or edema  PSYCH: AAOx3  NEUROLOGY: non-focal  SKIN: No rashes or lesions    LABS:                        7.7    7.69  )-----------( 96       ( 30 Apr 2022 00:33 )             24.9     04-30    138  |  102  |  38<H>  ----------------------------<  186<H>  3.9   |  22  |  1.10    Ca    8.3<L>      30 Apr 2022 00:33  Phos  3.5     04-30  Mg     2.1     04-30      PT/INR - ( 30 Apr 2022 00:33 )   PT: 19.9 sec;   INR: 1.71 ratio         PTT - ( 30 Apr 2022 00:33 )  PTT:71.2 sec          RADIOLOGY & ADDITIONAL TESTS:    Imaging Personally Reviewed:    Consultant(s) Notes Reviewed:      Care Discussed with Consultants/Other Providers:

## 2022-05-23 RX ORDER — TORSEMIDE 20 MG/1
50 TABLET ORAL AS NEEDED
Qty: 225 TABLET | Refills: 3
Start: 2022-05-23 | End: 2022-06-06

## 2022-05-23 NOTE — TELEPHONE ENCOUNTER
Yes I agree with the rise to 3 now 4 , to hold torsemide and just use PRN  Likely chronic venous insufficiency in addition to cardiac diastolic edema and CKD 4    Current Outpatient Medications   Medication Instructions    aspirin delayed-release 81 mg, Oral, DAILY    carvediloL (COREG) 12.5 mg, Oral, 2 TIMES DAILY WITH MEALS    ciprofloxacin HCl (CIPRO) 250 mg, Oral, 2 TIMES DAILY    glipiZIDE SR (GLUCOTROL XL) 10 mg, Oral, DAILY    ibuprofen (MOTRIN) 400 mg, Oral, DAILY AS NEEDED    metFORMIN (GLUCOPHAGE) 500 mg, Oral, 2 TIMES DAILY WITH MEALS    mirtazapine (REMERON) 15 mg tablet TAKE 1 TABLET BY MOUTH AT BEDTIME FOR 1 WEEK, THEN 2 TABLETS NIGHTLY    simvastatin (ZOCOR) 20 mg, Oral, EVERY BEDTIME    torsemide (DEMADEX) 50 mg, Oral, DAILY

## 2022-05-23 NOTE — TELEPHONE ENCOUNTER
Verified patient with two types of identifiers. Updated patient's daughter about Torsemide PRN. And she will have her mother follow up with nephrology. Patient's daughter verbalized understanding and will call with any other questions.

## 2022-05-24 ENCOUNTER — HOME CARE VISIT (OUTPATIENT)
Dept: SCHEDULING | Facility: HOME HEALTH | Age: 72
End: 2022-05-24
Payer: MEDICARE

## 2022-05-24 VITALS
RESPIRATION RATE: 16 BRPM | HEART RATE: 69 BPM | SYSTOLIC BLOOD PRESSURE: 130 MMHG | WEIGHT: 121.7 LBS | OXYGEN SATURATION: 98 % | TEMPERATURE: 97 F | DIASTOLIC BLOOD PRESSURE: 62 MMHG | BODY MASS INDEX: 23 KG/M2

## 2022-05-24 PROCEDURE — G0300 HHS/HOSPICE OF LPN EA 15 MIN: HCPCS

## 2022-05-24 PROCEDURE — 400013 HH SOC

## 2022-05-27 ENCOUNTER — HOME CARE VISIT (OUTPATIENT)
Dept: SCHEDULING | Facility: HOME HEALTH | Age: 72
End: 2022-05-27
Payer: MEDICARE

## 2022-05-27 VITALS
OXYGEN SATURATION: 100 % | TEMPERATURE: 97.3 F | WEIGHT: 122.5 LBS | HEART RATE: 84 BPM | RESPIRATION RATE: 16 BRPM | SYSTOLIC BLOOD PRESSURE: 134 MMHG | BODY MASS INDEX: 23.15 KG/M2 | DIASTOLIC BLOOD PRESSURE: 62 MMHG

## 2022-05-27 PROCEDURE — G0300 HHS/HOSPICE OF LPN EA 15 MIN: HCPCS

## 2022-05-31 ENCOUNTER — HOME CARE VISIT (OUTPATIENT)
Dept: SCHEDULING | Facility: HOME HEALTH | Age: 72
End: 2022-05-31
Payer: MEDICARE

## 2022-05-31 VITALS
HEART RATE: 80 BPM | RESPIRATION RATE: 20 BRPM | DIASTOLIC BLOOD PRESSURE: 70 MMHG | SYSTOLIC BLOOD PRESSURE: 123 MMHG | TEMPERATURE: 97.5 F | OXYGEN SATURATION: 99 %

## 2022-05-31 PROCEDURE — G0300 HHS/HOSPICE OF LPN EA 15 MIN: HCPCS

## 2022-06-03 ENCOUNTER — HOME CARE VISIT (OUTPATIENT)
Dept: SCHEDULING | Facility: HOME HEALTH | Age: 72
End: 2022-06-03
Payer: MEDICARE

## 2022-06-03 PROCEDURE — G0300 HHS/HOSPICE OF LPN EA 15 MIN: HCPCS

## 2022-06-05 VITALS
OXYGEN SATURATION: 98 % | HEART RATE: 81 BPM | TEMPERATURE: 97 F | BODY MASS INDEX: 24.41 KG/M2 | RESPIRATION RATE: 16 BRPM | SYSTOLIC BLOOD PRESSURE: 122 MMHG | DIASTOLIC BLOOD PRESSURE: 66 MMHG | WEIGHT: 129.2 LBS

## 2022-06-05 LAB — CREATININE, EXTERNAL: 2.32

## 2022-06-06 ENCOUNTER — HOME CARE VISIT (OUTPATIENT)
Dept: SCHEDULING | Facility: HOME HEALTH | Age: 72
End: 2022-06-06
Payer: MEDICARE

## 2022-06-06 ENCOUNTER — TRANSCRIBE ORDER (OUTPATIENT)
Dept: SCHEDULING | Age: 72
End: 2022-06-06

## 2022-06-06 VITALS
SYSTOLIC BLOOD PRESSURE: 124 MMHG | RESPIRATION RATE: 16 BRPM | OXYGEN SATURATION: 100 % | WEIGHT: 126.3 LBS | BODY MASS INDEX: 23.86 KG/M2 | HEART RATE: 74 BPM | TEMPERATURE: 97.2 F | DIASTOLIC BLOOD PRESSURE: 68 MMHG

## 2022-06-06 DIAGNOSIS — N18.4 CHRONIC KIDNEY DISEASE, STAGE IV (SEVERE) (HCC): Primary | ICD-10-CM

## 2022-06-06 PROCEDURE — G0300 HHS/HOSPICE OF LPN EA 15 MIN: HCPCS

## 2022-06-06 RX ORDER — GLIPIZIDE 10 MG/1
10 TABLET, FILM COATED, EXTENDED RELEASE ORAL EVERY OTHER DAY
Qty: 90 TABLET | Refills: 1 | Status: SHIPPED | OUTPATIENT
Start: 2022-06-06

## 2022-06-10 ENCOUNTER — HOME CARE VISIT (OUTPATIENT)
Dept: SCHEDULING | Facility: HOME HEALTH | Age: 72
End: 2022-06-10
Payer: MEDICARE

## 2022-06-10 PROCEDURE — G0299 HHS/HOSPICE OF RN EA 15 MIN: HCPCS

## 2022-06-11 VITALS
HEART RATE: 79 BPM | DIASTOLIC BLOOD PRESSURE: 70 MMHG | OXYGEN SATURATION: 98 % | RESPIRATION RATE: 18 BRPM | SYSTOLIC BLOOD PRESSURE: 124 MMHG

## 2022-06-13 ENCOUNTER — TELEPHONE (OUTPATIENT)
Dept: CARDIOLOGY CLINIC | Age: 72
End: 2022-06-13

## 2022-06-13 NOTE — TELEPHONE ENCOUNTER
Patient's daughter is calling because she would like to speak with the nurse about her mother. The beginning of June 1, Anton Schlatter had her mom stop the fluid pill. But she spoke with the kidney doctor and her mom should be on this medicine. Patient is having SOB, and she is weak. Nephrologist said she needs to be on the fluid pill. Her legs are swollen. Daughter would also like to know how often her mom she be taking in fluids. Daughter is concerned.     [phone]

## 2022-06-14 ENCOUNTER — HOME CARE VISIT (OUTPATIENT)
Dept: SCHEDULING | Facility: HOME HEALTH | Age: 72
End: 2022-06-14
Payer: MEDICARE

## 2022-06-14 VITALS
HEART RATE: 61 BPM | SYSTOLIC BLOOD PRESSURE: 122 MMHG | RESPIRATION RATE: 18 BRPM | OXYGEN SATURATION: 99 % | DIASTOLIC BLOOD PRESSURE: 70 MMHG | TEMPERATURE: 97.4 F

## 2022-06-14 PROCEDURE — G0299 HHS/HOSPICE OF RN EA 15 MIN: HCPCS

## 2022-06-14 NOTE — TELEPHONE ENCOUNTER
Verified patient with two types of identifiers. Carl Scanlon reports her mother has not used her torsemide PRN and has gained weight and edema. She states nephrologist reports her creatinine has greatly improved. Let her know we will request records of these labs and advise her of the plan.

## 2022-06-15 PROCEDURE — A6212 FOAM DRG <=16 SQ IN W/BORDER: HCPCS

## 2022-06-16 NOTE — TELEPHONE ENCOUNTER
Spoke to Dr. Sameer Jerez nurse. She will call me back with plan for diuretic. We discussed that patient and daughter cannot take them PRN as this is too stressful for them.

## 2022-06-17 ENCOUNTER — HOME CARE VISIT (OUTPATIENT)
Dept: SCHEDULING | Facility: HOME HEALTH | Age: 72
End: 2022-06-17
Payer: MEDICARE

## 2022-06-17 ENCOUNTER — HOSPITAL ENCOUNTER (OUTPATIENT)
Dept: ULTRASOUND IMAGING | Age: 72
Discharge: HOME | End: 2022-06-17
Attending: INTERNAL MEDICINE
Payer: MEDICARE

## 2022-06-17 VITALS
BODY MASS INDEX: 23.54 KG/M2 | DIASTOLIC BLOOD PRESSURE: 68 MMHG | OXYGEN SATURATION: 99 % | TEMPERATURE: 97.5 F | HEART RATE: 65 BPM | RESPIRATION RATE: 16 BRPM | SYSTOLIC BLOOD PRESSURE: 134 MMHG | WEIGHT: 124.6 LBS

## 2022-06-17 DIAGNOSIS — N18.4 CHRONIC KIDNEY DISEASE, STAGE IV (SEVERE) (HCC): ICD-10-CM

## 2022-06-17 PROCEDURE — A6216 NON-STERILE GAUZE<=16 SQ IN: HCPCS

## 2022-06-17 PROCEDURE — G0300 HHS/HOSPICE OF LPN EA 15 MIN: HCPCS

## 2022-06-17 PROCEDURE — 76770 US EXAM ABDO BACK WALL COMP: CPT

## 2022-06-17 PROCEDURE — A9270 NON-COVERED ITEM OR SERVICE: HCPCS

## 2022-06-21 ENCOUNTER — HOME CARE VISIT (OUTPATIENT)
Dept: SCHEDULING | Facility: HOME HEALTH | Age: 72
End: 2022-06-21
Payer: MEDICARE

## 2022-06-21 VITALS
OXYGEN SATURATION: 98 % | HEART RATE: 64 BPM | BODY MASS INDEX: 22.73 KG/M2 | WEIGHT: 120.3 LBS | RESPIRATION RATE: 16 BRPM | SYSTOLIC BLOOD PRESSURE: 134 MMHG | TEMPERATURE: 97.6 F | DIASTOLIC BLOOD PRESSURE: 62 MMHG

## 2022-06-21 PROCEDURE — G0300 HHS/HOSPICE OF LPN EA 15 MIN: HCPCS

## 2022-06-21 PROCEDURE — 400014 HH F/U

## 2022-06-24 ENCOUNTER — HOME CARE VISIT (OUTPATIENT)
Dept: SCHEDULING | Facility: HOME HEALTH | Age: 72
End: 2022-06-24
Payer: MEDICARE

## 2022-06-24 PROCEDURE — G0300 HHS/HOSPICE OF LPN EA 15 MIN: HCPCS

## 2022-06-26 VITALS
RESPIRATION RATE: 16 BRPM | HEART RATE: 55 BPM | WEIGHT: 118.6 LBS | TEMPERATURE: 97.3 F | OXYGEN SATURATION: 100 % | BODY MASS INDEX: 22.41 KG/M2 | SYSTOLIC BLOOD PRESSURE: 134 MMHG | DIASTOLIC BLOOD PRESSURE: 62 MMHG

## 2022-06-27 ENCOUNTER — OFFICE VISIT (OUTPATIENT)
Dept: CARDIOLOGY CLINIC | Age: 72
End: 2022-06-27
Payer: MEDICARE

## 2022-06-27 DIAGNOSIS — Z95.810 PRESENCE OF AUTOMATIC CARDIOVERTER/DEFIBRILLATOR (AICD): Primary | ICD-10-CM

## 2022-06-27 PROCEDURE — 93295 DEV INTERROG REMOTE 1/2/MLT: CPT | Performed by: INTERNAL MEDICINE

## 2022-06-27 PROCEDURE — 93296 REM INTERROG EVL PM/IDS: CPT | Performed by: INTERNAL MEDICINE

## 2022-06-27 NOTE — LETTER
6/27/2022 10:28 AM    Ms. Felipa Lorenzo  [address] [facility] [address]            This letter confirms that we have received your scheduled remote check of your implanted     device on 6-27-22  . Our EP team will contact you via phone if there are significant abnormal    findings. Your next remote check from home is scheduled for 10-3-22  . If you have any questions, please call [address] [place] [address] Drive at [phone].                Sincerely,    Herbie Banegas MD [facility]

## 2022-06-28 ENCOUNTER — HOME CARE VISIT (OUTPATIENT)
Dept: SCHEDULING | Facility: HOME HEALTH | Age: 72
End: 2022-06-28
Payer: MEDICARE

## 2022-06-28 VITALS
WEIGHT: 117.9 LBS | HEART RATE: 68 BPM | SYSTOLIC BLOOD PRESSURE: 120 MMHG | BODY MASS INDEX: 22.28 KG/M2 | DIASTOLIC BLOOD PRESSURE: 64 MMHG | TEMPERATURE: 97.6 F | OXYGEN SATURATION: 95 % | RESPIRATION RATE: 16 BRPM

## 2022-06-28 PROCEDURE — G0300 HHS/HOSPICE OF LPN EA 15 MIN: HCPCS

## 2022-06-30 ENCOUNTER — HOME CARE VISIT (OUTPATIENT)
Dept: SCHEDULING | Facility: HOME HEALTH | Age: 72
End: 2022-06-30
Payer: MEDICARE

## 2022-06-30 VITALS
OXYGEN SATURATION: 99 % | HEART RATE: 63 BPM | RESPIRATION RATE: 16 BRPM | SYSTOLIC BLOOD PRESSURE: 128 MMHG | BODY MASS INDEX: 22.35 KG/M2 | TEMPERATURE: 97.2 F | WEIGHT: 118.3 LBS | DIASTOLIC BLOOD PRESSURE: 64 MMHG

## 2022-06-30 PROCEDURE — G0300 HHS/HOSPICE OF LPN EA 15 MIN: HCPCS

## 2022-07-05 ENCOUNTER — HOME CARE VISIT (OUTPATIENT)
Dept: SCHEDULING | Facility: HOME HEALTH | Age: 72
End: 2022-07-05
Payer: MEDICARE

## 2022-07-05 VITALS
BODY MASS INDEX: 22.67 KG/M2 | SYSTOLIC BLOOD PRESSURE: 128 MMHG | RESPIRATION RATE: 16 BRPM | HEART RATE: 71 BPM | WEIGHT: 120 LBS | TEMPERATURE: 97.5 F | DIASTOLIC BLOOD PRESSURE: 60 MMHG | OXYGEN SATURATION: 97 %

## 2022-07-05 PROCEDURE — G0300 HHS/HOSPICE OF LPN EA 15 MIN: HCPCS

## 2022-07-08 ENCOUNTER — HOME CARE VISIT (OUTPATIENT)
Dept: SCHEDULING | Facility: HOME HEALTH | Age: 72
End: 2022-07-08
Payer: MEDICARE

## 2022-07-08 VITALS
HEART RATE: 72 BPM | DIASTOLIC BLOOD PRESSURE: 66 MMHG | OXYGEN SATURATION: 97 % | SYSTOLIC BLOOD PRESSURE: 138 MMHG | TEMPERATURE: 97.7 F | RESPIRATION RATE: 16 BRPM | WEIGHT: 119.5 LBS | BODY MASS INDEX: 22.58 KG/M2

## 2022-07-08 PROCEDURE — G0300 HHS/HOSPICE OF LPN EA 15 MIN: HCPCS

## 2022-07-12 ENCOUNTER — HOME CARE VISIT (OUTPATIENT)
Dept: SCHEDULING | Facility: HOME HEALTH | Age: 72
End: 2022-07-12
Payer: MEDICARE

## 2022-07-12 PROCEDURE — G0299 HHS/HOSPICE OF RN EA 15 MIN: HCPCS

## 2022-07-15 ENCOUNTER — HOME CARE VISIT (OUTPATIENT)
Dept: SCHEDULING | Facility: HOME HEALTH | Age: 72
End: 2022-07-15
Payer: MEDICARE

## 2022-07-15 PROCEDURE — G0300 HHS/HOSPICE OF LPN EA 15 MIN: HCPCS

## 2022-07-17 VITALS
BODY MASS INDEX: 22.2 KG/M2 | TEMPERATURE: 97.1 F | OXYGEN SATURATION: 100 % | SYSTOLIC BLOOD PRESSURE: 138 MMHG | RESPIRATION RATE: 16 BRPM | DIASTOLIC BLOOD PRESSURE: 64 MMHG | WEIGHT: 117.5 LBS | HEART RATE: 72 BPM

## 2022-07-19 ENCOUNTER — HOME CARE VISIT (OUTPATIENT)
Dept: SCHEDULING | Facility: HOME HEALTH | Age: 72
End: 2022-07-19
Payer: MEDICARE

## 2022-07-19 VITALS
OXYGEN SATURATION: 100 % | HEART RATE: 62 BPM | WEIGHT: 119.7 LBS | BODY MASS INDEX: 22.62 KG/M2 | TEMPERATURE: 97.8 F | DIASTOLIC BLOOD PRESSURE: 62 MMHG | RESPIRATION RATE: 16 BRPM | SYSTOLIC BLOOD PRESSURE: 134 MMHG

## 2022-07-19 PROCEDURE — G0300 HHS/HOSPICE OF LPN EA 15 MIN: HCPCS

## 2022-07-19 PROCEDURE — 400014 HH F/U

## 2022-07-22 ENCOUNTER — TELEPHONE (OUTPATIENT)
Dept: INTERNAL MEDICINE CLINIC | Age: 72
End: 2022-07-22

## 2022-07-22 ENCOUNTER — HOME CARE VISIT (OUTPATIENT)
Dept: SCHEDULING | Facility: HOME HEALTH | Age: 72
End: 2022-07-22
Payer: MEDICARE

## 2022-07-22 PROCEDURE — G0300 HHS/HOSPICE OF LPN EA 15 MIN: HCPCS

## 2022-07-22 NOTE — TELEPHONE ENCOUNTER
Returned call to [employer]. He stated at Ms Palak Gould home visit she c/o swelling in right ankle. No warmth, tender to touch.pain 8/10. Mildly swollen. Pictures were place in chart. Per Joe patient daughter was advised to watch closely this weekend. Call provider office next week with any concerns or appointment.

## 2022-07-22 NOTE — TELEPHONE ENCOUNTER
Reason for call: FYI     Pt has swelling in her right ankle swollen a little         Is this a new problem: yes     Date of last appointment:  5/13/2022     Can we respond via How[employer]hart: no    Best call back number: Therese Davilates [phone] with [facility]

## 2022-07-24 VITALS
WEIGHT: 119 LBS | BODY MASS INDEX: 22.48 KG/M2 | RESPIRATION RATE: 16 BRPM | OXYGEN SATURATION: 100 % | HEART RATE: 68 BPM | TEMPERATURE: 97.8 F | DIASTOLIC BLOOD PRESSURE: 62 MMHG | SYSTOLIC BLOOD PRESSURE: 134 MMHG

## 2022-07-26 ENCOUNTER — HOME CARE VISIT (OUTPATIENT)
Dept: SCHEDULING | Facility: HOME HEALTH | Age: 72
End: 2022-07-26
Payer: MEDICARE

## 2022-07-26 VITALS
OXYGEN SATURATION: 99 % | BODY MASS INDEX: 22.77 KG/M2 | DIASTOLIC BLOOD PRESSURE: 60 MMHG | WEIGHT: 120.5 LBS | HEART RATE: 64 BPM | SYSTOLIC BLOOD PRESSURE: 118 MMHG | TEMPERATURE: 97 F | RESPIRATION RATE: 16 BRPM

## 2022-07-26 PROCEDURE — G0300 HHS/HOSPICE OF LPN EA 15 MIN: HCPCS

## 2022-07-29 ENCOUNTER — HOME CARE VISIT (OUTPATIENT)
Dept: SCHEDULING | Facility: HOME HEALTH | Age: 72
End: 2022-07-29
Payer: MEDICARE

## 2022-07-29 PROCEDURE — G0300 HHS/HOSPICE OF LPN EA 15 MIN: HCPCS

## 2022-07-31 VITALS
SYSTOLIC BLOOD PRESSURE: 128 MMHG | WEIGHT: 119.5 LBS | OXYGEN SATURATION: 100 % | BODY MASS INDEX: 22.58 KG/M2 | DIASTOLIC BLOOD PRESSURE: 70 MMHG | TEMPERATURE: 97.3 F | RESPIRATION RATE: 16 BRPM | HEART RATE: 64 BPM

## 2022-08-02 ENCOUNTER — HOME CARE VISIT (OUTPATIENT)
Dept: SCHEDULING | Facility: HOME HEALTH | Age: 72
End: 2022-08-02
Payer: MEDICARE

## 2022-08-02 VITALS
DIASTOLIC BLOOD PRESSURE: 66 MMHG | WEIGHT: 121.5 LBS | RESPIRATION RATE: 16 BRPM | TEMPERATURE: 97.6 F | HEART RATE: 63 BPM | BODY MASS INDEX: 22.96 KG/M2 | OXYGEN SATURATION: 99 % | SYSTOLIC BLOOD PRESSURE: 128 MMHG

## 2022-08-02 PROCEDURE — G0300 HHS/HOSPICE OF LPN EA 15 MIN: HCPCS

## 2022-08-05 ENCOUNTER — HOME CARE VISIT (OUTPATIENT)
Dept: SCHEDULING | Facility: HOME HEALTH | Age: 72
End: 2022-08-05
Payer: MEDICARE

## 2022-08-05 VITALS
TEMPERATURE: 97.2 F | RESPIRATION RATE: 20 BRPM | OXYGEN SATURATION: 100 % | HEART RATE: 69 BPM | SYSTOLIC BLOOD PRESSURE: 132 MMHG | DIASTOLIC BLOOD PRESSURE: 74 MMHG

## 2022-08-05 PROCEDURE — G0300 HHS/HOSPICE OF LPN EA 15 MIN: HCPCS

## 2022-08-09 ENCOUNTER — HOME CARE VISIT (OUTPATIENT)
Dept: SCHEDULING | Facility: HOME HEALTH | Age: 72
End: 2022-08-09
Payer: MEDICARE

## 2022-08-09 VITALS
HEART RATE: 67 BPM | TEMPERATURE: 98.2 F | SYSTOLIC BLOOD PRESSURE: 124 MMHG | DIASTOLIC BLOOD PRESSURE: 72 MMHG | OXYGEN SATURATION: 98 % | RESPIRATION RATE: 19 BRPM

## 2022-08-09 PROCEDURE — G0299 HHS/HOSPICE OF RN EA 15 MIN: HCPCS

## 2022-08-11 ENCOUNTER — TELEPHONE (OUTPATIENT)
Dept: INTERNAL MEDICINE CLINIC | Age: 72
End: 2022-08-11

## 2022-08-11 NOTE — TELEPHONE ENCOUNTER
Reason for call:  needs to know what mirtazapine is being prescibed for. Patients moods have changed.     Is this a new problem: yes     Date of last appointment:  5/13/2022     Can we respond via [employer]: no    Best call back number:   Ben Flakes () [phone] (Home)

## 2022-08-12 ENCOUNTER — HOME CARE VISIT (OUTPATIENT)
Dept: SCHEDULING | Facility: HOME HEALTH | Age: 72
End: 2022-08-12
Payer: MEDICARE

## 2022-08-12 PROCEDURE — G0300 HHS/HOSPICE OF LPN EA 15 MIN: HCPCS

## 2022-08-12 NOTE — TELEPHONE ENCOUNTER
Returned call to patient's daughter, Sa[facility] She was notified her mother is taking Remeron for sleep. She stated if she has any other question she will give the office a call.

## 2022-08-14 VITALS
OXYGEN SATURATION: 95 % | RESPIRATION RATE: 20 BRPM | BODY MASS INDEX: 22.86 KG/M2 | HEART RATE: 77 BPM | DIASTOLIC BLOOD PRESSURE: 70 MMHG | TEMPERATURE: 96.7 F | SYSTOLIC BLOOD PRESSURE: 125 MMHG | WEIGHT: 121 LBS

## 2022-08-15 ENCOUNTER — HOME CARE VISIT (OUTPATIENT)
Dept: SCHEDULING | Facility: HOME HEALTH | Age: 72
End: 2022-08-15
Payer: MEDICARE

## 2022-08-15 PROCEDURE — G0299 HHS/HOSPICE OF RN EA 15 MIN: HCPCS

## 2022-08-16 VITALS
RESPIRATION RATE: 17 BRPM | SYSTOLIC BLOOD PRESSURE: 122 MMHG | DIASTOLIC BLOOD PRESSURE: 65 MMHG | TEMPERATURE: 98.1 F | HEART RATE: 65 BPM | OXYGEN SATURATION: 96 %

## 2022-08-18 ENCOUNTER — HOME CARE VISIT (OUTPATIENT)
Dept: SCHEDULING | Facility: HOME HEALTH | Age: 72
End: 2022-08-18
Payer: MEDICARE

## 2022-08-18 VITALS
BODY MASS INDEX: 23.49 KG/M2 | SYSTOLIC BLOOD PRESSURE: 128 MMHG | DIASTOLIC BLOOD PRESSURE: 74 MMHG | OXYGEN SATURATION: 99 % | WEIGHT: 124.3 LBS | HEART RATE: 83 BPM | TEMPERATURE: 97.5 F | RESPIRATION RATE: 16 BRPM

## 2022-08-18 PROCEDURE — 400014 HH F/U

## 2022-08-18 PROCEDURE — G0300 HHS/HOSPICE OF LPN EA 15 MIN: HCPCS

## 2022-08-22 ENCOUNTER — HOME CARE VISIT (OUTPATIENT)
Dept: SCHEDULING | Facility: HOME HEALTH | Age: 72
End: 2022-08-22
Payer: MEDICARE

## 2022-08-22 VITALS
DIASTOLIC BLOOD PRESSURE: 78 MMHG | RESPIRATION RATE: 20 BRPM | SYSTOLIC BLOOD PRESSURE: 132 MMHG | HEART RATE: 75 BPM | OXYGEN SATURATION: 98 % | TEMPERATURE: 96.8 F

## 2022-08-22 PROCEDURE — G0300 HHS/HOSPICE OF LPN EA 15 MIN: HCPCS

## 2022-08-24 ENCOUNTER — HOME CARE VISIT (OUTPATIENT)
Dept: SCHEDULING | Facility: HOME HEALTH | Age: 72
End: 2022-08-24
Payer: MEDICARE

## 2022-08-24 VITALS
WEIGHT: 122.3 LBS | RESPIRATION RATE: 16 BRPM | SYSTOLIC BLOOD PRESSURE: 134 MMHG | DIASTOLIC BLOOD PRESSURE: 60 MMHG | TEMPERATURE: 97.2 F | HEART RATE: 68 BPM | OXYGEN SATURATION: 100 % | BODY MASS INDEX: 23.11 KG/M2

## 2022-08-24 PROCEDURE — G0300 HHS/HOSPICE OF LPN EA 15 MIN: HCPCS

## 2022-08-29 ENCOUNTER — HOME CARE VISIT (OUTPATIENT)
Dept: SCHEDULING | Facility: HOME HEALTH | Age: 72
End: 2022-08-29
Payer: MEDICARE

## 2022-08-29 VITALS
OXYGEN SATURATION: 100 % | HEART RATE: 68 BPM | RESPIRATION RATE: 20 BRPM | SYSTOLIC BLOOD PRESSURE: 115 MMHG | TEMPERATURE: 97.7 F | DIASTOLIC BLOOD PRESSURE: 63 MMHG

## 2022-08-29 PROCEDURE — G0300 HHS/HOSPICE OF LPN EA 15 MIN: HCPCS

## 2022-08-31 ENCOUNTER — HOME CARE VISIT (OUTPATIENT)
Dept: SCHEDULING | Facility: HOME HEALTH | Age: 72
End: 2022-08-31
Payer: MEDICARE

## 2022-08-31 VITALS
DIASTOLIC BLOOD PRESSURE: 68 MMHG | TEMPERATURE: 97 F | WEIGHT: 124.31 LBS | BODY MASS INDEX: 23.49 KG/M2 | RESPIRATION RATE: 20 BRPM | OXYGEN SATURATION: 100 % | HEART RATE: 65 BPM | SYSTOLIC BLOOD PRESSURE: 112 MMHG

## 2022-08-31 PROCEDURE — G0300 HHS/HOSPICE OF LPN EA 15 MIN: HCPCS

## 2022-09-06 ENCOUNTER — HOME CARE VISIT (OUTPATIENT)
Dept: SCHEDULING | Facility: HOME HEALTH | Age: 72
End: 2022-09-06
Payer: MEDICARE

## 2022-09-06 VITALS
SYSTOLIC BLOOD PRESSURE: 123 MMHG | HEART RATE: 65 BPM | BODY MASS INDEX: 23.62 KG/M2 | WEIGHT: 125 LBS | DIASTOLIC BLOOD PRESSURE: 65 MMHG | TEMPERATURE: 96.5 F | OXYGEN SATURATION: 100 % | RESPIRATION RATE: 18 BRPM

## 2022-09-06 PROCEDURE — G0300 HHS/HOSPICE OF LPN EA 15 MIN: HCPCS

## 2022-09-09 ENCOUNTER — HOME CARE VISIT (OUTPATIENT)
Dept: SCHEDULING | Facility: HOME HEALTH | Age: 72
End: 2022-09-09
Payer: MEDICARE

## 2022-09-09 PROCEDURE — G0300 HHS/HOSPICE OF LPN EA 15 MIN: HCPCS

## 2022-09-11 VITALS
TEMPERATURE: 96.5 F | DIASTOLIC BLOOD PRESSURE: 80 MMHG | BODY MASS INDEX: 23.5 KG/M2 | RESPIRATION RATE: 18 BRPM | SYSTOLIC BLOOD PRESSURE: 143 MMHG | HEART RATE: 66 BPM | OXYGEN SATURATION: 96 % | WEIGHT: 124.38 LBS

## 2022-09-12 ENCOUNTER — HOME CARE VISIT (OUTPATIENT)
Dept: SCHEDULING | Facility: HOME HEALTH | Age: 72
End: 2022-09-12
Payer: MEDICARE

## 2022-09-12 ENCOUNTER — TELEPHONE (OUTPATIENT)
Dept: INTERNAL MEDICINE CLINIC | Age: 72
End: 2022-09-12

## 2022-09-12 VITALS
RESPIRATION RATE: 20 BRPM | SYSTOLIC BLOOD PRESSURE: 124 MMHG | WEIGHT: 125 LBS | HEART RATE: 71 BPM | DIASTOLIC BLOOD PRESSURE: 65 MMHG | OXYGEN SATURATION: 100 % | BODY MASS INDEX: 23.62 KG/M2 | TEMPERATURE: 97 F

## 2022-09-12 PROCEDURE — G0300 HHS/HOSPICE OF LPN EA 15 MIN: HCPCS

## 2022-09-12 NOTE — TELEPHONE ENCOUNTER
Reason for call:    Zohaib Lawrence, Nurse with [facility], called regarding the wound on patient's right leg. She said it is very tender and she could barely clean it with saline solution. She also said it is not healing. She is requesting Santyl.     Is this a new problem: yes     Date of last appointment:  5/13/2022     Can we respond via Outsmarthart: no    Best call back number:     Zohaib Lawrence - [phone]

## 2022-09-14 ENCOUNTER — TELEPHONE (OUTPATIENT)
Dept: CARDIOLOGY CLINIC | Age: 72
End: 2022-09-14

## 2022-09-14 ENCOUNTER — HOME CARE VISIT (OUTPATIENT)
Dept: SCHEDULING | Facility: HOME HEALTH | Age: 72
End: 2022-09-14
Payer: MEDICARE

## 2022-09-14 VITALS
OXYGEN SATURATION: 94 % | BODY MASS INDEX: 23.62 KG/M2 | TEMPERATURE: 96.8 F | DIASTOLIC BLOOD PRESSURE: 76 MMHG | SYSTOLIC BLOOD PRESSURE: 122 MMHG | HEART RATE: 61 BPM | WEIGHT: 125 LBS | RESPIRATION RATE: 18 BRPM

## 2022-09-14 PROCEDURE — G0300 HHS/HOSPICE OF LPN EA 15 MIN: HCPCS

## 2022-09-19 ENCOUNTER — HOME CARE VISIT (OUTPATIENT)
Dept: SCHEDULING | Facility: HOME HEALTH | Age: 72
End: 2022-09-19
Payer: MEDICARE

## 2022-09-19 VITALS
HEART RATE: 84 BPM | RESPIRATION RATE: 20 BRPM | DIASTOLIC BLOOD PRESSURE: 84 MMHG | WEIGHT: 124.5 LBS | SYSTOLIC BLOOD PRESSURE: 130 MMHG | OXYGEN SATURATION: 97 % | BODY MASS INDEX: 23.52 KG/M2

## 2022-09-19 PROCEDURE — G0300 HHS/HOSPICE OF LPN EA 15 MIN: HCPCS

## 2022-09-19 PROCEDURE — 400014 HH F/U

## 2022-09-21 ENCOUNTER — HOME CARE VISIT (OUTPATIENT)
Dept: SCHEDULING | Facility: HOME HEALTH | Age: 72
End: 2022-09-21
Payer: MEDICARE

## 2022-09-21 ENCOUNTER — TELEPHONE (OUTPATIENT)
Dept: CARDIOLOGY CLINIC | Age: 72
End: 2022-09-21

## 2022-09-21 VITALS
WEIGHT: 125.13 LBS | TEMPERATURE: 96.8 F | BODY MASS INDEX: 23.64 KG/M2 | SYSTOLIC BLOOD PRESSURE: 124 MMHG | RESPIRATION RATE: 20 BRPM | OXYGEN SATURATION: 100 % | DIASTOLIC BLOOD PRESSURE: 64 MMHG | HEART RATE: 66 BPM

## 2022-09-21 PROCEDURE — G0300 HHS/HOSPICE OF LPN EA 15 MIN: HCPCS

## 2022-09-21 NOTE — TELEPHONE ENCOUNTER
ANJEL from [facility] is calling to discuss pt's wound care. Please Advise.      [facility]    [phone]

## 2022-09-21 NOTE — TELEPHONE ENCOUNTER
Ed Nicely, LPN  You 16 hours ago (4:12 PM)     PM  Patient need to schedule an appointment. Can be Virtual.  Need information on wound. Gin Mitchell MD  Ed Nicely, LPN 18 hours ago (8:43 PM)     [facility]  Need details regarding the wound, if needed, we can do a virtual appt. You  Gin Mitchell MD 7 days ago     Luke Bergeron[facility]? Dr. Shae Beal is on vacation and seems like it should be from [facility] and [facility] will reach out to patient's daughter Stephani Jeter to set up a VV to assess . Micky [place]wood wound.

## 2022-09-22 NOTE — TELEPHONE ENCOUNTER
Attempted to reach [facility] with [facility] by telephone. A message was left stating PCP will take over [facility] orders.

## 2022-09-23 ENCOUNTER — HOME CARE VISIT (OUTPATIENT)
Dept: HOME HEALTH SERVICES | Facility: HOME HEALTH | Age: 72
End: 2022-09-23
Payer: MEDICARE

## 2022-09-23 ENCOUNTER — VIRTUAL VISIT (OUTPATIENT)
Dept: INTERNAL MEDICINE CLINIC | Age: 72
End: 2022-09-23
Payer: MEDICARE

## 2022-09-23 ENCOUNTER — HOSPITAL ENCOUNTER (EMERGENCY)
Age: 72
Discharge: HOME | End: 2022-09-23
Attending: STUDENT IN AN ORGANIZED HEALTH CARE EDUCATION/TRAINING PROGRAM | Admitting: STUDENT IN AN ORGANIZED HEALTH CARE EDUCATION/TRAINING PROGRAM
Payer: MEDICARE

## 2022-09-23 VITALS
DIASTOLIC BLOOD PRESSURE: 72 MMHG | TEMPERATURE: 97.6 F | OXYGEN SATURATION: 99 % | RESPIRATION RATE: 18 BRPM | HEART RATE: 73 BPM | SYSTOLIC BLOOD PRESSURE: 136 MMHG

## 2022-09-23 DIAGNOSIS — S81.801A LEG WOUND, RIGHT, INITIAL ENCOUNTER: Primary | ICD-10-CM

## 2022-09-23 DIAGNOSIS — S81.802A OPEN WOUND OF LEFT LOWER EXTREMITY, INITIAL ENCOUNTER: Primary | ICD-10-CM

## 2022-09-23 DIAGNOSIS — L03.115 CELLULITIS OF RIGHT LOWER LEG: ICD-10-CM

## 2022-09-23 PROCEDURE — G8754 DIAS BP LESS 90: HCPCS | Performed by: INTERNAL MEDICINE

## 2022-09-23 PROCEDURE — G9899 SCRN MAM PERF RSLTS DOC: HCPCS | Performed by: INTERNAL MEDICINE

## 2022-09-23 PROCEDURE — G8427 DOCREV CUR MEDS BY ELIG CLIN: HCPCS | Performed by: INTERNAL MEDICINE

## 2022-09-23 PROCEDURE — G8752 SYS BP LESS 140: HCPCS | Performed by: INTERNAL MEDICINE

## 2022-09-23 PROCEDURE — 1090F PRES/ABSN URINE INCON ASSESS: CPT | Performed by: INTERNAL MEDICINE

## 2022-09-23 PROCEDURE — 1101F PT FALLS ASSESS-DOCD LE1/YR: CPT | Performed by: INTERNAL MEDICINE

## 2022-09-23 PROCEDURE — 99283 EMERGENCY DEPT VISIT LOW MDM: CPT

## 2022-09-23 PROCEDURE — 3017F COLORECTAL CA SCREEN DOC REV: CPT | Performed by: INTERNAL MEDICINE

## 2022-09-23 PROCEDURE — G9717 DOC PT DX DEP/BP F/U NT REQ: HCPCS | Performed by: INTERNAL MEDICINE

## 2022-09-23 PROCEDURE — 99213 OFFICE O/P EST LOW 20 MIN: CPT | Performed by: INTERNAL MEDICINE

## 2022-09-23 PROCEDURE — G8399 PT W/DXA RESULTS DOCUMENT: HCPCS | Performed by: INTERNAL MEDICINE

## 2022-09-23 RX ORDER — MUPIROCIN 20 MG/G
OINTMENT TOPICAL 2 TIMES DAILY
Qty: 22 G | Refills: 0 | Status: SHIPPED | OUTPATIENT
Start: 2022-09-23

## 2022-09-23 RX ORDER — DOXYCYCLINE HYCLATE 100 MG
100 TABLET ORAL 2 TIMES DAILY
Qty: 14 TABLET | Refills: 0 | Status: SHIPPED | OUTPATIENT
Start: 2022-09-23 | End: 2022-09-30

## 2022-09-23 NOTE — ED TRIAGE NOTES
Pt c/o of wound on right leg. Pcp sent her over to get the wound looked at. Pt states wound has drainage with yellow tint. Pt states this is not a new wound. Denies fever or chills.

## 2022-09-23 NOTE — CASE COMMUNICATION
Order placed for wound care center referal. Per Dr Yordan Bower, pt is to go to ER today and have RLE wound evaluated, debrided and antibiotics obtained. Daughter Junior Ny instructed on these orders and she stated she will try to get her mom to go.

## 2022-09-23 NOTE — ED PROVIDER NOTES
65yo female diabetic, hx of cardiomyopathy, HTN, high cholesterol presenting with R lower leg wound present for over 1 year, under the care of home health, presenting for increase in drainage and size of the wound the past few days. Sent by pcp for evaluation and abx. Patient and daugther who is with her deny F/C, vomiting, calf pain, LE edema. Not currently on abx or topical treatment. Able to ambulate without issue. Denies recent trauma, fall or injury. States \"I am going out of town tomorrow and I am going to be admitted. \"        Past Medical History:   Diagnosis Date    Calculus of kidney     Cardiomyopathy ([facility] [address]     idiopathic    Chronic systolic heart failure [facility])     April 2010 ef 20%, by Dec 2010 up to 50% on meds, cath with normal cors    Diabetes [facility])     Essential hypertension, benign     Hypercholesterolemia        Past Surgical History:   Procedure Laterality Date    HX HYSTERECTOMY      HX ORTHOPAEDIC      HX OTHER SURGICAL  10/2017    difibulator     HX UROLOGICAL      stone removal    MD CARDIAC SURG PROCEDURE UNLIST           Family History:   Problem Relation Age of Onset    Stroke Mother     Hypertension Mother     Lung Disease Father         emphysema    Heart Disease Brother     Stroke Brother     Cancer Brother         bone    Cancer Maternal Grandfather         colon ca    Cancer Other         breast ca    Breast Cancer Sister 79    Breast Cancer Daughter 28       Social History     Socioeconomic History    Marital status:      Spouse name: Not on file    Number of children: Not on file    Years of education: Not on file    Highest education level: Not on file   Occupational History    Not on file   Tobacco Use    Smoking status: Never    Smokeless tobacco: Never   Substance and Sexual Activity    Alcohol use: No    Drug use: No    Sexual activity: Not Currently     Partners: Male     Comment: ,working,2 children,43 and 45 yrs old   Other Topics Concern    Not on file   Social History Narrative    Not on file     Social Determinants of Health     Financial Resource Strain: Not on file   Food Insecurity: Not on file   Transportation Needs: Not on file   Physical Activity: Not on file   Stress: Not on file   Social Connections: Not on file   Intimate Partner Violence: Not on file   Housing Stability: Not on file         ALLERGIES: Codeine and Pcn [penicillins]    Review of Systems   Constitutional: Negative. Negative for activity change, chills, fatigue and unexpected weight change. HENT:  Negative for trouble swallowing. Respiratory:  Negative for cough, chest tightness, shortness of breath and wheezing. Cardiovascular: Negative. Negative for chest pain and palpitations. Gastrointestinal: Negative. Negative for abdominal pain, diarrhea, nausea and vomiting. Genitourinary: Negative. Negative for dysuria, flank pain, frequency and hematuria. Musculoskeletal: Negative. Negative for arthralgias, back pain, neck pain and neck stiffness. Skin:  Positive for wound. Negative for color change and rash. Neurological: Negative. Negative for dizziness, numbness and headaches. All other systems reviewed and are negative. Vitals:    09/23/22 1406   BP: 136/72   Pulse: 73   Resp: 18   Temp: 97.6 °F (36.4 °C)   SpO2: 99%            Physical Exam  Vitals and nursing note reviewed. Constitutional:       General: She is not in acute distress. Appearance: Normal appearance. She is well-developed. She is not toxic-appearing or diaphoretic. HENT:      Head: Normocephalic and atraumatic. Eyes:      General:         Right eye: No discharge. Left eye: No discharge. Conjunctiva/sclera: Conjunctivae normal.   Neck:      Trachea: No tracheal tenderness. Cardiovascular:      Rate and Rhythm: Normal rate and regular rhythm. Pulses: Normal pulses. Heart sounds: Normal heart sounds. No murmur heard. No friction rub. No gallop.    Pulmonary:      Effort: Pulmonary effort is normal. No respiratory distress. Breath sounds: Normal breath sounds. No wheezing or rales. Chest:      Chest wall: No tenderness. Abdominal:      General: Bowel sounds are normal. There is no distension. Palpations: Abdomen is soft. Tenderness: There is no abdominal tenderness. There is no guarding or rebound. Musculoskeletal:         General: No tenderness. Normal range of motion. Cervical back: Full passive range of motion without pain and normal range of motion. Skin:     General: Skin is warm and dry. Capillary Refill: Capillary refill takes less than 2 seconds. Findings: No abrasion, erythema or rash. Comments: R lower anterior shin with two ulcerated wounds with yellow serous drainage noted and +odor. No surrounding cellulitis, warmth or induration. FROM of knee, ankle; gait normal and unassisted. No calf TTP or palpable cords. Neurological:      General: No focal deficit present. Mental Status: She is alert and oriented to person, place, and time. Cranial Nerves: No cranial nerve deficit. Sensory: No sensory deficit. Coordination: Coordination normal.   Psychiatric:         Speech: Speech normal.         Behavior: Behavior normal.        MDM  Number of Diagnoses or Management Options  Cellulitis of right lower leg  Leg wound, right, initial encounter  Diagnosis management comments:   Ddx: wound infection, cellulitis       Amount and/or Complexity of Data Reviewed  Review and summarize past medical records: yes  Discuss the patient with other providers: yes    Patient Progress  Patient progress: stable         Procedures  Patient is a known diabetic with wound to RLE cared for by home health sent for wound evaluation. Discussed labs, xray with patient who states regardless of results of these she will not stay for admission as she is going to the beach tomorrow.  I advised risk and benefit and strongly discouraged going into the water and she states she will not. Discussed with my attending who states okay to discharge with doxycycline, bactroban ointment and wound care. She declines wound cleaning here, states she will do this at home and is ready to go. No signs of necrosis or eschar noted. She has wound care being scheduled through her pcp and home health nurse. I also advised if symptoms worsen or new concerning symptoms develop to immediately be seen at an ER and she and daughter agree to this plan and all questions answered. DISCHARGE NOTE:  3:56 PM  The patient has been re-evaluated and feeling much better and are stable for discharge. All available radiology and laboratory results have been reviewed with patient and/or available family. Patient and/or family verbally conveyed their understanding and agreement of the patient's signs, symptoms, diagnosis, treatment and prognosis and additionally agree to follow-up as recommended in the discharge instructions or to return to the Emergency Department should their condition change or worsen prior to their follow-up appointment. All questions have been answered and patient and/or available family express understanding. IMPRESSION:  1. Leg wound, right, initial encounter    2. Cellulitis of right lower leg        PLAN:  Follow-up Information       Follow up With Specialties Details Why Contact Info    Zeinab Dunn MD Internal Medicine Physician Schedule an appointment as soon as possible for a visit  for wound check in 2-3 days. [address] [place] [address] Dr CELENA Grace [address] [facility]            Current Discharge Medication List        START taking these medications    Details   doxycycline (VIBRA-TABS) 100 mg tablet Take 1 Tablet by mouth two (2) times a day for 7 days. Qty: 14 Tablet, Refills: 0  Start date: 9/23/2022, End date: 9/30/2022      mupirocin (BACTROBAN) 2 % ointment Apply  to affected area two (2) times a day.   Qty: 22 g, Refills: 0  Start date: 9/23/2022

## 2022-09-26 ENCOUNTER — HOME CARE VISIT (OUTPATIENT)
Dept: SCHEDULING | Facility: HOME HEALTH | Age: 72
End: 2022-09-26
Payer: MEDICARE

## 2022-09-28 ENCOUNTER — HOME CARE VISIT (OUTPATIENT)
Dept: SCHEDULING | Facility: HOME HEALTH | Age: 72
End: 2022-09-28
Payer: MEDICARE

## 2022-09-29 DIAGNOSIS — R41.3 MEMORY LOSS: ICD-10-CM

## 2022-09-29 RX ORDER — MIRTAZAPINE 15 MG/1
TABLET, FILM COATED ORAL
Qty: 45 TABLET | Refills: 3 | Status: SHIPPED | OUTPATIENT
Start: 2022-09-29

## 2022-09-29 NOTE — TELEPHONE ENCOUNTER
Requested Prescriptions     Pending Prescriptions Disp Refills    mirtazapine (REMERON) 15 mg tablet 45 Tablet 3     Sig: TAKE 1 TABLET BY MOUTH AT BEDTIME FOR 1 WEEK, THEN 2 TABLETS NIGHTLY     [facility]/pharmacy #[id number]- [place], [place] - [address] AT [address] [place] [address]  [phone]

## 2022-09-30 NOTE — PROGRESS NOTES
Kenneth Bradley is a 67 y.o. female who was seen by synchronous (real-time) audio-video technology on 9/23/2022. She confirmed that, for purposes of billing, this is a virtual visit with her provider for which we will submit a claim for reimbursement to her insurance company. She is aware that she will be responsible for any copays, coinsurance amounts or other amounts not covered by her insurance company. Do you accept - YES    This visit was completed was completed virtually using [email or web address]        Subjective: Kenneth Bradley was seen for Wound Check      The patient reports having a large wound on her right shin which is not healing. This has been previously evaluated by home care. They are attempting to heal it but this is not closing properly. Evaluated on the virtual visit with a poor quality camera. Advised that her wound may need a more intensive in person evaluation. She denies fevers but notes that the area is painful. Prior to Admission medications    Medication Sig Start Date End Date Taking? Authorizing Provider   ferrous sulfate 140 mg (45 mg iron) TbER ER tablet Take 140 mg by mouth daily (with breakfast). 7/7/22  Yes Alessandra Whipple MD   torsemide [facility]) 20 mg tablet Take 50 mg by mouth daily. Take 2.5 tablets (50mg total) daily   Yes Juliocesar Shepherd MD   glipiZIDE SR (GLUCOTROL XL) 10 mg CR tablet Take 1 Tablet by mouth every other day. 6/6/22  Yes Juliocesar Shepherd MD   simvastatin (ZOCOR) 20 mg tablet Take 1 Tablet by mouth nightly. 5/13/22  Yes Alessandra Whipple MD   ibuprofen (MOTRIN) 200 mg tablet Take 400 mg by mouth daily as needed for Pain. Yes Provider, Historical   carvediloL (COREG) 12.5 mg tablet Take 1 Tablet by mouth two (2) times daily (with meals). 3/4/22  Yes Juliocesar Shepherd MD   aspirin delayed-release 81 mg tablet Take 81 mg by mouth daily.    Yes Provider, Historical   mirtazapine (REMERON) 15 mg tablet TAKE 1 TABLET BY MOUTH AT BEDTIME FOR 1 WEEK, THEN 2 TABLETS NIGHTLY 9/29/22   Nicolle Schwab MD   doxycycline (VIBRA-TABS) 100 mg tablet Take 1 Tablet by mouth two (2) times a day for 7 days. 9/23/22 9/30/22  Mone Zavala PA-C   mupirocin (BACTROBAN) 2 % ointment Apply  to affected area two (2) times a day. 9/23/22   Mone Zavala PA-C       Allergies   Allergen Reactions    Codeine Hives    Pcn [Penicillins] Hives       Patient Active Problem List    Diagnosis Date Noted    CKD (chronic kidney disease) stage 4, GFR 15-29 ml/min ([facility]) 05/13/2022    Major depressive disorder, recurrent, mild 05/13/2022    Major depressive disorder, recurrent, moderate 05/13/2022    Major depressive disorder, recurrent, unspecified 05/13/2022    CKD (chronic kidney disease) stage 3, GFR 30-59 ml/min ([facility]) 01/16/2020    Hypertriglyceridemia 01/15/2019    Cardiorenal syndrome 06/24/2018    Type 2 diabetes with nephropathy ([facility] [address] 05/10/2018    Cervicalgia 06/27/2017    Tardive dyskinesia 06/27/2017    Essential hypertension, benign     Pure hypercholesterolemia 02/28/2011    Chronic systolic heart failure (HCC)     Cardiomyopathy ([facility])      Current Outpatient Medications   Medication Sig Dispense Refill    ferrous sulfate 140 mg (45 mg iron) TbER ER tablet Take 140 mg by mouth daily (with breakfast). torsemide (DEMADEX) 20 mg tablet Take 50 mg by mouth daily. Take 2.5 tablets (50mg total) daily      glipiZIDE SR (GLUCOTROL XL) 10 mg CR tablet Take 1 Tablet by mouth every other day. 90 Tablet 1    simvastatin (ZOCOR) 20 mg tablet Take 1 Tablet by mouth nightly. 90 Tablet 1    ibuprofen (MOTRIN) 200 mg tablet Take 400 mg by mouth daily as needed for Pain. carvediloL (COREG) 12.5 mg tablet Take 1 Tablet by mouth two (2) times daily (with meals). 180 Tablet 3    aspirin delayed-release 81 mg tablet Take 81 mg by mouth daily.       mirtazapine (REMERON) 15 mg tablet TAKE 1 TABLET BY MOUTH AT BEDTIME FOR 1 WEEK, THEN 2 TABLETS NIGHTLY 45 Tablet 3    doxycycline (VIBRA-TABS) 100 mg tablet Take 1 Tablet by mouth two (2) times a day for 7 days. 14 Tablet 0    mupirocin (BACTROBAN) 2 % ointment Apply  to affected area two (2) times a day. 22 g 0     Allergies   Allergen Reactions    Codeine Hives    Pcn [Penicillins] Hives     Past Medical History:   Diagnosis Date    Calculus of kidney     Cardiomyopathy ([address]     idiopathic    Chronic systolic heart failure [facility])     April 2010 ef 20%, by Dec 2010 up to 50% on meds, cath with normal cors    Diabetes [facility])     Essential hypertension, benign     Hypercholesterolemia      Past Surgical History:   Procedure Laterality Date    HX HYSTERECTOMY      HX ORTHOPAEDIC      HX OTHER SURGICAL  10/2017    difibulator     HX UROLOGICAL      stone removal    [place] CARDIAC SURG PROCEDURE UNLIST       Family History   Problem Relation Age of Onset    Stroke Mother     Hypertension Mother     Lung Disease Father         emphysema    Heart Disease Brother     Stroke Brother     Cancer Brother         bone    Cancer Maternal Grandfather         colon ca    Cancer Other         breast ca    Breast Cancer Sister 79    Breast Cancer Daughter 28     Social History     Tobacco Use    Smoking status: Never    Smokeless tobacco: Never   Substance Use Topics    Alcohol use: No          ROS - per HPI      Objective:     General: alert, cooperative, no distress   Mental  status: pe mental status_general use: normal mood, behavior, speech, dress, motor activity, and thought processes, able to follow commands   Eyes: EOM intact, normal sclera   Mouth: mucous membranes moist   Neck: no visualized mass   Resp: PULM - obs findings: normal effort and no respiratory distress   Neuro: neuro - obs: no gross deficits   Musculoskeletal: normal ROM of neck and normal gait w/o ataxia   Skin: skin exam: Large lesion at the anterior shin--circular with reddened edges.   Appears to have granulation tissue at the center   Psychiatric: normal affect           Assessment & Plan:   1. Open wound of left lower extremity, initial encounter - Long discussion with the patient's daughter as well as with the home care nurse after the visit. The patient's leg wound appears to be significant and indeed worsening. We discussed that it would need to be further evaluated in person. This would most easily be accomplished in the emergency room. She denies fever and has no other systemic signs of infection. However, given her poorly controlled diabetes I am concerned about the prospect of worsening situation. Advised that she proceed to the ER for further evaluation and the potential need for debridement. Due to this being a TeleHealth evaluation, many elements of the physical examination are unable to be assessed. Pursuant to the emergency declaration under the [facility], [facility] waiver authority and the [employer] Act, this Virtual  Visit was conducted, with patient's consent, to reduce the patient's risk of exposure to COVID-19 and provide continuity of care for an established patient. Services were provided through a video synchronous discussion virtually to substitute for in-person clinic visit. We discussed the expected course, resolution and complications of the diagnosis(es) in detail. Medication risks, benefits, costs, interactions, and alternatives were discussed as indicated. I advised her to contact the office if her condition worsens, changes or fails to improve as anticipated. She expressed understanding with the diagnosis(es) and plan.      Nas Dunlap MD

## 2022-10-03 ENCOUNTER — OFFICE VISIT (OUTPATIENT)
Dept: CARDIOLOGY CLINIC | Age: 72
End: 2022-10-03
Payer: MEDICARE

## 2022-10-03 ENCOUNTER — HOME CARE VISIT (OUTPATIENT)
Dept: SCHEDULING | Facility: HOME HEALTH | Age: 72
End: 2022-10-03
Payer: MEDICARE

## 2022-10-03 VITALS
RESPIRATION RATE: 18 BRPM | TEMPERATURE: 96 F | DIASTOLIC BLOOD PRESSURE: 72 MMHG | SYSTOLIC BLOOD PRESSURE: 136 MMHG | BODY MASS INDEX: 23.43 KG/M2 | WEIGHT: 124 LBS | HEART RATE: 69 BPM | OXYGEN SATURATION: 96 %

## 2022-10-03 DIAGNOSIS — Z95.810 AUTOMATIC IMPLANTABLE CARDIAC DEFIBRILLATOR IN SITU: Primary | ICD-10-CM

## 2022-10-03 PROCEDURE — G0300 HHS/HOSPICE OF LPN EA 15 MIN: HCPCS

## 2022-10-03 PROCEDURE — 93296 REM INTERROG EVL PM/IDS: CPT | Performed by: INTERNAL MEDICINE

## 2022-10-03 PROCEDURE — 93295 DEV INTERROG REMOTE 1/2/MLT: CPT | Performed by: INTERNAL MEDICINE

## 2022-10-05 ENCOUNTER — HOME CARE VISIT (OUTPATIENT)
Dept: SCHEDULING | Facility: HOME HEALTH | Age: 72
End: 2022-10-05
Payer: MEDICARE

## 2022-10-05 VITALS
HEART RATE: 68 BPM | SYSTOLIC BLOOD PRESSURE: 132 MMHG | DIASTOLIC BLOOD PRESSURE: 74 MMHG | RESPIRATION RATE: 18 BRPM | TEMPERATURE: 97.2 F | OXYGEN SATURATION: 97 %

## 2022-10-05 PROCEDURE — G0300 HHS/HOSPICE OF LPN EA 15 MIN: HCPCS

## 2022-10-10 ENCOUNTER — HOME CARE VISIT (OUTPATIENT)
Dept: SCHEDULING | Facility: HOME HEALTH | Age: 72
End: 2022-10-10
Payer: MEDICARE

## 2022-10-10 VITALS
RESPIRATION RATE: 20 BRPM | HEART RATE: 71 BPM | OXYGEN SATURATION: 100 % | DIASTOLIC BLOOD PRESSURE: 75 MMHG | SYSTOLIC BLOOD PRESSURE: 138 MMHG | TEMPERATURE: 96.8 F

## 2022-10-10 PROCEDURE — G0300 HHS/HOSPICE OF LPN EA 15 MIN: HCPCS

## 2022-10-10 NOTE — PROGRESS NOTES
C/ ICD remote [facility]). Device functioning appropriately as programmed. See scanned documents in media manger.

## 2022-10-12 ENCOUNTER — HOME CARE VISIT (OUTPATIENT)
Dept: SCHEDULING | Facility: HOME HEALTH | Age: 72
End: 2022-10-12
Payer: MEDICARE

## 2022-10-12 PROCEDURE — G0299 HHS/HOSPICE OF RN EA 15 MIN: HCPCS

## 2022-10-19 ENCOUNTER — HOME CARE VISIT (OUTPATIENT)
Dept: SCHEDULING | Facility: HOME HEALTH | Age: 72
End: 2022-10-19
Payer: MEDICARE

## 2022-10-19 VITALS
TEMPERATURE: 96.8 F | DIASTOLIC BLOOD PRESSURE: 71 MMHG | RESPIRATION RATE: 18 BRPM | SYSTOLIC BLOOD PRESSURE: 133 MMHG | OXYGEN SATURATION: 95 % | HEART RATE: 80 BPM

## 2022-10-19 PROCEDURE — G0300 HHS/HOSPICE OF LPN EA 15 MIN: HCPCS

## 2022-10-26 ENCOUNTER — HOME CARE VISIT (OUTPATIENT)
Dept: SCHEDULING | Facility: HOME HEALTH | Age: 72
End: 2022-10-26
Payer: MEDICARE

## 2022-10-26 VITALS
OXYGEN SATURATION: 99 % | SYSTOLIC BLOOD PRESSURE: 130 MMHG | HEART RATE: 76 BPM | DIASTOLIC BLOOD PRESSURE: 74 MMHG | BODY MASS INDEX: 23.44 KG/M2 | RESPIRATION RATE: 18 BRPM | WEIGHT: 124.06 LBS | TEMPERATURE: 96.9 F

## 2022-10-26 PROCEDURE — G0300 HHS/HOSPICE OF LPN EA 15 MIN: HCPCS

## 2022-11-02 ENCOUNTER — HOME CARE VISIT (OUTPATIENT)
Dept: HOME HEALTH SERVICES | Facility: HOME HEALTH | Age: 72
End: 2022-11-02
Payer: MEDICARE

## 2022-11-09 ENCOUNTER — HOME CARE VISIT (OUTPATIENT)
Dept: SCHEDULING | Facility: HOME HEALTH | Age: 72
End: 2022-11-09
Payer: MEDICARE

## 2022-11-09 VITALS
OXYGEN SATURATION: 98 % | DIASTOLIC BLOOD PRESSURE: 67 MMHG | RESPIRATION RATE: 18 BRPM | SYSTOLIC BLOOD PRESSURE: 110 MMHG | TEMPERATURE: 98.2 F | WEIGHT: 124 LBS | BODY MASS INDEX: 23.43 KG/M2 | HEART RATE: 67 BPM

## 2022-11-09 PROCEDURE — G0300 HHS/HOSPICE OF LPN EA 15 MIN: HCPCS

## 2022-11-15 ENCOUNTER — TELEPHONE (OUTPATIENT)
Dept: INTERNAL MEDICINE CLINIC | Age: 72
End: 2022-11-15

## 2022-11-15 ENCOUNTER — HOME CARE VISIT (OUTPATIENT)
Dept: SCHEDULING | Facility: HOME HEALTH | Age: 72
End: 2022-11-15
Payer: MEDICARE

## 2022-11-15 VITALS
DIASTOLIC BLOOD PRESSURE: 58 MMHG | RESPIRATION RATE: 10 BRPM | OXYGEN SATURATION: 98 % | SYSTOLIC BLOOD PRESSURE: 128 MMHG | TEMPERATURE: 98.5 F | HEART RATE: 94 BPM

## 2022-11-15 PROCEDURE — G0299 HHS/HOSPICE OF RN EA 15 MIN: HCPCS

## 2022-11-15 NOTE — TELEPHONE ENCOUNTER
Reason for call:  patient is having a allergic rxn to the therahoney for wound care. She has been using her own [facility] [employer].   Needs a new wound care order, if it is bactroban a new prescription is needed pt is almost out    Is this a new problem: yes     Date of last appointment:  9/23/2022     Can we respond via [employer]: no    Best call back number:   Reina Anglin sec hh [phone]

## 2022-11-15 NOTE — TELEPHONE ENCOUNTER
Per pcp patient needs to follow up with wound care to be evaluated again if having a reaction to honey.  Home health advised

## 2022-11-17 NOTE — CASE COMMUNICATION
Dr Valerie Perez and Dr Dill See - we need wound care orders for this pt., Last wound care order was for honey - but pt has not useed this as she states she is allergic. She was given Mupirocin in ER visit, and has been using this along with aquacel Ag, but her wound has stagnated.  Please advise

## 2022-11-21 ENCOUNTER — HOME CARE VISIT (OUTPATIENT)
Dept: SCHEDULING | Facility: HOME HEALTH | Age: 72
End: 2022-11-21
Payer: MEDICARE

## 2022-11-21 PROCEDURE — G0299 HHS/HOSPICE OF RN EA 15 MIN: HCPCS

## 2022-11-22 VITALS — WEIGHT: 123.2 LBS | BODY MASS INDEX: 23.28 KG/M2

## 2022-11-28 ENCOUNTER — HOME CARE VISIT (OUTPATIENT)
Dept: SCHEDULING | Facility: HOME HEALTH | Age: 72
End: 2022-11-28
Payer: MEDICARE

## 2022-11-28 VITALS
DIASTOLIC BLOOD PRESSURE: 64 MMHG | HEART RATE: 66 BPM | SYSTOLIC BLOOD PRESSURE: 112 MMHG | TEMPERATURE: 97.6 F | OXYGEN SATURATION: 96 % | RESPIRATION RATE: 18 BRPM

## 2022-11-28 PROCEDURE — G0300 HHS/HOSPICE OF LPN EA 15 MIN: HCPCS

## 2022-11-29 DIAGNOSIS — R41.3 MEMORY LOSS: ICD-10-CM

## 2022-12-02 RX ORDER — MIRTAZAPINE 15 MG/1
TABLET, FILM COATED ORAL
Qty: 60 TABLET | Refills: 2 | Status: SHIPPED | OUTPATIENT
Start: 2022-12-02

## 2022-12-05 ENCOUNTER — HOME CARE VISIT (OUTPATIENT)
Dept: SCHEDULING | Facility: HOME HEALTH | Age: 72
End: 2022-12-05
Payer: MEDICARE

## 2022-12-05 VITALS
OXYGEN SATURATION: 96 % | HEART RATE: 71 BPM | SYSTOLIC BLOOD PRESSURE: 112 MMHG | RESPIRATION RATE: 20 BRPM | DIASTOLIC BLOOD PRESSURE: 64 MMHG | TEMPERATURE: 97.4 F

## 2022-12-05 PROCEDURE — G0300 HHS/HOSPICE OF LPN EA 15 MIN: HCPCS

## 2022-12-09 ENCOUNTER — HOSPITAL ENCOUNTER (OUTPATIENT)
Dept: WOUND CARE | Age: 72
Discharge: HOME | End: 2022-12-09
Payer: MEDICARE

## 2022-12-09 VITALS
SYSTOLIC BLOOD PRESSURE: 147 MMHG | TEMPERATURE: 97.5 F | HEART RATE: 85 BPM | RESPIRATION RATE: 17 BRPM | DIASTOLIC BLOOD PRESSURE: 75 MMHG

## 2022-12-09 PROBLEM — L97.912 CHRONIC ULCER OF RIGHT LEG WITH FAT LAYER EXPOSED (HCC): Status: ACTIVE | Noted: 2022-12-09

## 2022-12-09 PROCEDURE — 11042 DBRDMT SUBQ TIS 1ST 20SQCM/<: CPT

## 2022-12-09 PROCEDURE — 99203 OFFICE O/P NEW LOW 30 MIN: CPT

## 2022-12-09 RX ORDER — BACITRACIN 500 [USP'U]/G
OINTMENT TOPICAL ONCE
OUTPATIENT
Start: 2022-12-09 | End: 2022-12-09

## 2022-12-09 RX ORDER — LIDOCAINE 40 MG/G
CREAM TOPICAL ONCE
OUTPATIENT
Start: 2022-12-09 | End: 2022-12-09

## 2022-12-09 RX ORDER — GENTAMICIN SULFATE 1 MG/G
OINTMENT TOPICAL ONCE
OUTPATIENT
Start: 2022-12-09 | End: 2022-12-09

## 2022-12-09 RX ORDER — BACITRACIN ZINC AND POLYMYXIN B SULFATE 500; 1000 [USP'U]/G; [USP'U]/G
OINTMENT TOPICAL ONCE
OUTPATIENT
Start: 2022-12-09 | End: 2022-12-09

## 2022-12-09 RX ORDER — LIDOCAINE HYDROCHLORIDE 20 MG/ML
JELLY TOPICAL ONCE
OUTPATIENT
Start: 2022-12-09 | End: 2022-12-09

## 2022-12-09 RX ORDER — BETAMETHASONE DIPROPIONATE 0.5 MG/G
OINTMENT TOPICAL ONCE
OUTPATIENT
Start: 2022-12-09 | End: 2022-12-09

## 2022-12-09 RX ORDER — LIDOCAINE 50 MG/G
OINTMENT TOPICAL ONCE
OUTPATIENT
Start: 2022-12-09 | End: 2022-12-09

## 2022-12-09 RX ORDER — CLOBETASOL PROPIONATE 0.5 MG/G
OINTMENT TOPICAL ONCE
OUTPATIENT
Start: 2022-12-09 | End: 2022-12-09

## 2022-12-09 RX ORDER — MUPIROCIN 20 MG/G
OINTMENT TOPICAL ONCE
OUTPATIENT
Start: 2022-12-09 | End: 2022-12-09

## 2022-12-09 RX ORDER — LIDOCAINE HYDROCHLORIDE 40 MG/ML
SOLUTION TOPICAL ONCE
OUTPATIENT
Start: 2022-12-09 | End: 2022-12-09

## 2022-12-09 RX ORDER — TRIAMCINOLONE ACETONIDE 1 MG/G
OINTMENT TOPICAL ONCE
OUTPATIENT
Start: 2022-12-09 | End: 2022-12-09

## 2022-12-09 RX ORDER — SILVER SULFADIAZINE 10 G/1000G
CREAM TOPICAL ONCE
OUTPATIENT
Start: 2022-12-09 | End: 2022-12-09

## 2022-12-09 NOTE — WOUND CARE
12/09/22 0829   Right Leg Edema Point of Measurement   Leg circumference 26.5 cm   Ankle circumference 18.5 cm   RLE Peripheral Vascular    Capillary Refill Less than/equal to 3 seconds   Color Appropriate for race   Temperature Cool   Sensation Present   Pedal Pulse Palpable   Wound Leg lower Right 12/09/22   Date First Assessed: 12/09/22   Present on Hospital Admission: Yes  Wound Approximate Age at First Assessment (Weeks): 36 weeks  Primary Wound Type: Traumatic  Location: Leg lower  Wound Location Orientation: Right  Date of First Observation: 12/09/22   Wound Image    Wound Etiology Traumatic   Dressing Status Intact; Old drainage noted   Cleansed Cleansed with saline   Dressing/Treatment   (santly/moist gauze/dry gauze/rg/tape/tubi )   Wound Length (cm) 1.5 cm   Wound Width (cm) 1.5 cm   Wound Depth (cm) 0.1 cm   Wound Surface Area (cm^2) 2.25 cm^2   Wound Volume (cm^3) 0.225 cm^3   Wound Assessment Slough;Pink/red   Drainage Amount Small   Drainage Description Serosanguinous   Wound Odor None   Susan-Wound/Incision Assessment Blanchable erythema;Edematous   Edges Flat/open edges   Wound Thickness Description Full thickness   Pain 1   Pain Scale 1 Numeric (0 - 10)   Pain Intensity 1 0

## 2022-12-09 NOTE — H&P
Patient presents to wound clinic for: Glynda Moritz is a 67 y.o. female who presents for initial evaluation of the following: right lower leg ulcers. She notes that they have been present for several months. They first occurred after she bumped her leg, and have been slow to heal. She has had home health in the past. She previously tried medihoney, but had an allergic reaction to it. She is accompanied by her daughter at today's appointment. Pertinent Medical History:  Past Medical History:   Diagnosis Date    Calculus of kidney     Cardiomyopathy [facility])     idiopathic    Chronic systolic heart failure [facility])     April 2010 ef 20%, by Dec 2010 up to 50% on meds, cath with normal cors    Diabetes [facility])     Essential hypertension, benign     Hypercholesterolemia      Past Surgical History:   Procedure Laterality Date    HX HYSTERECTOMY      HX ORTHOPAEDIC      HX OTHER SURGICAL  10/2017    difibulator     HX UROLOGICAL      stone removal    [facility] CARDI[facility] SURG PROCEDURE UNLIST       Prior to Admission medications    Medication Sig Start Date End Date Taking? Authorizing Provider   mirtazapine (REMERON) 15 mg tablet , THEN 2 TABLETS NIGHTLY 12/2/22  Yes Elizabeth Pennington MD   mupirocin [facility]) 2 % ointment Apply  to affected area two (2) times a day. 9/23/22  Yes Torrie Mcclendon PA-C   ferrous sulfate 140 mg (45 mg iron) TbER ER tablet Take 140 mg by mouth daily (with breakfast). 7/7/22  Yes Elizabeth Pennington MD   torsemide [facility]) 20 mg tablet Take 50 mg by mouth daily. Take 2.5 tablets (50mg total) daily   Yes Eugenio Rincon MD   glipiZIDE SR (GLUCOTROL XL) 10 mg CR tablet Take 1 Tablet by mouth every other day. 6/6/22  Yes Eugenio Rincon MD   simvastatin (ZOCOR) 20 mg tablet Take 1 Tablet by mouth nightly. 5/13/22  Yes Elizabeth Pennington MD   ibuprofen (MOTRIN) 200 mg tablet Take 400 mg by mouth daily as needed for Pain.    Yes Provider, Historical   carvediloL (COREG) 12.5 mg tablet Take 1 Tablet by mouth two (2) times daily (with meals). 3/4/22  Yes Renate Kussmaul, MD   aspirin delayed-release 81 mg tablet Take 81 mg by mouth daily. Yes Provider, Historical     Allergies   Allergen Reactions    Codeine Hives    Honey Itching and Other (comments)     allergic to therahoney    Pcn [Penicillins] Hives     Family History   Problem Relation Age of Onset    Stroke Mother     Hypertension Mother     Lung Disease Father         emphysema    Heart Disease Brother     Stroke Brother     Cancer Brother         bone    Cancer Maternal Grandfather         colon ca    Cancer Other         breast ca    Breast Cancer Sister 79    Breast Cancer Daughter 28     Social History     Socioeconomic History    Marital status:      Spouse name: Not on file    Number of children: Not on file    Years of education: Not on file    Highest education level: Not on file   Occupational History    Not on file   Tobacco Use    Smoking status: Never    Smokeless tobacco: Never   Substance and Sexual Activity    Alcohol use: No    Drug use: No    Sexual activity: Not Currently     Partners: Male     Comment: ,working,2 children,43 and 45 yrs old   Other Topics Concern    Not on file   Social History Narrative    Not on file     Social Determinants of Health     Financial Resource Strain: Not on file   Food Insecurity: Not on file   Transportation Needs: Not on file   Physical Activity: Not on file   Stress: Not on file   Social Connections: Not on file   Intimate Partner Violence: Not on file   Housing Stability: Not on file       Vitals:    12/09/22 0829   BP: (!) 147/75   Pulse: 85   Resp: 17   Temp: 97.5 °F (36.4 °C)       Review of Systems:    Gen: No fever, chills, malaise, weight loss/gain. Heent: No headache, rhinorrhea, epistaxis, ear pain, hearing loss, sinus pain, neck pain/stiffness, sore throat. Heart: No chest pain, palpitations, DARNELL, pnd, or orthopnea. Resp: No cough, hemoptysis, wheezing and shortness of breath. GI: No nausea, vomiting, diarrhea, constipation, melena or hematochezia. : No urinary obstruction, dysuria or hematuria. Derm: see below  Musc/skeletal: no bone or joint complains. Vasc: No edema, cyanosis or claudication. Endo: No heat/cold intolerance, no polyuria,polydipsia or polyphagia. Neuro: No unilateral weakness, numbness, tingling. No seizures. Heme: No easy bruising or bleeding. Wound Description:       12/09/22 0829   Right Leg Edema Point of Measurement   Leg circumference 26.5 cm   Ankle circumference 18.5 cm   RLE Peripheral Vascular    Capillary Refill Less than/equal to 3 seconds   Color Appropriate for race   Temperature Cool   Sensation Present   Pedal Pulse Palpable   Wound Leg lower Right 12/09/22   Date First Assessed: 12/09/22   Present on Hospital Admission: Yes  Wound Approximate Age at First Assessment (Weeks): 36 weeks  Primary Wound Type: Traumatic  Location: Leg lower  Wound Location Orientation: Right  Date of First Observation: 12/09/22   Wound Image    Wound Etiology Traumatic   Dressing Status Intact; Old drainage noted   Cleansed Cleansed with saline   Dressing/Treatment    (santly/moist gauze/dry gauze/rg/tape/tubi )   Wound Length (cm) 1.5 cm   Wound Width (cm) 1.5 cm   Wound Depth (cm) 0.1 cm   Wound Surface Area (cm^2) 2.25 cm^2   Wound Volume (cm^3) 0.225 cm^3   Wound Assessment Slough;Pink/red   Drainage Amount Small   Drainage Description Serosanguinous   Wound Odor None   Susan-Wound/Incision Assessment Blanchable erythema;Edematous   Edges Flat/open edges   Wound Thickness Description Full thickness   Pain 1   Pain Scale 1 Numeric (0 - 10)   Pain Intensity 1 0       Debridement:     Debridement Wound Care        Problem List Items Addressed This Visit    None      Procedure Note  Indications:  Based on my examination of this patient's wound(s)/ulcer(s) today, debridement is required to promote healing and evaluate the wound base.     Performed by: Daksha Malik Lesa Dates, DPM    Consent obtained: Yes    Time out taken: Yes    Debridement: Excisional    Using curette the wound(s)/ulcer(s) was/were sharply debrided down through and including the removal of    epidermis, dermis, and subcutaneous tissue    Devitalized Tissue Debrided: fibrin, biofilm, slough, and exudate    Pre Debridement Measurements:  Are located in the [place]  Documentation Flow Sheet    Non-Pressure ulcer, fat layer exposed    Wound/Ulcer #: 1    Post Debridement Measurements:  Wound/Ulcer Descriptions are Pre Debridement except measurements:    Wound Leg lower Right 12/09/22 (Active)   Wound Image   12/09/22 0829   Wound Etiology Traumatic 12/09/22 0829   Dressing Status Intact; Old drainage noted 12/09/22 0829   Cleansed Cleansed with saline 12/09/22 0829   Wound Length (cm) 1.5 cm 12/09/22 0829   Wound Width (cm) 1.5 cm 12/09/22 0829   Wound Depth (cm) 0.1 cm 12/09/22 0829   Wound Surface Area (cm^2) 2.25 cm^2 12/09/22 0829   Wound Volume (cm^3) 0.225 cm^3 12/09/22 0829   Wound Assessment Slough;Pink/red 12/09/22 0829   Drainage Amount Small 12/09/22 0829   Drainage Description Serosanguinous 12/09/22 0829   Wound Odor None 12/09/22 0829   Susan-Wound/Incision Assessment Blanchable erythema;Edematous 12/09/22 0829   Edges Flat/open edges 12/09/22 0829   Wound Thickness Description Full thickness 12/09/22 0829   Number of days: 1        Total Surface Area Debrided:  2.25 sq cm     Estimated Blood Loss:  Minimal     Hemostasis Achieved: Pressure    Procedural Pain: 1 / 10     Post Procedural Pain: 0 / 10     Response to treatment: Well tolerated by patient     Assessment:   Right leg ulcer with fat layer exposed    Plan:  -Wound debrided as noted above  -Santyl dsd  -Continue home health  -Single layer tubigrips.  -Follow-up 3 weeks

## 2022-12-09 NOTE — DISCHARGE INSTRUCTIONS
Discharge Instructions/Wound Orders  [facility]  [address] [facility], [address]  Telephone: [phone]    NAME:  Jim Lopez OF BIRTH:  1950  MEDICAL RECORD NUMBER:  [id number]  DATE:  December 9, 2022  Wound Care Orders:  Right Lower Extremity: Cleanse with baby shampoo and pat dry. Apply Santyl to wound bed. Cover with damp gauze and dry gauze. Secure in place with tape. Care to be done daily. Will resume home health services. Apply Tubigrip E to right lower extremity. May take off at bedtime. Dietary:  [] Diet as tolerated: [x] Calorie Diabetic Diet:Low carb and no Sugar [] No Added Salt:[] Increase Protein: [] Other:Limit the amount of liquid you are drinking and avoid drinking in between meals   Activity:  [x] Activity as tolerated:  [] Patient has no activity restrictions     [] Strict Bedrest: [] Remain off Work:     [] May return to full duty work:                                   [] Return to work with restrictions:   Return Appointment:  [] Return Appointment: With Dr. Milton Abreu in [facility])  [] Ordered tests:    Electronically signed Fam Sawyre RN on 12/9/2022 at 8:55 AM     Berenice Perez 281: Should you experience any significant changes in your wound(s) or have questions about your wound care, please contact the [address] [place] [address] at [address] [place] [address] 8:00 am - 4:30. If you need help with your wound outside these hours and cannot wait until we are again available, contact your PCP or go to the hospital emergency room. PLEASE NOTE: IF YOU ARE UNABLE TO OBTAIN WOUND SUPPLIES, CONTINUE TO USE THE SUPPLIES YOU HAVE AVAILABLE UNTIL YOU ARE ABLE TO REACH US. IT IS MOST IMPORTANT TO KEEP THE WOUND COVERED AT ALL TIMES.      Physician Signature:_______________________    Date: ___________ Time:  ____________

## 2022-12-09 NOTE — WOUND CARE
12/09/22 0829   RLE Peripheral Vascular    Capillary Refill Less than/equal to 3 seconds   Color Appropriate for race   Temperature Cool   Sensation Present   Pedal Pulse Palpable   Visit Vitals  BP (!) 147/75 (BP 1 Location: Left upper arm, BP Patient Position: Sitting)   Pulse 85   Temp 97.5 °F (36.4 °C)   Resp 17

## 2022-12-10 ENCOUNTER — HOME CARE VISIT (OUTPATIENT)
Dept: SCHEDULING | Facility: HOME HEALTH | Age: 72
End: 2022-12-10
Payer: MEDICARE

## 2022-12-11 ENCOUNTER — HOME CARE VISIT (OUTPATIENT)
Dept: SCHEDULING | Facility: HOME HEALTH | Age: 72
End: 2022-12-11
Payer: MEDICARE

## 2022-12-12 ENCOUNTER — HOME CARE VISIT (OUTPATIENT)
Dept: SCHEDULING | Facility: HOME HEALTH | Age: 72
End: 2022-12-12
Payer: MEDICARE

## 2022-12-12 VITALS
OXYGEN SATURATION: 96 % | HEART RATE: 60 BPM | SYSTOLIC BLOOD PRESSURE: 112 MMHG | TEMPERATURE: 99.1 F | DIASTOLIC BLOOD PRESSURE: 54 MMHG | RESPIRATION RATE: 12 BRPM

## 2022-12-12 PROCEDURE — G0299 HHS/HOSPICE OF RN EA 15 MIN: HCPCS

## 2022-12-13 ENCOUNTER — HOME CARE VISIT (OUTPATIENT)
Dept: SCHEDULING | Facility: HOME HEALTH | Age: 72
End: 2022-12-13
Payer: MEDICARE

## 2022-12-13 VITALS
OXYGEN SATURATION: 98 % | HEART RATE: 75 BPM | WEIGHT: 123 LBS | BODY MASS INDEX: 23.24 KG/M2 | RESPIRATION RATE: 18 BRPM | SYSTOLIC BLOOD PRESSURE: 112 MMHG | DIASTOLIC BLOOD PRESSURE: 65 MMHG | TEMPERATURE: 98.1 F

## 2022-12-13 PROCEDURE — G0299 HHS/HOSPICE OF RN EA 15 MIN: HCPCS

## 2022-12-20 ENCOUNTER — TELEPHONE (OUTPATIENT)
Dept: INTERNAL MEDICINE CLINIC | Age: 72
End: 2022-12-20

## 2022-12-20 ENCOUNTER — HOME CARE VISIT (OUTPATIENT)
Dept: SCHEDULING | Facility: HOME HEALTH | Age: 72
End: 2022-12-20
Payer: MEDICARE

## 2022-12-20 VITALS
TEMPERATURE: 98.1 F | RESPIRATION RATE: 12 BRPM | DIASTOLIC BLOOD PRESSURE: 58 MMHG | HEART RATE: 63 BPM | OXYGEN SATURATION: 92 % | SYSTOLIC BLOOD PRESSURE: 112 MMHG

## 2022-12-20 PROCEDURE — G0299 HHS/HOSPICE OF RN EA 15 MIN: HCPCS

## 2022-12-20 NOTE — TELEPHONE ENCOUNTER
Reason for call:    JENNIE Mccollum, from [facility] called. She wanted to let Dr. Stefania Dunn know that the patient could not afford Fantyl that was prescribed for her wound. She was given Adaptic by the wound care center. Can patient's orders be changed to reflect this? Also, the patient is caring for her wound independently and has met her goals. She will probably be discharged from home health next week.     Is this a new problem: yes     Date of last appointment:  9/23/2022     Can we respond via [employer]: no    Best call back number:     Korin [facility] - [phone]

## 2022-12-20 NOTE — TELEPHONE ENCOUNTER
Reason for call:   Veda from [facility] called, returning Crystal's phone call.     Is this a new problem: yes     Date of last appointment:  9/23/2022     Can we respond via [employer]: no    Best call back number:     Kristen Batres - [phone]

## 2022-12-22 ENCOUNTER — HOME CARE VISIT (OUTPATIENT)
Dept: HOME HEALTH SERVICES | Facility: HOME HEALTH | Age: 72
End: 2022-12-22
Payer: MEDICARE

## 2022-12-29 ENCOUNTER — HOME CARE VISIT (OUTPATIENT)
Dept: SCHEDULING | Facility: HOME HEALTH | Age: 72
End: 2022-12-29
Payer: MEDICARE

## 2022-12-29 PROCEDURE — G0299 HHS/HOSPICE OF RN EA 15 MIN: HCPCS

## 2023-01-09 DIAGNOSIS — I50.22 CHRONIC SYSTOLIC CONGESTIVE HEART FAILURE (HCC): ICD-10-CM

## 2023-01-09 DIAGNOSIS — E78.1 HYPERTRIGLYCERIDEMIA: ICD-10-CM

## 2023-01-09 DIAGNOSIS — I10 ESSENTIAL HYPERTENSION, BENIGN: ICD-10-CM

## 2023-01-12 RX ORDER — CARVEDILOL 12.5 MG/1
TABLET ORAL
Qty: 180 TABLET | Refills: 3 | Status: SHIPPED | OUTPATIENT
Start: 2023-01-12

## 2023-01-13 ENCOUNTER — TELEPHONE (OUTPATIENT)
Dept: INTERNAL MEDICINE CLINIC | Age: 73
End: 2023-01-13

## 2023-01-13 ENCOUNTER — HOSPITAL ENCOUNTER (OUTPATIENT)
Dept: WOUND CARE | Age: 73
Discharge: HOME | End: 2023-01-13
Payer: MEDICARE

## 2023-01-13 VITALS — SYSTOLIC BLOOD PRESSURE: 133 MMHG | HEART RATE: 72 BPM | DIASTOLIC BLOOD PRESSURE: 78 MMHG | TEMPERATURE: 97.8 F

## 2023-01-13 DIAGNOSIS — L97.912 CHRONIC ULCER OF RIGHT LEG WITH FAT LAYER EXPOSED (HCC): Primary | ICD-10-CM

## 2023-01-13 PROCEDURE — 11042 DBRDMT SUBQ TIS 1ST 20SQCM/<: CPT

## 2023-01-13 RX ORDER — TRIAMCINOLONE ACETONIDE 1 MG/G
OINTMENT TOPICAL ONCE
OUTPATIENT
Start: 2023-01-13 | End: 2023-01-13

## 2023-01-13 RX ORDER — BACITRACIN 500 [USP'U]/G
OINTMENT TOPICAL ONCE
OUTPATIENT
Start: 2023-01-13 | End: 2023-01-13

## 2023-01-13 RX ORDER — LIDOCAINE 40 MG/G
CREAM TOPICAL ONCE
OUTPATIENT
Start: 2023-01-13 | End: 2023-01-13

## 2023-01-13 RX ORDER — LIDOCAINE HYDROCHLORIDE 20 MG/ML
JELLY TOPICAL ONCE
OUTPATIENT
Start: 2023-01-13 | End: 2023-01-13

## 2023-01-13 RX ORDER — CLOBETASOL PROPIONATE 0.5 MG/G
OINTMENT TOPICAL ONCE
OUTPATIENT
Start: 2023-01-13 | End: 2023-01-13

## 2023-01-13 RX ORDER — LIDOCAINE 50 MG/G
OINTMENT TOPICAL ONCE
OUTPATIENT
Start: 2023-01-13 | End: 2023-01-13

## 2023-01-13 RX ORDER — MUPIROCIN 20 MG/G
OINTMENT TOPICAL ONCE
OUTPATIENT
Start: 2023-01-13 | End: 2023-01-13

## 2023-01-13 RX ORDER — LIDOCAINE HYDROCHLORIDE 40 MG/ML
SOLUTION TOPICAL ONCE
OUTPATIENT
Start: 2023-01-13 | End: 2023-01-13

## 2023-01-13 RX ORDER — BETAMETHASONE DIPROPIONATE 0.5 MG/G
OINTMENT TOPICAL ONCE
OUTPATIENT
Start: 2023-01-13 | End: 2023-01-13

## 2023-01-13 RX ORDER — SILVER SULFADIAZINE 10 G/1000G
CREAM TOPICAL ONCE
OUTPATIENT
Start: 2023-01-13 | End: 2023-01-13

## 2023-01-13 RX ORDER — GENTAMICIN SULFATE 1 MG/G
OINTMENT TOPICAL ONCE
OUTPATIENT
Start: 2023-01-13 | End: 2023-01-13

## 2023-01-13 RX ORDER — BACITRACIN ZINC AND POLYMYXIN B SULFATE 500; 1000 [USP'U]/G; [USP'U]/G
OINTMENT TOPICAL ONCE
OUTPATIENT
Start: 2023-01-13 | End: 2023-01-13

## 2023-01-13 NOTE — PROGRESS NOTES
Patient presents to wound clinic for: Nabeel Vaca is a 67 y.o. female who presents for initial evaluation of the following: right lower leg ulcers. She notes that they have been present for several months. They first occurred after she bumped her leg, and have been slow to heal. She has had home health in the past. She previously tried medihoney, but had an allergic reaction to it. She is accompanied by her daughter at today's appointment. Update 1/13/23: Patient presents today for a follow-up appointment accompanied by her daughter. She relates that she was unable to get the santyl. Has been dressing the wound with xeroform and dsd. She had been having [facility] coming to the house with her dressings, but recently dismissed them. Her daughter relates that she did not know this until today's appointment. Patient does note that the wound does appear to be getting smaller. Pertinent Medical History:  Past Medical History:   Diagnosis Date    Calculus of kidney     Cardiomyopathy [facility])     idiopathic    Chronic systolic heart failure [facility])     April 2010 ef 20%, by Dec 2010 up to 50% on meds, cath with normal cors    Diabetes [facility])     Essential hypertension, benign     Hypercholesterolemia      Past Surgical History:   Procedure Laterality Date    HX HYSTERECTOMY      HX ORTHOPAEDIC      HX OTHER SURGICAL  10/2017    difibulator     HX UROLOGICAL      stone removal    MD UNLISTED PROCEDURE CARDIAC SURGERY       Prior to Admission medications    Medication Sig Start Date End Date Taking? Authorizing Provider   carvediloL (COREG) 12.5 mg tablet TAKE 1 TABLET BY MOUTH TWICE A DAY WITH MEALS 1/12/23   Sage Daniels MD   mirtazapine (REMERON) 15 mg tablet , THEN 2 TABLETS NIGHTLY  Patient taking differently: Take 30 mg by mouth nightly. 2 TABLETS NIGHTLY 12/2/22   Octavia Mari MD   mupirocin [facility]) 2 % ointment Apply  to affected area two (2) times a day.   Patient taking differently: Apply 1 Tube to affected area two (2) times a day. Apply small amount to affected area two times a day. 9/23/22   Xiang Shane PA-C   ferrous sulfate 140 mg (45 mg iron) TbER ER tablet Take 140 mg by mouth daily (with breakfast). 7/7/22   Randy Hercules MD   glipiZIDE SR (GLUCOTROL XL) 10 mg CR tablet Take 1 Tablet by mouth every other day. 6/6/22   Sage Daniels MD   simvastatin (ZOCOR) 20 mg tablet Take 1 Tablet by mouth nightly. 5/13/22   Randy Hercules MD   ibuprofen (MOTRIN) 200 mg tablet Take 400 mg by mouth daily as needed for Pain. Provider, Historical   aspirin delayed-release 81 mg tablet Take 81 mg by mouth daily.     Provider, Historical     Allergies   Allergen Reactions    Codeine Hives    Honey Itching and Other (comments)     allergic to therahoney    Pcn [Penicillins] Hives     Family History   Problem Relation Age of Onset    Stroke Mother     Hypertension Mother     Lung Disease Father         emphysema    Heart Disease Brother     Stroke Brother     Cancer Brother         bone    Cancer Maternal Grandfather         colon ca    Cancer Other         breast ca    Breast Cancer Sister 79    Breast Cancer Daughter 28     Social History     Socioeconomic History    Marital status:      Spouse name: Not on file    Number of children: Not on file    Years of education: Not on file    Highest education level: Not on file   Occupational History    Not on file   Tobacco Use    Smoking status: Never    Smokeless tobacco: Never   Substance and Sexual Activity    Alcohol use: No    Drug use: No    Sexual activity: Not Currently     Partners: Male     Comment: ,working,2 children,43 and 45 yrs old   Other Topics Concern    Not on file   Social History Narrative    Not on file     Social Determinants of Health     Financial Resource Strain: Not on file   Food Insecurity: Not on file   Transportation Needs: Not on file   Physical Activity: Not on file   Stress: Not on file   Social Connections: Not on file   Intimate Partner Violence: Not on file   Housing Stability: Not on file       Vitals:    01/13/23 1043   BP: 133/78   Pulse: 72   Temp: 97.8 °F (36.6 °C)       Review of Systems:    Gen: No fever, chills, malaise, weight loss/gain. Heent: No headache, rhinorrhea, epistaxis, ear pain, hearing loss, sinus pain, neck pain/stiffness, sore throat. Heart: No chest pain, palpitations, DARNELL, pnd, or orthopnea. Resp: No cough, hemoptysis, wheezing and shortness of breath. GI: No nausea, vomiting, diarrhea, constipation, melena or hematochezia. : No urinary obstruction, dysuria or hematuria. Derm: see below  Musc/skeletal: no bone or joint complains. Vasc: No edema, cyanosis or claudication. Endo: No heat/cold intolerance, no polyuria,polydipsia or polyphagia. Neuro: No unilateral weakness, numbness, tingling. No seizures. Heme: No easy bruising or bleeding. Wound Description:    01/13/23 1043   Wound Leg lower Right 12/09/22   Date First Assessed: 12/09/22   Present on Hospital Admission: Yes  Wound Approximate Age at First Assessment (Weeks): 36 weeks  Primary Wound Type: Traumatic  Location: Leg lower  Wound Location Orientation: Right  Date of First Observation: 12/09/22   Wound Image    Wound Etiology Traumatic   Dressing Status Clean;Dry; Intact   Cleansed Cleansed with saline   Wound Length (cm) 1 cm   Wound Width (cm) 1.4 cm   Wound Depth (cm) 0.1 cm   Wound Surface Area (cm^2) 1.4 cm^2   Change in Wound Size % 37.78   Wound Volume (cm^3) 0.14 cm^3   Wound Healing % 38   Wound Assessment Slough;Pink/red   Drainage Amount Small   Drainage Description Serosanguinous   Wound Odor None   Susan-Wound/Incision Assessment Blanchable erythema   Edges Flat/open edges   Wound Thickness Description Full thickness   Pain 1   Pain Scale 1 Numeric (0 - 10)   Pain Intensity 1 0       Debridement Wound Care        Problem List Items Addressed This Visit          Other    Chronic ulcer of right leg with fat layer exposed ([facility] - Primary    Relevant Orders    INITIATE OUTPATIENT WOUND CARE PROTOCOL       Procedure Note  Indications:  Based on my examination of this patient's wound(s)/ulcer(s) today, debridement is required to promote healing and evaluate the wound base. Performed by: Gilles Berkowitz DPM    Consent obtained: Yes    Time out taken: Yes    Debridement: Excisional    Using # 15 blade scalpel the wound(s)/ulcer(s) was/were sharply debrided down through and including the removal of    epidermis, dermis, and subcutaneous tissue    Devitalized Tissue Debrided: fibrin, biofilm, slough, and exudate    Pre Debridement Measurements:  Are located in the [place]  Documentation Flow Sheet    Diabetic ulcer, fat layer exposed    Wound/Ulcer #: 1    Post Debridement Measurements:  Wound/Ulcer Descriptions are Pre Debridement except measurements:    Wound Leg lower Right 12/09/22 (Active)   Wound Image   01/13/23 1043   Wound Etiology Traumatic 01/13/23 1043   Dressing Status Clean;Dry; Intact 01/13/23 1043   Cleansed Cleansed with saline 01/13/23 1043   Wound Length (cm) 1 cm 01/13/23 1043   Wound Width (cm) 1.4 cm 01/13/23 1043   Wound Depth (cm) 0.1 cm 01/13/23 1043   Wound Surface Area (cm^2) 1.4 cm^2 01/13/23 1043   Change in Wound Size % 37.78 01/13/23 1043   Wound Volume (cm^3) 0.14 cm^3 01/13/23 1043   Wound Healing % 38 01/13/23 1043   Wound Assessment Slough;Pink/red 01/13/23 1043   Drainage Amount Small 01/13/23 1043   Drainage Description Serosanguinous 01/13/23 1043   Wound Odor None 01/13/23 1043   Susan-Wound/Incision Assessment Blanchable erythema 01/13/23 1043   Edges Flat/open edges 01/13/23 1043   Wound Thickness Description Full thickness 01/13/23 1043   Number of days: 35        Total Surface Area Debrided:  1.4 sq cm     Estimated Blood Loss:  Minimal     Hemostasis Achieved: Pressure    Procedural Pain: 2 / 10     Post Procedural Pain: 0 / 10     Response to treatment: Well tolerated by patient Assessment:   Right leg ulcer with fat layer exposed    Plan:  -Wound debrided as noted above  -Iodosorb, dsd  -Follow-up 3 weeks

## 2023-01-13 NOTE — DISCHARGE INSTRUCTIONS
Discharge Instructions for          [place] [address] [facility], [address]  Phone: [phone] Fax: [phone]    NAME:  Jack Lesches  YOB: 1950  MEDICAL RECORD NUMBER:  [id number]  DATE:  January 13, 2023  WOUND CARE ORDERS:  Right Lower Extremity: Cleanse with baby shampoo and pat dry. Apply iodosorb to wound bed. Cover with gauze. Secure in place with gauze roll and tape. Change three times a week. TREATMENT ORDERS:    Elevate leg(s) above the level of the heart when sitting. Avoid prolonged standing in one place. Do no get dressing/wrap wet. Follow Diet as prescribed:   [] Diet as tolerated: [] Calorie Diabetic Diet: Low carb and no Sugar [] No Added Salt:  [x] Increase Protein: [] Limit the amount of liquid you are drinking and avoid drinking in between meals   Return Appointment:  [x] Return Appointment: With Dr. Vilma Orozco in 3 weeks. [] Nurse Visit : *** days  [] Ordered tests:    Electronically signed Salvador Albarran RN on 1/13/2023 at 10:57 AM     [address] [place] [address] Information: Should you experience any significant changes in your wound(s) or have questions about your wound care, please contact the [address] [place] [address] at [address] [place] [address] 8:00 am - 4:30. If you need help with your wound outside these hours and cannot wait until we are again available, contact your PCP or go to the hospital emergency room. PLEASE NOTE: IF YOU ARE UNABLE TO OBTAIN WOUND SUPPLIES, CONTINUE TO USE THE SUPPLIES YOU HAVE AVAILABLE UNTIL YOU ARE ABLE TO REACH US. IT IS MOST IMPORTANT TO KEEP THE WOUND COVERED AT ALL TIMES.      Physician Signature:_______________________    Date: ___________ Time:  ____________

## 2023-01-13 NOTE — TELEPHONE ENCOUNTER
Per VO by MD.    Future Appointments   Date Time Provider Chava Gabriel   1/13/2023 10:30 AM Juliane Garcia DPM [facility]   4/10/2023 11:45 AM CHRISTINE Frank BS AMB   1/11/2024  2:00 PM CHRISTINE ENCISO BS AMB   1/11/2024  2:20 PM MD ARCELIA Crystal BS AMB

## 2023-01-16 RX ORDER — SIMVASTATIN 20 MG/1
20 TABLET, FILM COATED ORAL
Qty: 90 TABLET | Refills: 1 | Status: SHIPPED | OUTPATIENT
Start: 2023-01-16

## 2023-02-10 ENCOUNTER — HOSPITAL ENCOUNTER (OUTPATIENT)
Dept: WOUND CARE | Age: 73
Discharge: HOME | DRG: 291 | End: 2023-02-10
Payer: MEDICARE

## 2023-02-10 VITALS
DIASTOLIC BLOOD PRESSURE: 70 MMHG | TEMPERATURE: 97 F | RESPIRATION RATE: 18 BRPM | SYSTOLIC BLOOD PRESSURE: 115 MMHG | HEART RATE: 61 BPM

## 2023-02-10 DIAGNOSIS — L97.912 CHRONIC ULCER OF RIGHT LEG WITH FAT LAYER EXPOSED (HCC): Primary | ICD-10-CM

## 2023-02-10 PROCEDURE — 97597 DBRDMT OPN WND 1ST 20 CM/<: CPT

## 2023-02-10 RX ORDER — TRIAMCINOLONE ACETONIDE 1 MG/G
OINTMENT TOPICAL ONCE
OUTPATIENT
Start: 2023-02-10 | End: 2023-02-10

## 2023-02-10 RX ORDER — LIDOCAINE HYDROCHLORIDE 40 MG/ML
SOLUTION TOPICAL ONCE
OUTPATIENT
Start: 2023-02-10 | End: 2023-02-10

## 2023-02-10 RX ORDER — CLOBETASOL PROPIONATE 0.5 MG/G
OINTMENT TOPICAL ONCE
OUTPATIENT
Start: 2023-02-10 | End: 2023-02-10

## 2023-02-10 RX ORDER — LIDOCAINE 40 MG/G
CREAM TOPICAL ONCE
OUTPATIENT
Start: 2023-02-10 | End: 2023-02-10

## 2023-02-10 RX ORDER — SILVER SULFADIAZINE 10 G/1000G
CREAM TOPICAL ONCE
OUTPATIENT
Start: 2023-02-10 | End: 2023-02-10

## 2023-02-10 RX ORDER — BETAMETHASONE DIPROPIONATE 0.5 MG/G
OINTMENT TOPICAL ONCE
OUTPATIENT
Start: 2023-02-10 | End: 2023-02-10

## 2023-02-10 RX ORDER — MUPIROCIN 20 MG/G
OINTMENT TOPICAL ONCE
OUTPATIENT
Start: 2023-02-10 | End: 2023-02-10

## 2023-02-10 RX ORDER — LIDOCAINE 50 MG/G
OINTMENT TOPICAL ONCE
OUTPATIENT
Start: 2023-02-10 | End: 2023-02-10

## 2023-02-10 RX ORDER — LIDOCAINE HYDROCHLORIDE 20 MG/ML
JELLY TOPICAL ONCE
OUTPATIENT
Start: 2023-02-10 | End: 2023-02-10

## 2023-02-10 RX ORDER — BACITRACIN ZINC AND POLYMYXIN B SULFATE 500; 1000 [USP'U]/G; [USP'U]/G
OINTMENT TOPICAL ONCE
OUTPATIENT
Start: 2023-02-10 | End: 2023-02-10

## 2023-02-10 RX ORDER — LIDOCAINE HYDROCHLORIDE 20 MG/ML
JELLY TOPICAL ONCE
Status: SHIPPED | OUTPATIENT
Start: 2023-02-10 | End: 2023-02-10

## 2023-02-10 RX ORDER — GENTAMICIN SULFATE 1 MG/G
OINTMENT TOPICAL ONCE
OUTPATIENT
Start: 2023-02-10 | End: 2023-02-10

## 2023-02-10 RX ORDER — BACITRACIN 500 [USP'U]/G
OINTMENT TOPICAL ONCE
OUTPATIENT
Start: 2023-02-10 | End: 2023-02-10

## 2023-02-10 NOTE — DISCHARGE INSTRUCTIONS
Discharge Instructions/Wound Orders  [place] [address] [facility], [facility] [address]  Telephone: [phone]    NAME:  Belle Gibbons  YOB: 1950  MEDICAL RECORD NUMBER:  [id number]  DATE:  February 10, 2023  Wound Care Orders:  Right Lower Extremity: Cleanse with baby shampoo and pat dry. Apply iodosorb to wound bed. Cover with gauze. Secure in place with gauze roll and tape. Change three times a week. If wound continues to be slow to heal, vascular studies may be ordered at a later date to check blood flow in lower extremities. Dietary:  [] Diet as tolerated: [] Calorie Diabetic Diet:Low carb and no Sugar [] No Added Salt:[x] Increase Protein: [] Other:Limit the amount of liquid you are drinking and avoid drinking in between meals   Activity:  [] Activity as tolerated:  [] Patient has no activity restrictions     [] Strict Bedrest: [] Remain off Work:     [] May return to full duty work:                                   [] Return to work with restrictions:   Return Appointment:  [x] Return Appointment: With Dr. Jorge Burciaga in [facility])  [] Ordered tests:    Electronically signed Keesha Sidhu RN on 2/10/2023 at 10:31 AM     Berenice Perez [address]: Should you experience any significant changes in your wound(s) or have questions about your wound care, please contact the [address] [place] [address] at [address] [place] [address] 8:00 am - 4:30. If you need help with your wound outside these hours and cannot wait until we are again available, contact your PCP or go to the hospital emergency room. PLEASE NOTE: IF YOU ARE UNABLE TO OBTAIN WOUND SUPPLIES, CONTINUE TO USE THE SUPPLIES YOU HAVE AVAILABLE UNTIL YOU ARE ABLE TO REACH US. IT IS MOST IMPORTANT TO KEEP THE WOUND COVERED AT ALL TIMES.      Physician Signature:_______________________    Date: ___________ Time:  ____________

## 2023-02-10 NOTE — WOUND CARE
02/10/23 0947   Right Leg Edema Point of Measurement   Leg circumference 29 cm   Ankle circumference 19 cm   RLE Peripheral Vascular    Capillary Refill Less than/equal to 3 seconds   Color Appropriate for race   Temperature Cool   Sensation Present   Pedal Pulse Palpable   Wound Leg lower Right 12/09/22   Date First Assessed: 12/09/22   Present on Hospital Admission: Yes  Wound Approximate Age at First Assessment (Weeks): 36 weeks  Primary Wound Type: Traumatic  Location: Leg lower  Wound Location Orientation: Right  Date of First Observation: 12/09/22   Wound Etiology Traumatic   Dressing Status Old drainage noted   Cleansed Cleansed with saline   Wound Length (cm) 1.2 cm   Wound Width (cm) 1.3 cm   Wound Depth (cm) 0.1 cm   Wound Surface Area (cm^2) 1.56 cm^2   Change in Wound Size % 30.67   Wound Volume (cm^3) 0.156 cm^3   Wound Healing % 31   Wound Assessment Eschar dry   Drainage Amount Small   Drainage Description Serosanguinous   Wound Odor None   Susan-Wound/Incision Assessment Blanchable erythema   Edges Defined edges   Wound Thickness Description Full thickness   Visit Vitals  /70 (BP Patient Position: At rest;Sitting)   Pulse 61   Temp 97 °F (36.1 °C)   Resp 18

## 2023-02-10 NOTE — PROGRESS NOTES
Patient presents to wound clinic for: Sammy Moe is a 67 y.o. female who presents for initial evaluation of the following: right lower leg ulcers. She notes that they have been present for several months. They first occurred after she bumped her leg, and have been slow to heal. She has had home health in the past. She previously tried medihoney, but had an allergic reaction to it. She is accompanied by her daughter at today's appointment. Update 2/10/23: Patient presents today for a follow-up appointment accompanied by her daughter. There is a mixed response as to whether patient has been using the iodosorb ointment. Patient relates that she has been using it regularly. However, her daughter relates that she does not believe that there has been any ointment used. Patient does appear mildly confused at times. Pertinent Medical History:  Past Medical History:   Diagnosis Date    Calculus of kidney     Cardiomyopathy [facility])     idiopathic    Chronic systolic heart failure [facility])     April 2010 ef 20%, by Dec 2010 up to 50% on meds, cath with normal cors    Diabetes [facility])     Essential hypertension, benign     Hypercholesterolemia      Past Surgical History:   Procedure Laterality Date    HX HYSTERECTOMY      HX ORTHOPAEDIC      HX OTHER SURGICAL  10/2017    difibulator     HX UROLOGICAL      stone removal    MS UNLISTED PROCEDURE CARDIAC SURGERY       Prior to Admission medications    Medication Sig Start Date End Date Taking? Authorizing Provider   simvastatin (ZOCOR) 20 mg tablet TAKE 1 TABLET BY MOUTH NIGHTLY 1/16/23  Yes Pola Solomon MD   carvediloL (COREG) 12.5 mg tablet TAKE 1 TABLET BY MOUTH TWICE A DAY WITH MEALS 1/12/23  Yes Tamera Lockhart MD   mirtazapine (REMERON) 15 mg tablet , THEN 2 TABLETS NIGHTLY  Patient taking differently: Take 30 mg by mouth nightly.   2 TABLETS NIGHTLY 12/2/22  Yes Pola Solomon MD   mupirocin [facility]) 2 % ointment Apply  to affected area two (2) times a day.  Patient taking differently: Apply 1 Tube to affected area two (2) times a day. Apply small amount to affected area two times a day. 9/23/22  Yes Shakeel Jackson PA-C   ferrous sulfate 140 mg (45 mg iron) TbER ER tablet Take 140 mg by mouth daily (with breakfast). 7/7/22  Yes Fabby Sweeney MD   glipiZIDE SR (GLUCOTROL XL) 10 mg CR tablet Take 1 Tablet by mouth every other day. 6/6/22  Yes Lauren Mclean MD   ibuprofen (MOTRIN) 200 mg tablet Take 400 mg by mouth daily as needed for Pain. Yes Provider, Historical   aspirin delayed-release 81 mg tablet Take 81 mg by mouth daily.    Yes Provider, Historical     Allergies   Allergen Reactions    Codeine Hives    Honey Itching and Other (comments)     allergic to therahoney    Pcn [Penicillins] Hives     Family History   Problem Relation Age of Onset    Stroke Mother     Hypertension Mother     Lung Disease Father         emphysema    Heart Disease Brother     Stroke Brother     Cancer Brother         bone    Cancer Maternal Grandfather         colon ca    Cancer Other         breast ca    Breast Cancer Sister 79    Breast Cancer Daughter 28     Social History     Socioeconomic History    Marital status:      Spouse name: Not on file    Number of children: Not on file    Years of education: Not on file    Highest education level: Not on file   Occupational History    Not on file   Tobacco Use    Smoking status: Never    Smokeless tobacco: Never   Substance and Sexual Activity    Alcohol use: No    Drug use: No    Sexual activity: Not Currently     Partners: Male     Comment: ,working,2 children,43 and 45 yrs old   Other Topics Concern    Not on file   Social History Narrative    Not on file     Social Determinants of Health     Financial Resource Strain: Not on file   Food Insecurity: Not on file   Transportation Needs: Not on file   Physical Activity: Not on file   Stress: Not on file   Social Connections: Not on file   Intimate Partner Violence: Not on file   Housing Stability: Not on file       Vitals:    02/10/23 0945   BP: 115/70   Pulse: 61   Resp: 18   Temp: 97 °F (36.1 °C)       Review of Systems:    Gen: No fever, chills, malaise, weight loss/gain. Heent: No headache, rhinorrhea, epistaxis, ear pain, hearing loss, sinus pain, neck pain/stiffness, sore throat. Heart: No chest pain, palpitations, DARNELL, pnd, or orthopnea. Resp: No cough, hemoptysis, wheezing and shortness of breath. GI: No nausea, vomiting, diarrhea, constipation, melena or hematochezia. : No urinary obstruction, dysuria or hematuria. Derm: see below  Musc/skeletal: no bone or joint complains. Vasc: No edema, cyanosis or claudication. Endo: No heat/cold intolerance, no polyuria,polydipsia or polyphagia. Neuro: No unilateral weakness, numbness, tingling. No seizures. Heme: No easy bruising or bleeding.     Wound Description:    02/10/23 0947   Right Leg Edema Point of Measurement   Leg circumference 29 cm   Ankle circumference 19 cm   RLE Peripheral Vascular    Capillary Refill Less than/equal to 3 seconds   Color Appropriate for race   Temperature Cool   Sensation Present   Pedal Pulse Palpable   Wound Leg lower Right 12/09/22   Date First Assessed: 12/09/22   Present on Hospital Admission: Yes  Wound Approximate Age at First Assessment (Weeks): 36 weeks  Primary Wound Type: Traumatic  Location: Leg lower  Wound Location Orientation: Right  Date of First Observation: 12/09/22   Wound Etiology Traumatic   Dressing Status Old drainage noted   Cleansed Cleansed with saline   Wound Length (cm) 1.2 cm   Wound Width (cm) 1.3 cm   Wound Depth (cm) 0.1 cm   Wound Surface Area (cm^2) 1.56 cm^2   Change in Wound Size % 30.67   Wound Volume (cm^3) 0.156 cm^3   Wound Healing % 31   Wound Assessment Eschar dry   Drainage Amount Small   Drainage Description Serosanguinous   Wound Odor None   Susan-Wound/Incision Assessment Blanchable erythema   Edges Defined edges   Wound Thickness Description Full thickness       Debridement Wound Care        Problem List Items Addressed This Visit          Other    Chronic ulcer of right leg with fat layer exposed ([address] - Primary    Relevant Medications    lidocaine (URO-JET/ GLYDO) 2 % jelly    Other Relevant Orders    INITIATE OUTPATIENT WOUND CARE PROTOCOL       Procedure Note  Indications:  Based on my examination of this patient's wound(s)/ulcer(s) today, debridement is required to promote healing and evaluate the wound base.     Performed by: Krishna Phipps DPM    Consent obtained: Yes    Time out taken: Yes    Debridement: Non-excisional/Selective    Using # 15 blade scalpel the wound(s)/ulcer(s) was/were sharply debrided down through and including the removal of    epidermis and dermis    Devitalized Tissue Debrided: fibrin, biofilm, and slough    Pre Debridement Measurements:  Are located in the [place]  Documentation Flow Sheet    Non-Pressure ulcer, limited to breakdown of skin    Wound/Ulcer #: 1    Post Debridement Measurements:  Wound/Ulcer Descriptions are Pre Debridement except measurements:    Wound Leg lower Right 12/09/22 (Active)   Wound Image   01/13/23 1043   Wound Etiology Traumatic 02/10/23 0947   Dressing Status Old drainage noted 02/10/23 0947   Cleansed Cleansed with saline 02/10/23 0947   Wound Length (cm) 1.2 cm 02/10/23 0947   Wound Width (cm) 1.3 cm 02/10/23 0947   Wound Depth (cm) 0.1 cm 02/10/23 0947   Wound Surface Area (cm^2) 1.56 cm^2 02/10/23 0947   Change in Wound Size % 30.67 02/10/23 0947   Wound Volume (cm^3) 0.156 cm^3 02/10/23 0947   Wound Healing % 31 02/10/23 0947   Post-Procedure Length (cm) 1.5 cm 02/10/23 1031   Post-Procedure Width (cm) 1 cm 02/10/23 1031   Post-Procedure Depth (cm) 0.1 cm 02/10/23 1031   Post-Procedure Surface Area (cm^2) 1.5 cm^2 02/10/23 1031   Post-Procedure Volume (cm^3) 0.15 cm^3 02/10/23 1031   Wound Assessment Eschar dry 02/10/23 0947   Drainage Amount Small 02/10/23 0947 Drainage Description Serosanguinous 02/10/23 0947   Wound Odor None 02/10/23 0947   Susan-Wound/Incision Assessment Blanchable erythema 02/10/23 0947   Edges Defined edges 02/10/23 0947   Wound Thickness Description Full thickness 02/10/23 0947   Number of days: 63        Total Surface Area Debrided:  1.56 sq cm     Estimated Blood Loss:  Minimal     Hemostasis Achieved: Pressure    Procedural Pain: 1 / 10     Post Procedural Pain: 0 / 10     Response to treatment: Well tolerated by patient           Assessment:   Right leg ulcer with fat layer exposed    Plan:  -Wound debrided as noted above  -Iodosorb, dsd  -Stressed the need for proper nutrition and protein intake.  -At today's appointment, there was significant confusion between the patient and daughter on whether the dressing and iodosorb ointment was being applied at all by the patient or how often it was being changed. Regardless, there was some dried iodosorb to the wound site, but I was unable to tell how long the old iodosorb may have been there. Patient's daughter today was also now stating to myself and nursing that the wound has been present for 1-2 years, which conflicts with her previous statements that the wound has only been there for a few months. Patient did appear confused at several times during today's visit and asked me to \"enjoy my evening\".   -Follow-up 2 weeks

## 2023-02-12 ENCOUNTER — APPOINTMENT (OUTPATIENT)
Dept: GENERAL RADIOLOGY | Age: 73
DRG: 291 | End: 2023-02-12
Attending: EMERGENCY MEDICINE
Payer: MEDICARE

## 2023-02-12 ENCOUNTER — HOSPITAL ENCOUNTER (INPATIENT)
Age: 73
LOS: 3 days | Discharge: HOME | DRG: 291 | End: 2023-02-15
Attending: EMERGENCY MEDICINE | Admitting: STUDENT IN AN ORGANIZED HEALTH CARE EDUCATION/TRAINING PROGRAM
Payer: MEDICARE

## 2023-02-12 DIAGNOSIS — R06.02 SOB (SHORTNESS OF BREATH): ICD-10-CM

## 2023-02-12 DIAGNOSIS — I50.23 ACUTE ON CHRONIC SYSTOLIC CONGESTIVE HEART FAILURE (HCC): ICD-10-CM

## 2023-02-12 DIAGNOSIS — N18.4 CKD (CHRONIC KIDNEY DISEASE) STAGE 4, GFR 15-29 ML/MIN (HCC): ICD-10-CM

## 2023-02-12 DIAGNOSIS — I13.0 CARDIORENAL SYNDROME WITH RENAL FAILURE, STAGE 1-4 OR UNSPECIFIED CHRONIC KIDNEY DISEASE, WITH HEART FAILURE (HCC): ICD-10-CM

## 2023-02-12 DIAGNOSIS — I50.9 ACUTE ON CHRONIC CONGESTIVE HEART FAILURE, UNSPECIFIED HEART FAILURE TYPE (HCC): Primary | ICD-10-CM

## 2023-02-12 DIAGNOSIS — R77.8 ELEVATED TROPONIN: ICD-10-CM

## 2023-02-12 LAB
ALBUMIN SERPL-MCNC: 3.2 G/DL (ref 3.5–5)
ALBUMIN/GLOB SERPL: 0.8 (ref 1.1–2.2)
ALP SERPL-CCNC: 100 U/L (ref 45–117)
ALT SERPL-CCNC: 20 U/L (ref 12–78)
ANION GAP SERPL CALC-SCNC: 11 MMOL/L (ref 5–15)
AST SERPL-CCNC: 31 U/L (ref 15–37)
ATRIAL RATE: 62 BPM
BASOPHILS # BLD: 0.1 K/UL (ref 0–0.1)
BASOPHILS NFR BLD: 1 % (ref 0–1)
BILIRUB SERPL-MCNC: 0.4 MG/DL (ref 0.2–1)
BNP SERPL-MCNC: ABNORMAL PG/ML
BUN SERPL-MCNC: 109 MG/DL (ref 6–20)
BUN/CREAT SERPL: 26 (ref 12–20)
CALCIUM SERPL-MCNC: 8.5 MG/DL (ref 8.5–10.1)
CALCULATED P AXIS, ECG09: 28 DEGREES
CALCULATED R AXIS, ECG10: 5 DEGREES
CALCULATED T AXIS, ECG11: -103 DEGREES
CHLORIDE SERPL-SCNC: 109 MMOL/L (ref 97–108)
CO2 SERPL-SCNC: 20 MMOL/L (ref 21–32)
COMMENT, HOLDF: NORMAL
CREAT SERPL-MCNC: 4.18 MG/DL (ref 0.55–1.02)
DIAGNOSIS, 93000: NORMAL
DIFFERENTIAL METHOD BLD: ABNORMAL
EOSINOPHIL # BLD: 0.3 K/UL (ref 0–0.4)
EOSINOPHIL NFR BLD: 4 % (ref 0–7)
ERYTHROCYTE [DISTWIDTH] IN BLOOD BY AUTOMATED COUNT: 14.7 % (ref 11.5–14.5)
EST. AVERAGE GLUCOSE BLD GHB EST-MCNC: 166 MG/DL
GLOBULIN SER CALC-MCNC: 3.9 G/DL (ref 2–4)
GLUCOSE BLD STRIP.AUTO-MCNC: 110 MG/DL (ref 65–117)
GLUCOSE BLD STRIP.AUTO-MCNC: 177 MG/DL (ref 65–117)
GLUCOSE SERPL-MCNC: 187 MG/DL (ref 65–100)
HBA1C MFR BLD: 7.4 % (ref 4–5.6)
HCT VFR BLD AUTO: 27.7 % (ref 35–47)
HGB BLD-MCNC: 9 G/DL (ref 11.5–16)
IMM GRANULOCYTES # BLD AUTO: 0 K/UL (ref 0–0.04)
IMM GRANULOCYTES NFR BLD AUTO: 0 % (ref 0–0.5)
LYMPHOCYTES # BLD: 1 K/UL (ref 0.8–3.5)
LYMPHOCYTES NFR BLD: 15 % (ref 12–49)
MCH RBC QN AUTO: 32.4 PG (ref 26–34)
MCHC RBC AUTO-ENTMCNC: 32.5 G/DL (ref 30–36.5)
MCV RBC AUTO: 99.6 FL (ref 80–99)
MONOCYTES # BLD: 0.8 K/UL (ref 0–1)
MONOCYTES NFR BLD: 11 % (ref 5–13)
NEUTS SEG # BLD: 4.9 K/UL (ref 1.8–8)
NEUTS SEG NFR BLD: 69 % (ref 32–75)
NRBC # BLD: 0 K/UL (ref 0–0.01)
NRBC BLD-RTO: 0 PER 100 WBC
P-R INTERVAL, ECG05: 158 MS
PLATELET # BLD AUTO: 178 K/UL (ref 150–400)
PMV BLD AUTO: 11.8 FL (ref 8.9–12.9)
POTASSIUM SERPL-SCNC: 4.7 MMOL/L (ref 3.5–5.1)
PROT SERPL-MCNC: 7.1 G/DL (ref 6.4–8.2)
Q-T INTERVAL, ECG07: 420 MS
QRS DURATION, ECG06: 96 MS
QTC CALCULATION (BEZET), ECG08: 426 MS
RBC # BLD AUTO: 2.78 M/UL (ref 3.8–5.2)
SAMPLES BEING HELD,HOLD: NORMAL
SERVICE CMNT-IMP: ABNORMAL
SERVICE CMNT-IMP: NORMAL
SODIUM SERPL-SCNC: 140 MMOL/L (ref 136–145)
TROPONIN I SERPL HS-MCNC: 1134 NG/L (ref 0–51)
VENTRICULAR RATE, ECG03: 62 BPM
WBC # BLD AUTO: 7 K/UL (ref 3.6–11)

## 2023-02-12 PROCEDURE — 65270000046 HC RM TELEMETRY

## 2023-02-12 PROCEDURE — 74011250636 HC RX REV CODE- 250/636: Performed by: EMERGENCY MEDICINE

## 2023-02-12 PROCEDURE — 74011636637 HC RX REV CODE- 636/637: Performed by: STUDENT IN AN ORGANIZED HEALTH CARE EDUCATION/TRAINING PROGRAM

## 2023-02-12 PROCEDURE — 82962 GLUCOSE BLOOD TEST: CPT

## 2023-02-12 PROCEDURE — 36415 COLL VENOUS BLD VENIPUNCTURE: CPT

## 2023-02-12 PROCEDURE — 99285 EMERGENCY DEPT VISIT HI MDM: CPT

## 2023-02-12 PROCEDURE — 83880 ASSAY OF NATRIURETIC PEPTIDE: CPT

## 2023-02-12 PROCEDURE — 96374 THER/PROPH/DIAG INJ IV PUSH: CPT

## 2023-02-12 PROCEDURE — 74011250637 HC RX REV CODE- 250/637: Performed by: EMERGENCY MEDICINE

## 2023-02-12 PROCEDURE — 84484 ASSAY OF TROPONIN QUANT: CPT

## 2023-02-12 PROCEDURE — 83036 HEMOGLOBIN GLYCOSYLATED A1C: CPT

## 2023-02-12 PROCEDURE — 74011250637 HC RX REV CODE- 250/637: Performed by: STUDENT IN AN ORGANIZED HEALTH CARE EDUCATION/TRAINING PROGRAM

## 2023-02-12 PROCEDURE — 80053 COMPREHEN METABOLIC PANEL: CPT

## 2023-02-12 PROCEDURE — 71046 X-RAY EXAM CHEST 2 VIEWS: CPT

## 2023-02-12 PROCEDURE — 74011000250 HC RX REV CODE- 250: Performed by: STUDENT IN AN ORGANIZED HEALTH CARE EDUCATION/TRAINING PROGRAM

## 2023-02-12 PROCEDURE — 74011250636 HC RX REV CODE- 250/636: Performed by: STUDENT IN AN ORGANIZED HEALTH CARE EDUCATION/TRAINING PROGRAM

## 2023-02-12 PROCEDURE — 85025 COMPLETE CBC W/AUTO DIFF WBC: CPT

## 2023-02-12 RX ORDER — GUAIFENESIN 100 MG/5ML
324 LIQUID (ML) ORAL
Status: COMPLETED | OUTPATIENT
Start: 2023-02-12 | End: 2023-02-12

## 2023-02-12 RX ORDER — MIRTAZAPINE 15 MG/1
15 TABLET, FILM COATED ORAL
COMMUNITY

## 2023-02-12 RX ORDER — INSULIN LISPRO 100 [IU]/ML
INJECTION, SOLUTION INTRAVENOUS; SUBCUTANEOUS
Status: DISCONTINUED | OUTPATIENT
Start: 2023-02-12 | End: 2023-02-15 | Stop reason: HOSPADM

## 2023-02-12 RX ORDER — TORSEMIDE 20 MG/1
50 TABLET ORAL DAILY
COMMUNITY
End: 2023-02-15

## 2023-02-12 RX ORDER — FUROSEMIDE 10 MG/ML
60 INJECTION INTRAMUSCULAR; INTRAVENOUS 2 TIMES DAILY
Status: DISCONTINUED | OUTPATIENT
Start: 2023-02-13 | End: 2023-02-15 | Stop reason: HOSPADM

## 2023-02-12 RX ORDER — SODIUM CHLORIDE 0.9 % (FLUSH) 0.9 %
5-40 SYRINGE (ML) INJECTION EVERY 8 HOURS
Status: DISCONTINUED | OUTPATIENT
Start: 2023-02-12 | End: 2023-02-15 | Stop reason: HOSPADM

## 2023-02-12 RX ORDER — MIRTAZAPINE 15 MG/1
15 TABLET, FILM COATED ORAL
Status: DISCONTINUED | OUTPATIENT
Start: 2023-02-12 | End: 2023-02-15 | Stop reason: HOSPADM

## 2023-02-12 RX ORDER — HEPARIN SODIUM 5000 [USP'U]/ML
5000 INJECTION, SOLUTION INTRAVENOUS; SUBCUTANEOUS EVERY 8 HOURS
Status: DISCONTINUED | OUTPATIENT
Start: 2023-02-12 | End: 2023-02-15 | Stop reason: HOSPADM

## 2023-02-12 RX ORDER — SODIUM CHLORIDE 0.9 % (FLUSH) 0.9 %
5-40 SYRINGE (ML) INJECTION AS NEEDED
Status: DISCONTINUED | OUTPATIENT
Start: 2023-02-12 | End: 2023-02-15 | Stop reason: HOSPADM

## 2023-02-12 RX ORDER — POLYETHYLENE GLYCOL 3350 17 G/17G
17 POWDER, FOR SOLUTION ORAL DAILY PRN
Status: DISCONTINUED | OUTPATIENT
Start: 2023-02-12 | End: 2023-02-15 | Stop reason: HOSPADM

## 2023-02-12 RX ORDER — IBUPROFEN 200 MG
4 TABLET ORAL AS NEEDED
Status: DISCONTINUED | OUTPATIENT
Start: 2023-02-12 | End: 2023-02-15 | Stop reason: HOSPADM

## 2023-02-12 RX ORDER — ACETAMINOPHEN 325 MG/1
650 TABLET ORAL
Status: DISCONTINUED | OUTPATIENT
Start: 2023-02-12 | End: 2023-02-15 | Stop reason: HOSPADM

## 2023-02-12 RX ORDER — INSULIN GLARGINE 100 [IU]/ML
10 INJECTION, SOLUTION SUBCUTANEOUS DAILY
Status: DISCONTINUED | OUTPATIENT
Start: 2023-02-12 | End: 2023-02-15 | Stop reason: HOSPADM

## 2023-02-12 RX ORDER — FUROSEMIDE 10 MG/ML
60 INJECTION INTRAMUSCULAR; INTRAVENOUS
Status: COMPLETED | OUTPATIENT
Start: 2023-02-12 | End: 2023-02-12

## 2023-02-12 RX ORDER — CARVEDILOL 12.5 MG/1
12.5 TABLET ORAL 2 TIMES DAILY WITH MEALS
Status: DISCONTINUED | OUTPATIENT
Start: 2023-02-12 | End: 2023-02-15 | Stop reason: HOSPADM

## 2023-02-12 RX ORDER — ASPIRIN 81 MG/1
81 TABLET ORAL DAILY
Status: DISCONTINUED | OUTPATIENT
Start: 2023-02-13 | End: 2023-02-15 | Stop reason: HOSPADM

## 2023-02-12 RX ORDER — ONDANSETRON 4 MG/1
4 TABLET, ORALLY DISINTEGRATING ORAL
Status: DISCONTINUED | OUTPATIENT
Start: 2023-02-12 | End: 2023-02-15 | Stop reason: HOSPADM

## 2023-02-12 RX ORDER — ATORVASTATIN CALCIUM 10 MG/1
10 TABLET, FILM COATED ORAL
Status: DISCONTINUED | OUTPATIENT
Start: 2023-02-12 | End: 2023-02-15 | Stop reason: HOSPADM

## 2023-02-12 RX ORDER — ACETAMINOPHEN 650 MG/1
650 SUPPOSITORY RECTAL
Status: DISCONTINUED | OUTPATIENT
Start: 2023-02-12 | End: 2023-02-15 | Stop reason: HOSPADM

## 2023-02-12 RX ORDER — ONDANSETRON 2 MG/ML
4 INJECTION INTRAMUSCULAR; INTRAVENOUS
Status: DISCONTINUED | OUTPATIENT
Start: 2023-02-12 | End: 2023-02-15 | Stop reason: HOSPADM

## 2023-02-12 RX ORDER — MIRTAZAPINE 15 MG/1
30 TABLET, FILM COATED ORAL
Status: DISCONTINUED | OUTPATIENT
Start: 2023-02-12 | End: 2023-02-12

## 2023-02-12 RX ORDER — LANOLIN ALCOHOL/MO/W.PET/CERES
325 CREAM (GRAM) TOPICAL
Status: DISCONTINUED | OUTPATIENT
Start: 2023-02-13 | End: 2023-02-15 | Stop reason: HOSPADM

## 2023-02-12 RX ADMIN — ASPIRIN 324 MG: 81 TABLET, CHEWABLE ORAL at 15:50

## 2023-02-12 RX ADMIN — CARVEDILOL 12.5 MG: 12.5 TABLET, FILM COATED ORAL at 18:30

## 2023-02-12 RX ADMIN — Medication 10 ML: at 17:04

## 2023-02-12 RX ADMIN — MIRTAZAPINE 15 MG: 15 TABLET, FILM COATED ORAL at 21:54

## 2023-02-12 RX ADMIN — ATORVASTATIN CALCIUM 10 MG: 10 TABLET, FILM COATED ORAL at 21:54

## 2023-02-12 RX ADMIN — Medication 10 ML: at 21:55

## 2023-02-12 RX ADMIN — FUROSEMIDE 60 MG: 10 INJECTION, SOLUTION INTRAMUSCULAR; INTRAVENOUS at 15:51

## 2023-02-12 RX ADMIN — HEPARIN SODIUM 5000 UNITS: 5000 INJECTION INTRAVENOUS; SUBCUTANEOUS at 18:31

## 2023-02-12 RX ADMIN — INSULIN GLARGINE 10 UNITS: 100 INJECTION, SOLUTION SUBCUTANEOUS at 18:31

## 2023-02-12 NOTE — PROGRESS NOTES
Admission Medication Reconciliation:      Spoke with patient at the bedside in ED-26. She seems like a reasonable historian- she read her medication regimen from a list provided by her daughter, which generally aligns with RX Query. Contacted Dr. Cindi Morgan via [employer] text to update regarding completion of/changes to PTA med list.    Medication changes (since last review):  Revised: Torsemide to 50 mg dose   Mirtazapine to 15 mg     Thank you for allowing me to participate in the care of your patient. Ben López PharmD, RN # [phone]       [facility] pharmacy benefit data reflects medications filled and processed through the patient's insurance, however   this data does NOT capture whether the medication was picked up or is currently being taken by the patient. Allergies:  Codeine, Honey, and Pcn [penicillins]    Significant PMH/Disease States:   Past Medical History:   Diagnosis Date    Calculus of kidney     Cardiomyopathy ([address]     idiopathic    Chronic systolic heart failure [facility])     April 2010 ef 20%, by Dec 2010 up to 50% on meds, cath with normal cors    Diabetes [facility])     Essential hypertension, benign     Hypercholesterolemia      Chief Complaint for this Admission:    Chief Complaint   Patient presents with    Shortness of Breath     Prior to Admission Medications:   Prior to Admission Medications   Prescriptions Last Dose Informant Taking?   aspirin delayed-release 81 mg tablet 2/11/2023  Yes   Sig: Take 81 mg by mouth daily. carvediloL (COREG) 12.5 mg tablet 2/11/2023  Yes   Sig: TAKE 1 TABLET BY MOUTH TWICE A DAY WITH MEALS   ferrous sulfate 140 mg (45 mg iron) TbER ER tablet 2/11/2023  Yes   Sig: Take 140 mg by mouth daily (with breakfast). glipiZIDE SR (GLUCOTROL XL) 10 mg CR tablet 2/11/2023  Yes   Sig: Take 1 Tablet by mouth every other day.    ibuprofen (MOTRIN) 200 mg tablet Unknown  No   Sig: Take 400 mg by mouth daily as needed for Pain.   mirtazapine (REMERON) 15 mg tablet 2/11/2023  Yes   Sig: Take 15 mg by mouth nightly. mupirocin (BACTROBAN) 2 % ointment 2/11/2023  Yes   Sig: Apply  to affected area two (2) times a day. Patient taking differently: Apply 1 Tube to affected area two (2) times a day. Apply small amount to affected area two times a day. simvastatin (ZOCOR) 20 mg tablet 2/11/2023  Yes   Sig: TAKE 1 TABLET BY MOUTH NIGHTLY   torsemide (DEMADEX) 20 mg tablet 2/11/2023  Yes   Sig: Take 50 mg by mouth daily. Take 2 1/5 tabs daily      Facility-Administered Medications: None     Please contact the main inpatient pharmacy with any questions or concerns at [phone] and we will direct you to the clinical pharmacist covering this patient's care while in-house.    ROXIE Villavicencio

## 2023-02-12 NOTE — H&P
History & Physical    Primary Care Provider: Janit Bamberger, MD  Source of Information: Patient and chart review    History of Presenting Illness: Alea Choi is a 67 y.o. female with history of HFrEF with an EF of 25%, ischemic cardiomyopathy, CKD stage IV, hypertension, dyslipidemia, iron deficiency anemia, non-insulin-dependent type 2 diabetes who presents to the hospital with complaints of shortness of breath. Reports worsening dyspnea very minimal exertion over the last 4 days with nonproductive cough. No recent travel or sick contacts. The patient denies any fever, chills, chest or abdominal pain, nausea, vomiting, congestion, recent illness, palpitations, or dysuria. Remarkable vitals on ER Presentation: vss  Labs Remarkable for: hgb 9, glu 187, cr 4.18, bnp >[id number], trop 1134  ER Images: cxr: Trace bilateral pleural effusions. ER treatment-aspirin, Lasix 60 mg IV evenings     Review of Systems:  Pertinent items are noted in the History of Present Illness. Past Medical History:   Diagnosis Date    Calculus of kidney     Cardiomyopathy [facility])     idiopathic    Chronic systolic heart failure [facility])     April 2010 ef 20%, by Dec 2010 up to 50% on meds, cath with normal cors    Diabetes [facility])     Essential hypertension, benign     Hypercholesterolemia       Past Surgical History:   Procedure Laterality Date    HX HYSTERECTOMY      HX ORTHOPAEDIC      HX OTHER SURGICAL  10/2017    difibulator     HX UROLOGICAL      stone removal    [place] UNLISTED PROCEDURE CARDIAC SURGERY       Prior to Admission medications    Medication Sig Start Date End Date Taking? Authorizing Provider   torsemide (DEMADEX) 20 mg tablet Take 20 mg by mouth daily.    Yes Niru, MD Elis   simvastatin (ZOCOR) 20 mg tablet TAKE 1 TABLET BY MOUTH NIGHTLY 1/16/23  Yes Janit Bamberger, MD   carvediloL (COREG) 12.5 mg tablet TAKE 1 TABLET BY MOUTH TWICE A DAY WITH MEALS 1/12/23  Yes Joelle Shaffer MD mirtazapine (REMERON) 15 mg tablet , THEN 2 TABLETS NIGHTLY  Patient taking differently: Take 30 mg by mouth nightly. 2 TABLETS NIGHTLY 12/2/22  Yes Orquidea Barker MD   ferrous sulfate 140 mg (45 mg iron) TbER ER tablet Take 140 mg by mouth daily (with breakfast). 7/7/22  Yes Orquidea Barker MD   glipiZIDE SR (GLUCOTROL XL) 10 mg CR tablet Take 1 Tablet by mouth every other day. 6/6/22  Yes Juancho Pastrana MD   aspirin delayed-release 81 mg tablet Take 81 mg by mouth daily. Yes Provider, Historical   mupirocin (BACTROBAN) 2 % ointment Apply  to affected area two (2) times a day. Patient taking differently: Apply 1 Tube to affected area two (2) times a day. Apply small amount to affected area two times a day. 9/23/22   Marija Lerma PA-C   ibuprofen (MOTRIN) 200 mg tablet Take 400 mg by mouth daily as needed for Pain. Provider, Historical     Allergies   Allergen Reactions    Codeine Hives    Honey Itching and Other (comments)     allergic to therahoney    Pcn [Penicillins] Hives      Family History   Problem Relation Age of Onset    Stroke Mother     Hypertension Mother     Lung Disease Father         emphysema    Heart Disease Brother     Stroke Brother     Cancer Brother         bone    Cancer Maternal Grandfather         colon ca    Cancer Other         breast ca    Breast Cancer Sister 79    Breast Cancer Daughter 28        SOCIAL HISTORY:  Patient resides:  Independently x   Assisted Living    SNF    With family care       Smoking history:   None x   Former    Chronic      Alcohol history:   None x   Social    Chronic      Ambulates:   Independently x   w/cane    w/walker    w/wc    CODE STATUS:  DNR    Full x   Other      Objective:     Physical Exam:     Visit Vitals  /64   Pulse 84   Temp 97.4 °F (36.3 °C)   Resp 17   SpO2 98%      O2 Device: None (Room air)    General:  Alert, cooperative, no distress, appears stated age.    Head:  Normocephalic, without obvious abnormality, atraumatic. Eyes:  Conjunctivae/corneas clear. PERRL, EOMs intact. Nose: Nares normal. Septum midline. Mucosa normal.        Neck: Supple, symmetrical, trachea midline, no carotid bruit and no JVD. Lungs:   Coarse breath sounds to auscultation bilaterally. Chest wall:  No tenderness or deformity. Heart:  Regular rate and rhythm, S1, S2 normal, no murmur, click, rub or gallop. Abdomen:   Soft, non-tender. Bowel sounds normal. No masses,  No organomegaly. Extremities: Extremities normal, atraumatic, no cyanosis or edema. Pulses: 2+ and symmetric all extremities. Skin: Skin color, texture, turgor normal. No rashes or lesions   Neurologic: CNII-XII intact. EKG:  nsr  Data Review:     Recent Days:  Recent Labs     02/12/23  1246   WBC 7.0   HGB 9.0*   HCT 27.7*        Recent Labs     02/12/23  1246      K 4.7   *   CO2 20*   *   *   CREA 4.18*   CA 8.5   ALB 3.2*   ALT 20     No results for input(s): PH, PCO2, PO2, HCO3, FIO2 in the last 72 hours. 24 Hour Results:  Recent Results (from the past 24 hour(s))   EKG, 12 LEAD, INITIAL    Collection Time: 02/12/23 12:26 PM   Result Value Ref Range    Ventricular Rate 62 BPM    Atrial Rate 62 BPM    P-R Interval 158 ms    QRS Duration 96 ms    Q-T Interval 420 ms    QTC Calculation (Bezet) 426 ms    Calculated P Axis 28 degrees    Calculated R Axis 5 degrees    Calculated T Axis -103 degrees    Diagnosis       Normal sinus rhythm  ST & T wave abnormality, consider inferior ischemia  Abnormal ECG  When compared with ECG of 22-APR-2010 06:00,  Vent.  rate has decreased BY  36 BPM  ST now depressed in Inferior leads  Inverted T waves have replaced nonspecific T wave abnormality in Inferior   leads  QT has shortened     CBC WITH AUTOMATED DIFF    Collection Time: 02/12/23 12:46 PM   Result Value Ref Range    WBC 7.0 3.6 - 11.0 K/uL    RBC 2.78 (L) 3.80 - 5.20 M/uL    HGB 9.0 (L) 11.5 - 16.0 g/dL    HCT 27.7 (L) 35.0 - 47.0 %    MCV 99.6 (H) 80.0 - 99.0 FL    MCH 32.4 26.0 - 34.0 PG    MCHC 32.5 30.0 - 36.5 g/dL    RDW 14.7 (H) 11.5 - 14.5 %    PLATELET 376 445 - 456 K/uL    MPV 11.8 8.9 - 12.9 FL    NRBC 0.0 0  WBC    ABSOLUTE NRBC 0.00 0.00 - 0.01 K/uL    NEUTROPHILS 69 32 - 75 %    LYMPHOCYTES 15 12 - 49 %    MONOCYTES 11 5 - 13 %    EOSINOPHILS 4 0 - 7 %    BASOPHILS 1 0 - 1 %    IMMATURE GRANULOCYTES 0 0.0 - 0.5 %    ABS. NEUTROPHILS 4.9 1.8 - 8.0 K/UL    ABS. LYMPHOCYTES 1.0 0.8 - 3.5 K/UL    ABS. MONOCYTES 0.8 0.0 - 1.0 K/UL    ABS. EOSINOPHILS 0.3 0.0 - 0.4 K/UL    ABS. BASOPHILS 0.1 0.0 - 0.1 K/UL    ABS. IMM. GRANS. 0.0 0.00 - 0.04 K/UL    DF AUTOMATED     METABOLIC PANEL, COMPREHENSIVE    Collection Time: 02/12/23 12:46 PM   Result Value Ref Range    Sodium 140 136 - 145 mmol/L    Potassium 4.7 3.5 - 5.1 mmol/L    Chloride 109 (H) 97 - 108 mmol/L    CO2 20 (L) 21 - 32 mmol/L    Anion gap 11 5 - 15 mmol/L    Glucose 187 (H) 65 - 100 mg/dL     (H) 6 - 20 MG/DL    Creatinine 4.18 (H) 0.55 - 1.02 MG/DL    BUN/Creatinine ratio 26 (H) 12 - 20      eGFR 11 (L) >60 ml/min/1.73m2    Calcium 8.5 8.5 - 10.1 MG/DL    Bilirubin, total 0.4 0.2 - 1.0 MG/DL    ALT (SGPT) 20 12 - 78 U/L    AST (SGOT) 31 15 - 37 U/L    Alk. phosphatase 100 45 - 117 U/L    Protein, total 7.1 6.4 - 8.2 g/dL    Albumin 3.2 (L) 3.5 - 5.0 g/dL    Globulin 3.9 2.0 - 4.0 g/dL    A-G Ratio 0.8 (L) 1.1 - 2.2     SAMPLES BEING HELD    Collection Time: 02/12/23 12:46 PM   Result Value Ref Range    SAMPLES BEING HELD 1BLUE     COMMENT        Add-on orders for these samples will be processed based on acceptable specimen integrity and analyte stability, which may vary by analyte.    NT-PRO BNP    Collection Time: 02/12/23 12:46 PM   Result Value Ref Range    NT pro-BNP >35,000 (H) <125 PG/ML   TROPONIN-HIGH SENSITIVITY    Collection Time: 02/12/23 12:46 PM   Result Value Ref Range    Troponin-High Sensitivity 1,134 (HH) 0 - 51 ng/L         Imaging: Assessment:     oJse Frye is a 67 y.o. female with history of HFrEF with an EF of 25%, ischemic cardiomyopathy, CKD stage IV, hypertension, dyslipidemia, iron deficiency anemia, non-insulin-dependent type 2 diabetes who is admitted for decompensated HFrEF.        Plan:       Considered HFrEF  -Continue diuresis with Lasix 60 mg IV twice daily  -Nephro consult given advanced CKD  -Strict I's and O's with daily weights  -Monitor and replete electrolytes  -Repeat echo in a.m.  -Consider cardiology consult in a.m.    CAD/ischemic cardiomyopathy/dyslipidemia  -Continue aspirin, statin and beta-blocker    Hypertension  -Continue home Coreg-    Mood disorder  -Home mirtazapine    NIDDM II  -Lantus 10 units  -SSI +Hypoglycemic protocols            FEN/GI -  po hydration  Activity - as tolerated  DVT prophylaxis - heparin  GI prophylaxis -  ni  Disposition - home    CODE STATUS:   full code       Signed By: Connor Meneses MD     February 12, 2023

## 2023-02-12 NOTE — ED PROVIDER NOTES
Shortness of Breath       79y F with idiopathic cardiomyopathy and CHF here with shortness of breath. Worsening over the past few days. No chest pain. No fever. (+) cough. No leg swelling/edema. No vomiting. No abdominal pain. No rash or skin changes. Has been taking all medications as directed.     Past Medical History:   Diagnosis Date    Calculus of kidney     Cardiomyopathy [facility])     idiopathic    Chronic systolic heart failure [facility])     April 2010 ef 20%, by Dec 2010 up to 50% on meds, cath with normal cors    Diabetes [facility])     Essential hypertension, benign     Hypercholesterolemia        Past Surgical History:   Procedure Laterality Date    HX HYSTERECTOMY      HX ORTHOPAEDIC      HX OTHER SURGICAL  10/2017    difibulator     HX UROLOGICAL      stone removal    [place] UNLISTED PROCEDURE CARDIAC SURGERY           Family History:   Problem Relation Age of Onset    Stroke Mother     Hypertension Mother     Lung Disease Father         emphysema    Heart Disease Brother     Stroke Brother     Cancer Brother         bone    Cancer Maternal Grandfather         colon ca    Cancer Other         breast ca    Breast Cancer Sister 79    Breast Cancer Daughter 28       Social History     Socioeconomic History    Marital status:      Spouse name: Not on file    Number of children: Not on file    Years of education: Not on file    Highest education level: Not on file   Occupational History    Not on file   Tobacco Use    Smoking status: Never    Smokeless tobacco: Never   Substance and Sexual Activity    Alcohol use: No    Drug use: No    Sexual activity: Not Currently     Partners: Male     Comment: ,working,2 children,43 and 45 yrs old   Other Topics Concern    Not on file   Social History Narrative    Not on file     Social Determinants of Health     Financial Resource Strain: Not on file   Food Insecurity: Not on file   Transportation Needs: Not on file   Physical Activity: Not on file   Stress: Not on file Social Connections: Not on file   Intimate Partner Violence: Not on file   Housing Stability: Not on file         ALLERGIES: Codeine, Honey, and Pcn [penicillins]    Review of Systems   Respiratory:  Positive for shortness of breath. Review of Systems   Constitutional: (-) weight loss. HEENT: (-) stiff neck   Eyes: (-) discharge. Respiratory: (-) cough. Cardiovascular: (-) syncope. Gastrointestinal: (-) blood in stool. Genitourinary: (-) hematuria. Musculoskeletal: (-) myalgias. Neurological: (-) seizure. Skin: (-) petechiae  Lymph/Immunologic: (-) enlarged lymph nodes  All other systems reviewed and are negative. Vitals:    02/12/23 1217   BP: 128/77   Pulse: 90   Resp: 24   Temp: 97.4 °F (36.3 °C)   SpO2: 98%            Physical Exam   Nursing note and vitals reviewed. Constitutional: oriented to person, place, and time. appears well-developed and well-nourished. No distress. Head: Normocephalic and atraumatic. Sclera anicteric  Nose: No rhinorrhea  Mouth/Throat: Oropharynx is clear and moist. Pharynx normal  Eyes: Conjunctivae are normal. Pupils are equal, round, and reactive to light. Right eye exhibits no discharge. Left eye exhibits no discharge. Neck: Painless normal range of motion. Neck supple. No LAD. Cardiovascular: Normal rate, regular rhythm, normal heart sounds and intact distal pulses. Exam reveals no gallop and no friction rub. No murmur heard. Pulmonary/Chest:  Mild respiratory distress. Crackles at the bases bilat. No increased work of breathing. No accessory muscle use. Good air exchange throughout. Abdominal: soft, non-tender, no rebound or guarding. No hepatosplenomegaly. Normal bowel sounds throughout. Back: no tenderness to palpation, no deformities, no CVA tenderness  Extremities/Musculoskeletal: Normal range of motion. no tenderness. No edema. Distal extremities are neurovasc intact. Lymphadenopathy:   No adenopathy.    Neurological:  Alert and oriented to person, place, and time. Coordination normal. CN 2-12 intact. Motor and sensory function intact. Skin: Skin is warm and dry. No rash noted. No pallor. Medical Decision Making  Amount and/or Complexity of Data Reviewed  Labs: ordered. Radiology: ordered. ECG/medicine tests: ordered. Risk  OTC drugs. Prescription drug management. Decision regarding hospitalization. 79y F here with shortness of breath. Looks like acute CHF exacerbation based on labs. Troponin elevated - will give ASA. Will d/w cardiology. Is going to need admission. ED Course as of 23 1453   Sun 2023   1234 EK:26 PM normal sinus rhythm, ventricular at 62 normal axis  Inferior T wave inversions, slight ST depression. Lateral T wave inversions. [NS]      ED Course User Index  [NS] Trevon Garcia MD       Procedures      [employer] Consult for Admission  3:13 PM    ED Room Number: ER26/26  Patient Name and age: Tavia Campos 67 y.o.  female  Working Diagnosis:   1. Acute on chronic congestive heart failure, unspecified heart failure type ([address]    2. SOB (shortness of breath)    3. Elevated troponin        COVID-19 Suspicion:  no  Sepsis present:  N/A  Reassessment needed: no  Code Status:  Full Code  Readmission: no  Isolation Requirements:  no  Recommended Level of Care:  telemetry  Department: [facility] Adult ED - 21       Other:  79y F with hx of cardiomyopathy here with acute CHF exacerbation. BNP greater than 35,000. Troponin 1,134. Chronic renal insufficiency. Getting IV lasix. Cards aware and will see in consult.

## 2023-02-13 LAB
ANION GAP SERPL CALC-SCNC: 9 MMOL/L (ref 5–15)
BUN SERPL-MCNC: 101 MG/DL (ref 6–20)
BUN/CREAT SERPL: 25 (ref 12–20)
CALCIUM SERPL-MCNC: 8.4 MG/DL (ref 8.5–10.1)
CHLORIDE SERPL-SCNC: 111 MMOL/L (ref 97–108)
CO2 SERPL-SCNC: 22 MMOL/L (ref 21–32)
COMMENT, HOLDF: NORMAL
CREAT SERPL-MCNC: 3.97 MG/DL (ref 0.55–1.02)
CREAT UR-MCNC: 24.4 MG/DL
GLUCOSE BLD STRIP.AUTO-MCNC: 131 MG/DL (ref 65–117)
GLUCOSE BLD STRIP.AUTO-MCNC: 141 MG/DL (ref 65–117)
GLUCOSE BLD STRIP.AUTO-MCNC: 143 MG/DL (ref 65–117)
GLUCOSE BLD STRIP.AUTO-MCNC: 94 MG/DL (ref 65–117)
GLUCOSE SERPL-MCNC: 111 MG/DL (ref 65–100)
POTASSIUM SERPL-SCNC: 3.2 MMOL/L (ref 3.5–5.1)
PROT UR-MCNC: 13 MG/DL (ref 0–11.9)
PROT/CREAT UR-RTO: 0.5
SAMPLES BEING HELD,HOLD: NORMAL
SERVICE CMNT-IMP: ABNORMAL
SERVICE CMNT-IMP: NORMAL
SODIUM SERPL-SCNC: 142 MMOL/L (ref 136–145)
TROPONIN I SERPL HS-MCNC: 1041 NG/L (ref 0–51)

## 2023-02-13 PROCEDURE — 74011250637 HC RX REV CODE- 250/637: Performed by: INTERNAL MEDICINE

## 2023-02-13 PROCEDURE — 80048 BASIC METABOLIC PNL TOTAL CA: CPT

## 2023-02-13 PROCEDURE — 97165 OT EVAL LOW COMPLEX 30 MIN: CPT

## 2023-02-13 PROCEDURE — 65270000046 HC RM TELEMETRY

## 2023-02-13 PROCEDURE — 74011250637 HC RX REV CODE- 250/637: Performed by: STUDENT IN AN ORGANIZED HEALTH CARE EDUCATION/TRAINING PROGRAM

## 2023-02-13 PROCEDURE — 84156 ASSAY OF PROTEIN URINE: CPT

## 2023-02-13 PROCEDURE — 74011250636 HC RX REV CODE- 250/636: Performed by: STUDENT IN AN ORGANIZED HEALTH CARE EDUCATION/TRAINING PROGRAM

## 2023-02-13 PROCEDURE — 84484 ASSAY OF TROPONIN QUANT: CPT

## 2023-02-13 PROCEDURE — 74011636637 HC RX REV CODE- 636/637: Performed by: STUDENT IN AN ORGANIZED HEALTH CARE EDUCATION/TRAINING PROGRAM

## 2023-02-13 PROCEDURE — 74011000250 HC RX REV CODE- 250: Performed by: STUDENT IN AN ORGANIZED HEALTH CARE EDUCATION/TRAINING PROGRAM

## 2023-02-13 PROCEDURE — 36415 COLL VENOUS BLD VENIPUNCTURE: CPT

## 2023-02-13 PROCEDURE — 99223 1ST HOSP IP/OBS HIGH 75: CPT | Performed by: SPECIALIST

## 2023-02-13 PROCEDURE — 82962 GLUCOSE BLOOD TEST: CPT

## 2023-02-13 PROCEDURE — 97530 THERAPEUTIC ACTIVITIES: CPT

## 2023-02-13 RX ORDER — POTASSIUM CHLORIDE 750 MG/1
40 TABLET, FILM COATED, EXTENDED RELEASE ORAL
Status: COMPLETED | OUTPATIENT
Start: 2023-02-13 | End: 2023-02-13

## 2023-02-13 RX ORDER — POTASSIUM CHLORIDE 750 MG/1
20 TABLET, FILM COATED, EXTENDED RELEASE ORAL DAILY
Status: DISCONTINUED | OUTPATIENT
Start: 2023-02-13 | End: 2023-02-15 | Stop reason: HOSPADM

## 2023-02-13 RX ADMIN — FUROSEMIDE 60 MG: 10 INJECTION, SOLUTION INTRAMUSCULAR; INTRAVENOUS at 17:07

## 2023-02-13 RX ADMIN — INSULIN GLARGINE 10 UNITS: 100 INJECTION, SOLUTION SUBCUTANEOUS at 09:00

## 2023-02-13 RX ADMIN — POTASSIUM CHLORIDE 20 MEQ: 750 TABLET, FILM COATED, EXTENDED RELEASE ORAL at 15:53

## 2023-02-13 RX ADMIN — FUROSEMIDE 60 MG: 10 INJECTION, SOLUTION INTRAMUSCULAR; INTRAVENOUS at 08:38

## 2023-02-13 RX ADMIN — POTASSIUM CHLORIDE 40 MEQ: 750 TABLET, FILM COATED, EXTENDED RELEASE ORAL at 09:02

## 2023-02-13 RX ADMIN — Medication 10 ML: at 15:53

## 2023-02-13 RX ADMIN — MIRTAZAPINE 15 MG: 15 TABLET, FILM COATED ORAL at 21:17

## 2023-02-13 RX ADMIN — CARVEDILOL 12.5 MG: 12.5 TABLET, FILM COATED ORAL at 08:38

## 2023-02-13 RX ADMIN — FERROUS SULFATE TAB 325 MG (65 MG ELEMENTAL FE) 325 MG: 325 (65 FE) TAB at 09:00

## 2023-02-13 RX ADMIN — Medication 2 UNITS: at 12:25

## 2023-02-13 RX ADMIN — ATORVASTATIN CALCIUM 10 MG: 10 TABLET, FILM COATED ORAL at 21:17

## 2023-02-13 RX ADMIN — Medication 10 ML: at 06:00

## 2023-02-13 RX ADMIN — CARVEDILOL 12.5 MG: 12.5 TABLET, FILM COATED ORAL at 17:00

## 2023-02-13 RX ADMIN — Medication 10 ML: at 21:19

## 2023-02-13 RX ADMIN — ASPIRIN 81 MG: 81 TABLET, COATED ORAL at 09:00

## 2023-02-13 NOTE — PROGRESS NOTES
Problem: Diabetes Self-Management  Goal: *Disease process and treatment process  Description: Define diabetes and identify own type of diabetes; list 3 options for treating diabetes. Outcome: Progressing Towards Goal  Goal: *Incorporating nutritional management into lifestyle  Description: Describe effect of type, amount and timing of food on blood glucose; list 3 methods for planning meals. Outcome: Progressing Towards Goal  Goal: *Incorporating physical activity into lifestyle  Description: State effect of exercise on blood glucose levels. Outcome: Progressing Towards Goal  Goal: *Developing strategies to promote health/change behavior  Description: Define the ABC's of diabetes; identify appropriate screenings, schedule and personal plan for screenings. Outcome: Progressing Towards Goal  Goal: *Using medications safely  Description: State effect of diabetes medications on diabetes; name diabetes medication taking, action and side effects. Outcome: Progressing Towards Goal  Goal: *Monitoring blood glucose, interpreting and using results  Description: Identify recommended blood glucose targets  and personal targets. Outcome: Progressing Towards Goal  Goal: *Prevention, detection, treatment of acute complications  Description: List symptoms of hyper- and hypoglycemia; describe how to treat low blood sugar and actions for lowering  high blood glucose level. Outcome: Progressing Towards Goal  Goal: *Prevention, detection and treatment of chronic complications  Description: Define the natural course of diabetes and describe the relationship of blood glucose levels to long term complications of diabetes.   Outcome: Progressing Towards Goal  Goal: *Developing strategies to address psychosocial issues  Description: Describe feelings about living with diabetes; identify support needed and support network  Outcome: Progressing Towards Goal  Goal: *Insulin pump training  Outcome: Progressing Towards Goal  Goal: *Sick day guidelines  Outcome: Progressing Towards Goal  Goal: *Patient Specific Goal (EDIT GOAL, INSERT TEXT)  Outcome: Progressing Towards Goal     Problem: Patient Education: Go to Patient Education Activity  Goal: Patient/Family Education  Outcome: Progressing Towards Goal     Problem: Pressure Injury - Risk of  Goal: *Prevention of pressure injury  Description: Document Mendel Scale and appropriate interventions in the flowsheet.   Outcome: Progressing Towards Goal  Note: Pressure Injury Interventions:  Sensory Interventions: Assess changes in LOC         Activity Interventions: Assess need for specialty bed    Mobility Interventions: Assess need for specialty bed    Nutrition Interventions: Document food/fluid/supplement intake    Friction and Shear Interventions: HOB 30 degrees or less                Problem: Patient Education: Go to Patient Education Activity  Goal: Patient/Family Education  Outcome: Progressing Towards Goal

## 2023-02-13 NOTE — CONSULTS
Patient name: Ayo Carmen MRN: [id number]      NEPHROLOGY SPECIALISTS  Initial Consult Note  Requested by: Dr. Haritha Isaac  Reason: Evaluation and management of WINSTON/CKD    Assessment:  WINSTON on CKD-4: WINSTON likely Cardio-renal syndrome in the setting of CHF/need for diuresis  CKD-4 (Dr Ashvin Mckeon)- basln Cr 2.3 to 2.6. due to DN and CRS (CHF)  A on C Syst CHF  Hypokalemia  Anemia- may have component of anemia of CKD  DM-2 with DN/CKD  Azotemia  Elevated Troponin- ?NSTEMI    Patient is exhibiting features of cardiorenal syndrome. She is clinically improved with IV diuretics. Will help to accept higher CR in order for her to breathe better and stay out of CHF. she has been stage IV CKD and I suspect this might be her new baseline and we will help to prepare her for future KRT    Plan/Recommendations:  Continue IV Lasix 60 mg twice daily  Start oral KCl 20 mg daily  Monitor SAWYER's  Daily labs  Daily weight  Fluid restriction to 1.5 L/day  Check iron profile with a.m. labs. May need oral versus IV iron depending on the results  Check Phos with am labs  Check urine PCR    HPI: Ayo Carmen is a 67 y.o. female with PMH significant for DM, CKD-4, CHF admitted with SOB  No associated n/v/cp. No cough, f/c or hemoptysis  Found to have evidence of CHF  Has WINSTON on CKD with Cr upto 4.2  Started on IV Lasix>>feeling better. Cr slight better today at 3.9    Denies diarrhea or blood in stool. Sees Randee Simms in office. Last visit in OCT'22. Cr 2.5  Denies taking any nsaids.  No recent abx use as OP    PMH:    Past Medical History:   Diagnosis Date    Calculus of kidney     Cardiomyopathy [facility])     idiopathic    Chronic systolic heart failure [facility])     April 2010 ef 20%, by Dec 2010 up to 50% on meds, cath with normal cors    Diabetes [facility])     Essential hypertension, benign     Hypercholesterolemia        PSH:  Past Surgical History:   Procedure Laterality Date    HX HYSTERECTOMY      HX ORTHOPAEDIC      HX OTHER SURGICAL  10/2017    difibulator     HX UROLOGICAL      stone removal    LA UNLISTED PROCEDURE CARDIAC SURGERY         Allergies   Allergen Reactions    Codeine Hives    Honey Itching and Other (comments)     allergic to therahoney    Pcn [Penicillins] Hives       Medications Prior to Admission   Medication Sig    torsemide (DEMADEX) 20 mg tablet Take 50 mg by mouth daily. Take 2 1/5 tabs daily    mirtazapine (REMERON) 15 mg tablet Take 15 mg by mouth nightly. simvastatin (ZOCOR) 20 mg tablet TAKE 1 TABLET BY MOUTH NIGHTLY    carvediloL (COREG) 12.5 mg tablet TAKE 1 TABLET BY MOUTH TWICE A DAY WITH MEALS    mupirocin (BACTROBAN) 2 % ointment Apply  to affected area two (2) times a day. (Patient taking differently: Apply 1 Tube to affected area two (2) times a day. Apply small amount to affected area two times a day.)    ferrous sulfate 140 mg (45 mg iron) TbER ER tablet Take 140 mg by mouth daily (with breakfast). glipiZIDE SR (GLUCOTROL XL) 10 mg CR tablet Take 1 Tablet by mouth every other day. aspirin delayed-release 81 mg tablet Take 81 mg by mouth daily. ibuprofen (MOTRIN) 200 mg tablet Take 400 mg by mouth daily as needed for Pain.        Social history:  Social History     Tobacco Use    Smoking status: Never    Smokeless tobacco: Never   Substance Use Topics    Alcohol use: No    Drug use: No           Family history:  No family history of CKD or ESRD    Review of Systems: as noted in HPI;     Physical Exam:  Visit Vitals  /60   Pulse 69   Temp 97.7 °F (36.5 °C)   Resp 23   Wt 58.1 kg (128 lb 0.6 oz)   SpO2 95%   BMI 24.19 kg/m²     Elderly, frail, NAD  AT/NC, no icterus  Decreased breath sound at bases, no distress  Regular rate rhythm, no rub  Soft, NT, BS +  Trace peripheral edema  AOx3  No skin rash    Labs/Data:   Lab Results   Component Value Date/Time    WBC 7.0 02/12/2023 12:46 PM    HGB 9.0 (L) 02/12/2023 12:46 PM    HCT 27.7 (L) 02/12/2023 12:46 PM    PLATELET 354 42/15/4637 12:46 PM    MCV 99.6 (H) 02/12/2023 12:46 PM       Lab Results   Component Value Date/Time    Sodium 142 02/13/2023 02:46 AM    Potassium 3.2 (L) 02/13/2023 02:46 AM    Chloride 111 (H) 02/13/2023 02:46 AM    CO2 22 02/13/2023 02:46 AM    Anion gap 9 02/13/2023 02:46 AM    Glucose 111 (H) 02/13/2023 02:46 AM     (H) 02/13/2023 02:46 AM    Creatinine 3.97 (H) 02/13/2023 02:46 AM    BUN/Creatinine ratio 25 (H) 02/13/2023 02:46 AM    GFR est AA 12 (L) 05/13/2022 11:39 AM    GFR est non-AA 10 (L) 05/13/2022 11:39 AM    eGFR 11 (L) 02/13/2023 02:46 AM    eGFR 15 (L) 04/20/2022 11:10 AM    Calcium 8.4 (L) 02/13/2023 02:46 AM         Intake/Output Summary (Last 24 hours) at 2/13/2023 1410  Last data filed at 2/13/2023 0938  Gross per 24 hour   Intake --   Output 1300 ml   Net -1300 ml       Wt Readings from Last 3 Encounters:   02/13/23 58.1 kg (128 lb 0.6 oz)   12/13/22 55.8 kg (123 lb)   11/21/22 55.9 kg (123 lb 3.2 oz)       Patient seen and examined. Chart reviewed. Labs, data and other pertinent notes reviewed in last 24 hrs.     Discussed with patient    Signed by:  Shelia De Luna MD  Nephrology and HTN

## 2023-02-13 NOTE — NURSE NAVIGATOR
HEART FAILURE NURSE NAVIGATOR NOTE  [address]    Patient chart was reviewed by Heart Failure (HF) Nurse Navigators for compliance of prescribed treatment with guidelines directed medical therapy (GDMT) and HF database completed. Please, review beneath recommendations for symptomatic patients with HF with Reduced Ejection Fraction ? 40% (HFrEF)* and patients whose LVEF improved > 40% (HFimpEF)* for your consideration when taking care of this patient. Current Medical Therapy:    Name Ayo LEYVA 1950   LVEF Prior EF 25% (2019); repeat echo pending   NYHA Functional Class Documentation needed   ARNi/ACEi/ARB Not currently prescribed, see recommendations below   Beta-blocker Coreg 12.5 mg twice daily   Aldosterone Antagonist Not currently prescribed, see recommendations below   SGLT2 inhibitor Not currently prescribed, see recommendations below   Hydralazine/Isosorbide Dinitrate    Consulting Cardiologist: Frances (CAV)     Recommendations:    Please, add the following GDMT for HFrEF ? 40% [Class 1] or document in discharge summary/progress note why patient cannot take the medication:  ARNi/ACEi or ARB  Beta-blockers (carvedilol, sustained-release metoprolol succinate or bisoprolol)  Aldosterone antagonists GFR > 30 and K< 5  SGLT2 inhibitor  Hydralazine/Isosorbide dinitrate for  Americans with Class III/IV symptoms  Adjust diuretic dose at discharge if hospitalized for volume overload    Consider adding the following GDMT for HFrEF ? 40%, if appropriate [Class 2b]:   Ivabradine for patients on maximally tolerated beta-blocker dose in order to achieve HR 70-80bpm  Digoxin, goal level 0.5-0.9  Polyunsaturated fatty acids  Vericuguat  For patient with hyperkalemia while on RAASi > 5.5, consider adding potassium binders (patiromer, sodium zirconium cycosilicate)    Note: the following medications may be potentially harmful in heart failure [Class 3]:  Calcium channel blockers (doxazosin, diltiazem, verapamil, nifedipine)  Antiarrhythmics (flecanide, disopyrimide, sotalol, dronedarone)  Diabetes medications (thiasolidinediones, saxagliptin, alogliptin)  NSAIDs and MOYA 2 inhibitors    Consider vaccinations for respiratory illnesses (flu, pneumonia, covid) [Class 2b]    For eligible patients with LVEF < 35% consider discharge with wearable defibrillation [Class 2b] and/or referral for ICD implantation [Class 1] for prevention of sudden cardiac death. Due to severely reduced LVEF < 25% and/or recurrent hospitalizations this patient may benefit from referral to Advanced Heart Failure Program to assess suitability for advanced therapies, such as left-ventricular assist device, heart transplantation, palliative inotropes or palliation [Class 1]. To obtain in-patient consultation or refer to [address] [place] [address], call [phone]    Patient Education:     Teach back in heart failure education provided, including information about medical therapy, lifestyle modifications, diet and fluid restrictions, physical activity. Educational resources provided: Living with Heart Failure booklet; Signs/Symptoms magnet; Weight Calendar; [facility] flyer; Preparation for Successful Discharge Checklist.  Information provided about HF support group. Heart failure avoiding triggers on discharge instructions. Plan for Transitional Care:    Post discharge follow up phone call to be made within 48-72 hours of discharge. Patient will follow-up with PCP. Patient will follow-up with Primary Cardiologist.  Obstructive sleep apnea screening done and patient was referred to Sleep Medicine. Referral/follow-up with Cardiac Rehabilitation.   Referral/follow-up with Advanced Heart Failure Specialist.  Referral/follow-up with Palliative Care Specialist.      Heart Failure Nurse Navigator  Phone: [phone]  /  [phone]    *Recommendations listed above are based on 2022 AHA/ACC/HFSA Guideline for the Management of Heart Failure: A Report of the [address] on Clinical Practice Guidelines. Circulation 2022; [id number]. and 2017 AHA/ACC/HRS guideline for management of patients with ventricular arrhythmias and the prevention of sudden cardiac death: A Report of the [employer] of Cardiology/American Heart Association Task Force on Clinical Practice Guidelines and the Heart Rhythm Society.  Heart Rhythm, Vol 15, No 10, October 2018 *Class of Recommendation: Class 1 (strong), Class 2a (moderate), Class 2b (weak), Class 3 (not recommended, potentially harmful)

## 2023-02-13 NOTE — CONSULTS
[address]                     Cardiology Care Note     [x]Initial Encounter     []Follow-up    Patient Name: Felicita Phan - [id number] - [id number]  Primary Cardiologist: [facility] Physicians: Emy Nieto MD  Consulting Cardiologist: [facility] Physicians: Emy Nieto MD     2023   Felicita Phan is a 67 y.o. female   I have seen Jayden Simmons since  when she had a nonischemic cardiomyopathy with an EF of 20%. She returned to see us in 2018 and has been intermittently since then in the office. She has an ill  and a very helpful daughter. She has often chosen to miss appointments partly because of her 's circumstances. She has had chronic shortness of breath and EF about 25% in 2018 and fluctuating weight. She has now had steadily decrease in her renal function. And is admitted with cardiorenal syndrome with shortness of breath. She denies chest pain. She has no edema on exam.  Her main complaint was shortness of breath without chest pain or edema. She had a previous pacemaker with single lead ICD [employer] and is followed by Dr. Elton Sofia. The pacemaker was placed in 2017 by another physician (Dr. Adilson Ch [facility]) prior to her transfer to our back to her care in 2018. At that time she was taking torsemide followed Dr. Odilia Goodell. Her nephropathy is felt due to type 2 diabetes and she has hypertension dyslipidemia. When I last saw her 10/15/2021 she was a compensated with a creatinine of 2.1 on Demadex 50 mg daily and Coreg 12.5 twice daily. Exam is notable for a wound on her right leg with a dressing but no edema clear lungs regular rate and rhythm without murmur rub or gallop. Labs show proBNP of greater than 35,000 bilateral pleural effusions and troponin that is 1134 and then 1041. The creatinine is now 4.18 she has had more than a liter out.   She says the wound was when she bumped her leg on furniture        Congestive heart failure systolic with an ejection fraction of 20 to 25% for about 13 years. She has defibrillator and has developed cardiorenal syndrome  CKD4 and WINSTON we will ask renal to see though I suspect we will have to diurese her regardless of renal dysfunction to keep her out of overt CHF  Elevated troponins not a typical pattern for MI this is probably supply demand mismatch in the setting of acute kidney injury and CHF. She had normal coronaries but that was by cath a long time ago so it is reasonable to get a stress test while she is here but I would not proceed to cath based on her renal dysfunction. The stress test is more diagnostic. Alyson Odor in a.m. tomorrow or Wednesday. Status post AICD needs device check to look for VT or other changes. Patient seen earlier today and examined  and agree with Advance Practice Provider (YEIMI, NP,PA)  assessment and plans. Jeniffer Carrera MD        Reason for encounter: CHF    HPI:   Miah Whitehead is a 67 y.o. female with PMH significant for Dilated cardiomyopathy, NICM, chronic HFrEF (EF 25% ), s/p ICD (10/2017), DM type II, Iron deficiency anemia CKD 3, HTN, HLD, who presents with chief c/o SOB and fatigue. Reports that emily has been doing well until last week she was distraught over the passing of the  of her Taoist  and she went to  on Tuesday. Georgeann Dakins was exhausting and she felt tired since then. Had some SOB. Nonproductive cough was reported  Yesterday she experienced intermittent episodes of worsened SOB at rest over 2-3 hours period. She mentioned it to her family who brought her to ER. Had increased SOB descending 3 flights of stairs to get to car. Denies any BLE edema but did feel her abdomen was swollen. Gained couple of lbs over past couple of days. Has been taking torsemide daily now for <1 month because she was \"putting on a little weight\" and thought it was fluid.   She denied any chest pain/pressure, arm or jaw pain,  dizziness, lightheadedness, palpitation. No blood in stool/black stools. Takes iron. In Er, pro BNP >35,000, CXR with small bilateral pleural effusions. HS troponin trend is flat ([id number]), creatinine 4.18, reduced to 3.97 after IV lasix 60 mg. She has had >1300 mls urine output since IV lasix and states that her breathing is improved, near  baseline. Her abdominal swelling is better. Prior to last week, she was walking approximately 5 blocks 3-4 times/week and able to climb 3 flight of stairs without SOB. Report she has a wound to [facility] from bumping her leg on furniture - wound clinic is managing wound outpatient. Assessment and Plan     Cardiomyopathy/ Acute on chronic HFrEF with suspected cardiorenal syndrome. NYHA IV on admission  EF 25% with diffuse hypokinesis and distinct RWMA (5/2018)[EF 30% 2010 with Normal Coronary arteries on remote C 5/2010]. Repeat echo today. On lasix 60 mg IV BID  Continue Coreg 12.5 mg BID. No ACE-I/ARB/ARNI/spironolactone given renal dysfunction. Consider low dose hydralazine or isosorbide if bp allows after diuresis. No Jardiance given GFR of <10. Has ICD-has never fired per pt report. 2.  Elevated troponin:  HS troponin 5482-4886. Troponins flat. EKG NSR with t wave inversion inferior leads (there were t wave inversions on echo in Lead II, avf on prior EKG 5/2018). No chest pain. Suspect Troponin elevation could be due to demand of CHF in setting of renal dysfunction but  Will check echo for now. Does have some risk factors for CAD and last cath was 2010, normal CORs. Avoid invasive cardiac testing given renal dysfunction/worsened renal function. Continue ASA, statin for now. No ACS heparin needed at this time. 3.  Anemia:  History of iron deficiency. Suspect chronic in setting of JACOBO and renal disease. May also contribute to SOB. On ferrous sulfate. 4.  CKD IV:  creatinine 3.16-4.48 last year.   Creatinine trending down. Suspect possibly some element of cardiorenal.   Lab Results   Component Value Date/Time    Creatinine 3.97 (H) 02/13/2023 02:46 AM   Renal consult pending. Suspect worsened renal dysfunction also contributing to SOB. 5. HLD  .8, HDL 27 (5/2022). Continue statin. 6. Hypokalemia: repleted by primary team.              ____________________________________________________________    Cardiac testing  Echo 5/31/23: Left ventricle: The ventricle was mildly dilated. Systolic function was  severely reduced by visual assessment. Ejection fraction was estimated to  be 25 %. There was severe diffuse hypokinesis with distinct regional wall  motion abnormalities. Wall thickness was at the upper limits of normal.     Left atrium: The atrium was moderately dilated. LA volume index was 43  ml/mï¾². Mitral valve: There was normal leaflet separation. There was no evidence  for stenosis. There was mild regurgitation. Pulmonary arteries: Systolic pressure was within the normal range. Most recent HS troponins:  Recent Labs     02/13/23  0246 02/12/23  1246   TROPHS 1,041* 1,134*       ECG: NSR with PVC    Review of Systems:    [x]All other systems reviewed and all negative except as written in HPI    [] Patient unable to provide secondary to condition    Past Medical History:   Diagnosis Date    Calculus of kidney     Cardiomyopathy ([facility] [address]     idiopathic    Chronic systolic heart failure [facility])     April 2010 ef 20%, by Dec 2010 up to 50% on meds, cath with normal cors    Diabetes [facility])     Essential hypertension, benign     Hypercholesterolemia      Past Surgical History:   Procedure Laterality Date    HX HYSTERECTOMY      HX ORTHOPAEDIC      HX OTHER SURGICAL  10/2017    difibulator     HX UROLOGICAL      stone removal    PA UNLISTED PROCEDURE CARDIAC SURGERY       Social Hx:  reports that she has never smoked.  She has never used smokeless tobacco. She reports that she does not drink alcohol and does not use drugs. Family Hx: family history includes Breast Cancer (age of onset: 28) in her daughter; Breast Cancer (age of onset: 79) in her sister; Cancer in her brother, maternal grandfather, and another family member; Heart Disease in her brother; Hypertension in her mother; Lung Disease in her father; Stroke in her brother and mother. Allergies   Allergen Reactions    Codeine Hives    Honey Itching and Other (comments)     allergic to therahoney    Pcn [Penicillins] Hives          OBJECTIVE:  Wt Readings from Last 3 Encounters:   12/13/22 55.8 kg (123 lb)   11/21/22 55.9 kg (123 lb 3.2 oz)   11/09/22 56.2 kg (124 lb)       Intake/Output Summary (Last 24 hours) at 2/13/2023 0855  Last data filed at 2/13/2023 0528  Gross per 24 hour   Intake --   Output 1000 ml   Net -1000 ml       Physical Exam:    Vitals:   Vitals:    02/13/23 0718 02/13/23 0733 02/13/23 0838 02/13/23 0840   BP:   129/61 129/61   Pulse: 67 65 65 63   Resp: 25 22 22   Temp:    97.7 °F (36.5 °C)   SpO2: 95% 96%  96%     Telemetry: NSR     Gen: Well-developed, in no acute distress  Neck: Supple, No JVD,   Resp: No accessory muscle use, bibasilar few crackles. Card: Regular Rate,Rythm, Normal S1, S2, grade I/VI systolic murmur LUSB. No rubs or gallop. No thrills. Abd:   Soft, non-tender, non-distended, BS+   MSK: No cyanosis,  RLE dressing c/d/I. Skin: No rashes    Neuro: Moving all four extremities, follows commands appropriately  Psych: Good insight, oriented to person, place, alert, Nml Affect  LE: No edema    Data Review:     Radiology:   XR Results (most recent):  Results from [place] encounter on 02/12/23    XR CHEST PA LAT    Narrative  INDICATION:  SOB    COMPARISON: April 2010    FINDINGS: PA and lateral views of the chest demonstrate a stable  cardiomediastinal silhouette and chronic cardiomegaly. There is a single lead  left-sided AICD. There are trace bilateral pleural effusions.  There is no  pulmonary edema. Impression  Trace bilateral pleural effusions. Recent Labs     02/13/23  0246 02/12/23  1246    140   K 3.2* 4.7   * 109*   CO2 22 20*   * 109*   CREA 3.97* 4.18*   * 187*   CA 8.4* 8.5     Recent Labs     02/12/23  1246   WBC 7.0   HGB 9.0*   HCT 27.7*        Recent Labs     02/12/23  1246        No results for input(s): CHOL, LDLC in the last 72 hours.     No lab exists for component: TGL, HDLC,  HBA1C      Current meds:    Current Facility-Administered Medications:     potassium chloride SR (KLOR-CON 10) tablet 40 mEq, 40 mEq, Oral, NOW, Carmen Carvajal MD    aspirin delayed-release tablet 81 mg, 81 mg, Oral, DAILY, Brian Owens MD    carvediloL (COREG) tablet 12.5 mg, 12.5 mg, Oral, BID WITH MEALS, Brian Owens MD, 12.5 mg at 02/13/23 0838    ferrous sulfate tablet 325 mg, 325 mg, Oral, DAILY WITH BREAKFAST, Brian Owens MD    atorvastatin (LIPITOR) tablet 10 mg, 10 mg, Oral, QHS, Brian Owens MD, 10 mg at 02/12/23 2154    glucose chewable tablet 16 g, 4 Tablet, Oral, PRN, Brian Owens MD    glucagon (GLUCAGEN) injection 1 mg, 1 mg, IntraMUSCular, PRN, Brian Owens MD    sodium chloride (NS) flush 5-40 mL, 5-40 mL, IntraVENous, Q8H, Brian Owens MD, 10 mL at 02/13/23 0600    sodium chloride (NS) flush 5-40 mL, 5-40 mL, IntraVENous, PRN, Brian Owens MD    acetaminophen (TYLENOL) tablet 650 mg, 650 mg, Oral, Q6H PRN **OR** acetaminophen (TYLENOL) suppository 650 mg, 650 mg, Rectal, Q6H PRN, Brian Owens MD    polyethylene glycol (MIRALAX) packet 17 g, 17 g, Oral, DAILY PRN, Brian Owens MD    ondansetron (ZOFRAN ODT) tablet 4 mg, 4 mg, Oral, Q8H PRN **OR** ondansetron (ZOFRAN) injection 4 mg, 4 mg, IntraVENous, Q6H PRN, Brian Owens MD    dextrose 10 % infusion 0-250 mL, 0-250 mL, IntraVENous, PRN, Brian Owens MD    insulin glargine (LANTUS) injection 10 Units, 10 Units, SubCUTAneous, DAILY, Brian Owens MD, 10 Units at 02/12/23 1831    insulin lispro (HUMALOG) injection, , SubCUTAneous, AC&HS, Brain Owens MD    heparin (porcine) injection 5,000 Units, 5,000 Units, SubCUTAneous, Q8H, Brian Owens MD, 5,000 Units at 02/12/23 1831    mirtazapine (REMERON) tablet 15 mg, 15 mg, Oral, QHS, Brian Owens MD, 15 mg at 02/12/23 2154    furosemide (LASIX) injection 60 mg, 60 mg, IntraVENous, BID, Brian Owens MD, 60 mg at 02/13/23 2140    Current Outpatient Medications:     torsemide (DEMADEX) 20 mg tablet, Take 50 mg by mouth daily. Take 2 1/5 tabs daily, Disp: , Rfl:     mirtazapine (REMERON) 15 mg tablet, Take 15 mg by mouth nightly., Disp: , Rfl:     simvastatin (ZOCOR) 20 mg tablet, TAKE 1 TABLET BY MOUTH NIGHTLY, Disp: 90 Tablet, Rfl: 1    carvediloL (COREG) 12.5 mg tablet, TAKE 1 TABLET BY MOUTH TWICE A DAY WITH MEALS, Disp: 180 Tablet, Rfl: 3    mupirocin (BACTROBAN) 2 % ointment, Apply  to affected area two (2) times a day. (Patient taking differently: Apply 1 Tube to affected area two (2) times a day. Apply small amount to affected area two times a day.), Disp: 22 g, Rfl: 0    ferrous sulfate 140 mg (45 mg iron) TbER ER tablet, Take 140 mg by mouth daily (with breakfast). , Disp: , Rfl:     glipiZIDE SR (GLUCOTROL XL) 10 mg CR tablet, Take 1 Tablet by mouth every other day., Disp: 90 Tablet, Rfl: 1    aspirin delayed-release 81 mg tablet, Take 81 mg by mouth daily. , Disp: , Rfl:     ibuprofen (MOTRIN) 200 mg tablet, Take 400 mg by mouth daily as needed for Pain., Disp: , Rfl:     Sameera Alexandre NP    [facility] Cardiology  Call center: Z) [phone] (W) [phone]      Yaritza Ryan MD

## 2023-02-13 NOTE — DISCHARGE INSTRUCTIONS
Dear Erica Wellington,    Thank you for choosing [address] [place] [address] for your healthcare needs. We strive to provide EXCELLENT care to you and your family. In an effort to explain clearly why you were here in the hospital, I've also written a very brief summary below. Other details including formal diagnosis, medication changes, and follow up appointment recommendations can also be found in this packet. You were admitted for shortness of breath due to CHF exacerbation for which you were started on IV diuretics. You also received care from specialist physicians in the following specialties:  - Cardiology  - Nephrology     Here are the updates to your medication list:  **STOP torsemide, and start furosemide twice daily     Remember that it is important for you to take your medications exactly as they are prescribed. It is helpful to keep a list of your medication with the names, dosages, and times to be taken in your wallet. Additionally,   - Please make sure to follow up with your primary care physician within 1-2 weeks of discharge for hospital follow up. You should also follow up with cardiology and nephrology   - Please make sure to continue to monitor for: Chest pain, shortness of breath, high fevers, and return to the Emergency Department with any of these symptoms.   - Please get up slowly from a seated or laying position, avoid falls. - Avoid tobacco, alcohol and other illicit drug use. - Diet restrictions: Low salt   - Activity restrictions: None, as tolerated   - Wound care: per prior wound care instructions (for L lower extremity)  - Please measure your weight daily, if you note a sharp increase (2-3lb) in a short period of time, please call your cardiologist. Karie Cerda should take a fluid pill at that time depending on their instruction.   - Keep to a low salt diet. This will help reduce the amount of excess fluid accumulation.        Make sure to also see your primary care doctor for follow-up. Bring these papers with you and be sure to review your medication list with your doctor. I cannot stress the importance of follow up enough. I've included the information for your follow-up appointments below:      FOLLOW UP APPOINTMENTS:    Follow-up Information       Follow up With Specialties Details Why Contact Info    Margarito Bernardo MD Internal Medicine Physician   [address] [facility]  [address]  Jose R Cuencae [address] [place] [address]      Sarah Byrne MD Cardiovascular Disease Physician Follow up in 1 week(s) For hospital follow up [address] [facility]      Damon Mejia MD Nephrology Follow up in 1 week(s)  [address] [facility] [address]  [address] [address]               At this time, the following test results are still pending: None  Again, please follow-up these results with your primary care provider. Should you have any fever over 101 degrees for 24 hours, chest pain, shortness of breath, fever, chills, nausea, vomiting, diarrhea, change in mentation, falling, weakness, bleeding, or worsening pain, please seek medical attention immediately. Finally, as your discharging physician, you may be receiving a survey regarding my care. I would greatly value and appreciate your input in the survey as we strive for excellence. If you have any questions, I can be reached at [phone]. Thank you so much again for allowing me to care for you at [facility].    Respectfully yours,  Jose Renee MD        Download the Heart Failure [place] Rosalind: Search in your Google Play Store (Android) or P[employer] Rosalind Store (Credivalores-Crediservicios): Search for- HF [place] Rosalind.    FinancialForce.com    [facility] [place] is a brand-new phone rosalind that helps you track daily symptoms, vitals, mood, energy level and more.  You can even add your heart failure care team members to view your data and monitor your condition at home. [facility] [place] lets you:  Track symptoms, medications and more  Share health information with your health care team  Connect with others living with heart failure     [facility]/ED Visit Follow-Up Instructions/Information    You may have an in home follow up visit set up with [facility] or may wish to [facility] to set-up a visit:    What are we? [facility] is an in-home urgent care service staffed with emergency trained medical teams. We come to your home in a vehicle stocked with medical supplies and technology. An ER physician is always available if needed. When? As a part of your hospital follow-up, an appointment has been/ or can be set up for us to come see you. Usually, this will be 24-72 hours after you leave the hospital or as needed. [facility] is open 7am-9pm, 7 days a week, 365 days a year, including holidays. Why? We know that you cannot always get to your doctor after being in the hospital and that your doctor is not always available when you need them. Once your workup is complete, we'll call in your prescriptions, update your family doctor, and handle billing with your insurance so you can focus on feeling better, faster without leaving home. How much? We accept most major health insurance plans, including Medicaid, Medicare, and Medicare Advantage [facility], [facility], [place] [employer], and [employer]. We also accept: credit, debit, health savings account (HSA), health reimbursement account (HRA) and flexible spending account (FSA) payments. [facility]'s prices compare to conventional urgent care facilities, but we bring the care to you. How to reach us? Getting care is easy- use our mobile rosalind (Funambol), website ([email or web address].pl) or call us [phone]. Weigh yourself daily.   If you gain 3 lbs or more in 24 hours or 5 lbs in a week, please call Dr. Annemarie Harris office. Thanks.

## 2023-02-13 NOTE — PROGRESS NOTES
Physical Therapy - defer  Chart reviewed in preparation for PT evaluation. Note patient in ED for decompensated HRrEF. Note troponin elevated (1,134 --> 1,041) and cardiology consult is pending. Will defer PT evaluation/ mobilization at this time and will follow up as able and medically appropriate.

## 2023-02-13 NOTE — PROGRESS NOTES
TRANSFER - IN REPORT:    Verbal report received from CHANO Williamson(name) on Tavia Campos  being received from ED (unit) for routine progression of care      Report consisted of patients Situation, Background, Assessment and   Recommendations(SBAR). Information from the following report(s) SBAR, Kardex, ED Summary, OR Summary, Procedure Summary, Intake/Output, and MAR was reviewed with the receiving nurse. Opportunity for questions and clarification was provided. Assessment completed upon patients arrival to unit and care assumed.

## 2023-02-13 NOTE — PROGRESS NOTES
Occupational Therapy: defer    Chart reviewed in preparation for OT evaluation. Note patient in ED for decompensated HRrEF. Note troponin elevated (1,134 --> 1,041) and cardiology consult is pending. Will defer OT evaluation/ mobilization at this time and will follow up as able and medically appropriate.     Andrew De La Vega, OTR/L

## 2023-02-13 NOTE — PROGRESS NOTES
[place]-Home with home health; lives with  and son      Reason for Admission:   shortness of breath, history of ischemic cardiomyopathy, CKD stage IV, hypertension, dyslipidemia, iron deficiency anemia, non insulin-dependent type 2 diabetes. RUR Score:   15% Moderate               PCP: First and Last name:   Chris Harris MD     [facility]   Are you a current patient: Yes/No: Yes   Approximate date of last visit:    Can you participate in a virtual visit if needed: NA    Do you (patient/family) have any concerns for transition/discharge? None reported              Plan for utilizing home health:   Has history with [facility] (ended 12/29/22)    Current Advanced Directive/Advance Care Plan:  Full Code      Healthcare Decision Maker: NA  Click here to complete [employer] including selection of the Healthcare Decision Maker Relationship (ie \"Primary\")              Transition of Care Plan:      CM met with patient to inform of CM role and to assess needs. Patient's son Chelsie Flores at the bedside. Patient lives at home with her  and son Chelsie Hogan. They live on the third floor and have 23 steps no elevator access. Patient is normally independent at home with self-care and ADLs-owns a walker but does not use regularly. Preferred pharmacy is [facility]. CM to monitor and assist with transitional care planning needs.      Herber Olivarez MS

## 2023-02-13 NOTE — PROGRESS NOTES
Hospitalist Progress Note  Warren Martinez MD  Answering service: [phone] from in house phone        Date of Service:  2023  NAME:  Dana Bass  :  1950  MRN:  [id number]      Admission Summary:   Dana Bass is a 67 y.o. female with history of HFrEF with an EF of 25%, ischemic cardiomyopathy, CKD stage IV, hypertension, dyslipidemia, iron deficiency anemia, non-insulin-dependent type 2 diabetes who presents to the hospital with complaints of shortness of breath. Reports worsening dyspnea very minimal exertion over the last 4 days with nonproductive cough. No recent travel or sick contacts. Interval history / Subjective:   Pt with improvement in her breathing since admission. On 1LNC. Cr improved with diuresis. Assessment & Plan:     HFrEF - 25%, ischemic  - Cardiology consulted, pending eval   - I and O and daily weights  - Not on ARB/ACE/ARNI due to Cr   - lasix 60mg BID for now   - Continue home coreg, statin, asa     WINSTON on CKD stage IV - ?CRS  - Improved with diuresis  - I and O and avoid nephrotoxins  - Nephrology consult   - Diurese as above for now     IDDM - A1c 7.4%  - Hold home glipizide. This medication should not be resumed on DC given patient's renal function.   - Continue glargine 10U daily for now  - SSI, POCT glucose checks and hypoglycemia protocol     H/o CAD - home ASA, Coreg, statin   Mood disorder - home mirtazapine        Code status: FULL  Prophylaxis: heparin (pt refusing intermittently)  Care Plan discussed with: pt, RN   Anticipated Disposition: Home in 24-48 hours. Needs improvement in renal function, weaned off O2. Clearance for home by consultants.       Hospital Problems  Date Reviewed: 2022            Codes Class Noted POA    CHF (congestive heart failure) ([facility] [address] ICD-10-CM: I50.9  ICD-9-CM: 428.0  2023 Unknown               Review of Systems:   A comprehensive review of systems was negative except for that written in the HPI. Vital Signs:    Last 24hrs VS reviewed since prior progress note. Most recent are:  Visit Vitals  BP (!) 116/52 (BP 1 Location: Right arm, BP Patient Position: At rest)   Pulse 70   Temp 97.3 °F (36.3 °C)   Resp 19   SpO2 93%         Intake/Output Summary (Last 24 hours) at 2/13/2023 1041  Last data filed at 2/13/2023 7147  Gross per 24 hour   Intake --   Output 1300 ml   Net -1300 ml        Physical Examination:     I had a face to face encounter with this patient and independently examined them on 2/13/2023 as outlined below:          General : alert x 3, awake, no acute distress,   HEENT: PEERL, EOMI, moist mucus membrane,  Neck: supple, no JVD, no meningeal signs  Chest: Crackles at the bases bilaterally, no increased WOB, speaking in full sentences. On 1LNC  CVS: S1 S2 heard, Capillary refill less than 2 seconds  Abd: soft/ non tender, non distended, BS physiological,   Ext: no clubbing, no cyanosis, no edema, brisk 2+ DP pulses  Neuro/Psych: pleasant mood and affect, CN 2-12 grossly intact,   Skin: warm            Data Review:    Review and/or order of clinical lab test  Review and/or order of tests in the radiology section of CPT  Review and/or order of tests in the medicine section of CPT    I have independently reviewed and interpreted patient's lab and all other diagnostic data    Notes reviewed from all clinical/nonclinical/nursing services involved in patient's clinical care. Care coordination discussions were held with appropriate clinical/nonclinical/ nursing providers based on care coordination needs.      Labs:     Recent Labs     02/12/23  1246   WBC 7.0   HGB 9.0*   HCT 27.7*        Recent Labs     02/13/23  0246 02/12/23  1246    140   K 3.2* 4.7   * 109*   CO2 22 20*   * 109*   CREA 3.97* 4.18*   * 187*   CA 8.4* 8.5     Recent Labs     02/12/23  1246   ALT 20   AP 100   TBILI 0.4   TP 7.1   ALB 3.2*   GLOB 3.9     No results for input(s): INR, PTP, APTT, INREXT in the last 72 hours. No results for input(s): FE, TIBC, PSAT, FERR in the last 72 hours. No results found for: FOL, RBCF   No results for input(s): PH, PCO2, PO2 in the last 72 hours. No results for input(s): CPK, CKNDX, TROIQ in the last 72 hours.     No lab exists for component: CPKMB  Lab Results   Component Value Date/Time    Cholesterol, total 182 05/13/2022 11:39 AM    HDL Cholesterol 27 05/13/2022 11:39 AM    LDL,Direct 58 04/23/2021 09:24 AM    LDL, calculated 101.8 (H) 05/13/2022 11:39 AM    Triglyceride 266 (H) 05/13/2022 11:39 AM    CHOL/HDL Ratio 6.7 (H) 05/13/2022 11:39 AM     Lab Results   Component Value Date/Time    Glucose (POC) 94 02/13/2023 07:47 AM    Glucose (POC) 177 (H) 02/12/2023 09:45 PM    Glucose (POC) 110 02/12/2023 06:36 PM    Glucose (POC) 148 (H) 04/23/2010 10:11 PM    Glucose (POC) 138 (H) 04/23/2010 04:57 PM     Lab Results   Component Value Date/Time    Color YELLOW/STRAW 05/13/2022 11:41 AM    Appearance CLOUDY (A) 05/13/2022 11:41 AM    Specific gravity 1.009 05/13/2022 11:41 AM    pH (UA) 5.0 05/13/2022 11:41 AM    Protein Negative 05/13/2022 11:41 AM    Glucose Negative 05/13/2022 11:41 AM    Ketone Negative 05/13/2022 11:41 AM    Bilirubin Negative 05/13/2022 11:41 AM    Urobilinogen 0.2 05/13/2022 11:41 AM    Nitrites Negative 05/13/2022 11:41 AM    Leukocyte Esterase LARGE (A) 05/13/2022 11:41 AM    Epithelial cells FEW 05/13/2022 11:41 AM    Bacteria 4+ (A) 05/13/2022 11:41 AM    WBC >100 (H) 05/13/2022 11:41 AM    RBC 0-5 05/13/2022 11:41 AM         Medications Reviewed:     Current Facility-Administered Medications   Medication Dose Route Frequency    aspirin delayed-release tablet 81 mg  81 mg Oral DAILY    carvediloL (COREG) tablet 12.5 mg  12.5 mg Oral BID WITH MEALS    ferrous sulfate tablet 325 mg  325 mg Oral DAILY WITH BREAKFAST    atorvastatin (LIPITOR) tablet 10 mg  10 mg Oral QHS    glucose chewable tablet 16 g  4 Tablet Oral PRN    glucagon (GLUCAGEN) injection 1 mg  1 mg IntraMUSCular PRN    sodium chloride (NS) flush 5-40 mL  5-40 mL IntraVENous Q8H    sodium chloride (NS) flush 5-40 mL  5-40 mL IntraVENous PRN    acetaminophen (TYLENOL) tablet 650 mg  650 mg Oral Q6H PRN    Or    acetaminophen (TYLENOL) suppository 650 mg  650 mg Rectal Q6H PRN    polyethylene glycol (MIRALAX) packet 17 g  17 g Oral DAILY PRN    ondansetron (ZOFRAN ODT) tablet 4 mg  4 mg Oral Q8H PRN    Or    ondansetron (ZOFRAN) injection 4 mg  4 mg IntraVENous Q6H PRN    dextrose 10 % infusion 0-250 mL  0-250 mL IntraVENous PRN    insulin glargine (LANTUS) injection 10 Units  10 Units SubCUTAneous DAILY    insulin lispro (HUMALOG) injection   SubCUTAneous AC&HS    heparin (porcine) injection 5,000 Units  5,000 Units SubCUTAneous Q8H    mirtazapine (REMERON) tablet 15 mg  15 mg Oral QHS    furosemide (LASIX) injection 60 mg  60 mg IntraVENous BID     ______________________________________________________________________  EXPECTED LENGTH OF STAY: - - -  ACTUAL LENGTH OF STAY:          1                 Frida Vo MD

## 2023-02-13 NOTE — PROGRESS NOTES
Problem: Self Care Deficits Care Plan (Adult)  Goal: *Acute Goals and Plan of Care (Insert Text)  Description: FUNCTIONAL STATUS PRIOR TO ADMISSION: Patient was modified independent with ADLs and functional mobility/ ambulatory with RW    HOME SUPPORT: Patient resided with her , who is not able to provide assistance. A hired aide visits them 3x/ week to help with household IADLs    Occupational Therapy Goals  Initiated 2/13/2023  1. Patient will perform grooming standing at sink with modified independence within 7 day(s). 2.  Patient will perform bathing with modified independence within 7 day(s). 3.  Patient will perform lower body dressing with modified independence within 7 day(s). 4.  Patient will perform toilet transfers with modified independence within 7 day(s). 5.  Patient will perform all aspects of toileting with modified independence within 7 day(s). 6.  Patient will utilize energy conservation techniques during functional activities with verbal cues within 7 day(s). 7.  Patient will utilize fall prevention techniques during functional activities with verbal cues within 7 day(s). Outcome: Not Met     OCCUPATIONAL THERAPY EVALUATION  Patient: Jelani Chi (67 y.o. female)  Date: 2/13/2023  Primary Diagnosis: CHF (congestive heart failure) ([facility]) [I50.9]       Precautions: fall,  I&Os      ASSESSMENT  At baseline patient was modified independent with ADLs and ambulatory with RW with a care aide visiting to assist with household IADLs. She is now admitted for decompensated heart failure and presents for OT evaluation with mildly impaired LB reach, impaired endurance, impaired balance, and general weakness. Note troponin elevated and patient cleared to mobilize per hospitalist.  She participated well and was agreeable to mobilize OOB to chair with mild DARNELL on room air. She would benefit from additional education on energy conservation/ ADL modifications.   Of concern, she has 23 steps to enter her condo. She is highly motivated to maintain her independence. Anticipate she would benefit from [facility] at d/c. Current Level of Function Impacting Discharge (ADLs/self-care): contact guard assistance    Functional Outcome Measure: The patient scored 45/100 on the Barthel Index outcome measure which is indicative of ~55% impairment in functional performance. Patient will benefit from skilled therapy intervention to address the above noted impairments. PLAN :  Recommendations and Planned Interventions: self care training, functional mobility training, therapeutic exercise, balance training, therapeutic activities, endurance activities, patient education, home safety training, and family training/education    Frequency/Duration: Patient will be followed by occupational therapy 5 times a week to address goals. Recommendation for discharge: (in order for the patient to meet his/her long term goals)  Occupational therapy at least 2 days/week in the home     This discharge recommendation:  Has been made in collaboration with the attending provider and/or case management       SUBJECTIVE:   Patient stated I climb up them [23 stairs] like a monkey.     OBJECTIVE DATA SUMMARY:   HISTORY:   Past Medical History:   Diagnosis Date    Calculus of kidney     Cardiomyopathy ([address]     idiopathic    Chronic systolic heart failure [facility])     April 2010 ef 20%, by Dec 2010 up to 50% on meds, cath with normal cors    Diabetes [facility])     Essential hypertension, benign     Hypercholesterolemia      Past Surgical History:   Procedure Laterality Date    HX HYSTERECTOMY      HX ORTHOPAEDIC      HX OTHER SURGICAL  10/2017    difibulator     HX UROLOGICAL      stone removal    MO UNLISTED PROCEDURE CARDIAC SURGERY         Expanded or extensive additional review of patient history:     Home Situation  Home Environment: Private residence ([address] [facility])  # Steps to Enter: 23  Rails to Enter: Yes  [employer] :  (initally bilateral, then only L ascending)  One/Two Story Residence: One story  Living Alone: No (with  and son)  Support Systems: Caregiver/Home Care Staff  Patient Expects to be Discharged to[de-identified] Home  Current DME Used/Available at Home: Shower chair, Walker, rolling, Walker, rollator  Tub or Shower Type: Shower  EXAMINATION OF PERFORMANCE DEFICITS:  Cognitive/Behavioral Status:  Neurologic State: Alert  Orientation Level: Oriented X4  Cognition: Follows commands             Skin: visible skin appears intact    Edema: none noted    Hearing: Auditory  Auditory Impairment: None    Vision/Perceptual:                           Acuity: Within Defined Limits    Corrective Lenses: Glasses    Range of Motion:    AROM: Generally decreased, functional                         Strength:    Strength: Generally decreased, functional                Coordination:  Coordination: Within functional limits  Fine Motor Skills-Upper: Left Intact; Right Intact    Gross Motor Skills-Upper: Left Intact; Right Intact    Tone & Sensation:    Tone: Normal  Sensation: Intact                      Balance:  Sitting: Intact  Standing: Impaired; With support (RW)  Standing - Static: Good  Standing - Dynamic : Good    Functional Mobility and Transfers for ADLs:  Bed Mobility:  Supine to Sit: Supervision    Transfers:  Sit to Stand: Stand-by assistance  Stand to Sit: Stand-by assistance  Bed to Chair: Contact guard assistance    ADL Assessment:  Feeding: Independent (inferred)    Oral Facial Hygiene/Grooming: Setup (inferred)    Bathing: Contact guard assistance (inferred)         Upper Body Dressing: Setup (inferred)    Lower Body Dressing: Contact guard assistance (able to raise each LE sufficiently for LB reach, although somewhat difficult.  has LB AE but prefers not to use it)    Toileting: Contact guard assistance (inferred)            Functional Measure:    Barthel Index:  Bathin  Bladder: 0  Bowels: 10  Groomin  Dressin  Feeding: 10  Mobility: 0  Stairs: 0  Toilet Use: 5  Transfer (Bed to Chair and Back): 10  Total: 45/100      The Barthel ADL Index: Guidelines  1. The index should be used as a record of what a patient does, not as a record of what a patient could do. 2. The main aim is to establish degree of independence from any help, physical or verbal, however minor and for whatever reason. 3. The need for supervision renders the patient not independent. 4. A patient's performance should be established using the best available evidence. Asking the patient, friends/relatives and nurses are the usual sources, but direct observation and common sense are also important. However direct testing is not needed. 5. Usually the patient's performance over the preceding 24-48 hours is important, but occasionally longer periods will be relevant. 6. Middle categories imply that the patient supplies over 50 per cent of the effort. 7. Use of aids to be independent is allowed. Score Interpretation (from [facility])    Independent   60-79 Minimally independent   40-59 Partially dependent   20-39 Very dependent   <20 Totally dependent     -Max Mackey., Barthel, D.W. (1965). Functional evaluation: the Barthel Index. [facility] ([address] (1997). The Barthel activities of daily living index: self-reporting versus actual performance in the old (> or = 75 years). Journal of [address] [place] [address] 45(7), 14 Health system, .[facility] HospitalAlfredo, German Miguel., Jagdeep Mendoza. (1999). Measuring the change in disability after inpatient rehabilitation; comparison of the responsiveness of the Barthel Index and Functional [place] Measure. Journal of Neurology, Neurosurgery, and Psychiatry, 66(4), 144-328. Jeanette Hoover, N.J.A, CYNDI Diamond, & Earlene Wylie MAlfredoA. (2004) Assessment of post-stroke quality of life in cost-effectiveness studies: The usefulness of the Barthel Index and the EuroQoL-5D. Quality of Life Research, 15, 291-73         Occupational Therapy Evaluation Charge Determination   History Examination Decision-Making   LOW Complexity : Brief history review  MEDIUM Complexity : 3-5 performance deficits relating to physical, cognitive , or psychosocial skils that result in activity limitations and / or participation restrictions MEDIUM Complexity : Patient may present with comorbidities that affect occupational performnce. Miniml to moderate modification of tasks or assistance (eg, physical or verbal ) with assesment(s) is necessary to enable patient to complete evaluation       Based on the above components, the patient evaluation is determined to be of the following complexity level: LOW   Pain Rating:  Patient did not report pain    Activity Tolerance:   Fair    After treatment patient left in no apparent distress:    Sitting in chair, Call bell within reach, and Bed / chair alarm activated    COMMUNICATION/EDUCATION:   The patients plan of care was discussed with: Physical therapist and Registered nurse. Home safety education was provided and the patient/caregiver indicated understanding., Patient/family have participated as able in goal setting and plan of care. , and Patient/family agree to work toward stated goals and plan of care. This patients plan of care is appropriate for delegation to [facility].     Thank you for this referral.  Everett Tom OT  Time Calculation: 22 mins

## 2023-02-14 ENCOUNTER — TRANSCRIBE ORDER (OUTPATIENT)
Dept: CARDIAC REHAB | Age: 73
End: 2023-02-14

## 2023-02-14 ENCOUNTER — APPOINTMENT (OUTPATIENT)
Dept: NUCLEAR MEDICINE | Age: 73
DRG: 291 | End: 2023-02-14
Attending: SPECIALIST
Payer: MEDICARE

## 2023-02-14 ENCOUNTER — APPOINTMENT (OUTPATIENT)
Dept: NON INVASIVE DIAGNOSTICS | Age: 73
DRG: 291 | End: 2023-02-14
Attending: SPECIALIST
Payer: MEDICARE

## 2023-02-14 ENCOUNTER — APPOINTMENT (OUTPATIENT)
Dept: NON INVASIVE DIAGNOSTICS | Age: 73
DRG: 291 | End: 2023-02-14
Attending: NURSE PRACTITIONER
Payer: MEDICARE

## 2023-02-14 DIAGNOSIS — I50.22 SYSTOLIC CHF, CHRONIC (HCC): Primary | ICD-10-CM

## 2023-02-14 LAB
ANION GAP SERPL CALC-SCNC: 8 MMOL/L (ref 5–15)
BASOPHILS # BLD: 0 K/UL (ref 0–0.1)
BASOPHILS NFR BLD: 1 % (ref 0–1)
BUN SERPL-MCNC: 99 MG/DL (ref 6–20)
BUN/CREAT SERPL: 25 (ref 12–20)
CALCIUM SERPL-MCNC: 9 MG/DL (ref 8.5–10.1)
CHLORIDE SERPL-SCNC: 108 MMOL/L (ref 97–108)
CO2 SERPL-SCNC: 24 MMOL/L (ref 21–32)
CREAT SERPL-MCNC: 3.9 MG/DL (ref 0.55–1.02)
DIFFERENTIAL METHOD BLD: ABNORMAL
ECHO AO ROOT DIAM: 3.2 CM
ECHO AO ROOT INDEX: 2.12 CM/M2
ECHO AR MAX VEL PISA: 2.9 M/S
ECHO AV PEAK GRADIENT: 4 MMHG
ECHO AV PEAK VELOCITY: 1 M/S
ECHO AV REGURGITANT PHT: 1093.9 MILLISECOND
ECHO AV VELOCITY RATIO: 0.6
ECHO LA DIAMETER INDEX: 2.19 CM/M2
ECHO LA DIAMETER: 3.3 CM
ECHO LA TO AORTIC ROOT RATIO: 1.03
ECHO LA VOL 4C: 56 ML (ref 22–52)
ECHO LA VOLUME INDEX A4C: 37 ML/M2 (ref 16–34)
ECHO LV E' LATERAL VELOCITY: 2 CM/S
ECHO LV E' SEPTAL VELOCITY: 3 CM/S
ECHO LV EDV A2C: 117 ML
ECHO LV EDV A4C: 150 ML
ECHO LV EDV BP: 138 ML (ref 56–104)
ECHO LV EDV INDEX A4C: 99 ML/M2
ECHO LV EDV INDEX BP: 91 ML/M2
ECHO LV EDV NDEX A2C: 77 ML/M2
ECHO LV EJECTION FRACTION A2C: 39 %
ECHO LV EJECTION FRACTION A4C: 28 %
ECHO LV EJECTION FRACTION BIPLANE: 35 % (ref 55–100)
ECHO LV ESV A2C: 72 ML
ECHO LV ESV A4C: 109 ML
ECHO LV ESV BP: 90 ML (ref 19–49)
ECHO LV ESV INDEX A2C: 48 ML/M2
ECHO LV ESV INDEX A4C: 72 ML/M2
ECHO LV ESV INDEX BP: 60 ML/M2
ECHO LV FRACTIONAL SHORTENING: 15 % (ref 28–44)
ECHO LV INTERNAL DIMENSION DIASTOLE INDEX: 3.44 CM/M2
ECHO LV INTERNAL DIMENSION DIASTOLIC: 5.2 CM (ref 3.9–5.3)
ECHO LV INTERNAL DIMENSION SYSTOLIC INDEX: 2.91 CM/M2
ECHO LV INTERNAL DIMENSION SYSTOLIC: 4.4 CM
ECHO LV IVSD: 1 CM (ref 0.6–0.9)
ECHO LV MASS 2D: 156.9 G (ref 67–162)
ECHO LV MASS INDEX 2D: 103.9 G/M2 (ref 43–95)
ECHO LV POSTERIOR WALL DIASTOLIC: 0.7 CM (ref 0.6–0.9)
ECHO LV RELATIVE WALL THICKNESS RATIO: 0.27
ECHO LVOT PEAK GRADIENT: 2 MMHG
ECHO LVOT PEAK VELOCITY: 0.6 M/S
ECHO MV A VELOCITY: 0.47 M/S
ECHO MV AREA PHT: 5.7 CM2
ECHO MV E DECELERATION TIME (DT): 132.8 MS
ECHO MV E VELOCITY: 0.77 M/S
ECHO MV E/A RATIO: 1.64
ECHO MV E/E' LATERAL: 38.5
ECHO MV E/E' RATIO (AVERAGED): 32.08
ECHO MV E/E' SEPTAL: 25.67
ECHO MV PRESSURE HALF TIME (PHT): 38.5 MS
ECHO MV REGURGITANT PEAK GRADIENT: 117 MMHG
ECHO MV REGURGITANT PEAK VELOCITY: 5.4 M/S
ECHO MV REGURGITANT VTIA: 186.3 CM
ECHO PULMONARY ARTERY END DIASTOLIC PRESSURE: 13 MMHG
ECHO PV MAX VELOCITY: 0.6 M/S
ECHO PV PEAK GRADIENT: 2 MMHG
ECHO RV TAPSE: 1.6 CM (ref 1.7–?)
ECHO TV REGURGITANT MAX VELOCITY: 2.61 M/S
ECHO TV REGURGITANT PEAK GRADIENT: 27 MMHG
EOSINOPHIL # BLD: 0.2 K/UL (ref 0–0.4)
EOSINOPHIL NFR BLD: 4 % (ref 0–7)
ERYTHROCYTE [DISTWIDTH] IN BLOOD BY AUTOMATED COUNT: 15 % (ref 11.5–14.5)
GLUCOSE BLD STRIP.AUTO-MCNC: 109 MG/DL (ref 65–117)
GLUCOSE BLD STRIP.AUTO-MCNC: 138 MG/DL (ref 65–117)
GLUCOSE BLD STRIP.AUTO-MCNC: 157 MG/DL (ref 65–117)
GLUCOSE SERPL-MCNC: 110 MG/DL (ref 65–100)
HCT VFR BLD AUTO: 32 % (ref 35–47)
HGB BLD-MCNC: 9.9 G/DL (ref 11.5–16)
IMM GRANULOCYTES # BLD AUTO: 0 K/UL (ref 0–0.04)
IMM GRANULOCYTES NFR BLD AUTO: 0 % (ref 0–0.5)
IRON SATN MFR SERPL: 11 % (ref 20–50)
IRON SERPL-MCNC: 38 UG/DL (ref 35–150)
LYMPHOCYTES # BLD: 0.8 K/UL (ref 0.8–3.5)
LYMPHOCYTES NFR BLD: 13 % (ref 12–49)
MAGNESIUM SERPL-MCNC: 2.6 MG/DL (ref 1.6–2.4)
MCH RBC QN AUTO: 31.1 PG (ref 26–34)
MCHC RBC AUTO-ENTMCNC: 30.9 G/DL (ref 30–36.5)
MCV RBC AUTO: 100.6 FL (ref 80–99)
MONOCYTES # BLD: 0.8 K/UL (ref 0–1)
MONOCYTES NFR BLD: 13 % (ref 5–13)
NEUTS SEG # BLD: 4.3 K/UL (ref 1.8–8)
NEUTS SEG NFR BLD: 69 % (ref 32–75)
NRBC # BLD: 0 K/UL (ref 0–0.01)
NRBC BLD-RTO: 0 PER 100 WBC
PHOSPHATE SERPL-MCNC: 4.4 MG/DL (ref 2.6–4.7)
PLATELET # BLD AUTO: 191 K/UL (ref 150–400)
PMV BLD AUTO: 11 FL (ref 8.9–12.9)
POTASSIUM SERPL-SCNC: 4.4 MMOL/L (ref 3.5–5.1)
RBC # BLD AUTO: 3.18 M/UL (ref 3.8–5.2)
SERVICE CMNT-IMP: ABNORMAL
SERVICE CMNT-IMP: ABNORMAL
SERVICE CMNT-IMP: NORMAL
SODIUM SERPL-SCNC: 140 MMOL/L (ref 136–145)
STRESS BASELINE DIAS BP: 74 MMHG
STRESS BASELINE HR: 66 BPM
STRESS BASELINE SYS BP: 130 MMHG
STRESS ESTIMATED WORKLOAD: 1 METS
STRESS EXERCISE DUR MIN: 3 MIN
STRESS EXERCISE DUR SEC: 0 SEC
STRESS PEAK DIAS BP: 68 MMHG
STRESS PEAK SYS BP: 137 MMHG
STRESS PERCENT HR ACHIEVED: 51 %
STRESS POST PEAK HR: 76 BPM
STRESS RATE PRESSURE PRODUCT: NORMAL BPM*MMHG
STRESS ST DEPRESSION: 0 MM
STRESS STAGE 1 BP: NORMAL MMHG
STRESS STAGE 1 DURATION: 1 MIN:SEC
STRESS STAGE 1 HR: 68 BPM
STRESS STAGE 2 BP: NORMAL MMHG
STRESS STAGE 2 DURATION: 1 MIN:SEC
STRESS STAGE 2 HR: 74 BPM
STRESS STAGE 3 BP: NORMAL MMHG
STRESS STAGE 3 DURATION: 1 MIN:SEC
STRESS STAGE 3 HR: 67 BPM
STRESS STAGE RECOVERY 1 BP: NORMAL MMHG
STRESS STAGE RECOVERY 1 DURATION: 1 MIN:SEC
STRESS STAGE RECOVERY 1 HR: 66 BPM
STRESS STAGE RECOVERY 2 DURATION: 1 MIN:SEC
STRESS STAGE RECOVERY 2 HR: 67 BPM
STRESS STAGE RECOVERY 3 BP: NORMAL MMHG
STRESS STAGE RECOVERY 3 DURATION: 1 MIN:SEC
STRESS STAGE RECOVERY 3 HR: 67 BPM
STRESS TARGET HR: 148 BPM
TIBC SERPL-MCNC: 334 UG/DL (ref 250–450)
WBC # BLD AUTO: 6.3 K/UL (ref 3.6–11)

## 2023-02-14 PROCEDURE — 36415 COLL VENOUS BLD VENIPUNCTURE: CPT

## 2023-02-14 PROCEDURE — 74011250636 HC RX REV CODE- 250/636: Performed by: SPECIALIST

## 2023-02-14 PROCEDURE — 74011250637 HC RX REV CODE- 250/637: Performed by: INTERNAL MEDICINE

## 2023-02-14 PROCEDURE — 74011250637 HC RX REV CODE- 250/637: Performed by: STUDENT IN AN ORGANIZED HEALTH CARE EDUCATION/TRAINING PROGRAM

## 2023-02-14 PROCEDURE — 84100 ASSAY OF PHOSPHORUS: CPT

## 2023-02-14 PROCEDURE — A9500 TC99M SESTAMIBI: HCPCS

## 2023-02-14 PROCEDURE — 74011250636 HC RX REV CODE- 250/636: Performed by: HOSPITALIST

## 2023-02-14 PROCEDURE — 97535 SELF CARE MNGMENT TRAINING: CPT | Performed by: OCCUPATIONAL THERAPIST

## 2023-02-14 PROCEDURE — 74011000250 HC RX REV CODE- 250: Performed by: STUDENT IN AN ORGANIZED HEALTH CARE EDUCATION/TRAINING PROGRAM

## 2023-02-14 PROCEDURE — 82962 GLUCOSE BLOOD TEST: CPT

## 2023-02-14 PROCEDURE — 83540 ASSAY OF IRON: CPT

## 2023-02-14 PROCEDURE — 74011000250 HC RX REV CODE- 250: Performed by: SPECIALIST

## 2023-02-14 PROCEDURE — 74011250636 HC RX REV CODE- 250/636: Performed by: STUDENT IN AN ORGANIZED HEALTH CARE EDUCATION/TRAINING PROGRAM

## 2023-02-14 PROCEDURE — 83735 ASSAY OF MAGNESIUM: CPT

## 2023-02-14 PROCEDURE — 78452 HT MUSCLE IMAGE SPECT MULT: CPT

## 2023-02-14 PROCEDURE — 99233 SBSQ HOSP IP/OBS HIGH 50: CPT | Performed by: SPECIALIST

## 2023-02-14 PROCEDURE — 80048 BASIC METABOLIC PNL TOTAL CA: CPT

## 2023-02-14 PROCEDURE — C8929 TTE W OR WO FOL WCON,DOPPLER: HCPCS

## 2023-02-14 PROCEDURE — 93306 TTE W/DOPPLER COMPLETE: CPT | Performed by: SPECIALIST

## 2023-02-14 PROCEDURE — 74011000250 HC RX REV CODE- 250: Performed by: HOSPITALIST

## 2023-02-14 PROCEDURE — 74011636637 HC RX REV CODE- 636/637: Performed by: STUDENT IN AN ORGANIZED HEALTH CARE EDUCATION/TRAINING PROGRAM

## 2023-02-14 PROCEDURE — 85025 COMPLETE CBC W/AUTO DIFF WBC: CPT

## 2023-02-14 PROCEDURE — 65270000046 HC RM TELEMETRY

## 2023-02-14 RX ORDER — TETRAKIS(2-METHOXYISOBUTYLISOCYANIDE)COPPER(I) TETRAFLUOROBORATE 1 MG/ML
32.6 INJECTION, POWDER, LYOPHILIZED, FOR SOLUTION INTRAVENOUS
Status: COMPLETED | OUTPATIENT
Start: 2023-02-14 | End: 2023-02-14

## 2023-02-14 RX ORDER — TETRAKIS(2-METHOXYISOBUTYLISOCYANIDE)COPPER(I) TETRAFLUOROBORATE 1 MG/ML
10.6 INJECTION, POWDER, LYOPHILIZED, FOR SOLUTION INTRAVENOUS
Status: DISCONTINUED | OUTPATIENT
Start: 2023-02-14 | End: 2023-02-14

## 2023-02-14 RX ORDER — SODIUM CHLORIDE 0.9 % (FLUSH) 0.9 %
20 SYRINGE (ML) INJECTION AS NEEDED
Status: DISCONTINUED | OUTPATIENT
Start: 2023-02-14 | End: 2023-02-15 | Stop reason: HOSPADM

## 2023-02-14 RX ORDER — TETRAKIS(2-METHOXYISOBUTYLISOCYANIDE)COPPER(I) TETRAFLUOROBORATE 1 MG/ML
10.6 INJECTION, POWDER, LYOPHILIZED, FOR SOLUTION INTRAVENOUS
Status: COMPLETED | OUTPATIENT
Start: 2023-02-14 | End: 2023-02-14

## 2023-02-14 RX ADMIN — TETRAKIS(2-METHOXYISOBUTYLISOCYANIDE)COPPER(I) TETRAFLUOROBORATE 10.6 MILLICURIE: 1 INJECTION, POWDER, LYOPHILIZED, FOR SOLUTION INTRAVENOUS at 07:20

## 2023-02-14 RX ADMIN — TETRAKIS(2-METHOXYISOBUTYLISOCYANIDE)COPPER(I) TETRAFLUOROBORATE 32.6 MILLICURIE: 1 INJECTION, POWDER, LYOPHILIZED, FOR SOLUTION INTRAVENOUS at 10:00

## 2023-02-14 RX ADMIN — ASPIRIN 81 MG: 81 TABLET, COATED ORAL at 08:09

## 2023-02-14 RX ADMIN — CARVEDILOL 12.5 MG: 12.5 TABLET, FILM COATED ORAL at 17:25

## 2023-02-14 RX ADMIN — FUROSEMIDE 60 MG: 10 INJECTION, SOLUTION INTRAMUSCULAR; INTRAVENOUS at 19:25

## 2023-02-14 RX ADMIN — Medication 10 ML: at 21:11

## 2023-02-14 RX ADMIN — REGADENOSON 0.4 MG: 0.08 INJECTION, SOLUTION INTRAVENOUS at 10:17

## 2023-02-14 RX ADMIN — FERROUS SULFATE TAB 325 MG (65 MG ELEMENTAL FE) 325 MG: 325 (65 FE) TAB at 08:09

## 2023-02-14 RX ADMIN — PERFLUTREN 1.5 ML: 6.52 INJECTION, SUSPENSION INTRAVENOUS at 17:00

## 2023-02-14 RX ADMIN — Medication 10 ML: at 17:24

## 2023-02-14 RX ADMIN — INSULIN GLARGINE 10 UNITS: 100 INJECTION, SOLUTION SUBCUTANEOUS at 12:09

## 2023-02-14 RX ADMIN — POTASSIUM CHLORIDE 20 MEQ: 750 TABLET, FILM COATED, EXTENDED RELEASE ORAL at 08:09

## 2023-02-14 RX ADMIN — SODIUM CHLORIDE, PRESERVATIVE FREE 20 ML: 5 INJECTION INTRAVENOUS at 10:17

## 2023-02-14 RX ADMIN — FUROSEMIDE 60 MG: 10 INJECTION, SOLUTION INTRAMUSCULAR; INTRAVENOUS at 12:18

## 2023-02-14 RX ADMIN — CARVEDILOL 12.5 MG: 12.5 TABLET, FILM COATED ORAL at 08:09

## 2023-02-14 RX ADMIN — MIRTAZAPINE 15 MG: 15 TABLET, FILM COATED ORAL at 21:09

## 2023-02-14 RX ADMIN — Medication 2 UNITS: at 17:24

## 2023-02-14 RX ADMIN — ATORVASTATIN CALCIUM 10 MG: 10 TABLET, FILM COATED ORAL at 21:09

## 2023-02-14 NOTE — CARDIO/PULMONARY
Cardiac Rehab: Living with Heart Failure Booklet left at bedside for hCeyenne Soares, who is down for her nuclear stress test currently. Will revisit later today if possible. Leena Massey RN  Revisited to provide reinforcement for HF education and to discuss the OP CR program.        Educated using teach back method. Discussed diagnosis definition and assessed patient understanding. Reviewed importance of daily weight monitoring and Low Sodium diet (7604-6872 mg. daily). Encouraged activity and rest periods within symptom limitations and as ordered by physician. Discussed importance of reporting signs and symptoms of exacerbation, and when to report them to the doctor, to prevent re-hospitalization. Discussed the Cardiac Rehab Program, benefits, format, and encouraged enrollment. Cheyenne Soares declined the program at this time as she is full time caregiver for her  and can not commit. Leena Massey RN    .

## 2023-02-14 NOTE — PROGRESS NOTES
Hospitalist Progress Note  Dharmesh Ladd MD  Answering service: [phone] from in house phone        Date of Service:  2023  NAME:  Kassidy Pollard  :  1950  MRN:  [id number]      Admission Summary:   Kassidy Pollard is a 67 y.o. female with history of HFrEF with an EF of 25%, ischemic cardiomyopathy, CKD stage IV, hypertension, dyslipidemia, iron deficiency anemia, non-insulin-dependent type 2 diabetes who presents to the hospital with complaints of shortness of breath. Reports worsening dyspnea very minimal exertion over the last 4 days with nonproductive cough. No recent travel or sick contacts. Interval history / Subjective:     Breathing improved awaiting Lexiscan stress test  Cardiology following creatinine slightly improved on high-dose of diuretics     Assessment & Plan:     HFrEF - 25%, ischemic  - Stress test--no ischemia EF 27% inferolateral infarct  - I and O and daily weights  - Not on ARB/ACE/ARNI due to Cr   - lasix 60mg BID for now   - Continue home coreg, statin, asa     WINSTON on CKD stage IV - ?CRS  - Improved with diuresis  - I and O and avoid nephrotoxins  - Nephrology consult   - Diurese as above for now     IDDM - A1c 7.4%  - Hold home glipizide. This medication should not be resumed on DC given patient's renal function.   - Continue glargine 10U daily for now  - SSI, POCT glucose checks and hypoglycemia protocol     H/o CAD - home ASA, Coreg, statin   Mood disorder - home mirtazapine        Code status: FULL  Prophylaxis: heparin   Care Plan discussed with: pt, RN   Anticipated Disposition: Home in 24-48 hours.   Pending cardiology clearance     Hospital Problems  Date Reviewed: 2022            Codes Class Noted POA    CHF (congestive heart failure) ([facility] [address] ICD-10-CM: I50.9  ICD-9-CM: 428.0  2023 Unknown             Review of Systems:   A comprehensive review of systems was negative except for that written in the HPI. Vital Signs:    Last 24hrs VS reviewed since prior progress note. Most recent are:  Visit Vitals  /72   Pulse 62   Temp 98 °F (36.7 °C)   Resp 21   Ht 5' (1.524 m)   Wt 55.8 kg (123 lb)   SpO2 90%   BMI 24.02 kg/m²         Intake/Output Summary (Last 24 hours) at 2/14/2023 1502  Last data filed at 2/14/2023 0601  Gross per 24 hour   Intake --   Output 2100 ml   Net -2100 ml          Physical Examination:     I had a face to face encounter with this patient and independently examined them on 2/14/2023 as outlined below:          General : alert x 3, awake, no acute distress,   HEENT: PEERL, EOMI, moist mucus membrane,  Neck: supple, no JVD, no meningeal signs  Chest: Crackles at the bases bilaterally, no increased WOB, speaking in full sentences. On 1LNC  CVS: S1 S2 heard, Capillary refill less than 2 seconds  Abd: soft/ non tender, non distended, BS physiological,   Ext: no clubbing, no cyanosis, no edema, brisk 2+ DP pulses  Neuro/Psych: pleasant mood and affect, CN 2-12 grossly intact,   Skin: warm            Data Review:    Review and/or order of clinical lab test  Review and/or order of tests in the radiology section of CPT  Review and/or order of tests in the medicine section of CPT    I have independently reviewed and interpreted patient's lab and all other diagnostic data    Notes reviewed from all clinical/nonclinical/nursing services involved in patient's clinical care. Care coordination discussions were held with appropriate clinical/nonclinical/ nursing providers based on care coordination needs.      Labs:     Recent Labs     02/14/23  0101 02/12/23  1246   WBC 6.3 7.0   HGB 9.9* 9.0*   HCT 32.0* 27.7*    178       Recent Labs     02/14/23  0101 02/13/23  0246 02/12/23  1246    142 140   K 4.4 3.2* 4.7    111* 109*   CO2 24 22 20*   BUN 99* 101* 109*   CREA 3.90* 3.97* 4.18*   * 111* 187*   CA 9.0 8.4* 8.5 MG 2.6*  --   --    PHOS 4.4  --   --        Recent Labs     02/12/23  1246   ALT 20      TBILI 0.4   TP 7.1   ALB 3.2*   GLOB 3.9       No results for input(s): INR, PTP, APTT, INREXT, INREXT in the last 72 hours. Recent Labs     02/14/23  0101   TIBC 334   PSAT 11*        No results found for: FOL, RBCF   No results for input(s): PH, PCO2, PO2 in the last 72 hours. No results for input(s): CPK, CKNDX, TROIQ in the last 72 hours.     No lab exists for component: CPKMB  Lab Results   Component Value Date/Time    Cholesterol, total 182 05/13/2022 11:39 AM    HDL Cholesterol 27 05/13/2022 11:39 AM    LDL,Direct 58 04/23/2021 09:24 AM    LDL, calculated 101.8 (H) 05/13/2022 11:39 AM    Triglyceride 266 (H) 05/13/2022 11:39 AM    CHOL/HDL Ratio 6.7 (H) 05/13/2022 11:39 AM     Lab Results   Component Value Date/Time    Glucose (POC) 109 02/14/2023 11:55 AM    Glucose (POC) 131 (H) 02/13/2023 09:17 PM    Glucose (POC) 141 (H) 02/13/2023 04:17 PM    Glucose (POC) 143 (H) 02/13/2023 11:55 AM    Glucose (POC) 94 02/13/2023 07:47 AM     Lab Results   Component Value Date/Time    Color YELLOW/STRAW 05/13/2022 11:41 AM    Appearance CLOUDY (A) 05/13/2022 11:41 AM    Specific gravity 1.009 05/13/2022 11:41 AM    pH (UA) 5.0 05/13/2022 11:41 AM    Protein Negative 05/13/2022 11:41 AM    Glucose Negative 05/13/2022 11:41 AM    Ketone Negative 05/13/2022 11:41 AM    Bilirubin Negative 05/13/2022 11:41 AM    Urobilinogen 0.2 05/13/2022 11:41 AM    Nitrites Negative 05/13/2022 11:41 AM    Leukocyte Esterase LARGE (A) 05/13/2022 11:41 AM    Epithelial cells FEW 05/13/2022 11:41 AM    Bacteria 4+ (A) 05/13/2022 11:41 AM    WBC >100 (H) 05/13/2022 11:41 AM    RBC 0-5 05/13/2022 11:41 AM         Medications Reviewed:     Current Facility-Administered Medications   Medication Dose Route Frequency    saline peripheral flush soln 20 mL  20 mL InterCATHeter PRN    potassium chloride SR (KLOR-CON 10) tablet 20 mEq  20 mEq Oral DAILY aspirin delayed-release tablet 81 mg  81 mg Oral DAILY    carvediloL (COREG) tablet 12.5 mg  12.5 mg Oral BID WITH MEALS    ferrous sulfate tablet 325 mg  325 mg Oral DAILY WITH BREAKFAST    atorvastatin (LIPITOR) tablet 10 mg  10 mg Oral QHS    glucose chewable tablet 16 g  4 Tablet Oral PRN    glucagon (GLUCAGEN) injection 1 mg  1 mg IntraMUSCular PRN    sodium chloride (NS) flush 5-40 mL  5-40 mL IntraVENous Q8H    sodium chloride (NS) flush 5-40 mL  5-40 mL IntraVENous PRN    acetaminophen (TYLENOL) tablet 650 mg  650 mg Oral Q6H PRN    Or    acetaminophen (TYLENOL) suppository 650 mg  650 mg Rectal Q6H PRN    polyethylene glycol (MIRALAX) packet 17 g  17 g Oral DAILY PRN    ondansetron (ZOFRAN ODT) tablet 4 mg  4 mg Oral Q8H PRN    Or    ondansetron (ZOFRAN) injection 4 mg  4 mg IntraVENous Q6H PRN    dextrose 10 % infusion 0-250 mL  0-250 mL IntraVENous PRN    insulin glargine (LANTUS) injection 10 Units  10 Units SubCUTAneous DAILY    insulin lispro (HUMALOG) injection   SubCUTAneous AC&HS    heparin (porcine) injection 5,000 Units  5,000 Units SubCUTAneous Q8H    mirtazapine (REMERON) tablet 15 mg  15 mg Oral QHS    furosemide (LASIX) injection 60 mg  60 mg IntraVENous BID     ______________________________________________________________________  EXPECTED LENGTH OF STAY: 3d 21h  ACTUAL LENGTH OF STAY:          2                 Arnulfo Gayle MD

## 2023-02-14 NOTE — PROGRESS NOTES
Name: Ayo Carmen   MRN: [id number]  : 1950      Assessment:  WINSTON on CKD-4: WINSTON likely Cardio-renal syndrome in the setting of CHF/need for diuresis  CKD-4 (Dr Ashvin Mckeon)- basln Cr 2.3 to 2.6. due to DN and CRS (CHF)  A on C Syst CHF  Hypokalemia  Anemia- may have component of anemia of CKD  DM-2 with DN/CKD  Azotemia  Elevated Troponin- ?NSTEMI     Patient is exhibiting features of cardiorenal syndrome. She is clinically improved with IV diuretics. Will help to accept higher CR in order for her to breathe better and stay out of CHF. she has been stage IV CKD and I suspect this might be her new baseline and we will help to prepare her for future KRT    Cr slight better. Phos is nl. Mild high Mg noted. Urine PCR- 500 mg of proteinuria. Lost 5 lbs     Plan/Recommendations:  Continue IV Lasix 60 mg twice daily- 1-2 more days  Then transition to PO lasix. WILL NEED HIGHER DOSE at time of d/c  Ct oral KCl 20 mg daily  Monitor SAWYER's  Daily labs  Daily weight  Fluid restriction to 1.5 L/day  Check iron profile with a.m. labs. May need oral versus IV iron depending on the results    Subjective:  Oob in chair.  Feels better    ROS:   No nausea, no vomiting  No chest pain, improved shortness of breath    Exam:  Visit Vitals  /74   Pulse 68   Temp 98 °F (36.7 °C)   Resp 18   Ht 5' (1.524 m)   Wt 55.8 kg (123 lb)   SpO2 90%   BMI 24.02 kg/m²     NAD  Dec BS, no distress  RRR  No sig edema  AOx3    Current Facility-Administered Medications   Medication Dose Route Frequency Last Admin    saline peripheral flush soln 20 mL  20 mL InterCATHeter PRN 20 mL at 23 1017    potassium chloride SR (KLOR-CON 10) tablet 20 mEq  20 mEq Oral DAILY 20 mEq at 23 0809    aspirin delayed-release tablet 81 mg  81 mg Oral DAILY 81 mg at 23 0809    carvediloL (COREG) tablet 12.5 mg  12.5 mg Oral BID WITH MEALS 12.5 mg at 02/14/23 0809    ferrous sulfate tablet 325 mg  325 mg Oral DAILY WITH BREAKFAST 325 mg at 02/14/23 0809    atorvastatin (LIPITOR) tablet 10 mg  10 mg Oral QHS 10 mg at 02/13/23 2117    glucose chewable tablet 16 g  4 Tablet Oral PRN      glucagon (GLUCAGEN) injection 1 mg  1 mg IntraMUSCular PRN      sodium chloride (NS) flush 5-40 mL  5-40 mL IntraVENous Q8H 10 mL at 02/13/23 2119    sodium chloride (NS) flush 5-40 mL  5-40 mL IntraVENous PRN      acetaminophen (TYLENOL) tablet 650 mg  650 mg Oral Q6H PRN      Or    acetaminophen (TYLENOL) suppository 650 mg  650 mg Rectal Q6H PRN      polyethylene glycol (MIRALAX) packet 17 g  17 g Oral DAILY PRN      ondansetron (ZOFRAN ODT) tablet 4 mg  4 mg Oral Q8H PRN      Or    ondansetron (ZOFRAN) injection 4 mg  4 mg IntraVENous Q6H PRN      dextrose 10 % infusion 0-250 mL  0-250 mL IntraVENous PRN      insulin glargine (LANTUS) injection 10 Units  10 Units SubCUTAneous DAILY 10 Units at 02/14/23 1209    insulin lispro (HUMALOG) injection   SubCUTAneous AC&HS 2 Units at 02/13/23 1225    heparin (porcine) injection 5,000 Units  5,000 Units SubCUTAneous Q8H 5,000 Units at 02/12/23 1831    mirtazapine (REMERON) tablet 15 mg  15 mg Oral QHS 15 mg at 02/13/23 2117    furosemide (LASIX) injection 60 mg  60 mg IntraVENous BID 60 mg at 02/14/23 1218       Labs/Data:    Lab Results   Component Value Date/Time    WBC 6.3 02/14/2023 01:01 AM    HGB 9.9 (L) 02/14/2023 01:01 AM    HCT 32.0 (L) 02/14/2023 01:01 AM    PLATELET 617 56/17/6724 01:01 AM    .6 (H) 02/14/2023 01:01 AM       Lab Results   Component Value Date/Time    Sodium 140 02/14/2023 01:01 AM    Potassium 4.4 02/14/2023 01:01 AM    Chloride 108 02/14/2023 01:01 AM    CO2 24 02/14/2023 01:01 AM    Anion gap 8 02/14/2023 01:01 AM    Glucose 110 (H) 02/14/2023 01:01 AM    BUN 99 (H) 02/14/2023 01:01 AM    Creatinine 3.90 (H) 02/14/2023 01:01 AM    BUN/Creatinine ratio 25 (H) 02/14/2023 01:01 AM    GFR est AA 12 (L) 05/13/2022 11:39 AM    GFR est non-AA 10 (L) 05/13/2022 11:39 AM    eGFR 12 (L) 02/14/2023 01:01 AM    eGFR 15 (L) 04/20/2022 11:10 AM    Calcium 9.0 02/14/2023 01:01 AM       Wt Readings from Last 3 Encounters:   02/14/23 55.8 kg (123 lb)   12/13/22 55.8 kg (123 lb)   11/21/22 55.9 kg (123 lb 3.2 oz)         Intake/Output Summary (Last 24 hours) at 2/14/2023 1232  Last data filed at 2/14/2023 0601  Gross per 24 hour   Intake --   Output 2900 ml   Net -2900 ml       Patient seen and examined. Chart reviewed. Labs, data and other pertinent notes reviewed in last 24 hrs.     PMH/SH/FH reviewed and unchanged compared to H&P    Discussed with pt      Kolton Chavira MD

## 2023-02-14 NOTE — PROGRESS NOTES
Bedside and Verbal shift change report given to Jean Gomez RN (oncoming nurse) by Amairani Tarn RN (offgoing nurse). Report included the following information SBAR, Kardex, ED Summary, Procedure Summary, Intake/Output, MAR, Recent Results, and Cardiac Rhythm NSR .

## 2023-02-14 NOTE — PROGRESS NOTES
[facility]                     Cardiology Care Note     []Initial Encounter     [x]Follow-up    Patient Name: Ana Mendoza - Y:[birth year] - LBU:[id number]  Primary Cardiologist: [facility] Physicians: Ana Christianson MD  Consulting Cardiologist: [facility] Physicians: Ana Christianson MD     2/14/2023     Ana Mendoza is a 67 y.o. female  She feels a little less short of breath overnight. She is undergoing a Lexiscan when seen this morning. She denies any chest pain. She has no edema and a normal lung sounds. No change in cardiovascular exam.     I have seen Raysa Pena since 2010 when she had a nonischemic cardiomyopathy with an EF of 20%. She returned to see us in 2018 and has been intermittently since then in the office. She has an ill  and a very helpful daughter. She has often chosen to miss appointments partly because of her 's circumstances. She has had chronic shortness of breath and EF about 25% in 2018 and fluctuating weight. She has now had steadily decrease in her renal function. And is admitted with cardiorenal syndrome with shortness of breath  She had a previous pacemaker with single lead ICD [employer] and is followed by Dr. Vitaliy Gutierrez. The pacemaker was placed in 2017 by another physician (Dr. Rip Case [facility]) prior to her transfer to our back to her care in 2018. At that time she was taking torsemide followed Dr. Milagros Parks. Her nephropathy is felt due to type 2 diabetes and she has hypertension dyslipidemia. When I last saw her 10/15/2021 she was a compensated with a creatinine of 2.1 on Demadex 50 mg daily and Coreg 12.5 twice daily. Labs with a troponin of 1134 equivocal for NSTEMI given the renal disorder lack of chest pain or EKG changes await results of Lexiscan nuclear and echo today  Creatinine was 4.18 hemoglobin is 9.9 with anemia due to renal failure.   Creatinine is down to 3.9 with a GFR of 12. Continue Coreg atorvastatin aspirin. Patient is on IV Lasix 60 mg twice daily if EF is normal could start to reduce or change to p.o. in the next 24 hours we will discuss with renal before making change. Patient seen earlier today and examined  and agree with Advance Practice Provider (YEIMI, NP,PA)  assessment and plans. Disha Weston MD        Reason for encounter: CHF  Subjective: saw pt after stress test this afternoon. She denies any chest pain. States she feels better. States she has been walking in room without chest pain or SOB. U/O 3200 mls/24 hours. ICD interrogated:per [employer] rep- no events, no VT noted  no pacing. Appropriate function of device. Stress test done. Echo read is pending. NUCLEAR CARDIAC STRESS TEST 02/14/2023, 02/14/2023 2/14/2023  SPECT quantitative perfusion analysis and images displayed in three planes demonstrate large inferolateral transmural absence of radiotracer on both sequences compatible with infarct. There is no ischemic reversibility. Gated SPECT quantitative analysis and images reviewed in 3 planes exhibit lateral wall akinesis and lack of LV systolic wall thickening. The calculated LV ejection fraction is 27%. Pulmonary uptake is normal. LV cavity size appears slightly large on both sequences. Impression: No ischemia. Inferolateral infarct. LVEF 27%. Signed by: Disha Weston MD on 2/14/2023 11:36 AM, Signed by: Kristine Salmon MD on 2/14/2023 11:49 AM         Assessment and Plan     Cardiomyopathy/ Acute on chronic HFrEF with suspected cardiorenal syndrome. NYHA IV on admission  EF 25% with diffuse hypokinesis and distinct RWMA (5/2018)[EF 30% 2010 with Normal Coronary arteries on remote [facility] 5/2010]. Repeat echo read pending. On lasix 60 mg IV BID. -3500 mls urine output/24 hours. Creatinine trending down 3.9 today. Will need higher po dose at dc. Continue Coreg 12.5 mg BID.  No ACE-I/ARB/ARNI/spironolactone given renal dysfunction. Consider low dose hydralazine or isosorbide if bp allows after diuresis. No Jardiance given GFR of <10. Has ICD-has never fired per pt report. [employer]. No events per ICD interrogation today. Further plan after repeat echo    2. Elevated troponin:  HS troponin [id number]. Troponins flat. EKG NSR with t wave inversion inferior leads (there were t wave inversions on echo in Lead II, avf on prior EKG 2018). No chest pain. Troponin elevation with nearly flat trend equivocal for NSTEMI given renal disorder and lack of CP or EKG changes-However. Stress test showed no ischemia but inferolateral infarct- Dr. Heather Tyler to review. Last cath was , normal CORs. Avoid invasive cardiac testing given renal dysfunction/worsened renal function. Continue ASA, statin for now. 3.  Anemia:  History of iron deficiency. Suspect chronic in setting of JACOBO and renal disease. May also contribute to SOB. On ferrous sulfate. 4. CKD IV wth cardio-renal syndrome:   creatinine 3.16-4.48 last year. Creatinine trending down. Some element of cardiorenal.   Lab Results   Component Value Date/Time    Creatinine 3.90 (H) 2023 01:01 AM   Renal following. 1.5 L/day fluid restriction. IV diuretics for 1-2 more days. 5. HLD  .8, HDL 27 (2022). Continue statin. HPI:   Tio Nolen is a 67 y.o. female with PMH significant for Dilated cardiomyopathy, NICM, chronic HFrEF (EF 25% ), s/p ICD (10/2017), DM type II, Iron deficiency anemia CKD 3, HTN, HLD, who presents with chief c/o SOB and fatigue. Reports that emily has been doing well until last week she was distraught over the passing of the  of her [employer]  and she went to  on Tuesday. [place] was exhausting and she felt tired since then. Had some SOB. Nonproductive cough was reported  Yesterday she experienced intermittent episodes of worsened SOB at rest over 2-3 hours period.   She mentioned it to her family who brought her to ER. Had increased SOB descending 3 flights of stairs to get to car. Denies any BLE edema but did feel her abdomen was swollen. Gained couple of lbs over past couple of days. Has been taking torsemide daily now for <1 month because she was \"putting on a little weight\" and thought it was fluid. She denied any chest pain/pressure, arm or jaw pain,  dizziness, lightheadedness, palpitation. No blood in stool/black stools. Takes iron. In Er, pro BNP >35,000, CXR with small bilateral pleural effusions. HS troponin trend is flat ([id number]), creatinine 4.18, reduced to 3.97 after IV lasix 60 mg. She has had >1300 mls urine output since IV lasix and states that her breathing is improved, near  baseline. Her abdominal swelling is better. Prior to last week, she was walking approximately 5 blocks 3-4 times/week and able to climb 3 flight of stairs without SOB. Report she has a wound to RLE from bumping her leg on furniture - wound clinic is managing wound outpatient.   ____________________________________________________________    Cardiac testing  Echo 5/31/23: Left ventricle: The ventricle was mildly dilated. Systolic function was  severely reduced by visual assessment. Ejection fraction was estimated to  be 25 %. There was severe diffuse hypokinesis with distinct regional wall  motion abnormalities. Wall thickness was at the upper limits of normal.     Left atrium: The atrium was moderately dilated. LA volume index was 43  ml/mï¾². Mitral valve: There was normal leaflet separation. There was no evidence  for stenosis. There was mild regurgitation. Pulmonary arteries: Systolic pressure was within the normal range.             Most recent HS troponins:  Recent Labs     02/13/23  0246 02/12/23  1246   TROPHS 1,041* 1,134*       ECG: NSR with PVC    Review of Systems:    [x]All other systems reviewed and all negative except as written in HPI    [] Patient unable to provide secondary to condition    Past Medical History:   Diagnosis Date    Calculus of kidney     Cardiomyopathy [facility])     idiopathic    Chronic systolic heart failure [facility])     April 2010 ef 20%, by Dec 2010 up to 50% on meds, cath with normal cors    Diabetes [facility])     Essential hypertension, benign     Hypercholesterolemia      Past Surgical History:   Procedure Laterality Date    HX HYSTERECTOMY      HX ORTHOPAEDIC      HX OTHER SURGICAL  10/2017    difibulator     HX UROLOGICAL      stone removal    SC UNLISTED PROCEDURE CARDIAC SURGERY       Social Hx:  reports that she has never smoked. She has never used smokeless tobacco. She reports that she does not drink alcohol and does not use drugs. Family Hx: family history includes Breast Cancer (age of onset: 28) in her daughter; Breast Cancer (age of onset: 79) in her sister; Cancer in her brother, maternal grandfather, and another family member; Heart Disease in her brother; Hypertension in her mother; Lung Disease in her father; Stroke in her brother and mother. Allergies   Allergen Reactions    Codeine Hives    Honey Itching and Other (comments)     allergic to therahoney    Pcn [Penicillins] Hives          OBJECTIVE:  Wt Readings from Last 3 Encounters:   02/14/23 55.8 kg (123 lb)   12/13/22 55.8 kg (123 lb)   11/21/22 55.9 kg (123 lb 3.2 oz)       Intake/Output Summary (Last 24 hours) at 2/14/2023 1501  Last data filed at 2/14/2023 0601  Gross per 24 hour   Intake --   Output 2100 ml   Net -2100 ml       Physical Exam:    Vitals:   Vitals:    02/14/23 1000 02/14/23 1200 02/14/23 1238 02/14/23 1400   BP: 130/74  132/72    Pulse:  68 69 62   Resp:   21    Temp:   98 °F (36.7 °C)    SpO2:       Weight:       Height:         Telemetry: NSR     Gen: Well-developed, in no acute distress  Neck: Supple, No JVD,   Resp: No accessory muscle use, lungs clear to auscultation bilaterally. .  Card: Regular Rate,Rythm, Normal S1, S2, grade I/VI systolic murmur LUSB. No rubs or gallop.  No thrills. Abd:   Soft, non-tender, non-distended, BS+   MSK: No cyanosis,  RLE dressing c/d/I. Skin: No rashes    Neuro: Moving all four extremities, follows commands appropriately  Psych: Good insight, oriented to person, place, alert, Nml Affect  LE: No edema    Data Review:     Radiology:   XR Results (most recent):  Results from [place] encounter on 02/12/23    XR CHEST PA LAT    Narrative  INDICATION:  SOB    COMPARISON: April 2010    FINDINGS: PA and lateral views of the chest demonstrate a stable  cardiomediastinal silhouette and chronic cardiomegaly. There is a single lead  left-sided AICD. There are trace bilateral pleural effusions. There is no  pulmonary edema. Impression  Trace bilateral pleural effusions. Recent Labs     02/14/23  0101 02/13/23  0246    142   K 4.4 3.2*    111*   CO2 24 22   BUN 99* 101*   CREA 3.90* 3.97*   * 111*   PHOS 4.4  --    CA 9.0 8.4*     Recent Labs     02/14/23  0101 02/12/23  1246   WBC 6.3 7.0   HGB 9.9* 9.0*   HCT 32.0* 27.7*    178     Recent Labs     02/12/23  1246        No results for input(s): CHOL, LDLC in the last 72 hours.     No lab exists for component: TGL, HDLC,  HBA1C      Current meds:    Current Facility-Administered Medications:     saline peripheral flush soln 20 mL, 20 mL, InterCATHeter, PRN, Sage Daniels MD, 20 mL at 02/14/23 1017    potassium chloride SR (KLOR-CON 10) tablet 20 mEq, 20 mEq, Oral, DAILY, Damon Mejia MD, 20 mEq at 02/14/23 0809    aspirin delayed-release tablet 81 mg, 81 mg, Oral, DAILY, Brian Owens MD, 81 mg at 02/14/23 0809    carvediloL (COREG) tablet 12.5 mg, 12.5 mg, Oral, BID WITH MEALS, Brian Owens MD, 12.5 mg at 02/14/23 0809    ferrous sulfate tablet 325 mg, 325 mg, Oral, DAILY WITH BREAKFAST, Brian Owens MD, 325 mg at 02/14/23 0809    atorvastatin (LIPITOR) tablet 10 mg, 10 mg, Oral, QHS, Brian Owens MD, 10 mg at 02/13/23 2117    glucose chewable tablet 16 g, 4 Tablet, Oral, PRN, Brian Owens MD    glucagon (GLUCAGEN) injection 1 mg, 1 mg, IntraMUSCular, PRN, Brian Owens MD    sodium chloride (NS) flush 5-40 mL, 5-40 mL, IntraVENous, Q8H, Brian Owens MD, 10 mL at 02/13/23 2119    sodium chloride (NS) flush 5-40 mL, 5-40 mL, IntraVENous, PRN, Brian Owens MD    acetaminophen (TYLENOL) tablet 650 mg, 650 mg, Oral, Q6H PRN **OR** acetaminophen (TYLENOL) suppository 650 mg, 650 mg, Rectal, Q6H PRN, Brian Owens MD    polyethylene glycol (MIRALAX) packet 17 g, 17 g, Oral, DAILY PRN, Brian Owens MD    ondansetron (ZOFRAN ODT) tablet 4 mg, 4 mg, Oral, Q8H PRN **OR** ondansetron (ZOFRAN) injection 4 mg, 4 mg, IntraVENous, Q6H PRN, Brian Owens MD    dextrose 10 % infusion 0-250 mL, 0-250 mL, IntraVENous, PRN, Brian Owens MD    insulin glargine (LANTUS) injection 10 Units, 10 Units, SubCUTAneous, DAILY, Brian Owens MD, 10 Units at 02/14/23 1209    insulin lispro (HUMALOG) injection, , SubCUTAneous, AC&HS, Brian Owens MD, 2 Units at 02/13/23 1225    heparin (porcine) injection 5,000 Units, 5,000 Units, SubCUTAneous, Q8H, Brian Owens MD, 5,000 Units at 02/12/23 1831    mirtazapine (REMERON) tablet 15 mg, 15 mg, Oral, QHS, Brian Owens MD, 15 mg at 02/13/23 2117    furosemide (LASIX) injection 60 mg, 60 mg, IntraVENous, BID, Brian Owens MD, 60 mg at 02/14/23 [address] [place] [address].  [facility]ENN[facility]  Call center: (o) [phone] (u) [phone]      Aldair Rodrigues MD

## 2023-02-14 NOTE — PROGRESS NOTES
Physical Therapy  2/14/2023    Order received, chart reviewed. Patient currently off the floor for stress test. Hold PT and continue to follow for evaluation when able. Thank you.     Corazon Joyce, PT, DPT

## 2023-02-14 NOTE — PROGRESS NOTES
Full note to follow  Miah Whitehead is a 67 y.o. female  She feels a little less short of breath overnight. She is undergoing a Lexiscan when seen this morning. She denies any chest pain. She has no edema and a normal lung sounds. No change in cardiovascular exam.    I have seen [facility] since 2010 when she had a nonischemic cardiomyopathy with an EF of 20%. She returned to see us in 2018 and has been intermittently since then in the office. She has an ill  and a very helpful daughter. She has often chosen to miss appointments partly because of her 's circumstances. She has had chronic shortness of breath and EF about 25% in 2018 and fluctuating weight. She has now had steadily decrease in her renal function. And is admitted with cardiorenal syndrome with shortness of breath  She had a previous pacemaker with single lead ICD [employer] and is followed by Dr. Zuhair Bella. The pacemaker was placed in 2017 by another physician (Dr. Thompson Fus [facility]) prior to her transfer to our back to her care in 2018. At that time she was taking torsemide followed Dr. Michael Donnelly. Her nephropathy is felt due to type 2 diabetes and she has hypertension dyslipidemia. When I last saw her 10/15/2021 she was a compensated with a creatinine of 2.1 on Demadex 50 mg daily and Coreg 12.5 twice daily. Labs with a troponin of 1134 equivocal for NSTEMI given the renal disorder lack of chest pain or EKG changes await results of Lexiscan nuclear and echo today  Creatinine was 4.18 hemoglobin is 9.9 with anemia due to renal failure. Creatinine is down to 3.9 with a GFR of 12. Continue Coreg atorvastatin aspirin. Patient is on IV Lasix 60 mg twice daily if EF is normal could start to reduce or change to p.o. in the next 24 hours we will discuss with renal before making change.

## 2023-02-14 NOTE — PROGRESS NOTES
Problem: Self Care Deficits Care Plan (Adult)  Goal: *Acute Goals and Plan of Care (Insert Text)  Description: FUNCTIONAL STATUS PRIOR TO ADMISSION: Patient was modified independent with ADLs and functional mobility/ ambulatory with RW    HOME SUPPORT: Patient resided with her , who is not able to provide assistance. A hired aide visits them 3x/ week to help with household IADLs    Occupational Therapy Goals  Initiated 2/13/2023  1. Patient will perform grooming standing at sink with modified independence within 7 day(s). 2.  Patient will perform bathing with modified independence within 7 day(s). 3.  Patient will perform lower body dressing with modified independence within 7 day(s). 4.  Patient will perform toilet transfers with modified independence within 7 day(s). 5.  Patient will perform all aspects of toileting with modified independence within 7 day(s). 6.  Patient will utilize energy conservation techniques during functional activities with verbal cues within 7 day(s). 7.  Patient will utilize fall prevention techniques during functional activities with verbal cues within 7 day(s). Outcome: Progressing Towards Goal    OCCUPATIONAL THERAPY TREATMENT  Patient: Cheyenne Soares (67 y.o. female)  Date: 2/14/2023  Diagnosis: CHF (congestive heart failure) ([facility]) [I50.9] <principal problem not specified>      Precautions:    Chart, occupational therapy assessment, plan of care, and goals were reviewed. ASSESSMENT  Patient continues with skilled OT services and is progressing towards goals. Patient reporting having busy day with a lot of tests and just back to bed. Educated on energy conversation techniques to incorporate after discharge, patient very motivated. Current Level of Function Impacting Discharge (ADLs):     Other factors to consider for discharge: 23 steps in to condo         PLAN :  Patient continues to benefit from skilled intervention to address the above impairments. Continue treatment per established plan of care to address goals. Recommend with staff: to bathroom for meals, in chair for meals    Recommend next OT session: per goals    Recommendation for discharge: (in order for the patient to meet his/her long term goals)  Occupational therapy at least 2 days/week in the home  versus none    This discharge recommendation:  Has been made in collaboration with the attending provider and/or case management    IF patient discharges home will need the following DME:        SUBJECTIVE:   Patient stated I don't let it stop me, I know I have to do it and I just do it re: her steps in    OBJECTIVE DATA SUMMARY:   Cognitive/Behavioral Status:  Neurologic State: Alert  Orientation Level: Oriented X4  Cognition: Appropriate decision making; Appropriate for age attention/concentration; Appropriate safety awareness             ADL Intervention:     Educated on energy conservation related to bathing, daily routine, planning ahead     Educated on positioning in bed to increase comfort and decreased pressure at back     Patient instructed and indicated understanding the benefits of maintaining activity tolerance, functional mobility, and independence with self care tasks during acute stay  to ensure safe return home and to baseline. Encouraged patient to increase frequency and duration OOB, be out of bed for all meals, perform daily ADLs (as approved by RN/MD regarding bathing etc), and performing functional mobility to/from bathroom. Pain:  No complaint    Activity Tolerance:   Good, reports busy day and up earlier    After treatment patient left in no apparent distress:   Supine in bed, Call bell within reach, and Side rails x 3    COMMUNICATION/COLLABORATION:   The patients plan of care was discussed with: Registered nurse.      Lluvia Matta OTR/L  Time Calculation: 10 mins

## 2023-02-15 VITALS
WEIGHT: 121.25 LBS | TEMPERATURE: 97.9 F | HEIGHT: 60 IN | HEART RATE: 68 BPM | RESPIRATION RATE: 17 BRPM | SYSTOLIC BLOOD PRESSURE: 107 MMHG | BODY MASS INDEX: 23.81 KG/M2 | OXYGEN SATURATION: 92 % | DIASTOLIC BLOOD PRESSURE: 52 MMHG

## 2023-02-15 DIAGNOSIS — I50.22 CHRONIC SYSTOLIC HEART FAILURE (HCC): Primary | ICD-10-CM

## 2023-02-15 LAB
ANION GAP SERPL CALC-SCNC: 11 MMOL/L (ref 5–15)
BUN SERPL-MCNC: 103 MG/DL (ref 6–20)
BUN/CREAT SERPL: 28 (ref 12–20)
CALCIUM SERPL-MCNC: 8.8 MG/DL (ref 8.5–10.1)
CHLORIDE SERPL-SCNC: 105 MMOL/L (ref 97–108)
CO2 SERPL-SCNC: 25 MMOL/L (ref 21–32)
CREAT SERPL-MCNC: 3.63 MG/DL (ref 0.55–1.02)
GLUCOSE BLD STRIP.AUTO-MCNC: 94 MG/DL (ref 65–117)
GLUCOSE SERPL-MCNC: 107 MG/DL (ref 65–100)
IRON SATN MFR SERPL: 17 % (ref 20–50)
IRON SERPL-MCNC: 49 UG/DL (ref 35–150)
POTASSIUM SERPL-SCNC: 3.9 MMOL/L (ref 3.5–5.1)
SERVICE CMNT-IMP: NORMAL
SODIUM SERPL-SCNC: 141 MMOL/L (ref 136–145)
TIBC SERPL-MCNC: 292 UG/DL (ref 250–450)

## 2023-02-15 PROCEDURE — 74011250637 HC RX REV CODE- 250/637: Performed by: STUDENT IN AN ORGANIZED HEALTH CARE EDUCATION/TRAINING PROGRAM

## 2023-02-15 PROCEDURE — 80048 BASIC METABOLIC PNL TOTAL CA: CPT

## 2023-02-15 PROCEDURE — 36415 COLL VENOUS BLD VENIPUNCTURE: CPT

## 2023-02-15 PROCEDURE — 74011250637 HC RX REV CODE- 250/637: Performed by: INTERNAL MEDICINE

## 2023-02-15 PROCEDURE — 82962 GLUCOSE BLOOD TEST: CPT

## 2023-02-15 PROCEDURE — 97161 PT EVAL LOW COMPLEX 20 MIN: CPT

## 2023-02-15 PROCEDURE — 97116 GAIT TRAINING THERAPY: CPT

## 2023-02-15 PROCEDURE — 97535 SELF CARE MNGMENT TRAINING: CPT

## 2023-02-15 PROCEDURE — 74011636637 HC RX REV CODE- 636/637: Performed by: STUDENT IN AN ORGANIZED HEALTH CARE EDUCATION/TRAINING PROGRAM

## 2023-02-15 PROCEDURE — 83540 ASSAY OF IRON: CPT

## 2023-02-15 RX ORDER — FUROSEMIDE 40 MG/1
60 TABLET ORAL 2 TIMES DAILY
Qty: 90 TABLET | Refills: 0 | Status: SHIPPED | OUTPATIENT
Start: 2023-02-15 | End: 2023-03-17

## 2023-02-15 RX ORDER — POTASSIUM CHLORIDE 1500 MG/1
20 TABLET, FILM COATED, EXTENDED RELEASE ORAL DAILY
Qty: 30 TABLET | Refills: 0 | Status: SHIPPED | OUTPATIENT
Start: 2023-02-16 | End: 2023-03-18

## 2023-02-15 RX ORDER — FUROSEMIDE 40 MG/1
TABLET ORAL
Qty: 60 TABLET | Refills: 2 | Status: SHIPPED | OUTPATIENT
Start: 2023-02-15

## 2023-02-15 RX ADMIN — INSULIN GLARGINE 10 UNITS: 100 INJECTION, SOLUTION SUBCUTANEOUS at 10:14

## 2023-02-15 RX ADMIN — CARVEDILOL 12.5 MG: 12.5 TABLET, FILM COATED ORAL at 09:50

## 2023-02-15 RX ADMIN — ASPIRIN 81 MG: 81 TABLET, COATED ORAL at 09:49

## 2023-02-15 RX ADMIN — FERROUS SULFATE TAB 325 MG (65 MG ELEMENTAL FE) 325 MG: 325 (65 FE) TAB at 09:50

## 2023-02-15 RX ADMIN — POTASSIUM CHLORIDE 20 MEQ: 750 TABLET, FILM COATED, EXTENDED RELEASE ORAL at 09:50

## 2023-02-15 NOTE — PROGRESS NOTES
PHYSICAL THERAPY EVALUATION/DISCHARGE  Patient: Analilia Barraza (67 y.o. female)  Date: 2/15/2023  Primary Diagnosis: CHF (congestive heart failure) ([facility]) [I50.9]       Precautions:          ASSESSMENT  Based on the objective data described below, the patient presents with reports of SOB due to CHF exacerbation. Pt received seated EOB and agreeable to therapy. Pt tolerated session well. Pt completed transfers with RW with supervision and tolerated gait training x 25 ft with RW with standby assist. Pt without LOB or evidence of instability. Pt tolerated prolonged standing at the sink for ADL tasks with OT present. Pt was able to stand on one leg to remove briefs with UE supported on RW without difficulty. Pt without SOB during activity. Pt appears to be functioning at baseline mobility status and does not require any further acute PT needs. Pt with good insight into deficits and manages well at home. .    Functional Outcome Measure: The patient scored 25/28 on the tinetti outcome measure which is indicative of low risk for falls. Other factors to consider for discharge: none     Further skilled acute physical therapy is not indicated at this time. PLAN :  Recommendation for discharge: (in order for the patient to meet his/her long term goals)  No skilled physical therapy/ follow up rehabilitation needs identified at this time. This discharge recommendation:  Has been made in collaboration with the attending provider and/or case management    IF patient discharges home will need the following DME: patient owns DME required for discharge       SUBJECTIVE:   Patient stated I do everything for my  at home.     OBJECTIVE DATA SUMMARY:   HISTORY:    Past Medical History:   Diagnosis Date    Calculus of kidney     Cardiomyopathy ([facility] [address]     idiopathic    Chronic systolic heart failure [facility])     April 2010 ef 20%, by Dec 2010 up to 50% on meds, cath with normal cors    Diabetes [facility])     Essential hypertension, benign     Hypercholesterolemia      Past Surgical History:   Procedure Laterality Date    HX HYSTERECTOMY      HX ORTHOPAEDIC      HX OTHER SURGICAL  10/2017    difibulator     HX UROLOGICAL      stone removal    [place] UNLISTED PROCEDURE CARDIAC SURGERY         Prior level of function: mod I with rolling walker, lives with spouse and son, steps to enter home  Personal factors and/or comorbidities impacting plan of care:     Home Situation  Home Environment: Private residence  # Steps to Enter: 21  Rails to Enter: Yes  Hand Rails :  (initally bilateral, then only L ascending)  One/Two Story Residence: One story  Living Alone: No  Support Systems: Child(oleg), Spouse/Significant Other  Patient Expects to be Discharged to[de-identified] Home with home health  Current DME Used/Available at Home: Walker, rolling  Tub or Shower Type: Shower    EXAMINATION/PRESENTATION/DECISION MAKING:   Critical Behavior:  Neurologic State: Alert  Orientation Level: Oriented X4  Cognition: Appropriate for age attention/concentration, Follows commands     Hearing: Auditory  Auditory Impairment: None  Skin:    Edema:   Range Of Motion:  AROM: Within functional limits                       Strength:    Strength: Within functional limits                    Tone & Sensation:   Tone: Normal              Sensation: Intact               Coordination:  Coordination: Within functional limits  Vision:      Functional Mobility:  Bed Mobility:     Supine to Sit:  (pt received seated EOB)        Transfers:  Sit to Stand: Supervision  Stand to Sit: Supervision                       Balance:   Sitting: Intact  Standing: Intact; With support  Standing - Static: Good  Standing - Dynamic : Good  Ambulation/Gait Training:  Distance (ft): 25 Feet (ft)  Assistive Device: Gait belt;Walker, rolling  Ambulation - Level of Assistance: Stand-by assistance        Gait Abnormalities: Decreased step clearance        Base of Support: Narrowed     Speed/Michelle: [employer] decreased (<100 feet/min)  Step Length: Right shortened;Left shortened              Functional Measure:  Tinetti test:    Sitting Balance: 1  Arises: 1  Attempts to Rise: 2  Immediate Standing Balance: 2  Standing Balance: 2  Nudged: 2  Eyes Closed: 1  Turn 360 Degrees - Continuous/Discontinuous: 1  Turn 360 Degrees - Steady/Unsteady: 1  Sitting Down: 1  Balance Score: 14 Balance total score  Indication of Gait: 1  R Step Length/Height: 1  L Step Length/Height: 1  R Foot Clearance: 1  L Foot Clearance: 1  Step Symmetry: 1  Step Continuity: 1  Path: 2  Trunk: 1  Walking Time: 1  Gait Score: 11 Gait total score  Total Score: 25/28 Overall total score         Tinetti Tool Score Risk of Falls  <19 = High Fall Risk  19-24 = Moderate Fall Risk  25-28 = Low Fall Risk  Tinetti ME. Performance-Oriented Assessment of Mobility Problems in Elderly Patients. [facility] 66; [id number]. (Scoring Description: PT Bulletin Feb. 10, 1993)    Older adults: Roberto Leo et al, 2009; n = 1000 [facility] elderly evaluated with ABC, ARIELA, ADL, and IADL)  · Mean ARIELA score for males aged 69-68 years = 26.21(3.40)  · Mean ARIELA score for females age 69-68 years = 25.16(4.30)  · Mean ARIELA score for males over 80 years = 23.29(6.02)  · Mean ARIELA score for females over 80 years = 17.20(8.32)          Based on the above components, the patient evaluation is determined to be of the following complexity level: LOW     Pain Rating:  Pt denied pain    Activity Tolerance:   Good      After treatment patient left in no apparent distress:   Caregiver / family present and standing in the bathroom with RN present    COMMUNICATION/EDUCATION:   The patients plan of care was discussed with: Occupational therapist and Registered nurse. Fall prevention education was provided and the patient/caregiver indicated understanding., Patient/family have participated as able in goal setting and plan of care. , and Patient/family agree to work toward stated goals and plan of care.    Thank you for this referral.  Miladys Mckeon, PT, DPT   Time Calculation: 22 mins

## 2023-02-15 NOTE — DISCHARGE SUMMARY
Discharge Summary       PATIENT ID: Miah Whitehead  MRN: [id number]   YOB: 1950    DATE OF ADMISSION: 2/12/2023  2:50 PM    DATE OF DISCHARGE: 02/15/23     PRIMARY CARE PROVIDER: Gallo Maravilla MD     ATTENDING PHYSICIAN: Ramses Álvarez MD    DISCHARGING PROVIDER: Ramses Álvarez MD      CONSULTATIONS: IP CONSULT TO CARDIOLOGY  IP CONSULT TO NEPHROLOGY    PROCEDURES/SURGERIES: * No surgery found *    [address] [facility],[address] COURSE:   Miah Whitehead is a 67 y.o. female with history of HFrEF with an EF of 25%, ischemic cardiomyopathy, CKD stage IV, hypertension, dyslipidemia, iron deficiency anemia, non-insulin-dependent type 2 diabetes who presents to the hospital with complaints of shortness of breath. Reports worsening dyspnea very minimal exertion over the last 4 days with nonproductive cough. No recent travel or sick contacts. Pt was admitted and started on IV lasix for diuresis. Imrpovement in respiratory status, and LE edema. Diagnosed with WINSTON likely due to CRS, as improved with diuresis as well. Cardiology consulted and recommended stress test which was completed and negative. Pt stable for DC home, ambulating on RA. Will need higher dose diuretic on discharge. DC torsemide, and start lasix PO 60mg BID. DISCHARGE DIAGNOSES / PLAN:      HFrEF - 25%, ischemic  - Stress test--no ischemia EF 27% inferolateral infarct  - I and O and daily weights  - Not on ARB/ACE/ARNI due to Cr   - lasix 60mg BID for now --> DC home on oral lasix 60mg BID   - Continue home coreg, statin, asa   - Close o/p follow up      WINSOTN on CKD stage IV - ?CRS  - Improved with diuresis  - I and O and avoid nephrotoxins  - Nephrology consulted and now cleared for DC home. Cr on discharge 3.6  - Diurese as above for now      IDDM - A1c 7.4%  - Hold home glipizide. Discussed risk of hypoglycemia due to worsening kidney function. She will discuss with her PCP regarding alternative.    - Continue glargine 10U daily for now  - SSI, POCT glucose checks and hypoglycemia protocol      H/o CAD - home ASA, Coreg, statin   Mood disorder - home mirtazapine     BMI: Body mass index is 23.68 kg/m². . This patient: Has a BMI within normal limits. PENDING TEST RESULTS:   At the time of discharge the following test results are still pending: None     ADDITIONAL CARE RECOMMENDATIONS:   - Please take all medications as prescribed. Note changes as below. **STOP torsemide, and start furosemide twice daily   - Please make sure to follow up with your primary care physician within 1-2 weeks of discharge for hospital follow up. You should also follow up with cardiology and nephrology   - Please make sure to continue to monitor for: Chest pain, shortness of breath, high fevers, and return to the Emergency Department with any of these symptoms.   - Please get up slowly from a seated or laying position, avoid falls. - Avoid tobacco, alcohol and other illicit drug use. - Diet restrictions: Low salt   - Activity restrictions: None, as tolerated   - Wound care: per prior wound care instructions (for L lower extremity)  - Please measure your weight daily, if you note a sharp increase (2-3lb) in a short period of time, please call your cardiologist. Anmol Samia should take a fluid pill at that time depending on their instruction.   - Keep to a low salt diet. This will help reduce the amount of excess fluid accumulation. DIET: Resume previous diet    ACTIVITY: Activity as tolerated    EQUIPMENT needed: None    NOTIFY YOUR PHYSICIAN FOR ANY OF THE FOLLOWING:   Fever over 101 degrees for 24 hours. Chest pain, shortness of breath, fever, chills, nausea, vomiting, diarrhea, change in mentation, falling, weakness, bleeding. Severe pain or pain not relieved by medications, as well as any other signs or symptoms that you may have questions about.     FOLLOW UP APPOINTMENTS:    Follow-up Information       Follow up With Specialties Details Why Contact Info    Thalia Poe MD Internal Medicine Physician   [address] [facility]  [address]  [facility]      Edilma Wilder MD Cardiovascular Disease Physician Follow up in 1 week(s) For hospital follow up [address] [facility] [address] [facility]      Larisa Mckeon MD Nephrology Follow up in 1 week(s)  [address]  [phone]               DISCHARGE MEDICATIONS:  Current Discharge Medication List        START taking these medications    Details   furosemide (Lasix) 40 mg tablet Take 1.5 Tablets by mouth two (2) times a day for 30 days. Qty: 90 Tablet, Refills: 0  Start date: 2/15/2023, End date: 3/17/2023      potassium chloride SR (K-TAB) 20 mEq tablet Take 1 Tablet by mouth daily for 30 days. Qty: 30 Tablet, Refills: 0  Start date: 2/16/2023, End date: 3/18/2023           CONTINUE these medications which have NOT CHANGED    Details   mirtazapine (REMERON) 15 mg tablet Take 15 mg by mouth nightly. simvastatin (ZOCOR) 20 mg tablet TAKE 1 TABLET BY MOUTH NIGHTLY  Qty: 90 Tablet, Refills: 1    Associated Diagnoses: Hypertriglyceridemia      carvediloL (COREG) 12.5 mg tablet TAKE 1 TABLET BY MOUTH TWICE A DAY WITH MEALS  Qty: 180 Tablet, Refills: 3    Associated Diagnoses: Chronic systolic congestive heart failure ([facility] [address] Essential hypertension, benign      mupirocin (BACTROBAN) 2 % ointment Apply  to affected area two (2) times a day. Qty: 22 g, Refills: 0      ferrous sulfate 140 mg (45 mg iron) TbER ER tablet Take 140 mg by mouth daily (with breakfast). glipiZIDE SR (GLUCOTROL XL) 10 mg CR tablet Take 1 Tablet by mouth every other day. Qty: 90 Tablet, Refills: 1      aspirin delayed-release 81 mg tablet Take 81 mg by mouth daily.            STOP taking these medications       torsemide (DEMADEX) 20 mg tablet Comments:   Reason for Stopping:         ibuprofen (MOTRIN) 200 mg tablet Comments:   Reason for Stopping:               DISPOSITION:   X Home With:   OT  PT  HH  RN       Long term SNF/Inpatient Rehab    Independent/assisted living    Hospice    Other:     PATIENT CONDITION AT DISCHARGE:     Functional status    Poor     Deconditioned    X Independent      Cognition    X Lucid     Forgetful     Dementia      Catheters/lines (plus indication)    Cunningham     PICC     PEG    X None      Code status    X Full code     DNR      PHYSICAL EXAMINATION AT DISCHARGE:  Visit Vitals  /61 (BP 1 Location: Right upper arm, BP Patient Position: At rest)   Pulse 61   Temp 98.3 °F (36.8 °C)   Resp 19   Ht 5' (1.524 m)   Wt 55 kg (121 lb 4.1 oz)   SpO2 92%   BMI 23.68 kg/m²     Gen: NAD, awake in bed  HEENT: NC/AT, sclera anicteric, PERRL, EOMI  CV: RRR no m/r/g, normal S1 and S2, no pedal edema   Resp: CTA b/l no increased work of breathing, no wheezing or rhonchi, speaking in full sentences   Abd: NT/ND, normal bowel sounds, no rebound or guarding  Ext: 2+ pulses, no edema  Skin: + chronic wound on L leg         CHRONIC MEDICAL DIAGNOSES:  Problem List as of 2/15/2023 Date Reviewed: 5/13/2022            Codes Class Noted - Resolved    CHF (congestive heart failure) ([facility] ICD-10-CM: I50.9  ICD-9-CM: 428.0  2/12/2023 - Present        Chronic ulcer of right leg with fat layer exposed ([facility] ICD-10-CM: I15.114  ICD-9-CM: 707.10  12/9/2022 - Present        CKD (chronic kidney disease) stage 4, GFR 15-29 ml/min ([facility]) ICD-10-CM: N18.4  ICD-9-CM: 585.4  5/13/2022 - Present        Major depressive disorder, recurrent, mild ICD-10-CM: F33.0  ICD-9-CM: 296.31  5/13/2022 - Present        Major depressive disorder, recurrent, moderate ICD-10-CM: F33.1  ICD-9-CM: 296.32  5/13/2022 - Present        Major depressive disorder, recurrent, unspecified ICD-10-CM: F33.9  ICD-9-CM: 296.30  5/13/2022 - Present        CKD (chronic kidney disease) stage 3, GFR 30-59 ml/min ([facility]) ICD-10-CM: N18.30  ICD-9-CM: 585.3  1/16/2020 - Present Hypertriglyceridemia ICD-10-CM: E78.1  ICD-9-CM: 272.1  1/15/2019 - Present        Cardiorenal syndrome ICD-10-CM: I13.10  ICD-9-CM: 404.90  6/24/2018 - Present        Type 2 diabetes with nephropathy ([facility] ICD-10-CM: E11.21  ICD-9-CM: 250.40, 583.81  5/10/2018 - Present        Cervicalgia ICD-10-CM: M54.2  ICD-9-CM: 723.1  6/27/2017 - Present        Tardive dyskinesia ICD-10-CM: G24.01  ICD-9-CM: 333.85  6/27/2017 - Present        Essential hypertension, benign ICD-10-CM: I10  ICD-9-CM: 401.1  Unknown - Present        Pure hypercholesterolemia ICD-10-CM: E78.00  ICD-9-CM: 272.0  2/28/2011 - Present        Chronic systolic heart failure (HCC) (Chronic) ICD-10-CM: C85.87  ICD-9-CM: 428.22  Unknown - Present        Cardiomyopathy ([facility] (Chronic) ICD-10-CM: I42.9  ICD-9-CM: 425.4  Unknown - Present        RESOLVED: Chronic systolic heart failure ([facility] ICD-10-CM: I50.22  ICD-9-CM: 428.22  Unknown - 6/24/2018    Overview Signed 3/28/2011  4:06 PM by Elly Hannon MD     April 2010 ef 20%, by Dec 2010 up to 50% on meds, cath with normal cors             RESOLVED: Cardiomyopathy ([facility] ICD-10-CM: I42.9  ICD-9-CM: 425.4  Unknown - 6/24/2018    Overview Signed 3/28/2011  4:06 PM by Elly Hannon MD     idiopathic             RESOLVED: DM (diabetes mellitus) ([facility] ICD-10-CM: E11.9  ICD-9-CM: 250.00  2/28/2011 - 6/24/2018        RESOLVED: HTN (hypertension) ICD-10-CM: I10  ICD-9-CM: 401.9  2/28/2011 - 6/24/2018           Greater than 30 minutes were spent with the patient on counseling and coordination of care    Signed:   Isela Ponce MD  2/15/2023  10:31 AM

## 2023-02-15 NOTE — PROGRESS NOTES
Transition of Care  RUR 15% Moderate  Disposition Home with family support  DME Walker as needed  Transportation Family   Follow Up PCP, Specialist      CM participated in [facility] with bedside nursing and   attending, that includes: Follow up appointments with PCP or specialist  Review of medications  Dispatch health information  Education on symptom management    All questions answered and patient concerns addressed SMAART-E checklist completed and placed in appropriate folder. Medicare pt has received, reviewed, and signed 2nd IM letter informing them of their right to appeal the discharge. Signed copy has been placed on pt bedside chart.     Herber Olivarez MS

## 2023-02-15 NOTE — PROGRESS NOTES
I have reviewed discharge instructions with the patient. The patient verbalized understanding.      Reviewed education regarding heart failure

## 2023-02-15 NOTE — PROGRESS NOTES
[facility]                    Cardiology Care Note     []Initial Encounter     [x]Follow-up    Patient Name: Alea Choi - GKF:[id number] - IS  Primary Cardiologist: [facility]: Shelia Staton MD  Consulting Cardiologist: [facility]: Shelia Staton MD         Reason for encounter: CHF  Subjective: saw pt after stress test this afternoon. She denies any chest pain. States she feels better. States she has been walking in room without chest pain or SOB. U/O 3200 mls/24 hours. Assessment and Plan     Cardiomyopathy/ Acute on chronic HFrEF with suspected cardiorenal syndrome. NYHA IV on admission. Repeat echo 23 with EF 25-30%, mod global hypokinesis, mild-mod AI and MR, mildly reduce RV function   (EF was 25% with diffuse hypokinesis and distinct RWMA (2018),EF 30% ) Normal Coronary arteries on cath . Change lasix to po 80 mg daily. Obtain standing scale weight before discharge. Continue Coreg 12.5 mg BID. No ACE-I/ARB/ARNI/spironolactone given renal dysfunction. Consider low dose hydralazine or isosorbide as outpatient  if bp allows after diuresis. No Jardiance given GFR of <10. Has ICD-. Telik. No events per ICD interrogation yesterday. [id number] [address] Davis for discharge. Will follow up with Dawne Habermann next week and Dr. London Etienne in April with limited echo same day. (Appointment requested). 2.  Elevated troponin:  HS troponin [id number]. Troponins flat. EKG NSR with t wave inversion inferior leads (there were t wave inversions on echo in Lead II, avf on prior EKG 2018). No chest pain. Troponin elevation with nearly flat trend equivocal for NSTEMI given renal disorder and lack of CP or EKG changes-However. Stress test showed no ischemia but inferolateral infarct. Last cath was , normal CORs.  Avoid invasive cardiac testing given renal dysfunction/worsened renal function. Continue ASA, statin for now. 3.  Anemia:  Lab Results   Component Value Date/Time    HGB 9.9 (L) 2023 01:01 AM   History of iron deficiency. Chronic in setting of JACOBO and renal disease. May also contribute to SOB. On ferrous sulfate. 4. CKD IV [facility] cardio-renal syndrome:   creatinine 3.16-4.48 last year. Creatinine trending down. Lab Results   Component Value Date/Time    Creatinine 3.63 (H) 02/15/2023 03:10 AM   Renal following. 1.5 L/day fluid restriction. 5. HLD  .8, HDL 27 (2022). Continue statin. 6. Aortic regurgitation: mild- mod on echo. 7.  Mitral regurgitation: mild- mod on echo. Future Appointments   Date Time Provider Chava Gabriel   2023  8:20 AM Jayme Lozano NP [facility] BS AMB   3/3/2023  9:15 AM Afshan Hurd DPM [facility]   4/10/2023 11:45 AM CHRISTINE Brooks BS AMB   4/10/2023  3:00 PM ECHO, CHRISTINE BOSE AMB   4/10/2023  3:20 PM Sage Daniels MD [facility] BS AMB   2024  2:00 PM PACEMAKER3CHRISTINE BS AMB   2024  2:20 PM MD ARCELIA Basilio BS AMB                  HPI:   Andria Aviles is a 67 y.o. female with PMH significant for Dilated cardiomyopathy, NICM, chronic HFrEF (EF 25% ), s/p ICD (10/2017), DM type II, Iron deficiency anemia CKD 3, HTN, HLD, who presents with chief c/o SOB and fatigue. Reports that emily has been doing well until last week she was distraught over the passing of the  of her Jainism  and she went to  on Tuesday. Yari Herbert was exhausting and she felt tired since then. Had some SOB. Nonproductive cough was reported  Yesterday she experienced intermittent episodes of worsened SOB at rest over 2-3 hours period. She mentioned it to her family who brought her to ER. Had increased SOB descending 3 flights of stairs to get to car. Denies any BLE edema but did feel her abdomen was swollen.  Gained couple of lbs over past couple of days. Has been taking torsemide daily now for <1 month because she was \"putting on a little weight\" and thought it was fluid. She denied any chest pain/pressure, arm or jaw pain,  dizziness, lightheadedness, palpitation. No blood in stool/black stools. Takes iron. In Er, pro BNP >35,000, CXR with small bilateral pleural effusions. HS troponin trend is flat ([id number]), creatinine 4.18, reduced to 3.97 after IV lasix 60 mg. She has had >1300 mls urine output since IV lasix and states that her breathing is improved, near  baseline. Her abdominal swelling is better. Prior to last week, she was walking approximately 5 blocks 3-4 times/week and able to climb 3 flight of stairs without SOB. Report she has a wound to RLE from bumping her leg on furniture - wound clinic is managing wound outpatient. ICD interrogated:per [employer] scientific rep- no events, no VT noted  no pacing. Appropriate function of device. Stress test done. Echo read is pending. NUCLEAR CARDIAC STRESS TEST 02/14/2023, 02/14/2023 2/14/2023  SPECT quantitative perfusion analysis and images displayed in three planes demonstrate large inferolateral transmural absence of radiotracer on both sequences compatible with infarct. There is no ischemic reversibility. Gated SPECT quantitative analysis and images reviewed in 3 planes exhibit lateral wall akinesis and lack of LV systolic wall thickening. The calculated LV ejection fraction is 27%. Pulmonary uptake is normal. LV cavity size appears slightly large on both sequences. Impression: No ischemia. Inferolateral infarct. LVEF 27%. Signed by: Mahesh Lu MD on 2/14/2023 11:36 AM, Signed by: Case Dill MD on 2/14/2023 11:49 AM       ____________________________________________________________    Cardiac testing  02/12/23    ECHO ADULT COMPLETE 02/14/2023 2/14/2023    Interpretation Summary    Left Ventricle:  Moderately reduced left ventricular systolic function with a visually estimated EF of 25 - 30%. Left ventricle size is normal. Normal wall thickness. Left Ventricle: Moderate global hypokinesis present. Right Ventricle: Mildly reduced systolic function. TAPSE is abnormal.    Aortic Valve: Mild to moderate regurgitation. Mitral Valve: Severely thickened leaflet. Mild to moderate regurgitation. Technical qualifiers: Echo study was technically difficult. Contrast used: Definity. Signed by: Matt Loya MD on 2/14/2023  5:49 PM          Echo 5/31/18: Left ventricle: The ventricle was mildly dilated. Systolic function was  severely reduced by visual assessment. Ejection fraction was estimated to  be 25 %. There was severe diffuse hypokinesis with distinct regional wall  motion abnormalities. Wall thickness was at the upper limits of normal.     Left atrium: The atrium was moderately dilated. LA volume index was 43  ml/mï¾². Mitral valve: There was normal leaflet separation. There was no evidence  for stenosis. There was mild regurgitation. Pulmonary arteries: Systolic pressure was within the normal range. Most recent HS troponins:  Recent Labs     02/13/23  0246 02/12/23  1246   TROPHS 1,041* 1,134*       ECG: NSR with PVC    Review of Systems:    [x]All other systems reviewed and all negative except as written in HPI    [] Patient unable to provide secondary to condition    Past Medical History:   Diagnosis Date    Calculus of kidney     Cardiomyopathy ([address]     idiopathic    Chronic systolic heart failure [facility])     April 2010 ef 20%, by Dec 2010 up to 50% on meds, cath with normal cors    Diabetes [facility])     Essential hypertension, benign     Hypercholesterolemia      Past Surgical History:   Procedure Laterality Date    HX HYSTERECTOMY      HX ORTHOPAEDIC      HX OTHER SURGICAL  10/2017    difibulator     HX UROLOGICAL      stone removal    AR UNLISTED PROCEDURE CARDIAC SURGERY       Social Hx:  reports that she has never smoked.  She has never used smokeless tobacco. She reports that she does not drink alcohol and does not use drugs. Family Hx: family history includes Breast Cancer (age of onset: 28) in her daughter; Breast Cancer (age of onset: 79) in her sister; Cancer in her brother, maternal grandfather, and another family member; Heart Disease in her brother; Hypertension in her mother; Lung Disease in her father; Stroke in her brother and mother. Allergies   Allergen Reactions    Codeine Hives    Honey Itching and Other (comments)     allergic to therahoney    Pcn [Penicillins] Hives          OBJECTIVE:  Wt Readings from Last 3 Encounters:   02/15/23 55 kg (121 lb 4.1 oz)   12/13/22 55.8 kg (123 lb)   11/21/22 55.9 kg (123 lb 3.2 oz)       Intake/Output Summary (Last 24 hours) at 2/15/2023 1117  Last data filed at 2/14/2023 2200  Gross per 24 hour   Intake 240 ml   Output 400 ml   Net -160 ml       Physical Exam:    Vitals:   Vitals:    02/15/23 0412 02/15/23 0600 02/15/23 0751 02/15/23 0944   BP: (!) 119/59  115/61    Pulse: 71 64 61    Resp: 18  19    Temp: 98 °F (36.7 °C)  98.3 °F (36.8 °C)    SpO2:   92% 92%   Weight: 55 kg (121 lb 4.1 oz)      Height:         Telemetry: NSR     Gen: Well-developed, in no acute distress  Neck: Supple, No JVD,   Resp: No accessory muscle use, lungs clear to auscultation bilaterally. .  Card: Regular Rate,Rythm, Normal S1, S2, grade I/VI systolic murmur LUSB. No rubs or gallop. No thrills. Abd:   Soft, non-tender, non-distended, BS+   MSK: No cyanosis,  RLE dressing c/d/I.    Skin: No rashes    Neuro: Moving all four extremities, follows commands appropriately  Psych: Good insight, oriented to person, place, alert, Nml Affect  LE: No edema    Data Review:     Radiology:   XR Results (most recent):  Results from [place] encounter on 02/12/23    XR CHEST PA LAT    Narrative  INDICATION:  SOB    COMPARISON: April 2010    FINDINGS: PA and lateral views of the chest demonstrate a stable  cardiomediastinal silhouette and chronic cardiomegaly. There is a single lead  left-sided AICD. There are trace bilateral pleural effusions. There is no  pulmonary edema. Impression  Trace bilateral pleural effusions. Recent Labs     02/15/23  0310 02/14/23  0101    140   K 3.9 4.4    108   CO2 25 24   * 99*   CREA 3.63* 3.90*   * 110*   PHOS  --  4.4   CA 8.8 9.0     Recent Labs     02/14/23  0101 02/12/23  1246   WBC 6.3 7.0   HGB 9.9* 9.0*   HCT 32.0* 27.7*    178     Recent Labs     02/12/23  1246        No results for input(s): CHOL, LDLC in the last 72 hours.     No lab exists for component: TGL, HDLC,  HBA1C      Current meds:    Current Facility-Administered Medications:     saline peripheral flush soln 20 mL, 20 mL, InterCATHeter, PRN, Sage Daniels MD, 20 mL at 02/14/23 1017    potassium chloride SR (KLOR-CON 10) tablet 20 mEq, 20 mEq, Oral, DAILY, Rylee Garcia MD, 20 mEq at 02/15/23 0950    aspirin delayed-release tablet 81 mg, 81 mg, Oral, DAILY, Brian Owens MD, 81 mg at 02/15/23 0949    carvediloL (COREG) tablet 12.5 mg, 12.5 mg, Oral, BID WITH MEALS, Brian Owens MD, 12.5 mg at 02/15/23 0950    ferrous sulfate tablet 325 mg, 325 mg, Oral, DAILY WITH BREAKFAST, Brian Owens MD, 325 mg at 02/15/23 0950    atorvastatin (LIPITOR) tablet 10 mg, 10 mg, Oral, QHS, Brian Owens MD, 10 mg at 02/14/23 2109    glucose chewable tablet 16 g, 4 Tablet, Oral, PRN, Brian Owens MD    glucagon (GLUCAGEN) injection 1 mg, 1 mg, IntraMUSCular, PRN, Brian Owens MD    sodium chloride (NS) flush 5-40 mL, 5-40 mL, IntraVENous, Q8H, Brian Owens MD, 10 mL at 02/14/23 2111    sodium chloride (NS) flush 5-40 mL, 5-40 mL, IntraVENous, PRN, Brian Owens MD    acetaminophen (TYLENOL) tablet 650 mg, 650 mg, Oral, Q6H PRN **OR** acetaminophen (TYLENOL) suppository 650 mg, 650 mg, Rectal, Q6H PRN, Brian Owens MD    polyethylene glycol (MIRALAX) packet 17 g, 17 g, Oral, DAILY PRN, Brian Owens MD    ondansetron (ZOFRAN ODT) tablet 4 mg, 4 mg, Oral, Q8H PRN **OR** ondansetron (ZOFRAN) injection 4 mg, 4 mg, IntraVENous, Q6H PRN, Brian Owens MD    dextrose 10 % infusion 0-250 mL, 0-250 mL, IntraVENous, PRN, Brian Owens MD    insulin glargine (LANTUS) injection 10 Units, 10 Units, SubCUTAneous, DAILY, Brian Owens MD, 10 Units at 02/15/23 1014    insulin lispro (HUMALOG) injection, , SubCUTAneous, AC&HS, Brian Owens MD, 2 Units at 02/14/23 1724    heparin (porcine) injection 5,000 Units, 5,000 Units, SubCUTAneous, Q8H, Brian Owens MD, 5,000 Units at 02/12/23 1831    mirtazapine (REMERON) tablet 15 mg, 15 mg, Oral, QHS, Brian Owens MD, 15 mg at 02/14/23 2109    furosemide (LASIX) injection 60 mg, 60 mg, IntraVENous, BID, Brian Owens MD, 60 mg at 02/14/23 Quoc Alexandre NP    [facility]  Call center: U) [phone]  (A) [phone]      Jon Arzola MD

## 2023-02-15 NOTE — NURSE NAVIGATOR
HF NN secured follow up appointment for patient with Jayden Cruz NP ([facility]) on 2/22/23 at 8:20 AM.  Details on AVS.

## 2023-02-15 NOTE — PROGRESS NOTES
OCCUPATIONAL THERAPY TREATMENT/DISCHARGE  Patient: Felicita Phan (67 y.o. female)  Date: 2/15/2023  Diagnosis: CHF (congestive heart failure) ([facility]) [I50.9] <principal problem not specified>      Precautions:    Chart, occupational therapy assessment, plan of care, and goals were reviewed. ASSESSMENT  Patient continues with skilled OT services and has progressed towards goals. Patient SBA for all mobility and mod I standing at sink for grooming, bathing and lower body dressing. Patient mod I for toileting as well. Patient with no further OT needs, will sign off. Current Level of Function (ADLs/self-care): SBA-mod I     Other factors to consider for discharge: none         PLAN :  Rationale for discharge: Goals achieved  Recommend with staff: Recommend with nursing, ADLs with supervision/setup, OOB to chair 3x/day via rolling walker and toileting via functional mobility to and from bathroom. Thank you for completing as able in order to maintain patient strength, endurance and independence. Recommendation for discharge: (in order for the patient to meet his/her long term goals)  No skilled occupational therapy/ follow up rehabilitation needs identified at this time. This discharge recommendation:  Has been made in collaboration with the attending provider and/or case management    IF patient discharges home will need the following DME:patient owns DME required for discharge       SUBJECTIVE:   Patient stated I love to dance, even with my walker.     OBJECTIVE DATA SUMMARY:   Cognitive/Behavioral Status:  Neurologic State: Alert  Orientation Level: Oriented X4  Cognition: Appropriate for age attention/concentration; Follows commands  Perception: Appears intact  Perseveration: No perseveration noted  Safety/Judgement: Awareness of environment; Fall prevention    Functional Mobility and Transfers for ADLs:  Bed Mobility:  Supine to Sit:  (pt received seated EOB)    Transfers:  Sit to Stand: Supervision  Functional Transfers  Toilet Transfer : Supervision    Balance:  Sitting: Intact  Standing: Intact; With support  Standing - Static: Good  Standing - Dynamic : Good    ADL Intervention:  Feeding  Feeding Assistance: Independent  Container Management: Independent  Cutting Food: Independent  Utensil Management: Independent  Food to Mouth: Independent  Drink to Mouth: Independent    Grooming  Position Performed: Standing  Washing Face: Modified independent  Brushing Teeth: Modified independent  Brushing/Combing Hair: Modified independent    Upper Body Bathing  Bathing Assistance: Modified independent  Position Performed: Standing    Lower Body Bathing  Bathing Assistance: Modified independent  Perineal  : Modified independent  Position Performed: Standing  Adaptive Equipment: Walker  Lower Body : Modified independent  Position Performed: Standing  Adaptive Equipment: Walker    Lower Body Dressing Assistance  Protective Undergarmet: Modified independent  Leg Crossed Method Used: No  Position Performed:  (seated on toilet)  Cues: Don;Doff  Adaptive Equipment Used: Walker    Toileting  Toileting Assistance: Independent  Bladder Hygiene: Independent  Bowel Hygiene: Independent  Clothing Management: Independent    Cognitive Retraining  Safety/Judgement: Awareness of environment; Fall prevention    Pain:  None reported    Activity Tolerance:   Good    After treatment patient left in no apparent distress:   Standing in bathroom for grooming with RN present    COMMUNICATION/COLLABORATION:   The patients plan of care was discussed with: Physical therapist and Registered nurse.      Breanna Lombardi OT  Time Calculation: 21 mins

## 2023-02-15 NOTE — PROGRESS NOTES
Bedside shift change report given to [facility] (oncoming nurse) by ALBINO Mcgee (offgoing nurse). Report included the following information SBAR, Kardex, Procedure Summary, and MAR.

## 2023-02-15 NOTE — PROGRESS NOTES
Name: Ramiro Aguilar   MRN: [id number]  : 1950      Assessment:  WINSTON on CKD-4: WINSTON likely Cardio-renal syndrome in the setting of CHF/need for diuresis  CKD-4 (Dr Edwin Gunn)- basln Cr 2.3 to 2.6. due to DN and CRS (CHF)  A on C Syst CHF  Hypokalemia  Anemia- may have component of anemia of CKD  DM-2 with DN/CKD  Azotemia  Elevated Troponin- ?NSTEMI     Patient is exhibiting features of cardiorenal syndrome. She is clinically improved with IV diuretics. Will help to accept higher CR in order for her to breathe better and stay out of CHF. she has been stage IV CKD and I suspect this might be her new baseline and we will help to prepare her for future KRT    Cr slight better-down to 3.6. Phos is nl. Mild high Mg noted. Urine PCR- 500 mg of proteinuria. Lost 5 lbs  Iron stores are low. Plan/Recommendations:  Transition to PO lasix- 80 mg daily  OK for d/c today from [facility]  Ct oral KCl 20 mg daily  Ct Fluid restriction to 1.5 L/day  Ct oral iron 1 tab every day  We will arrange outpatient appointment in office in couple weeks    Subjective:  Oob in chair. Feels better  Off O2.   Ready to go home    ROS:   No nausea, no vomiting  No chest pain, improved shortness of breath    Exam:  Visit Vitals  BP (!) 107/52   Pulse 68   Temp 97.9 °F (36.6 °C)   Resp 17   Ht 5' (1.524 m)   Wt 55 kg (121 lb 4.1 oz)   SpO2 92%   BMI 23.68 kg/m²     NAD  Dec BS, no distress  RRR  No sig edema  AOx3    Current Facility-Administered Medications   Medication Dose Route Frequency Last Admin    saline peripheral flush soln 20 mL  20 mL InterCATHeter PRN 20 mL at 23 1017    potassium chloride SR (KLOR-CON 10) tablet 20 mEq  20 mEq Oral DAILY 20 mEq at 02/15/23 0950    aspirin delayed-release tablet 81 mg  81 mg Oral DAILY 81 mg at 02/15/23 0949    carvediloL (COREG) tablet 12.5 mg  12.5 mg Oral BID WITH MEALS 12.5 mg at 02/15/23 0950    ferrous sulfate tablet 325 mg  325 mg Oral DAILY WITH BREAKFAST 325 mg at 02/15/23 0950    atorvastatin (LIPITOR) tablet 10 mg  10 mg Oral QHS 10 mg at 02/14/23 2109    glucose chewable tablet 16 g  4 Tablet Oral PRN      glucagon (GLUCAGEN) injection 1 mg  1 mg IntraMUSCular PRN      sodium chloride (NS) flush 5-40 mL  5-40 mL IntraVENous Q8H 10 mL at 02/14/23 2111    sodium chloride (NS) flush 5-40 mL  5-40 mL IntraVENous PRN      acetaminophen (TYLENOL) tablet 650 mg  650 mg Oral Q6H PRN      Or    acetaminophen (TYLENOL) suppository 650 mg  650 mg Rectal Q6H PRN      polyethylene glycol (MIRALAX) packet 17 g  17 g Oral DAILY PRN      ondansetron (ZOFRAN ODT) tablet 4 mg  4 mg Oral Q8H PRN      Or    ondansetron (ZOFRAN) injection 4 mg  4 mg IntraVENous Q6H PRN      dextrose 10 % infusion 0-250 mL  0-250 mL IntraVENous PRN      insulin glargine (LANTUS) injection 10 Units  10 Units SubCUTAneous DAILY 10 Units at 02/15/23 1014    insulin lispro (HUMALOG) injection   SubCUTAneous AC&HS 2 Units at 02/14/23 1724    heparin (porcine) injection 5,000 Units  5,000 Units SubCUTAneous Q8H 5,000 Units at 02/12/23 1831    mirtazapine (REMERON) tablet 15 mg  15 mg Oral QHS 15 mg at 02/14/23 2109    furosemide (LASIX) injection 60 mg  60 mg IntraVENous BID 60 mg at 02/14/23 1925       Labs/Data:    Lab Results   Component Value Date/Time    WBC 6.3 02/14/2023 01:01 AM    HGB 9.9 (L) 02/14/2023 01:01 AM    HCT 32.0 (L) 02/14/2023 01:01 AM    PLATELET 012 77/49/2471 01:01 AM    .6 (H) 02/14/2023 01:01 AM       Lab Results   Component Value Date/Time    Sodium 141 02/15/2023 03:10 AM    Potassium 3.9 02/15/2023 03:10 AM    Chloride 105 02/15/2023 03:10 AM    CO2 25 02/15/2023 03:10 AM    Anion gap 11 02/15/2023 03:10 AM    Glucose 107 (H) 02/15/2023 03:10 AM     (H) 02/15/2023 03:10 AM    Creatinine 3.63 (H) 02/15/2023 03:10 AM    BUN/Creatinine ratio 28 (H) 02/15/2023 03:10 AM    GFR est AA 12 (L) 05/13/2022 11:39 AM    GFR est non-AA 10 (L) 05/13/2022 11:39 AM    eGFR 13 (L) 02/15/2023 03:10 AM    eGFR 15 (L) 04/20/2022 11:10 AM    Calcium 8.8 02/15/2023 03:10 AM       Wt Readings from Last 3 Encounters:   02/15/23 55 kg (121 lb 4.1 oz)   12/13/22 55.8 kg (123 lb)   11/21/22 55.9 kg (123 lb 3.2 oz)         Intake/Output Summary (Last 24 hours) at 2/15/2023 1250  Last data filed at 2/14/2023 2200  Gross per 24 hour   Intake 240 ml   Output 400 ml   Net -160 ml         Patient seen and examined. Chart reviewed. Labs, data and other pertinent notes reviewed in last 24 hrs.     PMH/SH/FH reviewed and unchanged compared to H&P    Discussed with pt, Dr Kieran Self MD

## 2023-02-16 ENCOUNTER — PATIENT OUTREACH (OUTPATIENT)
Dept: CASE MANAGEMENT | Age: 73
End: 2023-02-16

## 2023-02-16 NOTE — PROGRESS NOTES
Care Transitions Outreach Attempt    Call within 2 business days of discharge: Yes   Attempted to reach patient for transitions of care follow up. Unable to reach patient. Patient: Charise Boeck Patient : 1950 MRN: [id number]    Last Discharge [address]       Date Complaint Diagnosis Description Type Department Provider    23 Shortness of Breath Acute on chronic congestive heart failure, unspecified heart failure type ([address] . .. ED to Hosp-Admission (Discharged) (ADMIT) Walter Barnett MD; Beronica Rosenberg. .. Was this an external facility discharge?  No     Noted following upcoming appointments from discharge chart review:   [id number] follow up appointment(s):   Future Appointments   Date Time Provider Chava Gabriel   2023  8:20 AM Bell Lozano NP [facility] BS AMB   3/3/2023  9:15 AM Ninoska Currie DPM [facility]   4/10/2023 11:45 AM REMOTE1CHRISTINE BS AMB   4/10/2023  3:00 PM ECHOCHRISTINE AMB   4/10/2023  3:20 PM Sage Daniels MD [facility] BS AMB   2024  2:00 PM PACEMAKER3CHRISTINE BS AMB   2024  2:20 PM MD ARCELIA Nolen BS AMB

## 2023-03-07 ENCOUNTER — DOCUMENTATION ONLY (OUTPATIENT)
Dept: CARDIOLOGY CLINIC | Age: 73
End: 2023-03-07

## 2023-03-07 NOTE — PROGRESS NOTES
Patient did not show for office visit for hospital follow up on 2/22  Please call to see if she would like to be seen next week for hospital follow up, has appt 4/10 with echo but should have CHF med titration prior to that    Future Appointments   Date Time Provider Chava Gabriel   4/10/2023 11:45 AM REMOTE1, CHRISTINE PANIAGUA BS AMB   4/10/2023  3:00 PM ECHO, CHRISTINE BOSE AMB   4/10/2023  3:20 PM MD ARCELIA Jennings BS AMB   1/11/2024  2:00 PM PACEMAKER3, CHRISTINE BOSE AMB   1/11/2024  2:20 PM MD ARCELIA White BS AMB

## 2023-04-10 ENCOUNTER — OFFICE VISIT (OUTPATIENT)
Dept: CARDIOLOGY CLINIC | Age: 73
End: 2023-04-10

## 2023-04-10 DIAGNOSIS — Z95.810 AUTOMATIC IMPLANTABLE CARDIAC DEFIBRILLATOR IN SITU: Primary | ICD-10-CM

## 2023-05-11 DIAGNOSIS — E11.21 TYPE 2 DIABETES MELLITUS WITH DIABETIC NEPHROPATHY (HCC): ICD-10-CM

## 2023-05-11 RX ORDER — GLIPIZIDE 10 MG/1
TABLET, FILM COATED, EXTENDED RELEASE ORAL
Qty: 30 TABLET | Refills: 0 | Status: SHIPPED | OUTPATIENT
Start: 2023-05-11

## 2023-05-18 RX ORDER — POTASSIUM CHLORIDE 1500 MG/1
TABLET, FILM COATED, EXTENDED RELEASE ORAL
Qty: 90 TABLET | Refills: 0 | Status: SHIPPED | OUTPATIENT
Start: 2023-05-18

## 2023-05-18 RX ORDER — FUROSEMIDE 40 MG/1
TABLET ORAL
Qty: 180 TABLET | Refills: 0 | Status: SHIPPED | OUTPATIENT
Start: 2023-05-18

## 2023-06-09 ENCOUNTER — OFFICE VISIT (OUTPATIENT)
Age: 73
End: 2023-06-09
Payer: MEDICARE

## 2023-06-09 VITALS
TEMPERATURE: 96.6 F | OXYGEN SATURATION: 98 % | HEIGHT: 60 IN | HEART RATE: 82 BPM | SYSTOLIC BLOOD PRESSURE: 130 MMHG | RESPIRATION RATE: 16 BRPM | WEIGHT: 124.8 LBS | DIASTOLIC BLOOD PRESSURE: 70 MMHG | BODY MASS INDEX: 24.5 KG/M2

## 2023-06-09 DIAGNOSIS — E11.21 TYPE 2 DIABETES WITH NEPHROPATHY (HCC): Primary | ICD-10-CM

## 2023-06-09 DIAGNOSIS — I50.22 CHRONIC SYSTOLIC HEART FAILURE (HCC): ICD-10-CM

## 2023-06-09 DIAGNOSIS — N18.4 CHRONIC KIDNEY DISEASE, STAGE 4 (SEVERE) (HCC): ICD-10-CM

## 2023-06-09 DIAGNOSIS — F33.0 MAJOR DEPRESSIVE DISORDER, RECURRENT, MILD (HCC): ICD-10-CM

## 2023-06-09 DIAGNOSIS — I10 ESSENTIAL HYPERTENSION, BENIGN: ICD-10-CM

## 2023-06-09 PROCEDURE — 2022F DILAT RTA XM EVC RTNOPTHY: CPT | Performed by: INTERNAL MEDICINE

## 2023-06-09 PROCEDURE — 3074F SYST BP LT 130 MM HG: CPT | Performed by: INTERNAL MEDICINE

## 2023-06-09 PROCEDURE — 3052F HG A1C>EQUAL 8.0%<EQUAL 9.0%: CPT | Performed by: INTERNAL MEDICINE

## 2023-06-09 PROCEDURE — 1090F PRES/ABSN URINE INCON ASSESS: CPT | Performed by: INTERNAL MEDICINE

## 2023-06-09 PROCEDURE — 1123F ACP DISCUSS/DSCN MKR DOCD: CPT | Performed by: INTERNAL MEDICINE

## 2023-06-09 PROCEDURE — 99214 OFFICE O/P EST MOD 30 MIN: CPT | Performed by: INTERNAL MEDICINE

## 2023-06-09 PROCEDURE — 3017F COLORECTAL CA SCREEN DOC REV: CPT | Performed by: INTERNAL MEDICINE

## 2023-06-09 PROCEDURE — G8420 CALC BMI NORM PARAMETERS: HCPCS | Performed by: INTERNAL MEDICINE

## 2023-06-09 PROCEDURE — G8399 PT W/DXA RESULTS DOCUMENT: HCPCS | Performed by: INTERNAL MEDICINE

## 2023-06-09 PROCEDURE — 4004F PT TOBACCO SCREEN RCVD TLK: CPT | Performed by: INTERNAL MEDICINE

## 2023-06-09 PROCEDURE — 3078F DIAST BP <80 MM HG: CPT | Performed by: INTERNAL MEDICINE

## 2023-06-09 PROCEDURE — G8428 CUR MEDS NOT DOCUMENT: HCPCS | Performed by: INTERNAL MEDICINE

## 2023-06-09 ASSESSMENT — PATIENT HEALTH QUESTIONNAIRE - PHQ9
SUM OF ALL RESPONSES TO PHQ QUESTIONS 1-9: 0
5. POOR APPETITE OR OVEREATING: 0
4. FEELING TIRED OR HAVING LITTLE ENERGY: 0
3. TROUBLE FALLING OR STAYING ASLEEP: 0
1. LITTLE INTEREST OR PLEASURE IN DOING THINGS: 0
SUM OF ALL RESPONSES TO PHQ9 QUESTIONS 1 & 2: 0
SUM OF ALL RESPONSES TO PHQ QUESTIONS 1-9: 0
9. THOUGHTS THAT YOU WOULD BE BETTER OFF DEAD, OR OF HURTING YOURSELF: 0
SUM OF ALL RESPONSES TO PHQ QUESTIONS 1-9: 0
7. TROUBLE CONCENTRATING ON THINGS, SUCH AS READING THE NEWSPAPER OR WATCHING TELEVISION: 0
2. FEELING DOWN, DEPRESSED OR HOPELESS: 0
6. FEELING BAD ABOUT YOURSELF - OR THAT YOU ARE A FAILURE OR HAVE LET YOURSELF OR YOUR FAMILY DOWN: 0
SUM OF ALL RESPONSES TO PHQ QUESTIONS 1-9: 0
8. MOVING OR SPEAKING SO SLOWLY THAT OTHER PEOPLE COULD HAVE NOTICED. OR THE OPPOSITE, BEING SO FIGETY OR RESTLESS THAT YOU HAVE BEEN MOVING AROUND A LOT MORE THAN USUAL: 0

## 2023-06-10 LAB
ALBUMIN SERPL-MCNC: 4.1 G/DL (ref 3.5–5)
ALBUMIN/GLOB SERPL: 1.1 (ref 1.1–2.2)
ALP SERPL-CCNC: 102 U/L (ref 45–117)
ALT SERPL-CCNC: 26 U/L (ref 12–78)
ANION GAP SERPL CALC-SCNC: 8 MMOL/L (ref 5–15)
AST SERPL-CCNC: 19 U/L (ref 15–37)
BASOPHILS # BLD: 0 K/UL (ref 0–0.1)
BASOPHILS NFR BLD: 0 % (ref 0–1)
BILIRUB SERPL-MCNC: 0.3 MG/DL (ref 0.2–1)
BUN SERPL-MCNC: 73 MG/DL (ref 6–20)
BUN/CREAT SERPL: 29 (ref 12–20)
CALCIUM SERPL-MCNC: 9.1 MG/DL (ref 8.5–10.1)
CHLORIDE SERPL-SCNC: 106 MMOL/L (ref 97–108)
CO2 SERPL-SCNC: 25 MMOL/L (ref 21–32)
CREAT SERPL-MCNC: 2.56 MG/DL (ref 0.55–1.02)
DIFFERENTIAL METHOD BLD: ABNORMAL
EOSINOPHIL # BLD: 0.2 K/UL (ref 0–0.4)
EOSINOPHIL NFR BLD: 3 % (ref 0–7)
ERYTHROCYTE [DISTWIDTH] IN BLOOD BY AUTOMATED COUNT: 12.6 % (ref 11.5–14.5)
EST. AVERAGE GLUCOSE BLD GHB EST-MCNC: 189 MG/DL
GLOBULIN SER CALC-MCNC: 3.7 G/DL (ref 2–4)
GLUCOSE SERPL-MCNC: 128 MG/DL (ref 65–100)
HBA1C MFR BLD: 8.2 % (ref 4–5.6)
HCT VFR BLD AUTO: 38.1 % (ref 35–47)
HGB BLD-MCNC: 11.7 G/DL (ref 11.5–16)
IMM GRANULOCYTES # BLD AUTO: 0 K/UL (ref 0–0.04)
IMM GRANULOCYTES NFR BLD AUTO: 0 % (ref 0–0.5)
LYMPHOCYTES # BLD: 1.7 K/UL (ref 0.8–3.5)
LYMPHOCYTES NFR BLD: 25 % (ref 12–49)
MCH RBC QN AUTO: 31.3 PG (ref 26–34)
MCHC RBC AUTO-ENTMCNC: 30.7 G/DL (ref 30–36.5)
MCV RBC AUTO: 101.9 FL (ref 80–99)
MONOCYTES # BLD: 0.6 K/UL (ref 0–1)
MONOCYTES NFR BLD: 9 % (ref 5–13)
NEUTS SEG # BLD: 4.1 K/UL (ref 1.8–8)
NEUTS SEG NFR BLD: 63 % (ref 32–75)
NRBC # BLD: 0 K/UL (ref 0–0.01)
NRBC BLD-RTO: 0 PER 100 WBC
PLATELET # BLD AUTO: 185 K/UL (ref 150–400)
PMV BLD AUTO: 12 FL (ref 8.9–12.9)
POTASSIUM SERPL-SCNC: 4.4 MMOL/L (ref 3.5–5.1)
PROT SERPL-MCNC: 7.8 G/DL (ref 6.4–8.2)
RBC # BLD AUTO: 3.74 M/UL (ref 3.8–5.2)
SODIUM SERPL-SCNC: 139 MMOL/L (ref 136–145)
WBC # BLD AUTO: 6.7 K/UL (ref 3.6–11)

## 2023-06-19 ASSESSMENT — ENCOUNTER SYMPTOMS: RESPIRATORY NEGATIVE: 1

## 2023-07-20 DIAGNOSIS — E78.1 PURE HYPERGLYCERIDEMIA: ICD-10-CM

## 2023-07-21 RX ORDER — POTASSIUM CHLORIDE 1500 MG/1
TABLET, EXTENDED RELEASE ORAL
Qty: 90 TABLET | Refills: 0 | OUTPATIENT
Start: 2023-07-21

## 2023-07-21 RX ORDER — SIMVASTATIN 20 MG
TABLET ORAL
Qty: 90 TABLET | Refills: 1 | Status: SHIPPED | OUTPATIENT
Start: 2023-07-21

## 2023-07-21 RX ORDER — FUROSEMIDE 40 MG/1
TABLET ORAL
Qty: 180 TABLET | Refills: 0 | OUTPATIENT
Start: 2023-07-21

## 2023-08-01 ENCOUNTER — TELEPHONE (OUTPATIENT)
Dept: PHARMACY | Facility: CLINIC | Age: 73
End: 2023-08-01

## 2023-08-01 NOTE — TELEPHONE ENCOUNTER
[facility] HEALTH CLINICAL PHARMACY: ADHERENCE REVIEW  Identified care gap per [place] citizen [place] Archipela) [facility] Diabetes adherence    Last Visit: 6/9/23  Next Visit: BRISEIDA CARDIO- 8/30/23    Patient also appears to be prescribed:STATIN  Medicare [facility]  Per insurer report, LIS-0 - co-pays are based on tiers and patient is subject to coverage gap. ASSESSMENT  DIABETES ADHERENCE    Insurance Records claims through 8/1/23  (Prior Year [address] = not reported; YTD [address] [place] [address] Street = 73%; Potential Fail Date: 8/17/2023:   Glipizide ER 10 mg  last filled on 5/11/2023 for 30 day supply. Next refill due: 6/12/2023 -PAST DUE 50 DAYS   Per [facility]:    3 refills remaining. Filled 6/26/23 3 refills. Was picked [facility]- verified was billed through [id number] as well- does not show in portal or rec dispense  Is due 7/26/2023. [facility] will [facility] now. Hemoglobin A1C   Date Value Ref Range Status   06/09/2023 8.2 (H) 4.0 - 5.6 % Final   02/12/2023 7.4 (H) 4.0 - 5.6 % Final   05/13/2022 5.5 4.0 - 5.6 % Final      NOTE: A1c <9%  STATIN ADHERENCE    Insurance Records claims through 8/1/2023 (Prior Year PDC = not reported; YTD [address] = 96%; Potential Fail Date: 12/22/2023): Simvastatin last filled on 7/21/2023 for 90 day supply. Next refill due: 10/19/2023  Per [facility]:    1 refills remaining.     Lab Results   Component Value Date    CHOL 182 05/13/2022    TRIG 266 (H) 05/13/2022    HDL 27 05/13/2022    LDLCALC 101.8 (H) 05/13/2022    LDLDIRECT 58 04/23/2021    VLDL 87 (H) 09/22/2020    LABVLDL 53.2 05/13/2022     ALT   Date Value Ref Range Status   06/09/2023 26 12 - 78 U/L Final     AST   Date Value Ref Range Status   06/09/2023 19 15 - 37 U/L Final     The 10-year ASCVD risk score (Zion ROSA, et al., 2019) is: 32.2%    Values used to calculate the score:      Age: 68 years      Sex: Female      Is Non- : No      Diabetic: Yes      Tobacco smoker: No      Systolic Blood Pressure: 621 mmHg      Is BP treated: Yes      HDL

## 2023-08-14 ENCOUNTER — TELEPHONE (OUTPATIENT)
Age: 73
End: 2023-08-14

## 2023-08-14 RX ORDER — MIRTAZAPINE 15 MG/1
15 TABLET, FILM COATED ORAL NIGHTLY
Qty: 30 TABLET | Refills: 0 | Status: SHIPPED | OUTPATIENT
Start: 2023-08-14

## 2023-08-14 NOTE — TELEPHONE ENCOUNTER
Medication Refill Request    Shemar Khanna is requesting a refill of the following medication(s):   mirtazapine (REMERON) 15 MG tablet                Please send refill to:     [facility]/pharmacy #[id number]- [facility] [address] [facility] [phone] [address] [place] [address]  Phone: [phone] Fax: [phone]

## 2023-08-17 ENCOUNTER — TELEPHONE (OUTPATIENT)
Age: 73
End: 2023-08-17

## 2023-08-17 DIAGNOSIS — E11.21 TYPE 2 DIABETES MELLITUS WITH DIABETIC NEPHROPATHY (HCC): ICD-10-CM

## 2023-08-17 RX ORDER — FUROSEMIDE 40 MG/1
TABLET ORAL
Qty: 180 TABLET | Refills: 0 | Status: SHIPPED | OUTPATIENT
Start: 2023-08-17

## 2023-08-17 RX ORDER — POTASSIUM CHLORIDE 1500 MG/1
TABLET, EXTENDED RELEASE ORAL
Qty: 90 TABLET | Refills: 0 | Status: SHIPPED | OUTPATIENT
Start: 2023-08-17

## 2023-08-17 RX ORDER — GLIPIZIDE 10 MG/1
TABLET, FILM COATED, EXTENDED RELEASE ORAL
Qty: 90 TABLET | Refills: 1 | Status: SHIPPED | OUTPATIENT
Start: 2023-08-17

## 2023-08-17 NOTE — TELEPHONE ENCOUNTER
Medication Refill Request    Davide Pacheco is requesting a refill of the following medication(s):   mirtazapine (REMERON) 15 MG tablet                    Please send refill to:     [facility]/pharmacy #[id number]- [place] [id number] [facility] [phone] [address] [place] [address]  Phone: [phone] Fax: [phone]

## 2023-08-25 ENCOUNTER — TELEPHONE (OUTPATIENT)
Age: 73
End: 2023-08-25

## 2023-08-25 NOTE — TELEPHONE ENCOUNTER
Reason for call:  TC from Jef Baez. Media down. Unable to look at [facility] Release. Ms. Nupur Mancuso states she called to make her Mother a Neuro appointment with Dr. Prashanth Gonzalez and was told that they needed a referral, plus the office notes that discuss pt needing Neuro help faxed to Dr. Lorna Neville attn at fax number [phone]. PSR called Dr. Lorna Neville office to confirm information needed because Ms. Nupur Mancuso requested if PSR would call. Nurse at Dr. Lorna Neville office confirmed information needed and added that they also need Demographic sheet for pt.      Is this a new problem: Yes    Date of last appointment:  6/9/2023     Can we respond via R[employer] Fitt: No    Best call back number: Jef Baez at [phone]

## 2023-08-29 ENCOUNTER — OFFICE VISIT (OUTPATIENT)
Age: 73
End: 2023-08-29
Payer: MEDICARE

## 2023-08-29 ENCOUNTER — TELEPHONE (OUTPATIENT)
Age: 73
End: 2023-08-29

## 2023-08-29 VITALS
OXYGEN SATURATION: 99 % | SYSTOLIC BLOOD PRESSURE: 121 MMHG | WEIGHT: 129.4 LBS | RESPIRATION RATE: 18 BRPM | HEIGHT: 60 IN | BODY MASS INDEX: 25.4 KG/M2 | DIASTOLIC BLOOD PRESSURE: 50 MMHG | TEMPERATURE: 97.4 F | HEART RATE: 79 BPM

## 2023-08-29 DIAGNOSIS — E11.21 TYPE 2 DIABETES MELLITUS WITH DIABETIC NEPHROPATHY, WITHOUT LONG-TERM CURRENT USE OF INSULIN (HCC): Primary | ICD-10-CM

## 2023-08-29 DIAGNOSIS — D63.1 ANEMIA DUE TO STAGE 3B CHRONIC KIDNEY DISEASE (HCC): ICD-10-CM

## 2023-08-29 DIAGNOSIS — N18.32 ANEMIA DUE TO STAGE 3B CHRONIC KIDNEY DISEASE (HCC): ICD-10-CM

## 2023-08-29 PROCEDURE — 1123F ACP DISCUSS/DSCN MKR DOCD: CPT | Performed by: INTERNAL MEDICINE

## 2023-08-29 PROCEDURE — 3052F HG A1C>EQUAL 8.0%<EQUAL 9.0%: CPT | Performed by: INTERNAL MEDICINE

## 2023-08-29 PROCEDURE — 2022F DILAT RTA XM EVC RTNOPTHY: CPT | Performed by: INTERNAL MEDICINE

## 2023-08-29 PROCEDURE — 99214 OFFICE O/P EST MOD 30 MIN: CPT | Performed by: INTERNAL MEDICINE

## 2023-08-29 PROCEDURE — 3078F DIAST BP <80 MM HG: CPT | Performed by: INTERNAL MEDICINE

## 2023-08-29 PROCEDURE — 1090F PRES/ABSN URINE INCON ASSESS: CPT | Performed by: INTERNAL MEDICINE

## 2023-08-29 PROCEDURE — G8428 CUR MEDS NOT DOCUMENT: HCPCS | Performed by: INTERNAL MEDICINE

## 2023-08-29 PROCEDURE — G8419 CALC BMI OUT NRM PARAM NOF/U: HCPCS | Performed by: INTERNAL MEDICINE

## 2023-08-29 PROCEDURE — 3074F SYST BP LT 130 MM HG: CPT | Performed by: INTERNAL MEDICINE

## 2023-08-29 PROCEDURE — 3017F COLORECTAL CA SCREEN DOC REV: CPT | Performed by: INTERNAL MEDICINE

## 2023-08-29 PROCEDURE — G8399 PT W/DXA RESULTS DOCUMENT: HCPCS | Performed by: INTERNAL MEDICINE

## 2023-08-29 PROCEDURE — 4004F PT TOBACCO SCREEN RCVD TLK: CPT | Performed by: INTERNAL MEDICINE

## 2023-08-29 ASSESSMENT — PATIENT HEALTH QUESTIONNAIRE - PHQ9
SUM OF ALL RESPONSES TO PHQ QUESTIONS 1-9: 0
1. LITTLE INTEREST OR PLEASURE IN DOING THINGS: 0
2. FEELING DOWN, DEPRESSED OR HOPELESS: 0
SUM OF ALL RESPONSES TO PHQ QUESTIONS 1-9: 0
SUM OF ALL RESPONSES TO PHQ9 QUESTIONS 1 & 2: 0

## 2023-08-29 NOTE — TELEPHONE ENCOUNTER
Reason for call:  TC from [facility] calling as a FYI for Dr. Chanda Hunt. Yvonne states pt has an appt with Dr. Chanda Hunt today and she faxed over pt's latest labs this morning with some additional notes. Yvonne states she spoke to pt's daughter and was told that pt is not taking iron tablets as prescribed and Dr. Chanda Hunt will see that on lab results. Yvonne stated if there is anything else that Dr. Chanda Hunt needs from [facility] to please give them a call.      Is this a new problem: No    Date of last appointment:  6/9/2023     Can we respond via [email or web address]: No    Best call back number: [facility]./[phone]

## 2023-08-30 ENCOUNTER — PROCEDURE VISIT (OUTPATIENT)
Age: 73
End: 2023-08-30

## 2023-08-30 ENCOUNTER — ANCILLARY PROCEDURE (OUTPATIENT)
Age: 73
End: 2023-08-30
Payer: MEDICARE

## 2023-08-30 ENCOUNTER — OFFICE VISIT (OUTPATIENT)
Age: 73
End: 2023-08-30
Payer: MEDICARE

## 2023-08-30 VITALS
SYSTOLIC BLOOD PRESSURE: 110 MMHG | WEIGHT: 125 LBS | DIASTOLIC BLOOD PRESSURE: 60 MMHG | OXYGEN SATURATION: 95 % | HEIGHT: 60 IN | RESPIRATION RATE: 16 BRPM | HEART RATE: 77 BPM | BODY MASS INDEX: 24.54 KG/M2

## 2023-08-30 VITALS
BODY MASS INDEX: 24.54 KG/M2 | SYSTOLIC BLOOD PRESSURE: 121 MMHG | HEIGHT: 60 IN | WEIGHT: 125 LBS | DIASTOLIC BLOOD PRESSURE: 50 MMHG

## 2023-08-30 DIAGNOSIS — I10 ESSENTIAL HYPERTENSION, BENIGN: ICD-10-CM

## 2023-08-30 DIAGNOSIS — I50.22 CHRONIC SYSTOLIC (CONGESTIVE) HEART FAILURE (HCC): ICD-10-CM

## 2023-08-30 DIAGNOSIS — I50.22 CHRONIC SYSTOLIC (CONGESTIVE) HEART FAILURE (HCC): Primary | ICD-10-CM

## 2023-08-30 DIAGNOSIS — Z95.810 PRESENCE OF AUTOMATIC (IMPLANTABLE) CARDIAC DEFIBRILLATOR: ICD-10-CM

## 2023-08-30 DIAGNOSIS — I13.10 CARDIORENAL SYNDROME WITH RENAL FAILURE, STAGE 1-4 OR UNSPECIFIED CHRONIC KIDNEY DISEASE, WITHOUT HEART FAILURE: ICD-10-CM

## 2023-08-30 DIAGNOSIS — Z01.810 INPLANTABLE CARDIOVERTER-DEFIBRILLATOR IN PLACE, PRE-OPERATIVE CARDIOVASCULAR EXAMINATION: Primary | ICD-10-CM

## 2023-08-30 DIAGNOSIS — E78.1 HYPERTRIGLYCERIDEMIA: ICD-10-CM

## 2023-08-30 DIAGNOSIS — I10 ESSENTIAL (PRIMARY) HYPERTENSION: ICD-10-CM

## 2023-08-30 DIAGNOSIS — Z95.810 INPLANTABLE CARDIOVERTER-DEFIBRILLATOR IN PLACE, PRE-OPERATIVE CARDIOVASCULAR EXAMINATION: Primary | ICD-10-CM

## 2023-08-30 PROBLEM — I50.9 CHF (CONGESTIVE HEART FAILURE) (HCC): Status: RESOLVED | Noted: 2023-02-12 | Resolved: 2023-08-30

## 2023-08-30 LAB
ECHO AO ROOT DIAM: 3.1 CM
ECHO AO ROOT INDEX: 2.03 CM/M2
ECHO BSA: 1.55 M2
ECHO LA DIAMETER INDEX: 3.14 CM/M2
ECHO LA DIAMETER: 4.8 CM
ECHO LA TO AORTIC ROOT RATIO: 1.55
ECHO LA VOL 2C: 70 ML (ref 22–52)
ECHO LA VOL 4C: 53 ML (ref 22–52)
ECHO LA VOL BP: 62 ML (ref 22–52)
ECHO LA VOL/BSA BIPLANE: 41 ML/M2 (ref 16–34)
ECHO LA VOLUME AREA LENGTH: 62 ML
ECHO LA VOLUME INDEX A2C: 46 ML/M2 (ref 16–34)
ECHO LA VOLUME INDEX A4C: 35 ML/M2 (ref 16–34)
ECHO LA VOLUME INDEX AREA LENGTH: 41 ML/M2 (ref 16–34)
ECHO LV EJECTION FRACTION A2C: 25 %
ECHO LV EJECTION FRACTION A4C: 24 %
ECHO LV FRACTIONAL SHORTENING: 6 % (ref 28–44)
ECHO LV INTERNAL DIMENSION DIASTOLE INDEX: 4.05 CM/M2
ECHO LV INTERNAL DIMENSION DIASTOLIC: 6.2 CM (ref 3.9–5.3)
ECHO LV INTERNAL DIMENSION SYSTOLIC INDEX: 3.79 CM/M2
ECHO LV INTERNAL DIMENSION SYSTOLIC: 5.8 CM
ECHO LV IVSD: 0.9 CM (ref 0.6–0.9)
ECHO LV MASS 2D: 197.1 G (ref 67–162)
ECHO LV MASS INDEX 2D: 128.8 G/M2 (ref 43–95)
ECHO LV POSTERIOR WALL DIASTOLIC: 0.7 CM (ref 0.6–0.9)
ECHO LV RELATIVE WALL THICKNESS RATIO: 0.23

## 2023-08-30 PROCEDURE — 3074F SYST BP LT 130 MM HG: CPT | Performed by: SPECIALIST

## 2023-08-30 PROCEDURE — 99214 OFFICE O/P EST MOD 30 MIN: CPT | Performed by: SPECIALIST

## 2023-08-30 PROCEDURE — G8399 PT W/DXA RESULTS DOCUMENT: HCPCS | Performed by: SPECIALIST

## 2023-08-30 PROCEDURE — G8427 DOCREV CUR MEDS BY ELIG CLIN: HCPCS | Performed by: SPECIALIST

## 2023-08-30 PROCEDURE — G8420 CALC BMI NORM PARAMETERS: HCPCS | Performed by: SPECIALIST

## 2023-08-30 PROCEDURE — 1090F PRES/ABSN URINE INCON ASSESS: CPT | Performed by: SPECIALIST

## 2023-08-30 PROCEDURE — 93308 TTE F-UP OR LMTD: CPT

## 2023-08-30 PROCEDURE — 3017F COLORECTAL CA SCREEN DOC REV: CPT | Performed by: SPECIALIST

## 2023-08-30 PROCEDURE — 93308 TTE F-UP OR LMTD: CPT | Performed by: SPECIALIST

## 2023-08-30 PROCEDURE — 4004F PT TOBACCO SCREEN RCVD TLK: CPT | Performed by: SPECIALIST

## 2023-08-30 PROCEDURE — 3078F DIAST BP <80 MM HG: CPT | Performed by: SPECIALIST

## 2023-08-30 PROCEDURE — 1123F ACP DISCUSS/DSCN MKR DOCD: CPT | Performed by: SPECIALIST

## 2023-08-31 ENCOUNTER — HOSPITAL ENCOUNTER (INPATIENT)
Facility: HOSPITAL | Age: 73
LOS: 3 days | Discharge: HOME | DRG: 812 | End: 2023-09-03
Attending: EMERGENCY MEDICINE | Admitting: HOSPITALIST
Payer: MEDICARE

## 2023-08-31 DIAGNOSIS — D64.9 ANEMIA, UNSPECIFIED TYPE: Primary | ICD-10-CM

## 2023-08-31 PROBLEM — R06.00 DYSPNEA: Status: ACTIVE | Noted: 2023-08-31

## 2023-08-31 LAB
ABO + RH BLD: NORMAL
ALBUMIN SERPL-MCNC: 3.1 G/DL (ref 3.5–5)
ALBUMIN/GLOB SERPL: 0.7 (ref 1.1–2.2)
ALP SERPL-CCNC: 95 U/L (ref 45–117)
ALT SERPL-CCNC: 14 U/L (ref 12–78)
ANION GAP SERPL CALC-SCNC: 8 MMOL/L (ref 5–15)
AST SERPL-CCNC: 6 U/L (ref 15–37)
BASOPHILS # BLD: 0 K/UL (ref 0–0.1)
BASOPHILS NFR BLD: 0 % (ref 0–1)
BILIRUB SERPL-MCNC: 0.6 MG/DL (ref 0.2–1)
BLOOD GROUP ANTIBODIES SERPL: NORMAL
BUN SERPL-MCNC: 72 MG/DL (ref 6–20)
BUN/CREAT SERPL: 25 (ref 12–20)
CALCIUM SERPL-MCNC: 8.8 MG/DL (ref 8.5–10.1)
CHLORIDE SERPL-SCNC: 102 MMOL/L (ref 97–108)
CO2 SERPL-SCNC: 25 MMOL/L (ref 21–32)
COMMENT:: NORMAL
CREAT SERPL-MCNC: 2.89 MG/DL (ref 0.55–1.02)
DIFFERENTIAL METHOD BLD: ABNORMAL
EOSINOPHIL # BLD: 0.2 K/UL (ref 0–0.4)
EOSINOPHIL NFR BLD: 2 % (ref 0–7)
ERYTHROCYTE [DISTWIDTH] IN BLOOD BY AUTOMATED COUNT: 14.2 % (ref 11.5–14.5)
FERRITIN SERPL-MCNC: 196 NG/ML (ref 26–388)
GLOBULIN SER CALC-MCNC: 4.7 G/DL (ref 2–4)
GLUCOSE BLD STRIP.AUTO-MCNC: 214 MG/DL (ref 65–117)
GLUCOSE BLD STRIP.AUTO-MCNC: 255 MG/DL (ref 65–117)
GLUCOSE SERPL-MCNC: 345 MG/DL (ref 65–100)
HCT VFR BLD AUTO: 29.2 % (ref 35–47)
HGB BLD-MCNC: 8.9 G/DL (ref 11.5–16)
IMM GRANULOCYTES # BLD AUTO: 0 K/UL (ref 0–0.04)
IMM GRANULOCYTES NFR BLD AUTO: 1 % (ref 0–0.5)
IRON SATN MFR SERPL: 13 % (ref 20–50)
IRON SERPL-MCNC: 32 UG/DL (ref 35–150)
LYMPHOCYTES # BLD: 0.9 K/UL (ref 0.8–3.5)
LYMPHOCYTES NFR BLD: 12 % (ref 12–49)
MCH RBC QN AUTO: 29.1 PG (ref 26–34)
MCHC RBC AUTO-ENTMCNC: 30.5 G/DL (ref 30–36.5)
MCV RBC AUTO: 95.4 FL (ref 80–99)
MONOCYTES # BLD: 0.7 K/UL (ref 0–1)
MONOCYTES NFR BLD: 9 % (ref 5–13)
NEUTS SEG # BLD: 5.6 K/UL (ref 1.8–8)
NEUTS SEG NFR BLD: 76 % (ref 32–75)
NRBC # BLD: 0 K/UL (ref 0–0.01)
NRBC BLD-RTO: 0 PER 100 WBC
PLATELET # BLD AUTO: 276 K/UL (ref 150–400)
PMV BLD AUTO: 10.5 FL (ref 8.9–12.9)
POTASSIUM SERPL-SCNC: 4.5 MMOL/L (ref 3.5–5.1)
PROT SERPL-MCNC: 7.8 G/DL (ref 6.4–8.2)
RBC # BLD AUTO: 3.06 M/UL (ref 3.8–5.2)
SERVICE CMNT-IMP: ABNORMAL
SERVICE CMNT-IMP: ABNORMAL
SODIUM SERPL-SCNC: 135 MMOL/L (ref 136–145)
SPECIMEN EXP DATE BLD: NORMAL
SPECIMEN HOLD: NORMAL
TIBC SERPL-MCNC: 247 UG/DL (ref 250–450)
WBC # BLD AUTO: 7.5 K/UL (ref 3.6–11)

## 2023-08-31 PROCEDURE — 6370000000 HC RX 637 (ALT 250 FOR IP): Performed by: HOSPITALIST

## 2023-08-31 PROCEDURE — 82962 GLUCOSE BLOOD TEST: CPT

## 2023-08-31 PROCEDURE — 36415 COLL VENOUS BLD VENIPUNCTURE: CPT

## 2023-08-31 PROCEDURE — 83550 IRON BINDING TEST: CPT

## 2023-08-31 PROCEDURE — 83540 ASSAY OF IRON: CPT

## 2023-08-31 PROCEDURE — 82728 ASSAY OF FERRITIN: CPT

## 2023-08-31 PROCEDURE — 86900 BLOOD TYPING SEROLOGIC ABO: CPT

## 2023-08-31 PROCEDURE — 80053 COMPREHEN METABOLIC PANEL: CPT

## 2023-08-31 PROCEDURE — 86850 RBC ANTIBODY SCREEN: CPT

## 2023-08-31 PROCEDURE — 99285 EMERGENCY DEPT VISIT HI MDM: CPT

## 2023-08-31 PROCEDURE — 85025 COMPLETE CBC W/AUTO DIFF WBC: CPT

## 2023-08-31 PROCEDURE — 2060000000 HC ICU INTERMEDIATE R&B

## 2023-08-31 PROCEDURE — 2580000003 HC RX 258: Performed by: HOSPITALIST

## 2023-08-31 PROCEDURE — 86901 BLOOD TYPING SEROLOGIC RH(D): CPT

## 2023-08-31 RX ORDER — FUROSEMIDE 40 MG/1
80 TABLET ORAL DAILY
Status: DISCONTINUED | OUTPATIENT
Start: 2023-09-01 | End: 2023-09-03 | Stop reason: HOSPADM

## 2023-08-31 RX ORDER — ONDANSETRON 2 MG/ML
4 INJECTION INTRAMUSCULAR; INTRAVENOUS EVERY 6 HOURS PRN
Status: DISCONTINUED | OUTPATIENT
Start: 2023-08-31 | End: 2023-09-03 | Stop reason: HOSPADM

## 2023-08-31 RX ORDER — CARVEDILOL 12.5 MG/1
12.5 TABLET ORAL 2 TIMES DAILY WITH MEALS
Status: DISCONTINUED | OUTPATIENT
Start: 2023-08-31 | End: 2023-09-03 | Stop reason: HOSPADM

## 2023-08-31 RX ORDER — ACETAMINOPHEN 325 MG/1
650 TABLET ORAL EVERY 6 HOURS PRN
Status: DISCONTINUED | OUTPATIENT
Start: 2023-08-31 | End: 2023-09-03 | Stop reason: HOSPADM

## 2023-08-31 RX ORDER — SODIUM CHLORIDE 0.9 % (FLUSH) 0.9 %
5-40 SYRINGE (ML) INJECTION PRN
Status: DISCONTINUED | OUTPATIENT
Start: 2023-08-31 | End: 2023-09-03 | Stop reason: HOSPADM

## 2023-08-31 RX ORDER — INSULIN GLARGINE 100 [IU]/ML
10 INJECTION, SOLUTION SUBCUTANEOUS NIGHTLY
Status: DISCONTINUED | OUTPATIENT
Start: 2023-08-31 | End: 2023-09-01

## 2023-08-31 RX ORDER — INSULIN LISPRO 100 [IU]/ML
0-4 INJECTION, SOLUTION INTRAVENOUS; SUBCUTANEOUS
Status: DISCONTINUED | OUTPATIENT
Start: 2023-08-31 | End: 2023-09-01

## 2023-08-31 RX ORDER — DEXTROSE MONOHYDRATE 100 MG/ML
INJECTION, SOLUTION INTRAVENOUS CONTINUOUS PRN
Status: DISCONTINUED | OUTPATIENT
Start: 2023-08-31 | End: 2023-09-03 | Stop reason: HOSPADM

## 2023-08-31 RX ORDER — ONDANSETRON 4 MG/1
4 TABLET, ORALLY DISINTEGRATING ORAL EVERY 8 HOURS PRN
Status: DISCONTINUED | OUTPATIENT
Start: 2023-08-31 | End: 2023-09-03 | Stop reason: HOSPADM

## 2023-08-31 RX ORDER — INSULIN LISPRO 100 [IU]/ML
0-4 INJECTION, SOLUTION INTRAVENOUS; SUBCUTANEOUS NIGHTLY
Status: DISCONTINUED | OUTPATIENT
Start: 2023-08-31 | End: 2023-09-01

## 2023-08-31 RX ORDER — POLYETHYLENE GLYCOL 3350 17 G/17G
17 POWDER, FOR SOLUTION ORAL DAILY PRN
Status: DISCONTINUED | OUTPATIENT
Start: 2023-08-31 | End: 2023-09-03 | Stop reason: HOSPADM

## 2023-08-31 RX ORDER — ACETAMINOPHEN 650 MG/1
650 SUPPOSITORY RECTAL EVERY 6 HOURS PRN
Status: DISCONTINUED | OUTPATIENT
Start: 2023-08-31 | End: 2023-09-03 | Stop reason: HOSPADM

## 2023-08-31 RX ORDER — ATORVASTATIN CALCIUM 10 MG/1
10 TABLET, FILM COATED ORAL DAILY
Status: DISCONTINUED | OUTPATIENT
Start: 2023-08-31 | End: 2023-09-01

## 2023-08-31 RX ORDER — SODIUM CHLORIDE 9 MG/ML
INJECTION, SOLUTION INTRAVENOUS PRN
Status: DISCONTINUED | OUTPATIENT
Start: 2023-08-31 | End: 2023-09-03 | Stop reason: HOSPADM

## 2023-08-31 RX ORDER — MIRTAZAPINE 15 MG/1
15 TABLET, FILM COATED ORAL NIGHTLY
Status: DISCONTINUED | OUTPATIENT
Start: 2023-08-31 | End: 2023-09-03 | Stop reason: HOSPADM

## 2023-08-31 RX ORDER — FERROUS SULFATE 325(65) MG
325 TABLET ORAL
Status: DISCONTINUED | OUTPATIENT
Start: 2023-09-01 | End: 2023-09-03 | Stop reason: HOSPADM

## 2023-08-31 RX ORDER — SODIUM CHLORIDE 0.9 % (FLUSH) 0.9 %
5-40 SYRINGE (ML) INJECTION EVERY 12 HOURS SCHEDULED
Status: DISCONTINUED | OUTPATIENT
Start: 2023-08-31 | End: 2023-09-03 | Stop reason: HOSPADM

## 2023-08-31 RX ADMIN — Medication 1 UNITS: at 18:00

## 2023-08-31 RX ADMIN — CARVEDILOL 12.5 MG: 12.5 TABLET, FILM COATED ORAL at 20:52

## 2023-08-31 RX ADMIN — SODIUM CHLORIDE, PRESERVATIVE FREE 10 ML: 5 INJECTION INTRAVENOUS at 20:55

## 2023-08-31 RX ADMIN — MIRTAZAPINE 15 MG: 15 TABLET, FILM COATED ORAL at 20:53

## 2023-08-31 RX ADMIN — ATORVASTATIN CALCIUM 10 MG: 10 TABLET, FILM COATED ORAL at 20:52

## 2023-08-31 ASSESSMENT — PAIN - FUNCTIONAL ASSESSMENT: PAIN_FUNCTIONAL_ASSESSMENT: NONE - DENIES PAIN

## 2023-08-31 NOTE — H&P
History and Physical    Date of Service:  8/31/2023  Primary Care Provider: Verito Peng MD  Source of information: The patient and Chart review    Chief Complaint: Anemia      History of Presenting Illness: Alina Berger is a 68 y.o. female who presented with dyspnea 1-1/2 weeks. Her history significant for ischemic cardiomyopathy status post AICD, CKD due to cardiorenal syndrome, chronic anemia. Patient went to the nephrologist today who referred her to come to the emergency room for evaluation of anemia. Her hemoglobin has trended from 11.7 in June down to 8.9. Patient denies hemoptysis or hematemesis, rectal bleeding, melena. She denies chest pressure/pain. She was just seen by her cardiologist yesterday, noted persistent SOB but patient appeared euvolemic advised patient to continue current medical therapy, patient not on ARNI, Aldactone or SGLT2 due to CKD. REVIEW OF SYSTEMS:  A comprehensive review of systems was negative except for that written in the History of Present Illness. No past medical history on file. No past surgical history on file. Prior to Admission medications    Medication Sig Start Date End Date Taking?  Authorizing Provider   glipiZIDE (GLUCOTROL XL) 10 MG extended release tablet TAKE 1 TABLET BY MOUTH EVERY DAY 8/17/23   DOUGIE Askew NP   potassium chloride (KLOR-CON M) 20 MEQ TBCR extended release tablet TAKE 1 TABLET BY MOUTH EVERY DAY 8/17/23   DOUGIE Askew NP   furosemide (LASIX) 40 MG tablet TAKE 2 TABLETS BY MOUTH EVERY DAY 8/17/23   Gali Ervin MD   mirtazapine (REMERON) 15 MG tablet Take 1 tablet by mouth nightly 8/14/23   Gali Ervin MD   simvastatin (ZOCOR) 20 MG tablet TAKE 1 TABLET BY MOUTH NIGHTLY 7/21/23   Gali Ervin MD   aspirin 81 MG EC tablet Take 1 tablet by mouth daily    Ar Automatic Reconciliation   carvedilol (COREG) 12.5 MG tablet TAKE 1 TABLET BY MOUTH TWICE A DAY WITH MEALS

## 2023-08-31 NOTE — ED NOTES
11:54 AM  I have evaluated the patient as the Provider in Rapid Medical Evaluation (RME). I have reviewed her vital signs and the triage nurse assessment. I have talked with the patient and any available family and advised that I am the provider in triage and have ordered the appropriate study to initiate their work up based on the clinical presentation during my assessment. I have advised that the patient will be accommodated in the Main ED as soon as possible. I have also requested to contact the triage nurse or myself immediately if the patient experiences any changes in their condition during this brief waiting period. 80-year-old female with past medical history of CKD presents as a referral from her kidney doctor who is worried she is anemic and may need a blood transfusion. No symptoms at this time.     BRANDON Jenkins PA-C  08/31/23 1154

## 2023-08-31 NOTE — ED PROVIDER NOTES
No  Isolation Requirements: no  Recommended Level of Care: med/surg  Department: [address] Adult ED - [phone]  51-year-old with diabetes, hyperlipidemia, chronic renal insufficiency. She presents after being referred by nephrology for anemia. Her hemoglobin has dropped 3 g in 2.5 mo. DISPOSITION/PLAN   DISPOSITION        PATIENT REFERRED TO:  No follow-up provider specified.     DISCHARGE MEDICATIONS:  New Prescriptions    No medications on file         (Please note that portions of this note were completed with a voice recognition program.  Efforts were made to edit the dictations but occasionally words are mis-transcribed.)    Tanvi Treviño MD (electronically signed)  Emergency Attending Physician / Physician Assistant / Nurse Practitioner             Roosvelt Bernheim, MD  08/31/23 [address] [place] [address] Corinne Dozier MD  08/31/23 8316

## 2023-09-01 PROBLEM — D64.9 ANEMIA: Status: ACTIVE | Noted: 2023-09-01

## 2023-09-01 PROBLEM — Z95.810 ICD (IMPLANTABLE CARDIOVERTER-DEFIBRILLATOR) IN PLACE: Status: ACTIVE | Noted: 2023-09-01

## 2023-09-01 PROBLEM — E11.65 TYPE 2 DIABETES MELLITUS WITH HYPERGLYCEMIA, WITHOUT LONG-TERM CURRENT USE OF INSULIN (HCC): Status: ACTIVE | Noted: 2023-09-01

## 2023-09-01 LAB
ANION GAP SERPL CALC-SCNC: 7 MMOL/L (ref 5–15)
BASOPHILS # BLD: 0 K/UL (ref 0–0.1)
BASOPHILS NFR BLD: 0 % (ref 0–1)
BUN SERPL-MCNC: 69 MG/DL (ref 6–20)
BUN/CREAT SERPL: 26 (ref 12–20)
CALCIUM SERPL-MCNC: 8.3 MG/DL (ref 8.5–10.1)
CHLORIDE SERPL-SCNC: 105 MMOL/L (ref 97–108)
CO2 SERPL-SCNC: 25 MMOL/L (ref 21–32)
COMMENT:: NORMAL
CREAT SERPL-MCNC: 2.66 MG/DL (ref 0.55–1.02)
DIFFERENTIAL METHOD BLD: ABNORMAL
EOSINOPHIL # BLD: 0.2 K/UL (ref 0–0.4)
EOSINOPHIL NFR BLD: 4 % (ref 0–7)
ERYTHROCYTE [DISTWIDTH] IN BLOOD BY AUTOMATED COUNT: 14.5 % (ref 11.5–14.5)
GLUCOSE BLD STRIP.AUTO-MCNC: 184 MG/DL (ref 65–117)
GLUCOSE BLD STRIP.AUTO-MCNC: 189 MG/DL (ref 65–117)
GLUCOSE BLD STRIP.AUTO-MCNC: 227 MG/DL (ref 65–117)
GLUCOSE BLD STRIP.AUTO-MCNC: 232 MG/DL (ref 65–117)
GLUCOSE SERPL-MCNC: 156 MG/DL (ref 65–100)
HCT VFR BLD AUTO: 26.1 % (ref 35–47)
HCT VFR BLD AUTO: 26.5 % (ref 35–47)
HGB BLD-MCNC: 8 G/DL (ref 11.5–16)
HGB BLD-MCNC: 8.3 G/DL (ref 11.5–16)
IMM GRANULOCYTES # BLD AUTO: 0 K/UL (ref 0–0.04)
IMM GRANULOCYTES NFR BLD AUTO: 0 % (ref 0–0.5)
LYMPHOCYTES # BLD: 1.1 K/UL (ref 0.8–3.5)
LYMPHOCYTES NFR BLD: 17 % (ref 12–49)
MAGNESIUM SERPL-MCNC: 2.4 MG/DL (ref 1.6–2.4)
MCH RBC QN AUTO: 29.3 PG (ref 26–34)
MCHC RBC AUTO-ENTMCNC: 30.7 G/DL (ref 30–36.5)
MCV RBC AUTO: 95.6 FL (ref 80–99)
MONOCYTES # BLD: 0.7 K/UL (ref 0–1)
MONOCYTES NFR BLD: 11 % (ref 5–13)
NEUTS SEG # BLD: 4.5 K/UL (ref 1.8–8)
NEUTS SEG NFR BLD: 68 % (ref 32–75)
NRBC # BLD: 0 K/UL (ref 0–0.01)
NRBC BLD-RTO: 0 PER 100 WBC
PHOSPHATE SERPL-MCNC: 4.1 MG/DL (ref 2.6–4.7)
PLATELET # BLD AUTO: 239 K/UL (ref 150–400)
PMV BLD AUTO: 10.9 FL (ref 8.9–12.9)
POTASSIUM SERPL-SCNC: 4 MMOL/L (ref 3.5–5.1)
RBC # BLD AUTO: 2.73 M/UL (ref 3.8–5.2)
SERVICE CMNT-IMP: ABNORMAL
SODIUM SERPL-SCNC: 137 MMOL/L (ref 136–145)
SPECIMEN HOLD: NORMAL
WBC # BLD AUTO: 6.5 K/UL (ref 3.6–11)

## 2023-09-01 PROCEDURE — 85025 COMPLETE CBC W/AUTO DIFF WBC: CPT

## 2023-09-01 PROCEDURE — 99233 SBSQ HOSP IP/OBS HIGH 50: CPT

## 2023-09-01 PROCEDURE — 6370000000 HC RX 637 (ALT 250 FOR IP): Performed by: HOSPITALIST

## 2023-09-01 PROCEDURE — 99222 1ST HOSP IP/OBS MODERATE 55: CPT | Performed by: INTERNAL MEDICINE

## 2023-09-01 PROCEDURE — 6370000000 HC RX 637 (ALT 250 FOR IP): Performed by: INTERNAL MEDICINE

## 2023-09-01 PROCEDURE — 84100 ASSAY OF PHOSPHORUS: CPT

## 2023-09-01 PROCEDURE — 84165 PROTEIN E-PHORESIS SERUM: CPT

## 2023-09-01 PROCEDURE — 2580000003 HC RX 258: Performed by: HOSPITALIST

## 2023-09-01 PROCEDURE — 84155 ASSAY OF PROTEIN SERUM: CPT

## 2023-09-01 PROCEDURE — 85018 HEMOGLOBIN: CPT

## 2023-09-01 PROCEDURE — 2060000000 HC ICU INTERMEDIATE R&B

## 2023-09-01 PROCEDURE — 36415 COLL VENOUS BLD VENIPUNCTURE: CPT

## 2023-09-01 PROCEDURE — 6360000002 HC RX W HCPCS: Performed by: HOSPITALIST

## 2023-09-01 PROCEDURE — 83521 IG LIGHT CHAINS FREE EACH: CPT

## 2023-09-01 PROCEDURE — 82784 ASSAY IGA/IGD/IGG/IGM EACH: CPT

## 2023-09-01 PROCEDURE — 85014 HEMATOCRIT: CPT

## 2023-09-01 PROCEDURE — 83735 ASSAY OF MAGNESIUM: CPT

## 2023-09-01 PROCEDURE — 6370000000 HC RX 637 (ALT 250 FOR IP)

## 2023-09-01 PROCEDURE — 80048 BASIC METABOLIC PNL TOTAL CA: CPT

## 2023-09-01 PROCEDURE — 82962 GLUCOSE BLOOD TEST: CPT

## 2023-09-01 PROCEDURE — 86334 IMMUNOFIX E-PHORESIS SERUM: CPT

## 2023-09-01 RX ORDER — INSULIN LISPRO 100 [IU]/ML
0-4 INJECTION, SOLUTION INTRAVENOUS; SUBCUTANEOUS
Status: DISCONTINUED | OUTPATIENT
Start: 2023-09-01 | End: 2023-09-03 | Stop reason: HOSPADM

## 2023-09-01 RX ORDER — INSULIN GLARGINE 100 [IU]/ML
8 INJECTION, SOLUTION SUBCUTANEOUS NIGHTLY
Status: DISCONTINUED | OUTPATIENT
Start: 2023-09-01 | End: 2023-09-03 | Stop reason: HOSPADM

## 2023-09-01 RX ORDER — ATORVASTATIN CALCIUM 10 MG/1
10 TABLET, FILM COATED ORAL NIGHTLY
Status: DISCONTINUED | OUTPATIENT
Start: 2023-09-02 | End: 2023-09-03 | Stop reason: HOSPADM

## 2023-09-01 RX ORDER — INSULIN LISPRO 100 [IU]/ML
0.05 INJECTION, SOLUTION INTRAVENOUS; SUBCUTANEOUS
Status: DISCONTINUED | OUTPATIENT
Start: 2023-09-01 | End: 2023-09-03 | Stop reason: HOSPADM

## 2023-09-01 RX ORDER — INSULIN LISPRO 100 [IU]/ML
0-4 INJECTION, SOLUTION INTRAVENOUS; SUBCUTANEOUS NIGHTLY
Status: DISCONTINUED | OUTPATIENT
Start: 2023-09-01 | End: 2023-09-03 | Stop reason: HOSPADM

## 2023-09-01 RX ORDER — DEXTROSE MONOHYDRATE 100 MG/ML
INJECTION, SOLUTION INTRAVENOUS CONTINUOUS PRN
Status: DISCONTINUED | OUTPATIENT
Start: 2023-09-01 | End: 2023-09-03 | Stop reason: HOSPADM

## 2023-09-01 RX ORDER — ATORVASTATIN CALCIUM 10 MG/1
10 TABLET, FILM COATED ORAL ONCE
Status: COMPLETED | OUTPATIENT
Start: 2023-09-01 | End: 2023-09-01

## 2023-09-01 RX ADMIN — SODIUM CHLORIDE, PRESERVATIVE FREE 10 ML: 5 INJECTION INTRAVENOUS at 08:56

## 2023-09-01 RX ADMIN — SODIUM CHLORIDE, PRESERVATIVE FREE 10 ML: 5 INJECTION INTRAVENOUS at 21:46

## 2023-09-01 RX ADMIN — Medication 1 UNITS: at 13:35

## 2023-09-01 RX ADMIN — MIRTAZAPINE 15 MG: 15 TABLET, FILM COATED ORAL at 21:45

## 2023-09-01 RX ADMIN — FUROSEMIDE 80 MG: 40 TABLET ORAL at 08:53

## 2023-09-01 RX ADMIN — CARVEDILOL 12.5 MG: 12.5 TABLET, FILM COATED ORAL at 08:55

## 2023-09-01 RX ADMIN — ATORVASTATIN CALCIUM 10 MG: 10 TABLET, FILM COATED ORAL at 22:30

## 2023-09-01 RX ADMIN — Medication 3 UNITS: at 17:38

## 2023-09-01 RX ADMIN — INSULIN GLARGINE 8 UNITS: 100 INJECTION, SOLUTION SUBCUTANEOUS at 21:45

## 2023-09-01 RX ADMIN — ERYTHROPOIETIN 10000 UNITS: 10000 INJECTION, SOLUTION INTRAVENOUS; SUBCUTANEOUS at 21:45

## 2023-09-01 RX ADMIN — CARVEDILOL 12.5 MG: 12.5 TABLET, FILM COATED ORAL at 17:38

## 2023-09-01 RX ADMIN — FERROUS SULFATE TAB 325 MG (65 MG ELEMENTAL FE) 325 MG: 325 (65 FE) TAB at 08:53

## 2023-09-01 NOTE — ED NOTES
Bedside and Verbal shift change report given to KARENA BOB Rn(oncoming nurse) by arabella rn (offgoing nurse). Report included the following information Nurse Handoff Report, ED Encounter Summary, ED SBAR, MAR, Recent Results, and Med Rec Status.        Bobbi Phillip RN  08/31/23 8732

## 2023-09-01 NOTE — ED NOTES
0900: pt medicated. All needs addressed. Pt has no complaints at this time    1000: meal tray delivered    1030: TRANSFER - OUT REPORT:    Verbal report given to [address] on [facility]  being transferred to room 202 for routine progression of patient care       Report consisted of patient's Situation, Background, Assessment and   Recommendations(SBAR). Information from the following report(s) Nurse Handoff Report, Index, MAR, Recent Results, and Cardiac Rhythm NSR  was reviewed with the receiving nurse. Bruner Fall Assessment:                           Lines:   Peripheral IV 08/31/23 Left Antecubital (Active)   Site Assessment Clean, dry & intact 09/01/23 0423   Line Status Flushed;Capped;Blood return noted 09/01/23 [address] disinfected; Connections checked and tightened 09/01/23 0423   Phlebitis Assessment No symptoms 09/01/23 0423   Infiltration Assessment 0 09/01/23 0423   Alcohol Cap Used Yes 09/01/23 0423   Dressing Status Clean, dry & intact 09/01/23 0423   Dressing Type Transparent 09/01/23 0423        Opportunity for questions and clarification was provided.       Patient transported with:  Monitor and Tech          Savi Moreno RN  09/01/23 103

## 2023-09-02 LAB
ALBUMIN SERPL-MCNC: 2.8 G/DL (ref 3.5–5)
ALBUMIN/GLOB SERPL: 0.6 (ref 1.1–2.2)
ALP SERPL-CCNC: 90 U/L (ref 45–117)
ALT SERPL-CCNC: 15 U/L (ref 12–78)
ANION GAP SERPL CALC-SCNC: 7 MMOL/L (ref 5–15)
APPEARANCE UR: CLEAR
AST SERPL-CCNC: 10 U/L (ref 15–37)
BACTERIA URNS QL MICRO: NEGATIVE /HPF
BILIRUB SERPL-MCNC: 0.5 MG/DL (ref 0.2–1)
BILIRUB UR QL: NEGATIVE
BUN SERPL-MCNC: 68 MG/DL (ref 6–20)
BUN/CREAT SERPL: 27 (ref 12–20)
CALCIUM SERPL-MCNC: 8.7 MG/DL (ref 8.5–10.1)
CHLORIDE SERPL-SCNC: 106 MMOL/L (ref 97–108)
CO2 SERPL-SCNC: 24 MMOL/L (ref 21–32)
COLOR UR: ABNORMAL
CREAT SERPL-MCNC: 2.51 MG/DL (ref 0.55–1.02)
CRP SERPL-MCNC: 8.32 MG/DL (ref 0–0.6)
EPITH CASTS URNS QL MICRO: ABNORMAL /LPF
ERYTHROCYTE [DISTWIDTH] IN BLOOD BY AUTOMATED COUNT: 14.4 % (ref 11.5–14.5)
FOLATE SERPL-MCNC: 17.4 NG/ML (ref 5–21)
GLOBULIN SER CALC-MCNC: 4.6 G/DL (ref 2–4)
GLUCOSE BLD STRIP.AUTO-MCNC: 244 MG/DL (ref 65–117)
GLUCOSE BLD STRIP.AUTO-MCNC: 269 MG/DL (ref 65–117)
GLUCOSE SERPL-MCNC: 125 MG/DL (ref 65–100)
GLUCOSE UR STRIP.AUTO-MCNC: NEGATIVE MG/DL
HCT VFR BLD AUTO: 30.6 % (ref 35–47)
HEMOCCULT STL QL: NEGATIVE
HGB BLD-MCNC: 9.2 G/DL (ref 11.5–16)
HGB UR QL STRIP: ABNORMAL
KETONES UR QL STRIP.AUTO: NEGATIVE MG/DL
LDH SERPL L TO P-CCNC: 189 U/L (ref 81–246)
LEUKOCYTE ESTERASE UR QL STRIP.AUTO: ABNORMAL
MAGNESIUM SERPL-MCNC: 2.3 MG/DL (ref 1.6–2.4)
MCH RBC QN AUTO: 28.5 PG (ref 26–34)
MCHC RBC AUTO-ENTMCNC: 30.1 G/DL (ref 30–36.5)
MCV RBC AUTO: 94.7 FL (ref 80–99)
NITRITE UR QL STRIP.AUTO: NEGATIVE
NRBC # BLD: 0 K/UL (ref 0–0.01)
NRBC BLD-RTO: 0 PER 100 WBC
PH UR STRIP: 5.5 (ref 5–8)
PHOSPHATE SERPL-MCNC: 3.8 MG/DL (ref 2.6–4.7)
PLATELET # BLD AUTO: 271 K/UL (ref 150–400)
PMV BLD AUTO: 10.3 FL (ref 8.9–12.9)
POTASSIUM SERPL-SCNC: 3.8 MMOL/L (ref 3.5–5.1)
PROT SERPL-MCNC: 7.4 G/DL (ref 6.4–8.2)
PROT UR STRIP-MCNC: NEGATIVE MG/DL
RBC # BLD AUTO: 3.23 M/UL (ref 3.8–5.2)
RBC #/AREA URNS HPF: ABNORMAL /HPF (ref 0–5)
SERVICE CMNT-IMP: ABNORMAL
SERVICE CMNT-IMP: ABNORMAL
SODIUM SERPL-SCNC: 137 MMOL/L (ref 136–145)
SP GR UR REFRACTOMETRY: 1.01 (ref 1–1.03)
SPECIMEN HOLD: NORMAL
TSH SERPL DL<=0.05 MIU/L-ACNC: 1.56 UIU/ML (ref 0.36–3.74)
UROBILINOGEN UR QL STRIP.AUTO: 0.2 EU/DL (ref 0.2–1)
VIT B12 SERPL-MCNC: 439 PG/ML (ref 193–986)
WBC # BLD AUTO: 6.8 K/UL (ref 3.6–11)
WBC URNS QL MICRO: ABNORMAL /HPF (ref 0–4)

## 2023-09-02 PROCEDURE — 82962 GLUCOSE BLOOD TEST: CPT

## 2023-09-02 PROCEDURE — 86235 NUCLEAR ANTIGEN ANTIBODY: CPT

## 2023-09-02 PROCEDURE — 97116 GAIT TRAINING THERAPY: CPT

## 2023-09-02 PROCEDURE — 82272 OCCULT BLD FECES 1-3 TESTS: CPT

## 2023-09-02 PROCEDURE — 6370000000 HC RX 637 (ALT 250 FOR IP): Performed by: INTERNAL MEDICINE

## 2023-09-02 PROCEDURE — 86038 ANTINUCLEAR ANTIBODIES: CPT

## 2023-09-02 PROCEDURE — 82746 ASSAY OF FOLIC ACID SERUM: CPT

## 2023-09-02 PROCEDURE — 84100 ASSAY OF PHOSPHORUS: CPT

## 2023-09-02 PROCEDURE — 2060000000 HC ICU INTERMEDIATE R&B

## 2023-09-02 PROCEDURE — 80053 COMPREHEN METABOLIC PANEL: CPT

## 2023-09-02 PROCEDURE — 36415 COLL VENOUS BLD VENIPUNCTURE: CPT

## 2023-09-02 PROCEDURE — 84443 ASSAY THYROID STIM HORMONE: CPT

## 2023-09-02 PROCEDURE — 81001 URINALYSIS AUTO W/SCOPE: CPT

## 2023-09-02 PROCEDURE — 82607 VITAMIN B-12: CPT

## 2023-09-02 PROCEDURE — 85027 COMPLETE CBC AUTOMATED: CPT

## 2023-09-02 PROCEDURE — 6370000000 HC RX 637 (ALT 250 FOR IP): Performed by: HOSPITALIST

## 2023-09-02 PROCEDURE — 97161 PT EVAL LOW COMPLEX 20 MIN: CPT

## 2023-09-02 PROCEDURE — 83735 ASSAY OF MAGNESIUM: CPT

## 2023-09-02 PROCEDURE — 2580000003 HC RX 258: Performed by: HOSPITALIST

## 2023-09-02 PROCEDURE — 97165 OT EVAL LOW COMPLEX 30 MIN: CPT

## 2023-09-02 PROCEDURE — 97535 SELF CARE MNGMENT TRAINING: CPT

## 2023-09-02 PROCEDURE — 6370000000 HC RX 637 (ALT 250 FOR IP)

## 2023-09-02 PROCEDURE — 86140 C-REACTIVE PROTEIN: CPT

## 2023-09-02 PROCEDURE — 83615 LACTATE (LD) (LDH) ENZYME: CPT

## 2023-09-02 RX ADMIN — FUROSEMIDE 80 MG: 40 TABLET ORAL at 09:46

## 2023-09-02 RX ADMIN — Medication 3 UNITS: at 13:25

## 2023-09-02 RX ADMIN — Medication 3 UNITS: at 18:44

## 2023-09-02 RX ADMIN — Medication 1 UNITS: at 18:44

## 2023-09-02 RX ADMIN — CARVEDILOL 12.5 MG: 12.5 TABLET, FILM COATED ORAL at 09:49

## 2023-09-02 RX ADMIN — CARVEDILOL 12.5 MG: 12.5 TABLET, FILM COATED ORAL at 18:45

## 2023-09-02 RX ADMIN — FERROUS SULFATE TAB 325 MG (65 MG ELEMENTAL FE) 325 MG: 325 (65 FE) TAB at 09:46

## 2023-09-02 RX ADMIN — Medication 1 UNITS: at 13:27

## 2023-09-02 RX ADMIN — Medication 3 UNITS: at 09:45

## 2023-09-02 RX ADMIN — SODIUM CHLORIDE, PRESERVATIVE FREE 10 ML: 5 INJECTION INTRAVENOUS at 09:46

## 2023-09-03 VITALS
SYSTOLIC BLOOD PRESSURE: 112 MMHG | WEIGHT: 125 LBS | BODY MASS INDEX: 24.54 KG/M2 | HEIGHT: 60 IN | RESPIRATION RATE: 16 BRPM | HEART RATE: 73 BPM | DIASTOLIC BLOOD PRESSURE: 54 MMHG | TEMPERATURE: 98.1 F | OXYGEN SATURATION: 97 %

## 2023-09-03 LAB
ALBUMIN SERPL-MCNC: 2.3 G/DL (ref 3.5–5)
ALBUMIN/GLOB SERPL: 0.7 (ref 1.1–2.2)
ALP SERPL-CCNC: 74 U/L (ref 45–117)
ALT SERPL-CCNC: 12 U/L (ref 12–78)
ANION GAP SERPL CALC-SCNC: 9 MMOL/L (ref 5–15)
AST SERPL-CCNC: 14 U/L (ref 15–37)
BILIRUB SERPL-MCNC: 0.4 MG/DL (ref 0.2–1)
BUN SERPL-MCNC: 57 MG/DL (ref 6–20)
BUN/CREAT SERPL: 28 (ref 12–20)
CALCIUM SERPL-MCNC: 7.1 MG/DL (ref 8.5–10.1)
CHLORIDE SERPL-SCNC: 113 MMOL/L (ref 97–108)
CO2 SERPL-SCNC: 22 MMOL/L (ref 21–32)
CREAT SERPL-MCNC: 2.04 MG/DL (ref 0.55–1.02)
ERYTHROCYTE [DISTWIDTH] IN BLOOD BY AUTOMATED COUNT: 14.3 % (ref 11.5–14.5)
GLOBULIN SER CALC-MCNC: 3.4 G/DL (ref 2–4)
GLUCOSE BLD STRIP.AUTO-MCNC: 127 MG/DL (ref 65–117)
GLUCOSE BLD STRIP.AUTO-MCNC: 163 MG/DL (ref 65–117)
GLUCOSE BLD STRIP.AUTO-MCNC: 285 MG/DL (ref 65–117)
GLUCOSE SERPL-MCNC: 144 MG/DL (ref 65–100)
HCT VFR BLD AUTO: 29.6 % (ref 35–47)
HGB BLD-MCNC: 9 G/DL (ref 11.5–16)
IGA SERPL-MCNC: 410 MG/DL (ref 64–422)
IGG SERPL-MCNC: 1228 MG/DL (ref 586–1602)
IGM SERPL-MCNC: 64 MG/DL (ref 26–217)
MAGNESIUM SERPL-MCNC: 2 MG/DL (ref 1.6–2.4)
MCH RBC QN AUTO: 28.5 PG (ref 26–34)
MCHC RBC AUTO-ENTMCNC: 30.4 G/DL (ref 30–36.5)
MCV RBC AUTO: 93.7 FL (ref 80–99)
NRBC # BLD: 0 K/UL (ref 0–0.01)
NRBC BLD-RTO: 0 PER 100 WBC
PHOSPHATE SERPL-MCNC: 3.4 MG/DL (ref 2.6–4.7)
PLATELET # BLD AUTO: 257 K/UL (ref 150–400)
PMV BLD AUTO: 10.1 FL (ref 8.9–12.9)
POTASSIUM SERPL-SCNC: 3.3 MMOL/L (ref 3.5–5.1)
PROT PATTERN SERPL IFE-IMP: NORMAL
PROT SERPL-MCNC: 5.7 G/DL (ref 6.4–8.2)
RBC # BLD AUTO: 3.16 M/UL (ref 3.8–5.2)
SERVICE CMNT-IMP: ABNORMAL
SODIUM SERPL-SCNC: 144 MMOL/L (ref 136–145)
WBC # BLD AUTO: 6 K/UL (ref 3.6–11)

## 2023-09-03 PROCEDURE — 6370000000 HC RX 637 (ALT 250 FOR IP)

## 2023-09-03 PROCEDURE — 84100 ASSAY OF PHOSPHORUS: CPT

## 2023-09-03 PROCEDURE — 6370000000 HC RX 637 (ALT 250 FOR IP): Performed by: INTERNAL MEDICINE

## 2023-09-03 PROCEDURE — 83735 ASSAY OF MAGNESIUM: CPT

## 2023-09-03 PROCEDURE — 6370000000 HC RX 637 (ALT 250 FOR IP): Performed by: HOSPITALIST

## 2023-09-03 PROCEDURE — 82962 GLUCOSE BLOOD TEST: CPT

## 2023-09-03 PROCEDURE — 80053 COMPREHEN METABOLIC PANEL: CPT

## 2023-09-03 PROCEDURE — 2580000003 HC RX 258: Performed by: HOSPITALIST

## 2023-09-03 PROCEDURE — 85027 COMPLETE CBC AUTOMATED: CPT

## 2023-09-03 PROCEDURE — 36415 COLL VENOUS BLD VENIPUNCTURE: CPT

## 2023-09-03 PROCEDURE — 6370000000 HC RX 637 (ALT 250 FOR IP): Performed by: STUDENT IN AN ORGANIZED HEALTH CARE EDUCATION/TRAINING PROGRAM

## 2023-09-03 RX ORDER — GLIPIZIDE 5 MG/1
2.5 TABLET ORAL DAILY
Qty: 15 TABLET | Refills: 0 | Status: SHIPPED | OUTPATIENT
Start: 2023-09-03 | End: 2023-10-03

## 2023-09-03 RX ORDER — GLIPIZIDE 5 MG/1
2.5 TABLET ORAL 2 TIMES DAILY
Qty: 30 TABLET | Refills: 0 | Status: SHIPPED | OUTPATIENT
Start: 2023-09-03 | End: 2023-09-03 | Stop reason: HOSPADM

## 2023-09-03 RX ORDER — POTASSIUM CHLORIDE 750 MG/1
40 TABLET, FILM COATED, EXTENDED RELEASE ORAL ONCE
Status: COMPLETED | OUTPATIENT
Start: 2023-09-03 | End: 2023-09-03

## 2023-09-03 RX ADMIN — Medication 3 UNITS: at 09:32

## 2023-09-03 RX ADMIN — SODIUM CHLORIDE, PRESERVATIVE FREE 10 ML: 5 INJECTION INTRAVENOUS at 09:32

## 2023-09-03 RX ADMIN — CARVEDILOL 12.5 MG: 12.5 TABLET, FILM COATED ORAL at 09:32

## 2023-09-03 RX ADMIN — FERROUS SULFATE TAB 325 MG (65 MG ELEMENTAL FE) 325 MG: 325 (65 FE) TAB at 07:36

## 2023-09-03 RX ADMIN — POTASSIUM CHLORIDE 40 MEQ: 750 TABLET, FILM COATED, EXTENDED RELEASE ORAL at 09:32

## 2023-09-03 RX ADMIN — Medication 3 UNITS: at 12:46

## 2023-09-03 RX ADMIN — MIRTAZAPINE 15 MG: 15 TABLET, FILM COATED ORAL at 00:13

## 2023-09-03 RX ADMIN — FUROSEMIDE 80 MG: 40 TABLET ORAL at 09:31

## 2023-09-03 RX ADMIN — SODIUM CHLORIDE, PRESERVATIVE FREE 10 ML: 5 INJECTION INTRAVENOUS at 00:30

## 2023-09-03 RX ADMIN — ATORVASTATIN CALCIUM 10 MG: 10 TABLET, FILM COATED ORAL at 00:14

## 2023-09-03 RX ADMIN — INSULIN GLARGINE 8 UNITS: 100 INJECTION, SOLUTION SUBCUTANEOUS at 00:31

## 2023-09-03 NOTE — DISCHARGE SUMMARY
CKD stage IV:  -Management per nephrology; at baseline cr prior to dc      Type 2 diabetes with hyperglycemia:  -Accu-Cheks AC&HS, hold oral hypoglycemics, resume on dc renally adjusted   ? Raynaud's:  -Patient's fingertips are cold and purplish, she denies any history of autoimmune disorders;   -recommend outpatient evaluation by Rheumatology;      PATIENCE pain, chronic:  -outpatient follow-up with primary Neurologist    Follow-up outpatient with PCP, cardiology, nephrology, rheumatology, GI as needed    Had multiple complaints about the  and that she \"will never come to this hospital again\". I addressed her concerns to the best of my ability at bedside but with focus on her medical plan of care. Escalated the issue with  to RN.      Yan home today             FOLLOW UP APPOINTMENTS:    Follow-up Information       Follow up With Specialties Details Why Contact Info    Darian Nichole MD Internal Medicine Call in 1 day(s)  [address] Internal Medicine  [facility] [address]  [address] Internal Medicine   [address] [phone]      Virgie Hou MD Nephrology Call in 1 day(s)  [address]  [address] [facility]  [phone]      Mc Denise MD Gastroenterology Call As needed    consider op colonoscopy or EGD [address] [facility]  [phone]                ADDITIONAL CARE RECOMMENDATIONS: Repeat CBC, BMP 3 to 5 days    DIET: cardiac diet    ACTIVITY: activity as tolerated        DISCHARGE MEDICATIONS:  Current Discharge Medication List        START taking these medications    Details   glipiZIDE (GLUCOTROL) 5 MG tablet Take 0.5 tablets by mouth daily  Qty: 15 tablet, Refills: 0  Start date: 9/3/2023, End date: 10/3/2023           CONTINUE these medications which have NOT CHANGED    Details   potassium chloride (KLOR-CON M) 20

## 2023-09-03 NOTE — CARE COORDINATION
Care Management Initial Assessment       RUR: 16%  Readmission? No  1st IM letter given? Yes - 9/3/23  1st  letter given: No    68year old female, Aox4. Patient with medical hx significant for history significant for ischemic cardiomyopathy status post AICD, CKD due to cardiorenal syndrome, chronic anemia. DME utilized: RW.      Patient's spouse currently hospitalized. GI, Nephrology, Cardiology consulted and following. Discharge anticipated 2-3 days. CM will continue to follow and assist with MISSY needs as they arise. 09/03/23 1130   Service Assessment   Patient Orientation Alert and Oriented   Cognition Alert   History Provided By Patient   Primary Caregiver Self   Support Systems Spouse/Significant Other;Children   Patient's Healthcare Decision Maker is: Legal Next of Kin  (Children: Nash Kumar [phone] and Lee Lyman [phone])   PCP Verified by CM Yes   Last Visit to PCP Within last 3 months   Prior Functional Level Independent in ADLs/IADLs   Current Functional Level Independent in ADLs/IADLs   Can patient return to prior living arrangement Yes   Ability to make needs known: Good   Family able to assist with home care needs: Yes   Would you like for me to discuss the discharge plan with any other family members/significant others, and if so, who? Yes   Financial Resources Medicare; Other (Comment)  (Receives [employer] as source of income)   Community Resources None   Social/Functional History   Lives With Spouse; Son   Type of [address] One level   Home Access Stairs to enter with rails   Entrance Stairs - Number of Steps 23   Entrance Stairs - Rails Both   Bathroom Shower/Tub Shower chair with back   [address], rolling   Receives Help From Family   Ambulation Assistance Independent   Active  No   Occupation Retired   Discharge Planning   Type of [address] Spouse/Significant Other;Children   Current

## 2023-09-05 LAB
ANA SER QL: NEGATIVE
CENTROMERE B AB SER-ACNC: <0.2 AI (ref 0–0.9)
KAPPA LC FREE SER-MCNC: 121.7 MG/L (ref 3.3–19.4)
KAPPA LC FREE/LAMBDA FREE SER: 2.06 (ref 0.26–1.65)
LAMBDA LC FREE SERPL-MCNC: 59 MG/L (ref 5.7–26.3)

## 2023-09-06 ENCOUNTER — TELEPHONE (OUTPATIENT)
Age: 73
End: 2023-09-06

## 2023-09-06 LAB
ALBUMIN SERPL ELPH-MCNC: 3 G/DL (ref 2.9–4.4)
ALBUMIN/GLOB SERPL: 1 (ref 0.7–1.7)
ALPHA1 GLOB SERPL ELPH-MCNC: 0.2 G/DL (ref 0–0.4)
ALPHA2 GLOB SERPL ELPH-MCNC: 0.9 G/DL (ref 0.4–1)
B-GLOBULIN SERPL ELPH-MCNC: 0.8 G/DL (ref 0.7–1.3)
GAMMA GLOB SERPL ELPH-MCNC: 1.1 G/DL (ref 0.4–1.8)
GLOBULIN SER CALC-MCNC: 3.1 G/DL (ref 2.2–3.9)
M PROTEIN SERPL ELPH-MCNC: NORMAL G/DL
PROT SERPL-MCNC: 6.1 G/DL (ref 6–8.5)

## 2023-09-07 ENCOUNTER — TELEPHONE (OUTPATIENT)
Age: 73
End: 2023-09-07

## 2023-09-11 LAB
IGA SERPL-MCNC: 410 MG/DL (ref 64–422)
IGG SERPL-MCNC: 1228 MG/DL (ref 586–1602)
IGM SERPL-MCNC: 64 MG/DL (ref 26–217)
PROT PATTERN SERPL IFE-IMP: ABNORMAL

## 2023-09-11 RX ORDER — MIRTAZAPINE 15 MG/1
15 TABLET, FILM COATED ORAL
Qty: 30 TABLET | Refills: 0 | Status: SHIPPED | OUTPATIENT
Start: 2023-09-11 | End: 2023-10-12 | Stop reason: SDUPTHER

## 2023-09-12 ASSESSMENT — ENCOUNTER SYMPTOMS
RESPIRATORY NEGATIVE: 1
GASTROINTESTINAL NEGATIVE: 1

## 2023-09-18 LAB — SCL-70 AB: <20 UNITS

## 2023-10-09 ENCOUNTER — TELEPHONE (OUTPATIENT)
Age: 73
End: 2023-10-09

## 2023-10-09 NOTE — TELEPHONE ENCOUNTER
Medication Refill Request    Hernandez Мария is requesting a refill of the following medication(s):   Mirtazapine 15 mg tablet Qty:30  Please send refill to:     [facility]/pharmacy #[id number]- [place], [address] [facility] [phone] [address] [place] [address]  Phone: [phone] Fax: [phone] No Yes

## 2023-10-11 NOTE — TELEPHONE ENCOUNTER
Medication Refill Request    Parul Vazquez is requesting a refill of the following medication(s):   Furosemide 40mg tablet Qty:30  Please send refill to:    [facility]/pharmacy #[id number]- [address] [facility], [address] [place] [address]. [facility] [phone] - F [phone]

## 2023-10-11 NOTE — TELEPHONE ENCOUNTER
Medication Refill Request    Parul Shukla is requesting a refill of the following medication(s):   Mirtazapine 15mg tablet Qty:90  Please send refill to:     [facility]/pharmacy #[id number]- [place], [address] [address]  Phone: [phone] Fax: [phone]

## 2023-10-12 RX ORDER — FUROSEMIDE 40 MG/1
80 TABLET ORAL DAILY
Qty: 180 TABLET | Refills: 0 | OUTPATIENT
Start: 2023-10-12

## 2023-10-12 RX ORDER — MIRTAZAPINE 15 MG/1
15 TABLET, FILM COATED ORAL NIGHTLY
Qty: 30 TABLET | Refills: 3 | Status: SHIPPED | OUTPATIENT
Start: 2023-10-12

## 2023-10-12 NOTE — TELEPHONE ENCOUNTER
Left detailed message MD received refill request for Lasix. This is prescribed by her cardiologist. Need to call mig[facility] office for refill.

## 2023-11-28 ENCOUNTER — TELEPHONE (OUTPATIENT)
Age: 73
End: 2023-11-28

## 2023-11-28 DIAGNOSIS — I50.22 CHRONIC SYSTOLIC (CONGESTIVE) HEART FAILURE (HCC): Primary | ICD-10-CM

## 2023-11-28 RX ORDER — GLIPIZIDE 5 MG/1
2.5 TABLET ORAL DAILY
Qty: 45 TABLET | Refills: 0 | Status: SHIPPED | OUTPATIENT
Start: 2023-11-28 | End: 2024-02-26

## 2023-11-28 RX ORDER — POTASSIUM CHLORIDE 1500 MG/1
20 TABLET, EXTENDED RELEASE ORAL DAILY
Qty: 90 TABLET | Refills: 3 | Status: SHIPPED | OUTPATIENT
Start: 2023-11-28

## 2023-11-28 RX ORDER — FUROSEMIDE 40 MG/1
80 TABLET ORAL DAILY
Qty: 180 TABLET | Refills: 3 | Status: SHIPPED | OUTPATIENT
Start: 2023-11-28

## 2023-11-28 NOTE — TELEPHONE ENCOUNTER
Pt. Following up on pharmacy request for refill.      Glipizide  Furosemide  Potassium    [facility] - [phone]    No Call Back Needed

## 2023-11-28 NOTE — TELEPHONE ENCOUNTER
Medication Refill Request    Xiao Can is requesting a refill of the following medication(s):     Glipizide (Glucotrol) 5 MG tablet    Please send refill to:     [facility]/pharmacy #[id number]- [place], [address] [place] [address]. Porfirio Pringle [phone] - F [phone]      Spoke to patient's daughter, Omar Nickerson. Advised her that patient should establish with a new PCP as soon as possible.

## 2023-11-28 NOTE — TELEPHONE ENCOUNTER
Per VO by MD.    Future Appointments   Date Time Provider [address]   1/11/2024  2:00 PM PACEMAKER3, AAKASH SULTANA BS AMB   1/11/2024  2:20 PM MD RD Will BS AMB   8/28/2024  2:00 PM MD RD Hussein BS AMB

## 2023-11-28 NOTE — TELEPHONE ENCOUNTER
This is a patient of Dr Bushra Valera, who previously worked in this practice. I have been asked to provide a medication refill as bridge until the patient can establish with a new primary care doctor. I have reviewed the most recent progress notes and labs. I will provide a one-time 90 day refill to bridge until she is able to establish with a new primary care physician.

## 2024-02-13 ENCOUNTER — APPOINTMENT (OUTPATIENT)
Facility: HOSPITAL | Age: 74
End: 2024-02-13
Payer: MEDICARE

## 2024-02-13 ENCOUNTER — HOSPITAL ENCOUNTER (INPATIENT)
Facility: HOSPITAL | Age: 74
LOS: 3 days | Discharge: HOME | End: 2024-02-16
Attending: EMERGENCY MEDICINE | Admitting: FAMILY MEDICINE
Payer: MEDICARE

## 2024-02-13 DIAGNOSIS — I50.23 ACUTE ON CHRONIC CLINICAL SYSTOLIC HEART FAILURE (HCC): ICD-10-CM

## 2024-02-13 DIAGNOSIS — R06.02 SHORTNESS OF BREATH: ICD-10-CM

## 2024-02-13 DIAGNOSIS — R60.0 BILATERAL LOWER EXTREMITY EDEMA: ICD-10-CM

## 2024-02-13 DIAGNOSIS — R60.9 FLUID RETENTION: ICD-10-CM

## 2024-02-13 DIAGNOSIS — J40 TRACHEOBRONCHITIS: Primary | ICD-10-CM

## 2024-02-13 LAB
ALBUMIN SERPL-MCNC: 3.5 G/DL (ref 3.5–5)
ALBUMIN/GLOB SERPL: 0.9 (ref 1.1–2.2)
ALP SERPL-CCNC: 89 U/L (ref 45–117)
ALT SERPL-CCNC: 25 U/L (ref 12–78)
ANION GAP SERPL CALC-SCNC: 7 MMOL/L (ref 5–15)
AST SERPL-CCNC: 25 U/L (ref 15–37)
BASOPHILS # BLD: 0.1 K/UL (ref 0–0.1)
BASOPHILS NFR BLD: 1 % (ref 0–1)
BILIRUB SERPL-MCNC: 0.6 MG/DL (ref 0.2–1)
BUN SERPL-MCNC: 67 MG/DL (ref 6–20)
BUN/CREAT SERPL: 24 (ref 12–20)
CALCIUM SERPL-MCNC: 8.3 MG/DL (ref 8.5–10.1)
CHLORIDE SERPL-SCNC: 107 MMOL/L (ref 97–108)
CO2 SERPL-SCNC: 23 MMOL/L (ref 21–32)
COMMENT:: NORMAL
CREAT SERPL-MCNC: 2.74 MG/DL (ref 0.55–1.02)
DIFFERENTIAL METHOD BLD: ABNORMAL
EKG ATRIAL RATE: 72 BPM
EKG DIAGNOSIS: NORMAL
EKG P AXIS: 40 DEGREES
EKG P-R INTERVAL: 158 MS
EKG Q-T INTERVAL: 432 MS
EKG QRS DURATION: 94 MS
EKG QTC CALCULATION (BAZETT): 473 MS
EKG R AXIS: 144 DEGREES
EKG T AXIS: 156 DEGREES
EKG VENTRICULAR RATE: 72 BPM
EOSINOPHIL # BLD: 0.2 K/UL (ref 0–0.4)
EOSINOPHIL NFR BLD: 3 % (ref 0–7)
ERYTHROCYTE [DISTWIDTH] IN BLOOD BY AUTOMATED COUNT: 16.5 % (ref 11.5–14.5)
FLUAV AG NPH QL IA: NEGATIVE
FLUBV AG NOSE QL IA: NEGATIVE
GLOBULIN SER CALC-MCNC: 4.1 G/DL (ref 2–4)
GLUCOSE SERPL-MCNC: 161 MG/DL (ref 65–100)
HCT VFR BLD AUTO: 33.7 % (ref 35–47)
HGB BLD-MCNC: 10.6 G/DL (ref 11.5–16)
IMM GRANULOCYTES # BLD AUTO: 0 K/UL (ref 0–0.04)
IMM GRANULOCYTES NFR BLD AUTO: 0 % (ref 0–0.5)
LYMPHOCYTES # BLD: 1 K/UL (ref 0.8–3.5)
LYMPHOCYTES NFR BLD: 15 % (ref 12–49)
MCH RBC QN AUTO: 31.5 PG (ref 26–34)
MCHC RBC AUTO-ENTMCNC: 31.5 G/DL (ref 30–36.5)
MCV RBC AUTO: 100 FL (ref 80–99)
MONOCYTES # BLD: 0.6 K/UL (ref 0–1)
MONOCYTES NFR BLD: 9 % (ref 5–13)
NEUTS SEG # BLD: 4.8 K/UL (ref 1.8–8)
NEUTS SEG NFR BLD: 72 % (ref 32–75)
NRBC # BLD: 0 K/UL (ref 0–0.01)
NRBC BLD-RTO: 0 PER 100 WBC
NT PRO BNP: ABNORMAL PG/ML
PLATELET # BLD AUTO: 184 K/UL (ref 150–400)
PMV BLD AUTO: 11 FL (ref 8.9–12.9)
POTASSIUM SERPL-SCNC: 5 MMOL/L (ref 3.5–5.1)
PROT SERPL-MCNC: 7.6 G/DL (ref 6.4–8.2)
RBC # BLD AUTO: 3.37 M/UL (ref 3.8–5.2)
SARS-COV-2 RDRP RESP QL NAA+PROBE: NOT DETECTED
SODIUM SERPL-SCNC: 137 MMOL/L (ref 136–145)
SOURCE: NORMAL
SPECIMEN HOLD: NORMAL
TROPONIN I SERPL HS-MCNC: 34 NG/L (ref 0–51)
WBC # BLD AUTO: 6.7 K/UL (ref 3.6–11)

## 2024-02-13 PROCEDURE — 80053 COMPREHEN METABOLIC PANEL: CPT

## 2024-02-13 PROCEDURE — 6360000002 HC RX W HCPCS: Performed by: EMERGENCY MEDICINE

## 2024-02-13 PROCEDURE — 87635 SARS-COV-2 COVID-19 AMP PRB: CPT

## 2024-02-13 PROCEDURE — 36415 COLL VENOUS BLD VENIPUNCTURE: CPT

## 2024-02-13 PROCEDURE — 99285 EMERGENCY DEPT VISIT HI MDM: CPT

## 2024-02-13 PROCEDURE — 85025 COMPLETE CBC W/AUTO DIFF WBC: CPT

## 2024-02-13 PROCEDURE — 87804 INFLUENZA ASSAY W/OPTIC: CPT

## 2024-02-13 PROCEDURE — 2060000000 HC ICU INTERMEDIATE R&B

## 2024-02-13 PROCEDURE — 51701 INSERT BLADDER CATHETER: CPT

## 2024-02-13 PROCEDURE — 83036 HEMOGLOBIN GLYCOSYLATED A1C: CPT

## 2024-02-13 PROCEDURE — 71045 X-RAY EXAM CHEST 1 VIEW: CPT

## 2024-02-13 PROCEDURE — 84484 ASSAY OF TROPONIN QUANT: CPT

## 2024-02-13 PROCEDURE — 83880 ASSAY OF NATRIURETIC PEPTIDE: CPT

## 2024-02-13 PROCEDURE — 93005 ELECTROCARDIOGRAM TRACING: CPT | Performed by: EMERGENCY MEDICINE

## 2024-02-13 RX ORDER — DEXTROSE MONOHYDRATE 100 MG/ML
INJECTION, SOLUTION INTRAVENOUS CONTINUOUS PRN
Status: DISCONTINUED | OUTPATIENT
Start: 2024-02-13 | End: 2024-02-16 | Stop reason: HOSPADM

## 2024-02-13 RX ORDER — CARVEDILOL 12.5 MG/1
12.5 TABLET ORAL 2 TIMES DAILY WITH MEALS
Status: DISCONTINUED | OUTPATIENT
Start: 2024-02-14 | End: 2024-02-16 | Stop reason: HOSPADM

## 2024-02-13 RX ORDER — BUMETANIDE 0.25 MG/ML
1 INJECTION INTRAMUSCULAR; INTRAVENOUS ONCE
Status: COMPLETED | OUTPATIENT
Start: 2024-02-13 | End: 2024-02-13

## 2024-02-13 RX ORDER — MIRTAZAPINE 15 MG/1
15 TABLET, FILM COATED ORAL NIGHTLY
Status: DISCONTINUED | OUTPATIENT
Start: 2024-02-14 | End: 2024-02-16 | Stop reason: HOSPADM

## 2024-02-13 RX ORDER — INSULIN LISPRO 100 [IU]/ML
0-4 INJECTION, SOLUTION INTRAVENOUS; SUBCUTANEOUS NIGHTLY
Status: DISCONTINUED | OUTPATIENT
Start: 2024-02-13 | End: 2024-02-16 | Stop reason: HOSPADM

## 2024-02-13 RX ORDER — SIMVASTATIN 20 MG
20 TABLET ORAL NIGHTLY
Status: DISCONTINUED | OUTPATIENT
Start: 2024-02-13 | End: 2024-02-14 | Stop reason: CLARIF

## 2024-02-13 RX ORDER — INSULIN LISPRO 100 [IU]/ML
0-4 INJECTION, SOLUTION INTRAVENOUS; SUBCUTANEOUS
Status: DISCONTINUED | OUTPATIENT
Start: 2024-02-14 | End: 2024-02-16 | Stop reason: HOSPADM

## 2024-02-13 RX ORDER — FERROUS SULFATE 325(65) MG
325 TABLET ORAL
Status: DISCONTINUED | OUTPATIENT
Start: 2024-02-14 | End: 2024-02-16 | Stop reason: HOSPADM

## 2024-02-13 RX ADMIN — BUMETANIDE 1 MG: 0.25 INJECTION INTRAMUSCULAR; INTRAVENOUS at 22:24

## 2024-02-13 NOTE — ED TRIAGE NOTES
Pt arrives via EMS w/ increased weakness and SOB worsening over last several weeks. Pt progressively became worse since yesterday. +expiratory wheezing w/ EMS.     Hx of CHF--daily wts at home, pt states she has gained 1# since yesterday. +lower leg swelling. Pt takes lasix at home.

## 2024-02-14 LAB
ALBUMIN SERPL-MCNC: 3 G/DL (ref 3.5–5)
ALBUMIN/GLOB SERPL: 0.8 (ref 1.1–2.2)
ALP SERPL-CCNC: 76 U/L (ref 45–117)
ALT SERPL-CCNC: 19 U/L (ref 12–78)
ANION GAP SERPL CALC-SCNC: 6 MMOL/L (ref 5–15)
APTT PPP: 27.4 SEC (ref 22.1–31)
AST SERPL-CCNC: 12 U/L (ref 15–37)
BASOPHILS # BLD: 0 K/UL (ref 0–0.1)
BASOPHILS NFR BLD: 1 % (ref 0–1)
BILIRUB SERPL-MCNC: 0.5 MG/DL (ref 0.2–1)
BUN SERPL-MCNC: 68 MG/DL (ref 6–20)
BUN/CREAT SERPL: 26 (ref 12–20)
CALCIUM SERPL-MCNC: 8 MG/DL (ref 8.5–10.1)
CHLORIDE SERPL-SCNC: 109 MMOL/L (ref 97–108)
CO2 SERPL-SCNC: 23 MMOL/L (ref 21–32)
CREAT SERPL-MCNC: 2.58 MG/DL (ref 0.55–1.02)
DIFFERENTIAL METHOD BLD: ABNORMAL
EOSINOPHIL # BLD: 0.2 K/UL (ref 0–0.4)
EOSINOPHIL NFR BLD: 3 % (ref 0–7)
ERYTHROCYTE [DISTWIDTH] IN BLOOD BY AUTOMATED COUNT: 16.6 % (ref 11.5–14.5)
EST. AVERAGE GLUCOSE BLD GHB EST-MCNC: 189 MG/DL
GLOBULIN SER CALC-MCNC: 3.7 G/DL (ref 2–4)
GLUCOSE BLD STRIP.AUTO-MCNC: 105 MG/DL (ref 65–117)
GLUCOSE BLD STRIP.AUTO-MCNC: 203 MG/DL (ref 65–117)
GLUCOSE BLD STRIP.AUTO-MCNC: 216 MG/DL (ref 65–117)
GLUCOSE BLD STRIP.AUTO-MCNC: 234 MG/DL (ref 65–117)
GLUCOSE BLD STRIP.AUTO-MCNC: 249 MG/DL (ref 65–117)
GLUCOSE SERPL-MCNC: 157 MG/DL (ref 65–100)
HBA1C MFR BLD: 8.2 % (ref 4–5.6)
HCT VFR BLD AUTO: 30.6 % (ref 35–47)
HGB BLD-MCNC: 9.9 G/DL (ref 11.5–16)
IMM GRANULOCYTES # BLD AUTO: 0 K/UL (ref 0–0.04)
IMM GRANULOCYTES NFR BLD AUTO: 0 % (ref 0–0.5)
INR PPP: 1.1 (ref 0.9–1.1)
LYMPHOCYTES # BLD: 1 K/UL (ref 0.8–3.5)
LYMPHOCYTES NFR BLD: 18 % (ref 12–49)
MCH RBC QN AUTO: 31.7 PG (ref 26–34)
MCHC RBC AUTO-ENTMCNC: 32.4 G/DL (ref 30–36.5)
MCV RBC AUTO: 98.1 FL (ref 80–99)
MONOCYTES # BLD: 0.5 K/UL (ref 0–1)
MONOCYTES NFR BLD: 10 % (ref 5–13)
NEUTS SEG # BLD: 3.8 K/UL (ref 1.8–8)
NEUTS SEG NFR BLD: 68 % (ref 32–75)
NRBC # BLD: 0 K/UL (ref 0–0.01)
NRBC BLD-RTO: 0 PER 100 WBC
PLATELET # BLD AUTO: 150 K/UL (ref 150–400)
PMV BLD AUTO: 10.8 FL (ref 8.9–12.9)
POTASSIUM SERPL-SCNC: 3.9 MMOL/L (ref 3.5–5.1)
PROT SERPL-MCNC: 6.7 G/DL (ref 6.4–8.2)
PROTHROMBIN TIME: 11.9 SEC (ref 9–11.1)
RBC # BLD AUTO: 3.12 M/UL (ref 3.8–5.2)
SERVICE CMNT-IMP: ABNORMAL
SERVICE CMNT-IMP: NORMAL
SODIUM SERPL-SCNC: 138 MMOL/L (ref 136–145)
THERAPEUTIC RANGE: NORMAL SECS (ref 58–77)
WBC # BLD AUTO: 5.5 K/UL (ref 3.6–11)

## 2024-02-14 PROCEDURE — 6370000000 HC RX 637 (ALT 250 FOR IP): Performed by: FAMILY MEDICINE

## 2024-02-14 PROCEDURE — 82962 GLUCOSE BLOOD TEST: CPT

## 2024-02-14 PROCEDURE — 6360000002 HC RX W HCPCS: Performed by: NURSE PRACTITIONER

## 2024-02-14 PROCEDURE — 36415 COLL VENOUS BLD VENIPUNCTURE: CPT

## 2024-02-14 PROCEDURE — 2060000000 HC ICU INTERMEDIATE R&B

## 2024-02-14 PROCEDURE — 2580000003 HC RX 258: Performed by: FAMILY MEDICINE

## 2024-02-14 PROCEDURE — 85730 THROMBOPLASTIN TIME PARTIAL: CPT

## 2024-02-14 PROCEDURE — 85610 PROTHROMBIN TIME: CPT

## 2024-02-14 PROCEDURE — 6370000000 HC RX 637 (ALT 250 FOR IP): Performed by: HOSPITALIST

## 2024-02-14 PROCEDURE — 6370000000 HC RX 637 (ALT 250 FOR IP): Performed by: NURSE PRACTITIONER

## 2024-02-14 PROCEDURE — 85025 COMPLETE CBC W/AUTO DIFF WBC: CPT

## 2024-02-14 PROCEDURE — 99223 1ST HOSP IP/OBS HIGH 75: CPT | Performed by: SPECIALIST

## 2024-02-14 PROCEDURE — 80053 COMPREHEN METABOLIC PANEL: CPT

## 2024-02-14 RX ORDER — SODIUM CHLORIDE 0.9 % (FLUSH) 0.9 %
5-40 SYRINGE (ML) INJECTION PRN
Status: DISCONTINUED | OUTPATIENT
Start: 2024-02-14 | End: 2024-02-16 | Stop reason: HOSPADM

## 2024-02-14 RX ORDER — ACETAMINOPHEN 325 MG/1
650 TABLET ORAL EVERY 6 HOURS PRN
Status: DISCONTINUED | OUTPATIENT
Start: 2024-02-14 | End: 2024-02-16 | Stop reason: HOSPADM

## 2024-02-14 RX ORDER — SODIUM CHLORIDE 9 MG/ML
INJECTION, SOLUTION INTRAVENOUS PRN
Status: DISCONTINUED | OUTPATIENT
Start: 2024-02-14 | End: 2024-02-16 | Stop reason: HOSPADM

## 2024-02-14 RX ORDER — ATORVASTATIN CALCIUM 10 MG/1
10 TABLET, FILM COATED ORAL NIGHTLY
Status: DISCONTINUED | OUTPATIENT
Start: 2024-02-14 | End: 2024-02-16 | Stop reason: HOSPADM

## 2024-02-14 RX ORDER — ISOSORBIDE DINITRATE 10 MG/1
5 TABLET ORAL 2 TIMES DAILY
Status: DISCONTINUED | OUTPATIENT
Start: 2024-02-14 | End: 2024-02-16 | Stop reason: HOSPADM

## 2024-02-14 RX ORDER — ONDANSETRON 4 MG/1
4 TABLET, ORALLY DISINTEGRATING ORAL EVERY 8 HOURS PRN
Status: DISCONTINUED | OUTPATIENT
Start: 2024-02-14 | End: 2024-02-16 | Stop reason: HOSPADM

## 2024-02-14 RX ORDER — BUMETANIDE 0.25 MG/ML
1 INJECTION INTRAMUSCULAR; INTRAVENOUS 2 TIMES DAILY
Status: DISCONTINUED | OUTPATIENT
Start: 2024-02-14 | End: 2024-02-15

## 2024-02-14 RX ORDER — ACETAMINOPHEN 650 MG/1
650 SUPPOSITORY RECTAL EVERY 6 HOURS PRN
Status: DISCONTINUED | OUTPATIENT
Start: 2024-02-14 | End: 2024-02-16 | Stop reason: HOSPADM

## 2024-02-14 RX ORDER — POLYETHYLENE GLYCOL 3350 17 G/17G
17 POWDER, FOR SOLUTION ORAL DAILY PRN
Status: DISCONTINUED | OUTPATIENT
Start: 2024-02-14 | End: 2024-02-16 | Stop reason: HOSPADM

## 2024-02-14 RX ORDER — SODIUM CHLORIDE 0.9 % (FLUSH) 0.9 %
5-40 SYRINGE (ML) INJECTION EVERY 12 HOURS SCHEDULED
Status: DISCONTINUED | OUTPATIENT
Start: 2024-02-14 | End: 2024-02-16 | Stop reason: HOSPADM

## 2024-02-14 RX ORDER — BUMETANIDE 0.25 MG/ML
1 INJECTION INTRAMUSCULAR; INTRAVENOUS DAILY
Status: DISCONTINUED | OUTPATIENT
Start: 2024-02-14 | End: 2024-02-14

## 2024-02-14 RX ORDER — ONDANSETRON 2 MG/ML
4 INJECTION INTRAMUSCULAR; INTRAVENOUS EVERY 6 HOURS PRN
Status: DISCONTINUED | OUTPATIENT
Start: 2024-02-14 | End: 2024-02-16 | Stop reason: HOSPADM

## 2024-02-14 RX ORDER — HEPARIN SODIUM 5000 [USP'U]/ML
5000 INJECTION, SOLUTION INTRAVENOUS; SUBCUTANEOUS EVERY 8 HOURS SCHEDULED
Status: DISCONTINUED | OUTPATIENT
Start: 2024-02-14 | End: 2024-02-16 | Stop reason: HOSPADM

## 2024-02-14 RX ADMIN — ISOSORBIDE DINITRATE 5 MG: 10 TABLET ORAL at 17:29

## 2024-02-14 RX ADMIN — CARVEDILOL 12.5 MG: 12.5 TABLET, FILM COATED ORAL at 17:29

## 2024-02-14 RX ADMIN — CARVEDILOL 12.5 MG: 12.5 TABLET, FILM COATED ORAL at 09:55

## 2024-02-14 RX ADMIN — BUMETANIDE 1 MG: 0.25 INJECTION INTRAMUSCULAR; INTRAVENOUS at 17:29

## 2024-02-14 RX ADMIN — FERROUS SULFATE TAB 325 MG (65 MG ELEMENTAL FE) 325 MG: 325 (65 FE) TAB at 09:55

## 2024-02-14 RX ADMIN — MIRTAZAPINE 15 MG: 15 TABLET, FILM COATED ORAL at 02:01

## 2024-02-14 RX ADMIN — Medication 10 ML: at 21:03

## 2024-02-14 RX ADMIN — ATORVASTATIN CALCIUM 10 MG: 10 TABLET, FILM COATED ORAL at 21:02

## 2024-02-14 RX ADMIN — Medication 10 ML: at 09:00

## 2024-02-14 RX ADMIN — INSULIN LISPRO 1 UNITS: 100 INJECTION, SOLUTION INTRAVENOUS; SUBCUTANEOUS at 18:09

## 2024-02-14 RX ADMIN — MIRTAZAPINE 15 MG: 15 TABLET, FILM COATED ORAL at 21:02

## 2024-02-14 NOTE — CONSULTS
Nephrology Consult Note      Assessment:  CKD stage 4: Serum Cr 2.8mg/dl. Baseline 2.3-2.6mg/dl. Underlying DM nephropathy. Followed by Dr. Madelyn Wallace.     Decompensated Systolic CHF: EF 20-25%: Repeat echo pending     HTN: stable     DM2: Last HgbA1c 8.2    Anemia: Hgb low/stable    Plan/Recommendations:  IV Bumex 1mg BID  Intermittent bladder scans  Echo  FR 1.5L/day  Strict I/Os  Avoid nephrotoxins  Am labs    Discussed with patient    Thanks for the consultation. Renal service will follow patient with you. Please contact me with any questions or concerns.    Initial Consult note         Patient name: Nolvia Silver  MR no: [id number]  Date of admission: 2/13/2024  Date of consultation: 2/14/24  Requested by: Dr. Adrián Flores  Reason for consult: CKD    Patient seen and examined. History obtained from patient and chart review. Relevant labs, data and notes reviewed.      HPI: Nolvia Silver is a 73 y.o. female with PMH significant for CKD stage 4, DM nephropathy, HTN, Systolic CHF presented to the ER with worsening SOB/Edema. Elevated BNP noted. Started on IV Bumex. Serum Cr 2.7mg/dl. +Urinary retention requiring straight cath x1. Nephrology consulted to evaluate and manage CKD. Patient is followed by my partner Dr. Madelyn Wallace. She can not recall last visit. Subjective fevers reported.     PMH:  No past medical history on file.   PSH:  No past surgical history on file.     Social history:   Social History       Tobacco History       Smoking Status  Never      Passive Exposure  Never      Smokeless Tobacco Use  Unknown              Alcohol History       Alcohol Use Status  Never              Drug Use       Drug Use Status  Never              Sexual Activity       Sexually Active  Not Asked                     Family history:  No history of CKD or ESRD in the family.     Allergies   Allergen Reactions    Codeine Hives and Other (See Comments)    Honey Itching and Other (See Comments)     allergic to therahoney

## 2024-02-14 NOTE — ED PROVIDER NOTES
[facility] EMERGENCY DEP  EMERGENCY DEPARTMENT ENCOUNTER      Pt Name: Nolvia Silver  MRN: [id number]  Birthdate 1950  Date of evaluation: 2/13/2024  Provider: Yuriy Hawkins MD    CHIEF COMPLAINT       Chief Complaint   Patient presents with    Shortness of Breath         HISTORY OF PRESENT ILLNESS    HPI  This is a 73-year-old female who has a history of chronic systolic heart failure and is followed by Dr. García with cardiology.  She states that she also has type 2 diabetes.  Last week she began with a wet cough and shortness of breath.  Those symptoms have gradually become more significant.  She has not had any fever or chill and has had no nausea or vomiting.  1 episode of diarrhea yesterday.  There has been no chest pain.  She has had some retention of fluid over the last week and said that this morning she gained about a pound.  She is not on any new medications.  The cough that she has had has been somewhat productive of greenish phlegm occasionally.  She denies any other acute complaints presently.    Review of External Medical Records:     Nursing Notes were reviewed.    REVIEW OF SYSTEMS       Review of Systems   Constitutional:  Negative for activity change, chills, fatigue and fever.   HENT:  Negative for congestion, ear pain, rhinorrhea, sinus pressure, sinus pain, sore throat and trouble swallowing.    Eyes: Negative.    Respiratory:  Positive for cough and shortness of breath. Negative for chest tightness, wheezing and stridor.    Cardiovascular:  Negative for chest pain, palpitations and leg swelling.   Gastrointestinal:  Positive for diarrhea (1 yesterday). Negative for abdominal pain, blood in stool, nausea and vomiting.   Endocrine: Negative.    Genitourinary:  Negative for decreased urine volume, difficulty urinating, flank pain, frequency and hematuria.   Musculoskeletal:  Negative for back pain, joint swelling and neck pain.   Skin:  Negative for color change, pallor and rash.    Neurological:  Positive for weakness. Negative for dizziness, syncope, speech difficulty, numbness and headaches.   Psychiatric/Behavioral:  Negative for agitation and behavioral problems.              PAST MEDICAL HISTORY   No past medical history on file.      SURGICAL HISTORY     No past surgical history on file.      CURRENT MEDICATIONS       Previous Medications    ASPIRIN 81 MG EC TABLET    Take 1 tablet by mouth daily    CARVEDILOL (COREG) 12.5 MG TABLET    TAKE 1 TABLET BY MOUTH TWICE A DAY WITH MEALS    FERROUS SULFATE (SLOW FE) 140 (45 FE) MG EXTENDED RELEASE TABLET    Take 1 tablet by mouth daily (with breakfast)    FUROSEMIDE (LASIX) 40 MG TABLET    Take 2 tablets by mouth daily    GLIPIZIDE (GLUCOTROL) 5 MG TABLET    Take 0.5 tablets by mouth daily    MIRTAZAPINE (REMERON) 15 MG TABLET    Take 1 tablet by mouth nightly    POTASSIUM CHLORIDE (KLOR-CON M) 20 MEQ TBCR EXTENDED RELEASE TABLET    Take 1 tablet by mouth daily    SIMVASTATIN (ZOCOR) 20 MG TABLET    TAKE 1 TABLET BY MOUTH NIGHTLY       ALLERGIES     Codeine, Honey, Penicillin g, and Penicillins    FAMILY HISTORY     No family history on file.       SOCIAL HISTORY       Social History     Socioeconomic History    Marital status:    Tobacco Use    Smoking status: Never     Passive exposure: Never   Substance and Sexual Activity    Alcohol use: Never    Drug use: Never           PHYSICAL EXAM       ED Triage Vitals [02/13/24 1639]   BP Temp Temp Source Pulse Respirations SpO2 Height Weight   (!) 142/93 97.3 °F (36.3 °C) Rectal 70 17 96 % -- --       There is no height or weight on file to calculate BMI.    Physical Exam  Vitals and nursing note reviewed.   Constitutional:       General: She is not in acute distress.     Appearance: She is ill-appearing. She is not diaphoretic.      Comments: This is a 73-year-old female who presents with some shortness of breath and weakness over the last week becoming more progressive.  Vital signs as

## 2024-02-14 NOTE — H&P
History and Physical    Date of Service:  2/13/2024  Primary Care Provider: No primary care provider on file.  Source of information: The patient and Chart review    Chief Complaint: Shortness of Breath      History of Presenting Illness:   Nolvia Silver is a 73 y.o. female with past medical history of chronic heart failure reduced ejection fraction (HFrEF), nonischemic cardiomyopathy, status post cardiac pacemaker implantation, CKD, hyperlipidemia presented to the emergency department via EMS with chief complaints of shortness of breath, leg swelling, unintentional weight loss, and generalized increased weakness.  Symptoms onset reported began several weeks ago but notably worsened yesterday, remain constant, severe, without specific alleviating factors.  Per ER triage note, patient expiratory wheezing.  Patient has a history of chronic CHF and is on Lasix at home for the same.  Patient notes having about 5 pound weight loss in the past 2 weeks.  On arrival in the ED, initial recorded vital signs were temperature 97.3 °F, /93, heart rate 70, respiratory rate 17, O2 saturation is 96% on room air.  Abnormal labs included hemoglobin 10.6, .0, BUN 67, creatinine 2.74, GFR 18, blood glucose 161, calcium 8.3, proBNP > 35,000.  COVID-19 test and influenza A/B antigen test were negative.  Chest reportable showed trace bilateral pleural effusions.  ED ordered Bumex 1 mg IV x 1 dose.  Patient notes he is yet to void urine.  However she notes that her breathing has improved.  She denies any wheezing and has no history of COPD or asthma.  Patient is now seen for admission to the hospitalist service.       REVIEW OF SYSTEMS:  A comprehensive review of systems was negative except for that written in the History of Present Illness.     Past medical history:  Chronic heart failure reduced ejection fraction (HFrEF)  Nonischemic cardiomyopathy  Chronic kidney disease  Hyperlipidemia    Past surgical  following components:    Glucose 161 (*)     BUN 67 (*)     Creatinine 2.74 (*)     Bun/Cre Ratio 24 (*)     Est, Glom Filt Rate 18 (*)     Calcium 8.3 (*)     Globulin 4.1 (*)     Albumin/Globulin Ratio 0.9 (*)     All other components within normal limits   BRAIN NATRIURETIC PEPTIDE - Abnormal; Notable for the following components:    NT Pro-BNP >35,000 (*)     All other components within normal limits       [unfilled]    IMAGING:   XR CHEST PORTABLE   Final Result      No acute process on portable chest.                 ECG/ECHO:  [unfilled]       Notes reviewed from all clinical/nonclinical/nursing services involved in patient's clinical care. Care coordination discussions were held with appropriate clinical/nonclinical/ nursing providers based on care coordination needs.     Assessment:   Given the patient's current clinical presentation, there is a high level of concern for decompensation if discharged from the emergency department. Complex decision making was performed, which includes reviewing the patient's available past medical records, laboratory results, and imaging studies.    Principal Problem:    Acute on chronic HFrEF (heart failure with reduced ejection fraction) ([facility])  Resolved Problems:    * No resolved hospital problems. *      Plan:     Acute on chronic HFrEF (heart failure with reduced ejection fraction) ([facility])  -s/p cardiac pacemaker  -admit to telemetry  -Lasix 40 mg IV q 24 hours; adjust diuretic dose accordingly  -last TTE 8/30/2023: EF 20% to 25% with severe global hypokinesis  -Consult cardiologist  -Strict I's and O's  -Daily weights to monitor fluid status closely  -Consider for guideline directed medical therapies including beta blocker, Jardiance  -No ACE/ARB or Spironolactone ordered due to elevated creatinine    2. Shortness of breath (SOB)  -Currently on 1.5 liters O2 via nasal cannula  -Titrate O2 to keep O2 saturations greater than 94%  -Continuous pulse oximetry

## 2024-02-14 NOTE — NURSE NAVIGATOR
HEART FAILURE NURSE NAVIGATOR NOTE  [facility]    Patient chart was reviewed by Heart Failure (HF) Nurse Navigators for compliance of prescribed treatment with guidelines directed medical therapy (GDMT) and HF database completed.     Please, review beneath recommendations for symptomatic patients with HF with Reduced Ejection Fraction ? 40% (HFrEF)* and patients whose LVEF improved > 40% (HFimpEF)* for your consideration when taking care of this patient.    Current Medical Therapy:    Name Nolvia Silver    1950   LVEF 20/25% (echo 23); repeat echo pending   NYHA Functional Class Documentation needed   ARNi/ACEi/ARB Contraindicated d/t elevated creatinine   Beta Blocker Coreg 12.5 mg twice daily   Aldosterone Antagonist Contraindicated d/t elevated creatinine   SGLT2 inhibitor Please document contraindication (GFR 19)    Hydralazine/Isosorbide Dinitrate    Consulting Cardiologist: Dr. Guardado consulted     Recommendations:    Please, add the following GDMT for HFrEF ? 40% [Class 1] or document in discharge summary/progress note why patient cannot take the medication:  ARNi/ACEi or ARB  Beta-blockers (carvedilol, sustained-release metoprolol succinate or bisoprolol)  Aldosterone antagonists GFR > 30 and K< 5  SGLT2 inhibitor  Hydralazine/Isosorbide dinitrate for  Americans with Class III/IV symptoms  Adjust diuretic dose at discharge if hospitalized for volume overload    Consider adding the following GDMT for HFrEF ? 40%, if appropriate [Class 2b]:  Ivabradine for patients on maximally tolerated beta-blocker dose in order to achieve HR 70-80bpm  Digoxin, goal level 0.5-0.9  Polyunsaturated fatty acids  Vericuguat  For patient with hyperkalemia while on RAASi > 5.5, consider adding potassium binders (patiromer, sodium zirconium cycosilicate)    Note: the following medications may be potentially harmful in heart failure [Class 3]:  Calcium channel blockers (doxazosin,  for the Management of Heart Failure: A Report of the American College of Cardiology/American Heart Association Joint Committee on Clinical Practice Guidelines. Circulation 2022; e1032. and 2017 AHA/ACC/HRS guideline for management of patients with ventricular arrhythmias and the prevention of sudden cardiac death: A Report of the American College of Cardiology/American Heart Association Task Force on Clinical Practice Guidelines and the Heart Rhythm Society. Heart Rhythm, Vol 15, No 10, October 2018 *Class of Recommendation: Class 1 (strong), Class 2a (moderate), Class 2b (weak), Class 3 (not recommended, potentially harmful)

## 2024-02-14 NOTE — ED NOTES
In and out cath done at this time// sterile technique maintained// 750 ml of clear yellow urine obtained// pt to be bladder scanned @ 1000// purewick and brief replaced a

## 2024-02-14 NOTE — PROGRESS NOTES
[facility] Adult  Hospitalist Group                                                                                          Hospitalist Progress Note  Romie Arnett MD  Office Phone: [phone]        Date of Service:  2024  NAME:  Nolvia Silver  :  1950  MRN:  [id number]       Admission Summary:   Nolvia Silver is a 73 y.o. female with past medical history of chronic heart failure reduced ejection fraction (HFrEF), nonischemic cardiomyopathy, status post cardiac pacemaker implantation, CKD, hyperlipidemia presented to the emergency department via EMS with chief complaints of shortness of breath, leg swelling, unintentional weight loss, and generalized increased weakness.  Symptoms onset reported began several weeks ago but notably worsened yesterday, remain constant, severe, without specific alleviating factors.  Per ER triage note, patient expiratory wheezing.  Patient has a history of chronic CHF and is on Lasix at home for the same.  Patient notes having about 5 pound weight loss in the past 2 weeks.  On arrival in the ED, initial recorded vital signs were temperature 97.3 °F, /93, heart rate 70, respiratory rate 17, O2 saturation is 96% on room air.  Abnormal labs included hemoglobin 10.6, .0, BUN 67, creatinine 2.74, GFR 18, blood glucose 161, calcium 8.3, proBNP > 35,000.  COVID-19 test and influenza A/B antigen test were negative.  Chest reportable showed trace bilateral pleural effusions.  ED ordered Bumex 1 mg IV x 1 dose.  Patient notes he is yet to void urine.  However she notes that her breathing has improved.  She denies any wheezing and has no history of COPD or asthma.  Patient is now seen for admission to the hospitalist service.       Interval history / Subjective:   Follow up CHF  Feels much better  Pedal edema has improved as well  On 1.5l NC   Saw the patient in ER  Assessment & Plan:     Acute on chronic HFrEF (heart failure with reduced ejection  encounter with this patient and independently examined them on 2/14/2024 as outlined below:          General : alert x 3, awake, no acute distress,   HEENT: PEERL, EOMI, moist mucus membrane, TM clear  Neck: supple, no JVD, no meningeal signs  Chest: Clear to auscultation bilaterally   CVS: S1 S2 heard, Capillary refill less than 2 seconds  Abd: soft/ non tender, non distended, BS physiological,   Ext: no clubbing, no cyanosis, no edema, brisk 2+ DP pulses  Neuro/Psych: pleasant mood and affect, CN 2-12 grossly intact, sensory grossly within normal limit, Strength 5/5 in all extremities, DTR 1+ x 4  Skin: warm     Data Review:    Review and/or order of clinical lab test      I have personally and independently reviewed all pertinent labs, diagnostic studies, imaging, and have provided independent interpretation of the same.     Labs:     Recent Labs     02/13/24 1648 02/14/24  0657   WBC 6.7 5.5   HGB 10.6* 9.9*   HCT 33.7* 30.6*    150     Recent Labs     02/13/24  1648 02/14/24  0657    138   K 5.0 3.9    109*   CO2 23 23   BUN 67* 68*     Recent Labs     02/13/24  1648 02/14/24  0657   ALT 25 19   GLOB 4.1* 3.7     Recent Labs     02/14/24  0657   INR 1.1   APTT 27.4      No results for input(s): \"TIBC\", \"FERR\" in the last 72 hours.    Invalid input(s): \"FE\", \"PSAT\"   No results found for: \"FOL\", \"RBCF\"   No results for input(s): \"PH\", \"PCO2\", \"PO2\" in the last 72 hours.  No results for input(s): \"CPK\" in the last 72 hours.    Invalid input(s): \"CPKMB\", \"CKNDX\", \"TROIQ\"  Lab Results   Component Value Date/Time    CHOL 182 05/13/2022 11:39 AM    HDL 27 05/13/2022 11:39 AM     No results found for: \"GLUCPOC\"  [unfilled]    Notes reviewed from all clinical/nonclinical/nursing services involved in patient's clinical care. Care coordination discussions were held with appropriate clinical/nonclinical/ nursing providers based on care coordination needs.         Patients current active Medications were  car seat

## 2024-02-14 NOTE — ED NOTES
ED TO INPATIENT SBAR HANDOFF    Patient Name: Nolvia Silver   :  1950  73 y.o.   MRN:  [id number]  ED Room #:  ER10/10  Family/Caregiver Present no   Restraints no   Sitter no   Sepsis Risk Score Sepsis Risk Score: 1.34    Situation  Code Status: DNR     Allergies: Codeine, Honey, Penicillin g, Penicillins, and Iodine i 131 albumin  Weight: Patient Vitals for the past 96 hrs (Last 3 readings):   Weight   24 0615 57.6 kg (127 lb)   24 0039 57.6 kg (127 lb)     Arrived from: home  Chief Complaint:   Chief Complaint   Patient presents with    Shortness of Breath     Hospital Problem/Diagnosis:  Principal Problem:    Acute on chronic HFrEF (heart failure with reduced ejection fraction) ([facility])  Resolved Problems:    * No resolved hospital problems. *    Imaging:   XR CHEST PORTABLE   Final Result      No acute process on portable chest.            Vascular duplex lower extremity venous bilateral    (Results Pending)     Abnormal labs:   Abnormal Labs Reviewed   CBC WITH AUTO DIFFERENTIAL - Abnormal; Notable for the following components:       Result Value    RBC 3.37 (*)     Hemoglobin 10.6 (*)     Hematocrit 33.7 (*)     .0 (*)     RDW 16.5 (*)     All other components within normal limits   COMPREHENSIVE METABOLIC PANEL - Abnormal; Notable for the following components:    Glucose 161 (*)     BUN 67 (*)     Creatinine 2.74 (*)     Bun/Cre Ratio 24 (*)     Est, Glom Filt Rate 18 (*)     Calcium 8.3 (*)     Globulin 4.1 (*)     Albumin/Globulin Ratio 0.9 (*)     All other components within normal limits   BRAIN NATRIURETIC PEPTIDE - Abnormal; Notable for the following components:    NT Pro-BNP >35,000 (*)     All other components within normal limits   HEMOGLOBIN A1C - Abnormal; Notable for the following components:    Hemoglobin A1C 8.2 (*)     All other components within normal limits   COMPREHENSIVE METABOLIC PANEL W/ REFLEX TO MG FOR LOW K - Abnormal; Notable for the following components:     Assessment  Pain Assessment: None - Denies Pain  Last documented pain score (0-10 scale)    Last documented pain medication administered: n/a     Mental Status: oriented, alert, coherent, logical, thought processes intact, and able to concentrate and follow conversation  Orientation Level:    NIH Score:    C-SSRS: Risk of Suicide: No Risk  Bedside swallow:    [place] Coma Scale (GCS): [place] Coma Scale  Eye Opening: Spontaneous  Best Verbal Response: Oriented  Best Motor Response: Obeys commands  Adrienne Coma Scale Score: 15  Active LDA's:   Peripheral IV 02/13/24 Left Antecubital (Active)   Site Assessment Clean, dry & intact 02/13/24 1652   Line Status Blood return noted;Flushed;Specimen collected 02/13/24 1652   Line Care Connections checked and tightened 02/13/24 1652   Phlebitis Assessment No symptoms 02/13/24 1652   Infiltration Assessment 0 02/13/24 1652   Alcohol Cap Used Yes 02/13/24 1652   Dressing Status New dressing applied;Clean, dry & intact 02/13/24 1652   Dressing Type Transparent 02/13/24 1652   Dressing Intervention New 02/13/24 1652     PO Status: Regular  Pertinent or High Risk Medications/Drips: no   If Yes, please provide details: n/a  Titratable drips: no   Pending Blood Product Administration: no   Mobility: limited transfer mobility   ED Fall Risk: Presents to emergency department  because of falls (Syncope, seizure, or loss of consciousness): No, Age > 70: Yes, Altered Mental Status, Intoxication with alcohol or substance confusion (Disorientation, impaired judgment, poor safety awaremess, or inability to follow instructions): No, Impaired Mobility: Ambulates or transfers with assistive devices or assistance; Unable to ambulate or transer.: Yes, Nursing Judgement: Yes     You may also review the ED PT Care Timeline found under the Summary Nursing Index tab.    Recommendation    Pending orders none  Consults ordered: IP CONSULT TO NEPHROLOGY  IP CONSULT TO CARDIOLOGY    Consulted provider:

## 2024-02-14 NOTE — ED NOTES
Pt finally able to void on bedside commode. RN attempted hirsch insertion around 1600 but was unsuccessful. MD aware.

## 2024-02-14 NOTE — PROGRESS NOTES
Admission Medication Reconciliation:    Information obtained from:  patient interview  RxQuery data available¹:  Yes    Comments/Recommendations: Updated PTA meds/reviewed patient's allergies.    Spoke with patient about current medication regimen. Patient was excellent historian and was able to accurately convey her regimen without issue. All medications in the EMR are accurate as of today's date with no changes since last review. Patient did state that she had a reaction to iodine which resulted in her \"breaking out\" and this allergy has been added to her profile.     ¹RxQuery pharmacy benefit data reflects medications filled and processed through the patient's insurance, however   this data does NOT capture whether the medication was picked up or is currently being taken by the patient.    Allergies:  Codeine, Honey, Penicillin g, Penicillins, and Iodine i 131 albumin    Significant PMH/Disease States: No past medical history on file.  Chief Complaint for this Admission:    Chief Complaint   Patient presents with    Shortness of Breath     Prior to Admission Medications:   Prior to Admission Medications   Prescriptions Last Dose Informant   aspirin 81 MG EC tablet  Self   Sig: Take 1 tablet by mouth daily   carvedilol (COREG) 12.5 MG tablet  Self   Sig: TAKE 1 TABLET BY MOUTH TWICE A DAY WITH MEALS   ferrous sulfate (SLOW FE) 140 (45 Fe) MG extended release tablet  Self   Sig: Take 1 tablet by mouth daily (with breakfast)   furosemide (LASIX) 40 MG tablet  Self   Sig: Take 2 tablets by mouth daily   glipiZIDE (GLUCOTROL) 5 MG tablet  Self   Sig: Take 0.5 tablets by mouth daily   mirtazapine (REMERON) 15 MG tablet  Self   Sig: Take 1 tablet by mouth nightly   potassium chloride (KLOR-CON M) 20 MEQ TBCR extended release tablet  Self   Sig: Take 1 tablet by mouth daily   simvastatin (ZOCOR) 20 MG tablet  Self   Sig: TAKE 1 TABLET BY MOUTH NIGHTLY      Facility-Administered Medications: None     Please contact the

## 2024-02-15 ENCOUNTER — APPOINTMENT (OUTPATIENT)
Facility: HOSPITAL | Age: 74
End: 2024-02-15
Payer: MEDICARE

## 2024-02-15 LAB
ALBUMIN SERPL-MCNC: 3.1 G/DL (ref 3.5–5)
ALBUMIN/GLOB SERPL: 0.8 (ref 1.1–2.2)
ALP SERPL-CCNC: 88 U/L (ref 45–117)
ALT SERPL-CCNC: 20 U/L (ref 12–78)
ANION GAP SERPL CALC-SCNC: 7 MMOL/L (ref 5–15)
AST SERPL-CCNC: 10 U/L (ref 15–37)
BILIRUB SERPL-MCNC: 0.5 MG/DL (ref 0.2–1)
BUN SERPL-MCNC: 74 MG/DL (ref 6–20)
BUN/CREAT SERPL: 25 (ref 12–20)
CALCIUM SERPL-MCNC: 8.5 MG/DL (ref 8.5–10.1)
CHLORIDE SERPL-SCNC: 110 MMOL/L (ref 97–108)
CO2 SERPL-SCNC: 21 MMOL/L (ref 21–32)
CREAT SERPL-MCNC: 3.01 MG/DL (ref 0.55–1.02)
ECHO AO ASC DIAM: 3 CM
ECHO AO ASCENDING AORTA INDEX: 1.92 CM/M2
ECHO AO ROOT DIAM: 3.1 CM
ECHO AO ROOT INDEX: 1.99 CM/M2
ECHO AV AREA PEAK VELOCITY: 1.9 CM2
ECHO AV AREA VTI: 2 CM2
ECHO AV AREA/BSA PEAK VELOCITY: 1.2 CM2/M2
ECHO AV AREA/BSA VTI: 1.3 CM2/M2
ECHO AV MEAN GRADIENT: 2 MMHG
ECHO AV MEAN VELOCITY: 0.7 M/S
ECHO AV PEAK GRADIENT: 4 MMHG
ECHO AV PEAK VELOCITY: 1.1 M/S
ECHO AV VELOCITY RATIO: 0.55
ECHO AV VTI: 21 CM
ECHO BSA: 1.56 M2
ECHO BSA: 1.56 M2
ECHO LA DIAMETER INDEX: 2.31 CM/M2
ECHO LA DIAMETER: 3.6 CM
ECHO LA TO AORTIC ROOT RATIO: 1.16
ECHO LA VOL A-L A2C: 61 ML (ref 22–52)
ECHO LA VOL A-L A4C: 74 ML (ref 22–52)
ECHO LA VOL MOD A2C: 57 ML (ref 22–52)
ECHO LA VOL MOD A4C: 70 ML (ref 22–52)
ECHO LA VOLUME AREA LENGTH: 71 ML
ECHO LA VOLUME INDEX A-L A2C: 39 ML/M2 (ref 16–34)
ECHO LA VOLUME INDEX A-L A4C: 47 ML/M2 (ref 16–34)
ECHO LA VOLUME INDEX AREA LENGTH: 46 ML/M2 (ref 16–34)
ECHO LA VOLUME INDEX MOD A2C: 37 ML/M2 (ref 16–34)
ECHO LA VOLUME INDEX MOD A4C: 45 ML/M2 (ref 16–34)
ECHO LV E' LATERAL VELOCITY: 5 CM/S
ECHO LV E' SEPTAL VELOCITY: 5 CM/S
ECHO LV FRACTIONAL SHORTENING: 13 % (ref 28–44)
ECHO LV INTERNAL DIMENSION DIASTOLE INDEX: 3.97 CM/M2
ECHO LV INTERNAL DIMENSION DIASTOLIC: 6.2 CM (ref 3.9–5.3)
ECHO LV INTERNAL DIMENSION SYSTOLIC INDEX: 3.46 CM/M2
ECHO LV INTERNAL DIMENSION SYSTOLIC: 5.4 CM
ECHO LV IVSD: 1.1 CM (ref 0.6–0.9)
ECHO LV MASS 2D: 261 G (ref 67–162)
ECHO LV MASS INDEX 2D: 167.3 G/M2 (ref 43–95)
ECHO LV POSTERIOR WALL DIASTOLIC: 0.9 CM (ref 0.6–0.9)
ECHO LV RELATIVE WALL THICKNESS RATIO: 0.29
ECHO LVOT AREA: 3.8 CM2
ECHO LVOT AV VTI INDEX: 0.54
ECHO LVOT DIAM: 2.2 CM
ECHO LVOT MEAN GRADIENT: 1 MMHG
ECHO LVOT PEAK GRADIENT: 1 MMHG
ECHO LVOT PEAK VELOCITY: 0.6 M/S
ECHO LVOT STROKE VOLUME INDEX: 27.8 ML/M2
ECHO LVOT SV: 43.3 ML
ECHO LVOT VTI: 11.4 CM
ECHO MV A VELOCITY: 0.52 M/S
ECHO MV AREA PHT: 5.5 CM2
ECHO MV E DECELERATION TIME (DT): 138.1 MS
ECHO MV E VELOCITY: 0.93 M/S
ECHO MV E/A RATIO: 1.79
ECHO MV E/E' LATERAL: 18.6
ECHO MV E/E' RATIO (AVERAGED): 18.6
ECHO MV PRESSURE HALF TIME (PHT): 40.1 MS
ECHO MV REGURGITANT ALIASING (NYQUIST) VELOCITY: 34 CM/S
ECHO MV REGURGITANT PEAK GRADIENT: 130 MMHG
ECHO MV REGURGITANT PEAK VELOCITY: 5.7 M/S
ECHO MV REGURGITANT RADIUS PISA: 0.74 CM
ECHO MV REGURGITANT VTIA: 178.1 CM
ECHO PULMONARY ARTERY END DIASTOLIC PRESSURE: 7 MMHG
ECHO PV MAX VELOCITY: 0.4 M/S
ECHO PV PEAK GRADIENT: 1 MMHG
ECHO PV REGURGITANT MAX VELOCITY: 1.3 M/S
ECHO RV INTERNAL DIMENSION: 3.7 CM
ECHO RV LONGITUDINAL DIMENSION: 6.3 CM
ECHO RV MID DIMENSION: 2.9 CM
ECHO RV TAPSE: 2.2 CM (ref 1.7–?)
ERYTHROCYTE [DISTWIDTH] IN BLOOD BY AUTOMATED COUNT: 16 % (ref 11.5–14.5)
GLOBULIN SER CALC-MCNC: 3.9 G/DL (ref 2–4)
GLUCOSE BLD STRIP.AUTO-MCNC: 192 MG/DL (ref 65–117)
GLUCOSE BLD STRIP.AUTO-MCNC: 239 MG/DL (ref 65–117)
GLUCOSE BLD STRIP.AUTO-MCNC: 252 MG/DL (ref 65–117)
GLUCOSE BLD STRIP.AUTO-MCNC: 255 MG/DL (ref 65–117)
GLUCOSE SERPL-MCNC: 228 MG/DL (ref 65–100)
HCT VFR BLD AUTO: 31 % (ref 35–47)
HGB BLD-MCNC: 9.9 G/DL (ref 11.5–16)
MAGNESIUM SERPL-MCNC: 2.4 MG/DL (ref 1.6–2.4)
MCH RBC QN AUTO: 31.5 PG (ref 26–34)
MCHC RBC AUTO-ENTMCNC: 31.9 G/DL (ref 30–36.5)
MCV RBC AUTO: 98.7 FL (ref 80–99)
NRBC # BLD: 0 K/UL (ref 0–0.01)
NRBC BLD-RTO: 0 PER 100 WBC
PHOSPHATE SERPL-MCNC: 3.8 MG/DL (ref 2.6–4.7)
PLATELET # BLD AUTO: 160 K/UL (ref 150–400)
PMV BLD AUTO: 11.2 FL (ref 8.9–12.9)
POTASSIUM SERPL-SCNC: 4.4 MMOL/L (ref 3.5–5.1)
PROT SERPL-MCNC: 7 G/DL (ref 6.4–8.2)
RBC # BLD AUTO: 3.14 M/UL (ref 3.8–5.2)
SERVICE CMNT-IMP: ABNORMAL
SODIUM SERPL-SCNC: 138 MMOL/L (ref 136–145)
WBC # BLD AUTO: 5.7 K/UL (ref 3.6–11)

## 2024-02-15 PROCEDURE — 93306 TTE W/DOPPLER COMPLETE: CPT | Performed by: SPECIALIST

## 2024-02-15 PROCEDURE — 6370000000 HC RX 637 (ALT 250 FOR IP): Performed by: FAMILY MEDICINE

## 2024-02-15 PROCEDURE — 97535 SELF CARE MNGMENT TRAINING: CPT

## 2024-02-15 PROCEDURE — 36415 COLL VENOUS BLD VENIPUNCTURE: CPT

## 2024-02-15 PROCEDURE — 83735 ASSAY OF MAGNESIUM: CPT

## 2024-02-15 PROCEDURE — 85027 COMPLETE CBC AUTOMATED: CPT

## 2024-02-15 PROCEDURE — 6370000000 HC RX 637 (ALT 250 FOR IP): Performed by: HOSPITALIST

## 2024-02-15 PROCEDURE — 2580000003 HC RX 258: Performed by: FAMILY MEDICINE

## 2024-02-15 PROCEDURE — 93970 EXTREMITY STUDY: CPT

## 2024-02-15 PROCEDURE — 2060000000 HC ICU INTERMEDIATE R&B

## 2024-02-15 PROCEDURE — 82962 GLUCOSE BLOOD TEST: CPT

## 2024-02-15 PROCEDURE — 97165 OT EVAL LOW COMPLEX 30 MIN: CPT

## 2024-02-15 PROCEDURE — 6360000002 HC RX W HCPCS: Performed by: NURSE PRACTITIONER

## 2024-02-15 PROCEDURE — 6370000000 HC RX 637 (ALT 250 FOR IP): Performed by: NURSE PRACTITIONER

## 2024-02-15 PROCEDURE — 80053 COMPREHEN METABOLIC PANEL: CPT

## 2024-02-15 PROCEDURE — C8929 TTE W OR WO FOL WCON,DOPPLER: HCPCS

## 2024-02-15 PROCEDURE — 99232 SBSQ HOSP IP/OBS MODERATE 35: CPT | Performed by: SPECIALIST

## 2024-02-15 PROCEDURE — 84100 ASSAY OF PHOSPHORUS: CPT

## 2024-02-15 PROCEDURE — 6360000004 HC RX CONTRAST MEDICATION: Performed by: HOSPITALIST

## 2024-02-15 RX ORDER — BENZONATATE 100 MG/1
100 CAPSULE ORAL 3 TIMES DAILY PRN
Status: DISCONTINUED | OUTPATIENT
Start: 2024-02-15 | End: 2024-02-16 | Stop reason: HOSPADM

## 2024-02-15 RX ORDER — FUROSEMIDE 40 MG/1
80 TABLET ORAL DAILY
Status: DISCONTINUED | OUTPATIENT
Start: 2024-02-16 | End: 2024-02-16 | Stop reason: HOSPADM

## 2024-02-15 RX ADMIN — PERFLUTREN 1.5 ML: 6.52 INJECTION, SUSPENSION INTRAVENOUS at 10:46

## 2024-02-15 RX ADMIN — INSULIN LISPRO 1 UNITS: 100 INJECTION, SOLUTION INTRAVENOUS; SUBCUTANEOUS at 17:42

## 2024-02-15 RX ADMIN — CARVEDILOL 12.5 MG: 12.5 TABLET, FILM COATED ORAL at 07:31

## 2024-02-15 RX ADMIN — BUMETANIDE 1 MG: 0.25 INJECTION INTRAMUSCULAR; INTRAVENOUS at 09:11

## 2024-02-15 RX ADMIN — ISOSORBIDE DINITRATE 5 MG: 10 TABLET ORAL at 15:06

## 2024-02-15 RX ADMIN — INSULIN LISPRO 2 UNITS: 100 INJECTION, SOLUTION INTRAVENOUS; SUBCUTANEOUS at 12:28

## 2024-02-15 RX ADMIN — ISOSORBIDE DINITRATE 5 MG: 10 TABLET ORAL at 07:31

## 2024-02-15 RX ADMIN — Medication 10 ML: at 21:00

## 2024-02-15 RX ADMIN — FERROUS SULFATE TAB 325 MG (65 MG ELEMENTAL FE) 325 MG: 325 (65 FE) TAB at 07:31

## 2024-02-15 RX ADMIN — Medication 10 ML: at 09:11

## 2024-02-15 RX ADMIN — MIRTAZAPINE 15 MG: 15 TABLET, FILM COATED ORAL at 21:16

## 2024-02-15 RX ADMIN — BENZONATATE 100 MG: 100 CAPSULE ORAL at 21:29

## 2024-02-15 RX ADMIN — ATORVASTATIN CALCIUM 10 MG: 10 TABLET, FILM COATED ORAL at 21:16

## 2024-02-15 RX ADMIN — CARVEDILOL 12.5 MG: 12.5 TABLET, FILM COATED ORAL at 17:42

## 2024-02-15 NOTE — PLAN OF CARE
Problem: Occupational Therapy - Adult  Goal: By Discharge: Performs self-care activities at highest level of function for planned discharge setting.  See evaluation for individualized goals.  Description: FUNCTIONAL STATUS PRIOR TO ADMISSION: Pt reported she lives with  and son in a one level condo and was MOD IND with ADLs prior to admission. Pt was primarily using a RW. Pt reports she assists with  as he is frequently \"in and out of hospitals\".     HOME SUPPORT: Patient lived with  and son but didn't require assistance.    Occupational Therapy Goals:  Initiated 2/15/2024  1.  Patient will perform grooming standing at sink with Modified [place] within 7 day(s).  2.  Patient will perform upper body dressing seated with Modified [place] within 7 day(s).  3.  Patient will perform simulated shower transfer with Modified [place] within 7 day(s).  4.  Patient will perform toilet transfers with Modified [place]  within 7 day(s).  5.  Patient will perform all aspects of toileting with Modified [place] within 7 day(s).  6.  Patient will participate in upper extremity therapeutic exercise/activities with Modified [place] for 5 minutes within 7 day(s).    7.  Patient will utilize energy conservation techniques during functional activities with verbal cues within 7 day(s).    Outcome: Progressing     OCCUPATIONAL THERAPY EVALUATION    Patient: Nolvia Silver (73 y.o. female)  Date: 2/15/2024  Primary Diagnosis: Shortness of breath [R06.02]  Fluid retention [R60.9]  Tracheobronchitis [J40]  Bilateral lower extremity edema [R60.0]  Acute on chronic clinical systolic heart failure (HCC) [I50.23]  Acute on chronic HFrEF (heart failure with reduced ejection fraction) ([facility]) [I50.23]         Precautions: Fall Risk                  ASSESSMENT :  The patient is limited by decreased functional mobility, independence in ADLs, high-level IADLs, activity tolerance, endurance, coordination,  support;Good      ADL Assessment:          Feeding: Independent       Grooming: Stand by assistance       UE Bathing: Stand by assistance            LE Bathing: Stand by assistance       UE Dressing: Stand by assistance       LE Dressing: Stand by assistance       Toileting: Stand by assistance       ADL Intervention and task modifications:    Grooming: .  - Washing Hands : Stand-by Assistance and Standing at sink    Toileting: .  - Bladder Hygiene : Stand-by Assistance and Seated   - Clothing Management : Stand-By Assistance and Seated      [facility]-PACTM \"6 Clicks\"                                                       Daily Activity Inpatient Short Form  How much help from another person does the patient currently need... Total; A Lot A Little None   1.  Putting on and taking off regular lower body clothing? []  1 []  2 [x]  3 []  4   2.  Bathing (including washing, rinsing, drying)? []  1 []  2 [x]  3 []  4   3.  Toileting, which includes using toilet, bedpan or urinal? [] 1 []  2 [x]  3 []  4   4.  Putting on and taking off regular upper body clothing? []  1 []  2 [x]  3 []  4   5.  Taking care of personal grooming such as brushing teeth? []  1 []  2 [x]  3 []  4   6.  Eating meals? []  1 []  2 []  3 [x]  4   © 2007, Trustees of [facility], under license to [employer]. All rights reserved     Score: 19/24     Interpretation of Tool:  Represents clinically-significant functional categories (i.e. Activities of daily living).    Cutoff score 39.4 (19) correlates to a good likelihood of discharging home versus a facility  Tara Myers, Nolvia Rutledge, Luke Hanson, Inga Rosen, Nic Mera, Alan Myers, -PAC “6-Clicks” Functional Assessment Scores Predict Acute Care Hospital Discharge Destination, Physical Therapy, Volume 94, Issue 9, 1 September 2014, Pages 1572-8168, [email or web address]    Pain Rating:  None reported     Activity Tolerance:

## 2024-02-15 NOTE — PROGRESS NOTES
Orders received, chart reviewed and patient evaluated by occupational therapy. Pending progression with skilled acute occupational therapy, recommend:  Therapy 2 days/week in the home, however, pt refused    Recommend with nursing patient to complete as able in order to maintain strength, endurance and independence: OOB to chair 3x/day, ADLs with supervision/setup and mobilizing to the bathroom for toileting with x 1 assist. Thank you for your assistance.     Full evaluation to follow.   Lotus Perez, OTANIKA, OTR/L

## 2024-02-15 NOTE — PROGRESS NOTES
[facility]  [facility]                                                                                          Hospitalist Progress Note  Romie Arnett MD  Office Phone: [phone]        Date of Service:  2/15/2024  NAME:  Nolvia Silver  :  1950  MRN:  [id number]       Admission Summary:   Nolvia Silver is a 73 y.o. female with past medical history of chronic heart failure reduced ejection fraction (HFrEF), nonischemic cardiomyopathy, status post cardiac pacemaker implantation, CKD, hyperlipidemia presented to the emergency department via EMS with chief complaints of shortness of breath, leg swelling, unintentional weight loss, and generalized increased weakness.  Symptoms onset reported began several weeks ago but notably worsened yesterday, remain constant, severe, without specific alleviating factors.  Per ER triage note, patient expiratory wheezing.  Patient has a history of chronic CHF and is on Lasix at home for the same.  Patient notes having about 5 pound weight loss in the past 2 weeks.  On arrival in the ED, initial recorded vital signs were temperature 97.3 °F, /93, heart rate 70, respiratory rate 17, O2 saturation is 96% on room air.  Abnormal labs included hemoglobin 10.6, .0, BUN 67, creatinine 2.74, GFR 18, blood glucose 161, calcium 8.3, proBNP > 35,000.  COVID-19 test and influenza A/B antigen test were negative.  Chest reportable showed trace bilateral pleural effusions.  ED ordered Bumex 1 mg IV x 1 dose.  Patient notes he is yet to void urine.  However she notes that her breathing has improved.  She denies any wheezing and has no history of COPD or asthma.  Patient is now seen for admission to the hospitalist service.       Interval history / Subjective:   Follow up CHF  Feels much better  Pedal edema has improved as well  On room air now     Assessment & Plan:     Acute on chronic HFrEF (heart failure with reduced ejection fraction)  02/13/24  1648 02/14/24  0657 02/15/24  0430    138 138   K 5.0 3.9 4.4    109* 110*   CO2 23 23 21   BUN 67* 68* 74*   MG  --   --  2.4   PHOS  --   --  3.8       Recent Labs     02/13/24  1648 02/14/24  0657 02/15/24  0430   ALT 25 19 20   GLOB 4.1* 3.7 3.9       Recent Labs     02/14/24  0657   INR 1.1   APTT 27.4        No results for input(s): \"TIBC\", \"FERR\" in the last 72 hours.    Invalid input(s): \"FE\", \"PSAT\"   No results found for: \"FOL\", \"RBCF\"   No results for input(s): \"PH\", \"PCO2\", \"PO2\" in the last 72 hours.  No results for input(s): \"CPK\" in the last 72 hours.    Invalid input(s): \"CPKMB\", \"CKNDX\", \"TROIQ\"  Lab Results   Component Value Date/Time    CHOL 182 05/13/2022 11:39 AM    HDL 27 05/13/2022 11:39 AM     No results found for: \"GLUCPOC\"  [unfilled]    Notes reviewed from all clinical/nonclinical/nursing services involved in patient's clinical care. Care coordination discussions were held with appropriate clinical/nonclinical/ nursing providers based on care coordination needs.         Patients current active Medications were reviewed, considered, added and adjusted based on the clinical condition today.      Home Medications were reconciled to the best of my ability given all available resources at the time of admission. Route is PO if not otherwise noted.      Admission Status:[id number]:::1}      Medications Reviewed:     Current Facility-Administered Medications   Medication Dose Route Frequency    sodium chloride flush 0.9 % injection 5-40 mL  5-40 mL IntraVENous 2 times per day    sodium chloride flush 0.9 % injection 5-40 mL  5-40 mL IntraVENous PRN    0.9 % sodium chloride infusion   IntraVENous PRN    ondansetron (ZOFRAN-ODT) disintegrating tablet 4 mg  4 mg Oral Q8H PRN    Or    ondansetron (ZOFRAN) injection 4 mg  4 mg IntraVENous Q6H PRN    polyethylene glycol (GLYCOLAX) packet 17 g  17 g Oral Daily PRN    acetaminophen (TYLENOL) tablet 650 mg  650 mg Oral Q6H PRN    Or

## 2024-02-15 NOTE — PROGRESS NOTES
[facility] CARDIOLOGY  Cardiology Care Note                  [x]Initial visit     []Established visit     Patient Name: Nolvia Silver - :1950 - MRN:[id number]  Primary Cardiologist: Heidi Guardado MD  Consulting Cardiologist: Alphonse Wick MD     Reason for initial visit:    SUBJECTIVE:  Pt feels better today. Denies chest pain or SOB currently. Denies orthopnea.  Feels better.    Reports improved BLE edema.      Assessment and Plan   This is a late entry the patient was seen on the day of the note on 15 February.  Patient seen on the day of progress note and examined  and agree with Advance Practice Provider (BELINAD, NP,PA)  assessment and plans.  Decompensated heart failure now better much better today.  Starting small dose of afterload reduction agent form of Isordil will consider adding small dose of hydralazine tomorrow if blood pressure is acceptable.  Follow creatinine.  She has fluctuated widely throughout the course of the years.   Acute on chronic HFrEF, NYHA IV on admission. SOB now much improved but still crackles on exam. EF 20-25% on limited Echo 2023, Repeat echo read pending. Held further diuretics for now due to bump in creatinine.      History Cardiomyopathy, NICM, dilated CM: EF 20-25% 2023 (from 25-30% , 25% in 2018) GDMT: on coreg. Not on ACE-I/ARB/ARNI/spironolactone given renal dysfunction. Not on Jardiance (creatinine clearance 14 ml/min). Added low dose isosorbide. Had remote [facility]  with NL cors.  Most recent stress test 23 with no ischemia but inferolateral infarct per read. Troponin is normal here and EKG shows NSR, nonspecific st/t wave abnoramality.    S/p AICD: follows with Dr. Moreno    BLE edema: check venous doppler   HTN: BP overall controlled. Continue Coreg.  CKD: Held diuretic for now. Renal following.- creatinine up to 3.01 today. Defer to renal.   Dyslipidemia: on statin.  (GLYCOLAX) packet 17 g, 17 g, Oral, Daily PRN, Adrián Flores MD    acetaminophen (TYLENOL) tablet 650 mg, 650 mg, Oral, Q6H PRN **OR** acetaminophen (TYLENOL) suppository 650 mg, 650 mg, Rectal, Q6H PRN, Adrián Flores MD    heparin (porcine) injection 5,000 Units, 5,000 Units, SubCUTAneous, 3 times per day, Adrián Flores MD    atorvastatin (LIPITOR) tablet 10 mg, 10 mg, Oral, Nightly, Romie Arnett MD, 10 mg at 02/14/24 2102    bumetanide (BUMEX) injection 1 mg, 1 mg, IntraVENous, BID, Waleska Roberts APRN - NP, 1 mg at 02/15/24 0911    isosorbide dinitrate (ISORDIL) tablet 5 mg, 5 mg, Oral, BID, Waleska Roberts APRN - NP, 5 mg at 02/15/24 0731    ferrous sulfate (IRON 325) tablet 325 mg, 325 mg, Oral, Daily with breakfast, Adrián Flores MD, 325 mg at 02/15/24 0731    carvedilol (COREG) tablet 12.5 mg, 12.5 mg, Oral, BID WC, Adrián Flores MD, 12.5 mg at 02/15/24 0731    mirtazapine (REMERON) tablet 15 mg, 15 mg, Oral, Nightly, Adrián Flores MD, 15 mg at 02/14/24 2102    glucose chewable tablet 16 g, 4 tablet, Oral, PRN, Adrián Flores MD    dextrose bolus 10% 125 mL, 125 mL, IntraVENous, PRN **OR** dextrose bolus 10% 250 mL, 250 mL, IntraVENous, PRN, Adrián Flores MD    glucagon injection 1 mg, 1 mg, SubCUTAneous, PRN, Adrián Flores MD    dextrose 10 % infusion, , IntraVENous, Continuous PRN, Adrián Flores MD    insulin lispro (HUMALOG) injection vial 0-4 Units, 0-4 Units, SubCUTAneous, TID WC, Adrián Flores MD, 1 Units at 02/14/24 1809    insulin lispro (HUMALOG) injection vial 0-4 Units, 0-4 Units, SubCUTAneous, Nightly, Adrián Flores MD Lisa Zimmer, DOUGIE - NP    [facility] Cardiology  Call center: (P) [phone]  (F) [phone]

## 2024-02-15 NOTE — PROGRESS NOTES
Physical Therapy    Pt orders acknowledged, chart reviewed and eval attempted. Pt requests defer d/t difficulty getting breakfast. Will return later to perform evaluation.     Attempted later on in the day and patient asleep. Per OT patient does not want therapy and deferred at this time. Will attempt again tomorrow.    Milka Gonzalez, DPT, PT

## 2024-02-15 NOTE — PROGRESS NOTES
Physician Progress Note      PATIENT:               FLAKO BUCK  CSN #:                  [id number]  :                       1950  ADMIT DATE:       2024 6:16 PM  DISCH DATE:  RESPONDING  PROVIDER #:        Romie Arnett MD          QUERY TEXT:    Pt admitted with Acute on chronic HFrEF. Pt noted to have an Increase in SCr   by greater than or equal to 0.3 mg/dl within 48 hours, from 2.58 on  to   3.01 on 2/15 . If possible, please document in the progress notes and   discharge summary if you are evaluating and/or treating any of the following:    The medical record reflects the following:  Risk Factors: 73 y.o. Female with CKD stage 4: Serum Cr 2.8mg/dl. Baseline   2.3-2.6mg/dl, DM, CHF, HTN with a FR 1.5L/day  Clinical Indicators:  Lab results -2/15  Cr: 2.74-2.58-3.01  BUN: 24-26-25  eGFR: 18-19-16    Treatment: IV Bumex 1mg BID, bladder scans, Strict I/Os, Avoid nephrotoxins,   BMP monitoring, Nephrology consult    Thank you,  Chika Pagan, ALBAN, RN, CCRN, CRCR  [employer]: [phone]  I am also available via PS.    Defined by Kidney Disease Improving Global Outcomes (KDIGO) clinical practice   guideline for acute kidney injury:  -Increase in SCr by greater than or equal to 0.3 mg/dl within 48 hours; or  -Increase or decrease in SCr to greater than or equal to 1.5 times baseline,   which is known or presumed to have occurred within the prior 7 days; or  -Urine volume < 0.5ml/kg/h for 6 hours  Options provided:  -- Acute kidney failure  -- Acute kidney injury  -- Other - I will add my own diagnosis  -- Disagree - Not applicable / Not valid  -- Disagree - Clinically unable to determine / Unknown  -- Refer to Clinical Documentation Reviewer    PROVIDER RESPONSE TEXT:    This patient has an Acute kidney injury.    Query created by: Chika Pagan on 2/15/2024 1:10 PM      Electronically signed by:  Romie Arnett MD 2/15/2024 6:06 PM

## 2024-02-15 NOTE — PROGRESS NOTES
Name: Nolvia Silver   MRN: [id number]  : 1950    Nephrology Progress Note    Assessment:  CKD stage 4: Serum Cr 2.8-> 3mg/dl with needed diuresis. Baseline 2.3-2.6mg/dl. Underlying DM nephropathy. Followed by Dr. Madelyn Wallace.     Decompensated Systolic CHF: EF 20-25%:      HTN: stable     DM2: Last HgbA1c 8.2     Anemia: Hgb low/stable    Plan/Recommendations:  Stop IV Bumex. Switch back to home regimen of Lasix-> would like to see Cr prior to dosing tomorrow am  Discharge planning -> likely tomorrow  Strict I/Os  Avoid nephrotoxins  Am labs      Subjective:  Feels significantly better. RN at bedside.   Good appetite    ROS:   No nausea, no vomiting  No chest pain, no shortness of breath    Exam:  /72   Pulse 77   Temp 97.7 °F (36.5 °C) (Oral)   Resp 18   Ht 1.6 m (5' 2.99\")   Wt 54.6 kg (120 lb 5.9 oz)   SpO2 95%   BMI 21.33 kg/m²     Gen: NAD  HEENT: No icterus, mmm,  AT/NC  Lungs/Chest wall: Clear. Equal BS. No respiratory distress  Cardiovascular: Regular rate, normal rhythm.   Abdomen/: Soft, NT,  BS+  Ext: No peripheral edema  CNS: alert and awake.     Current Facility-Administered Medications   Medication Dose Route Frequency Provider Last Rate Last Admin    [START ON 2024] furosemide (LASIX) tablet 80 mg  80 mg Oral Daily Mitchel Weaver MD        sodium chloride flush 0.9 % injection 5-40 mL  5-40 mL IntraVENous 2 times per day Adrián Flores MD   10 mL at 02/15/24 0911    sodium chloride flush 0.9 % injection 5-40 mL  5-40 mL IntraVENous PRN Adrián Flores MD        0.9 % sodium chloride infusion   IntraVENous PRN Adrián Flores MD        ondansetron (ZOFRAN-ODT) disintegrating tablet 4 mg  4 mg Oral Q8H PRN Adrián Flores MD        Or    ondansetron (ZOFRAN) injection 4 mg  4 mg IntraVENous Q6H PRN Adrián Flores MD

## 2024-02-16 VITALS
TEMPERATURE: 98.2 F | HEART RATE: 80 BPM | DIASTOLIC BLOOD PRESSURE: 63 MMHG | HEIGHT: 63 IN | SYSTOLIC BLOOD PRESSURE: 126 MMHG | RESPIRATION RATE: 17 BRPM | OXYGEN SATURATION: 100 % | BODY MASS INDEX: 22.62 KG/M2 | WEIGHT: 127.7 LBS

## 2024-02-16 LAB
ALBUMIN SERPL-MCNC: 3.1 G/DL (ref 3.5–5)
ALBUMIN/GLOB SERPL: 0.8 (ref 1.1–2.2)
ALP SERPL-CCNC: 83 U/L (ref 45–117)
ALT SERPL-CCNC: 17 U/L (ref 12–78)
ANION GAP SERPL CALC-SCNC: 7 MMOL/L (ref 5–15)
AST SERPL-CCNC: 12 U/L (ref 15–37)
BILIRUB SERPL-MCNC: 0.5 MG/DL (ref 0.2–1)
BUN SERPL-MCNC: 69 MG/DL (ref 6–20)
BUN/CREAT SERPL: 24 (ref 12–20)
CALCIUM SERPL-MCNC: 8.6 MG/DL (ref 8.5–10.1)
CHLORIDE SERPL-SCNC: 109 MMOL/L (ref 97–108)
CO2 SERPL-SCNC: 23 MMOL/L (ref 21–32)
CREAT SERPL-MCNC: 2.9 MG/DL (ref 0.55–1.02)
ERYTHROCYTE [DISTWIDTH] IN BLOOD BY AUTOMATED COUNT: 16 % (ref 11.5–14.5)
GLOBULIN SER CALC-MCNC: 3.7 G/DL (ref 2–4)
GLUCOSE BLD STRIP.AUTO-MCNC: 156 MG/DL (ref 65–117)
GLUCOSE BLD STRIP.AUTO-MCNC: 257 MG/DL (ref 65–117)
GLUCOSE SERPL-MCNC: 197 MG/DL (ref 65–100)
HCT VFR BLD AUTO: 31.6 % (ref 35–47)
HGB BLD-MCNC: 10 G/DL (ref 11.5–16)
MAGNESIUM SERPL-MCNC: 2.3 MG/DL (ref 1.6–2.4)
MCH RBC QN AUTO: 31.4 PG (ref 26–34)
MCHC RBC AUTO-ENTMCNC: 31.6 G/DL (ref 30–36.5)
MCV RBC AUTO: 99.4 FL (ref 80–99)
NRBC # BLD: 0 K/UL (ref 0–0.01)
NRBC BLD-RTO: 0 PER 100 WBC
PHOSPHATE SERPL-MCNC: 3.8 MG/DL (ref 2.6–4.7)
PLATELET # BLD AUTO: 161 K/UL (ref 150–400)
PMV BLD AUTO: 11.1 FL (ref 8.9–12.9)
POTASSIUM SERPL-SCNC: 4.7 MMOL/L (ref 3.5–5.1)
PROT SERPL-MCNC: 6.8 G/DL (ref 6.4–8.2)
RBC # BLD AUTO: 3.18 M/UL (ref 3.8–5.2)
SERVICE CMNT-IMP: ABNORMAL
SERVICE CMNT-IMP: ABNORMAL
SODIUM SERPL-SCNC: 139 MMOL/L (ref 136–145)
WBC # BLD AUTO: 6.2 K/UL (ref 3.6–11)

## 2024-02-16 PROCEDURE — 6370000000 HC RX 637 (ALT 250 FOR IP): Performed by: FAMILY MEDICINE

## 2024-02-16 PROCEDURE — 82962 GLUCOSE BLOOD TEST: CPT

## 2024-02-16 PROCEDURE — 2580000003 HC RX 258: Performed by: FAMILY MEDICINE

## 2024-02-16 PROCEDURE — 36415 COLL VENOUS BLD VENIPUNCTURE: CPT

## 2024-02-16 PROCEDURE — 83735 ASSAY OF MAGNESIUM: CPT

## 2024-02-16 PROCEDURE — 6370000000 HC RX 637 (ALT 250 FOR IP): Performed by: NURSE PRACTITIONER

## 2024-02-16 PROCEDURE — 84100 ASSAY OF PHOSPHORUS: CPT

## 2024-02-16 PROCEDURE — 51798 US URINE CAPACITY MEASURE: CPT

## 2024-02-16 PROCEDURE — 80053 COMPREHEN METABOLIC PANEL: CPT

## 2024-02-16 PROCEDURE — 99232 SBSQ HOSP IP/OBS MODERATE 35: CPT | Performed by: SPECIALIST

## 2024-02-16 PROCEDURE — 85027 COMPLETE CBC AUTOMATED: CPT

## 2024-02-16 PROCEDURE — 6370000000 HC RX 637 (ALT 250 FOR IP): Performed by: HOSPITALIST

## 2024-02-16 RX ORDER — HYDRALAZINE HYDROCHLORIDE 10 MG/1
10 TABLET, FILM COATED ORAL EVERY 8 HOURS SCHEDULED
Qty: 90 TABLET | Refills: 3 | Status: SHIPPED | OUTPATIENT
Start: 2024-02-16

## 2024-02-16 RX ORDER — HYDRALAZINE HYDROCHLORIDE 10 MG/1
10 TABLET, FILM COATED ORAL EVERY 8 HOURS SCHEDULED
Status: DISCONTINUED | OUTPATIENT
Start: 2024-02-16 | End: 2024-02-16 | Stop reason: HOSPADM

## 2024-02-16 RX ORDER — ISOSORBIDE DINITRATE 5 MG/1
5 TABLET ORAL 2 TIMES DAILY
Qty: 90 TABLET | Refills: 3 | Status: SHIPPED | OUTPATIENT
Start: 2024-02-16

## 2024-02-16 RX ORDER — BENZONATATE 100 MG/1
100 CAPSULE ORAL 3 TIMES DAILY PRN
Qty: 30 CAPSULE | Refills: 0 | Status: SHIPPED | OUTPATIENT
Start: 2024-02-16 | End: 2024-02-23

## 2024-02-16 RX ADMIN — INSULIN LISPRO 2 UNITS: 100 INJECTION, SOLUTION INTRAVENOUS; SUBCUTANEOUS at 11:45

## 2024-02-16 RX ADMIN — ISOSORBIDE DINITRATE 5 MG: 10 TABLET ORAL at 07:02

## 2024-02-16 RX ADMIN — ISOSORBIDE DINITRATE 5 MG: 10 TABLET ORAL at 13:47

## 2024-02-16 RX ADMIN — CARVEDILOL 12.5 MG: 12.5 TABLET, FILM COATED ORAL at 08:46

## 2024-02-16 RX ADMIN — HYDRALAZINE HYDROCHLORIDE 10 MG: 10 TABLET, FILM COATED ORAL at 13:47

## 2024-02-16 RX ADMIN — Medication 10 ML: at 08:46

## 2024-02-16 RX ADMIN — FERROUS SULFATE TAB 325 MG (65 MG ELEMENTAL FE) 325 MG: 325 (65 FE) TAB at 08:46

## 2024-02-16 RX ADMIN — BENZONATATE 100 MG: 100 CAPSULE ORAL at 07:02

## 2024-02-16 NOTE — DISCHARGE SUMMARY
Discharge Summary       PATIENT ID: Nolvia Silver  MRN: [id number]   YOB: 1950    DATE OF ADMISSION: 2/13/2024  6:16 PM    DATE OF DISCHARGE: 2/16/2024   PRIMARY CARE PROVIDER: No primary care provider on file.     ATTENDING PHYSICIAN: Dr Romie Arnett  DISCHARGING PROVIDER: Romie Arnett MD    To contact this individual call [phone] and ask the  to page.  If unavailable ask to be transferred the Adult Hospitalist Department.    CONSULTATIONS: IP CONSULT TO NEPHROLOGY  IP CONSULT TO CARDIOLOGY    PROCEDURES/SURGERIES: * No surgery found *    ADMITTING DIAGNOSES & HOSPITAL COURSE:   Acute on chronic HFrEF (heart failure with reduced ejection fraction) ([facility])  SOB--improving  Bilateral pedal edema--improved  -s/p cardiac pacemaker  -was on IV lasix, held 2/15  -last TTE 8/30/2023: EF 20% to 25% with severe global hypokinesis  -Strict I's and O's  -Daily weights to monitor fluid status closely  -On Statin/Coreg/Isordil  -No ACE/ARB or Spironolactone or Jardiance ordered due to elevated creatinine  -IV diuresis held sec to bump in creatinine  -Appreciate discussion with Cardiology, ok to discharge, add hydralazine  -Appreciate discussion with Nephrology, ok to discharge, restart lasix from tomorrow. Outpatient follow up in 1 week     Generalized weakness  -PT/OT     Macrocytic anemia: monitor  Hyperlipidemia: on lipitor      PENDING TEST RESULTS:   At the time of discharge the following test results are still pending: none    FOLLOW UP APPOINTMENTS:    PCP  Nephrology  Cardiology    ADDITIONAL CARE RECOMMENDATIONS:   Please restart Lasix from 2/17  Daily weight, if weight gain >2lbs in 3-4 days please call your cardiology  Please expect a blood work when you see your nephrologist    DIET: cardiac diet    ACTIVITY: activity as tolerated      DISCHARGE MEDICATIONS:     Medication List        START taking these medications      isosorbide dinitrate 5 MG tablet  Commonly known as: ISORDIL  Take 1

## 2024-02-16 NOTE — PROGRESS NOTES
Spiritual Care Assessment/Progress Note  [facility]    Name: Nolvia Silver MRN: [id number]    Age: 73 y.o.     Sex: female   Language: English     Date: 2/16/2024            Total Time Calculated: 13 min              Spiritual Assessment begun in [facility]  Service Provided For:: Patient  Referral/Consult From:: Rounding  Encounter Overview/Reason : Initial Encounter    Spiritual beliefs:      [] Involved in a eliza tradition/spiritual practice:      [] Supported by a eliza community:      [] Claims no spiritual orientation:      [] Seeking spiritual identity:           [] Adheres to an individual form of spirituality:      [] Not able to assess:                Identified resources for coping and support system:   Support System: Children       [] Prayer                  [] Devotional reading               [] Music                  [] Guided Imagery     [] Pet visits                                        [] Other: (COMMENT)     Specific area/focus of visit   Encounter:    Crisis:    Spiritual/Emotional needs: Type: Spiritual Support  Ritual, Rites and Sacraments:    Grief, Loss, and Adjustments:    Ethics/Mediation:    Behavioral Health:    Palliative Care:    Advance Care Planning:      Plan/Referrals: Provided reading/devotional materials, Developed Care Plan (see consult note)    Narrative:     This is a random initial visit to a patient in . Pt is awake. Pt says she has no concerns as of the moment. She says she prayed for the patient next to her. Pt has strong support from his eliza community and her family. Pt feels blessed by God as she did a life review.  provided active listening. Pt mentioned that she forgot to bring her bible.  provided devotional material and a bible.  assures the pt of his continued prayers. Pt expresses gratitude. Pt is informed of the availability of chaplains.     Burak clark  [phone]

## 2024-02-16 NOTE — PROGRESS NOTES
[facility] Adult  Hospitalist Group                                                                                          Hospitalist Progress Note  Romie Arnett MD  Office Phone: [phone]        Date of Service:  2024  NAME:  Nolvia Silver  :  1950  MRN:  [id number]       Admission Summary:   Nolvia Silver is a 73 y.o. female with past medical history of chronic heart failure reduced ejection fraction (HFrEF), nonischemic cardiomyopathy, status post cardiac pacemaker implantation, CKD, hyperlipidemia presented to the emergency department via EMS with chief complaints of shortness of breath, leg swelling, unintentional weight loss, and generalized increased weakness.  Symptoms onset reported began several weeks ago but notably worsened yesterday, remain constant, severe, without specific alleviating factors.  Per ER triage note, patient expiratory wheezing.  Patient has a history of chronic CHF and is on Lasix at home for the same.  Patient notes having about 5 pound weight loss in the past 2 weeks.  On arrival in the ED, initial recorded vital signs were temperature 97.3 °F, /93, heart rate 70, respiratory rate 17, O2 saturation is 96% on room air.  Abnormal labs included hemoglobin 10.6, .0, BUN 67, creatinine 2.74, GFR 18, blood glucose 161, calcium 8.3, proBNP > 35,000.  COVID-19 test and influenza A/B antigen test were negative.  Chest reportable showed trace bilateral pleural effusions.  ED ordered Bumex 1 mg IV x 1 dose.  Patient notes he is yet to void urine.  However she notes that her breathing has improved.  She denies any wheezing and has no history of COPD or asthma.  Patient is now seen for admission to the hospitalist service.       Interval history / Subjective:   Follow up CHF  Feels much better  Pedal edema has improved as well  Continues to room air  Creatinine improved     Assessment & Plan:     Acute on chronic HFrEF (heart failure with reduced  Interpersonal Safety ([facility] HRSN)     How often does anyone, including family and friends, physically hurt you?: Never     How often does anyone, including family and friends, scream or curse at you?: Not on file     How often does anyone, including family and friends, insult or talk down to you?: Not on file     How often does anyone, including family and friends, threaten you with harm?: Not on file   Utilities: Not At Risk (2/14/2024)    [facility] Utilities     Threatened with loss of utilities: No       Review of Systems:   Pertinent items are noted in HPI.       Vital Signs:    Last 24hrs VS reviewed since prior progress note. Most recent are:  Vitals:    02/16/24 1000   BP:    Pulse: 88   Resp:    Temp:    SpO2:          Intake/Output Summary (Last 24 hours) at 2/16/2024 1052  Last data filed at 2/16/2024 1036  Gross per 24 hour   Intake 730 ml   Output 2500 ml   Net -1770 ml          Physical Examination:     I had a face to face encounter with this patient and independently examined them on 2/16/2024 as outlined below:          General : alert x 3, awake, no acute distress,   HEENT: PEERL, EOMI, moist mucus membrane, TM clear  Neck: supple, no JVD, no meningeal signs  Chest: Clear to auscultation bilaterally   CVS: S1 S2 heard, Capillary refill less than 2 seconds  Abd: soft/ non tender, non distended, BS physiological,   Ext: no clubbing, no cyanosis, no edema, brisk 2+ DP pulses  Neuro/Psych: pleasant mood and affect, CN 2-12 grossly intact, sensory grossly within normal limit, Strength 5/5 in all extremities, DTR 1+ x 4  Skin: warm     Data Review:    Review and/or order of clinical lab test      I have personally and independently reviewed all pertinent labs, diagnostic studies, imaging, and have provided independent interpretation of the same.     Labs:     Recent Labs     02/15/24  0430 02/16/24  0443   WBC 5.7 6.2   HGB 9.9* 10.0*   HCT 31.0* 31.6*    161       Recent Labs     02/14/24  0657

## 2024-02-16 NOTE — NURSE NAVIGATOR
Heart Failure Nurse Navigator rounds completed.    Patient states she has been living with heart failure a long time.  She has educational material at home.  She does daily weights and is aware of the 3 lbs overnight or 5 lbs in a week as a trigger to call cardiology.  She does the cooking at home and is makes a low salt diet.  She is not sure what caused this heart failure exacerbation.  Her daughter recently went blind and is no longer able to drive her to appointments but she has friends that are able to provide transportation.  Patient lives with her son and .   Advised patient that follow up appointment will be scheduled and placed on Discharge Instructions prior to discharge. Will continue to follow until discharge.        Time spent with patient discussing HF education:  10 minutes

## 2024-02-16 NOTE — PLAN OF CARE
Problem: Discharge Planning  Goal: Discharge to home or other facility with appropriate resources  Outcome: Completed     Problem: Discharge Planning  Goal: Discharge to home or other facility with appropriate resources  Outcome: Completed     Problem: Safety - Adult  Goal: Free from fall injury  Outcome: Completed     Problem: Skin/Tissue Integrity  Goal: Absence of new skin breakdown  Description: 1.  Monitor for areas of redness and/or skin breakdown  2.  Assess vascular access sites hourly  3.  Every 4-6 hours minimum:  Change oxygen saturation probe site  4.  Every 4-6 hours:  If on nasal continuous positive airway pressure, respiratory therapy assess nares and determine need for appliance change or resting period.  Outcome: Completed     Problem: Chronic Conditions and Co-morbidities  Goal: Patient's chronic conditions and co-morbidity symptoms are monitored and maintained or improved  Outcome: Completed

## 2024-02-16 NOTE — PROGRESS NOTES
Pt refusing PT evaluation again today. Pt states that she has been walking in room and denies need for PT, refusing HH offered by OT. Will complete order as skilled PT not desired    Milka Gonzalez, TRACIT, PT

## 2024-02-16 NOTE — PROGRESS NOTES
(ZOFRAN-ODT) disintegrating tablet 4 mg  4 mg Oral Q8H PRN Adrián Flores MD        Or    ondansetron (ZOFRAN) injection 4 mg  4 mg IntraVENous Q6H PRN Adrián Flores MD        polyethylene glycol (GLYCOLAX) packet 17 g  17 g Oral Daily PRN Adrián Flores MD        acetaminophen (TYLENOL) tablet 650 mg  650 mg Oral Q6H PRN Adrián Flores MD        Or    acetaminophen (TYLENOL) suppository 650 mg  650 mg Rectal Q6H PRN Adrián Flores MD        heparin (porcine) injection 5,000 Units  5,000 Units SubCUTAneous 3 times per day Adrián Flores MD        atorvastatin (LIPITOR) tablet 10 mg  10 mg Oral Nightly Romie Arnett MD   10 mg at 02/15/24 2116    isosorbide dinitrate (ISORDIL) tablet 5 mg  5 mg Oral BID Waleska Roberts APRN - NP   5 mg at 02/16/24 1347    ferrous sulfate (IRON 325) tablet 325 mg  325 mg Oral Daily with breakfast Adrián Flores MD   325 mg at 02/16/24 0846    carvedilol (COREG) tablet 12.5 mg  12.5 mg Oral BID WC Adrián Flores MD   12.5 mg at 02/16/24 0846    mirtazapine (REMERON) tablet 15 mg  15 mg Oral Nightly Adrián Flores MD   15 mg at 02/15/24 2116    glucose chewable tablet 16 g  4 tablet Oral PRN Adrián Flores MD        dextrose bolus 10% 125 mL  125 mL IntraVENous PRN Adrián Flores MD        Or    dextrose bolus 10% 250 mL  250 mL IntraVENous PRN Adrián Flores MD        glucagon injection 1 mg  1 mg SubCUTAneous PRN Adrián Flores MD        dextrose 10 % infusion   IntraVENous Continuous PRN Adrián Flores MD        insulin lispro (HUMALOG) injection vial 0-4 Units  0-4 Units SubCUTAneous TID  Adrián Flores MD   2 Units at 02/16/24 1145    insulin lispro (HUMALOG) injection vial 0-4 Units  0-4 Units SubCUTAneous Nightly Adrián Flores MD            Labs/Data:    Lab Results   Component Value Date    WBC 6.2 02/16/2024    HGB 10.0 (L) 02/16/2024    HCT 31.6 (L) 02/16/2024    MCV 99.4 (H) 02/16/2024     02/16/2024       Lab Results    Component Value Date/Time     02/16/2024 04:43 AM    K 4.7 02/16/2024 04:43 AM     02/16/2024 04:43 AM    CO2 23 02/16/2024 04:43 AM    BUN 69 02/16/2024 04:43 AM    CREATININE 2.90 02/16/2024 04:43 AM    GLUCOSE 197 02/16/2024 04:43 AM    CALCIUM 8.6 02/16/2024 04:43 AM    LABGLOM 17 02/16/2024 04:43 AM    LABGLOM 15 04/20/2022 11:10 AM        Wt Readings from Last 3 Encounters:   02/16/24 57.9 kg (127 lb 11.2 oz)   08/31/23 56.7 kg (125 lb)   08/30/23 56.7 kg (125 lb)         Intake/Output Summary (Last 24 hours) at 2/16/2024 1548  Last data filed at 2/16/2024 1036  Gross per 24 hour   Intake 490 ml   Output 2100 ml   Net -1610 ml         Patient seen and examined. Chart reviewed. Labs, data and other pertinent notes reviewed in last 24 hrs.    PMH/SH/FH reviewed and unchanged compared to H&P    Discussed with patient and Dr. Hope Weaver MD  [facility] ([facility])

## 2024-02-16 NOTE — DISCHARGE INSTRUCTIONS
Discharge Instructions       PATIENT ID: Nolvia Silver  MRN: [id number]   YOB: 1950    DATE OF ADMISSION: [unfilled]    DATE OF DISCHARGE: 2/16/2024    PRIMARY CARE PROVIDER: @PCP@     ATTENDING PHYSICIAN: [unfilled]  DISCHARGING PROVIDER: Romie Arnett MD    To contact this individual call [phone] and ask the  to page.   If unavailable ask to be transferred the Adult Hospitalist Department.    DISCHARGE DIAGNOSES CHF    CONSULTATIONS: Cardiology, Nephrology    PROCEDURES/SURGERIES: * No surgery found *    PENDING TEST RESULTS:   At the time of discharge the following test results are still pending: as above    FOLLOW UP APPOINTMENTS:   PCP  Nephrology  Cardiology    ADDITIONAL CARE RECOMMENDATIONS:   Please restart Lasix from 2/17  Daily weight, if weight gain >2lbs in 3-4 days please call your cardiology  Please expect a blood work when you see your nephrologist    DIET: cardiac diet    ACTIVITY: activity as tolerated      DISCHARGE MEDICATIONS:   See Medication Reconciliation Form    It is important that you take the medication exactly as they are prescribed.   Keep your medication in the bottles provided by the pharmacist and keep a list of the medication names, dosages, and times to be taken in your wallet.   Do not take other medications without consulting your doctor.       NOTIFY YOUR PHYSICIAN FOR ANY OF THE FOLLOWING:   Fever over 101 degrees for 24 hours.   Chest pain, shortness of breath, fever, chills, nausea, vomiting, diarrhea, change in mentation, falling, weakness, bleeding. Severe pain or pain not relieved by medications.  Or, any other signs or symptoms that you may have questions about.      DISPOSITION:   x Home With:   OT  PT  HH  RN       SNF/Inpatient Rehab/LTAC    Independent/assisted living    Hospice    Other:     CDMP Checked:   Yes x     PROBLEM LIST Updated:  Yes x       Signed:   Romie Arnett MD  2/16/2024  11:24 AM

## 2024-02-16 NOTE — NURSE NAVIGATOR
HF NN called [facility] Cardiology to get patient scheduled for follow up appointment.  First available appointment with any provider is on 3/8/24 at 1:40 PM with Denise Russell NP.  Details on AVS.

## 2024-02-16 NOTE — PROGRESS NOTES
[facility] CARDIOLOGY  Cardiology Care Note                  [x]Initial visit     []Established visit     Patient Name: Nolvia Silver - :1950 - MRN:[id number]  Primary Cardiologist: Heidi Guardado MD  Consulting Cardiologist: Alphonse Wick MD     Reason for initial visit:    SUBJECTIVE: Better today and back to baseline she tells me that shortness of breath complete improved.  No chest pain reported     Assessment and Plan   1.  Cardiomyopathy: Likely nonischemic cardiomyopathy.  Repeated echo shows again during this hospital admission ejection fraction 20 to 25% with mild to moderate mitral regurgitation.    Clinically compensated at this time.    Continue same dose of Coreg 12.5 mg p.o. twice daily.  Not on ACE or Arni or ARB on account of renal dysfunction for the same reason as she is not on Aldactone or Jardiance at this time.    Started small dose of afterload reduction with Isordil.  She seems to be tolerating that okay thus far.  Consider adding hydralazine as an outpatient.    Upon admission the EKG showed normal sinus rhythm with nonspecific ST-T changes and normal troponin.    Nuclear stress test showed 2023 with no ischemia but inferolateral fixed defect.    She is status post AICD placement in the past.    Consider resuming Lasix 80 mg p.o. daily if also With nephrology upon discharge.    2.  Pretension: Well-controlled.    3.  CKD: Followed by nephrology.    4.  Hyperlipidemia: On statin.    No additional cardiac interventions at this time I will sign off okay to discharge from my standpoint follow-up with Dr. Guardado next couple weeks      24    ECHO (TTE) COMPLETE (PRN CONTRAST/BUBBLE/STRAIN/3D) 02/15/2024  1:07 PM (Final)    Interpretation Summary    Left Ventricle: Severely reduced left ventricular systolic function with a visually estimated EF of 20 - 25%. Left ventricle is mildly dilated.  (HUMALOG) injection vial 0-4 Units, 0-4 Units, SubCUTAneous, TID WC, Adrián Flores MD, 1 Units at 02/15/24 1742    insulin lispro (HUMALOG) injection vial 0-4 Units, 0-4 Units, SubCUTAneous, Nightly, Adrián Flores MD Massimo Giusti, MD    [facility] Cardiology  Call center: (P) [phone]  (F) [phone]

## 2024-02-16 NOTE — PROGRESS NOTES
Hospital follow-up NEW PCP transitional care appointment has been scheduled with Dr. Kelly Crenshaw for Monday, March 11th, 2024 at 11:30 a.m.. Please plan to arrive 20 min early with Insurance card, ID Pending patient discharge summary, list of medications.  Mikayla Moralez, Care Management Assistant

## 2024-02-19 ENCOUNTER — TELEPHONE (OUTPATIENT)
Age: 74
End: 2024-02-19

## 2024-02-19 ENCOUNTER — TELEPHONE (OUTPATIENT)
Facility: HOSPITAL | Age: 74
End: 2024-02-19

## 2024-02-19 NOTE — TELEPHONE ENCOUNTER
Left VM on an identified line of Corazon Pool (alt. Contact) of date, time, and location of Nolvia Silver's hosp f/u appt. There was no vm options for the numbers of the patient.  Left  [phone] for questions.

## 2024-02-20 ENCOUNTER — TELEPHONE (OUTPATIENT)
Facility: HOSPITAL | Age: 74
End: 2024-02-20

## 2024-02-21 DIAGNOSIS — I10 ESSENTIAL HYPERTENSION, BENIGN: ICD-10-CM

## 2024-02-21 DIAGNOSIS — I50.22 CHRONIC SYSTOLIC (CONGESTIVE) HEART FAILURE (HCC): Primary | ICD-10-CM

## 2024-02-21 RX ORDER — CARVEDILOL 12.5 MG/1
TABLET ORAL
Qty: 180 TABLET | Refills: 3 | Status: SHIPPED | OUTPATIENT
Start: 2024-02-21

## 2024-02-21 NOTE — TELEPHONE ENCOUNTER
Per VO by MD.    Future Appointments   Date Time Provider Department [place]   2/26/2024 11:00 AM Heidi Guardado MD [facility]   3/8/2024  1:40 PM Denise Zuniga APRN - NP RD [facility] AMB   3/18/2024 11:30 AM Kelly Crenshaw MD [facility] AMB   8/28/2024  2:00 PM Heidi Guardado MD [facility] AM[facility]   1/15/2025  9:40 AM AAKASH VARGAS [facility] AMB   1/15/2025 10:00 AM Mirella Wheatley APRN - NP RD [facility] AMB

## 2024-02-29 DIAGNOSIS — E78.1 PURE HYPERGLYCERIDEMIA: ICD-10-CM

## 2024-02-29 RX ORDER — SIMVASTATIN 20 MG
20 TABLET ORAL NIGHTLY
Qty: 30 TABLET | Refills: 0 | Status: SHIPPED | OUTPATIENT
Start: 2024-02-29

## 2024-02-29 NOTE — TELEPHONE ENCOUNTER
This is a patient of Dr OBED Brooks, who previously worked in this practice. I have been asked to provide a medication refill as bridge until the patient can establish with a new primary care doctor. I have reviewed the most recent progress notes and labs.     Will establish with Dr Crenshaw 3/18/24. Will refill simvastatin so that she doesn't run out.  Due for lipid lab check.    Mirella Odonnell MD

## 2024-04-04 DIAGNOSIS — E78.1 PURE HYPERGLYCERIDEMIA: ICD-10-CM

## 2024-04-04 NOTE — TELEPHONE ENCOUNTER
Noted last refill on 2/29/24 pt was to est care with new pcp Dr Crenshaw on 3/18/24. Left message for pt to call back.

## 2024-04-05 RX ORDER — SIMVASTATIN 20 MG
TABLET ORAL
Qty: 30 TABLET | Refills: 0 | Status: SHIPPED | OUTPATIENT
Start: 2024-04-05

## 2024-04-05 NOTE — TELEPHONE ENCOUNTER
Spoke with pt - advised her with her last refill on 2/29/24 pt was to est care with new pcp Dr Crenshaw on 3/18/24. She states she never heard of this doctor. Advised her in chart she did not show for appt with them. She states she thinks she missed it as her daughter Corazon had health issue with her sight but better now. Advised pt she still needs to schedule with new pcp. I will call her daughter as a reminder.    Called pt's daughter Corazon - left detailed message about rescheduling appt for new pcp.

## 2024-04-09 ENCOUNTER — APPOINTMENT (OUTPATIENT)
Facility: HOSPITAL | Age: 74
DRG: 291 | End: 2024-04-09
Payer: MEDICARE

## 2024-04-09 ENCOUNTER — HOSPITAL ENCOUNTER (INPATIENT)
Facility: HOSPITAL | Age: 74
LOS: 6 days | Discharge: HOME HEALTH | DRG: 291 | End: 2024-04-15
Attending: EMERGENCY MEDICINE | Admitting: FAMILY MEDICINE
Payer: MEDICARE

## 2024-04-09 DIAGNOSIS — I50.23 ACUTE ON CHRONIC HFREF (HEART FAILURE WITH REDUCED EJECTION FRACTION) (HCC): ICD-10-CM

## 2024-04-09 DIAGNOSIS — I50.9 ACUTE ON CHRONIC CONGESTIVE HEART FAILURE, UNSPECIFIED HEART FAILURE TYPE (HCC): Primary | ICD-10-CM

## 2024-04-09 DIAGNOSIS — I50.33 CONGESTIVE HEART FAILURE WITH LEFT VENTRICULAR DIASTOLIC DYSFUNCTION, ACUTE ON CHRONIC (HCC): ICD-10-CM

## 2024-04-09 LAB
ALBUMIN SERPL-MCNC: 3.3 G/DL (ref 3.5–5)
ALBUMIN/GLOB SERPL: 0.8 (ref 1.1–2.2)
ALP SERPL-CCNC: 89 U/L (ref 45–117)
ALT SERPL-CCNC: 20 U/L (ref 12–78)
ANION GAP SERPL CALC-SCNC: 9 MMOL/L (ref 5–15)
APPEARANCE UR: CLEAR
AST SERPL-CCNC: 9 U/L (ref 15–37)
BACTERIA URNS QL MICRO: NEGATIVE /HPF
BASOPHILS # BLD: 0 K/UL (ref 0–0.1)
BASOPHILS NFR BLD: 0 % (ref 0–1)
BILIRUB SERPL-MCNC: 0.6 MG/DL (ref 0.2–1)
BILIRUB UR QL: NEGATIVE
BUN SERPL-MCNC: 65 MG/DL (ref 6–20)
BUN/CREAT SERPL: 26 (ref 12–20)
CALCIUM SERPL-MCNC: 9 MG/DL (ref 8.5–10.1)
CHLORIDE SERPL-SCNC: 107 MMOL/L (ref 97–108)
CO2 SERPL-SCNC: 24 MMOL/L (ref 21–32)
COLOR UR: ABNORMAL
COMMENT:: NORMAL
CREAT SERPL-MCNC: 2.5 MG/DL (ref 0.55–1.02)
DIFFERENTIAL METHOD BLD: ABNORMAL
EOSINOPHIL # BLD: 0.1 K/UL (ref 0–0.4)
EOSINOPHIL NFR BLD: 2 % (ref 0–7)
EPITH CASTS URNS QL MICRO: ABNORMAL /LPF
ERYTHROCYTE [DISTWIDTH] IN BLOOD BY AUTOMATED COUNT: 14.6 % (ref 11.5–14.5)
GLOBULIN SER CALC-MCNC: 4.3 G/DL (ref 2–4)
GLUCOSE BLD STRIP.AUTO-MCNC: 149 MG/DL (ref 65–117)
GLUCOSE SERPL-MCNC: 276 MG/DL (ref 65–100)
GLUCOSE UR STRIP.AUTO-MCNC: NEGATIVE MG/DL
HCT VFR BLD AUTO: 37.2 % (ref 35–47)
HGB BLD-MCNC: 11.3 G/DL (ref 11.5–16)
HGB UR QL STRIP: NEGATIVE
IMM GRANULOCYTES # BLD AUTO: 0 K/UL (ref 0–0.04)
IMM GRANULOCYTES NFR BLD AUTO: 0 % (ref 0–0.5)
KETONES UR QL STRIP.AUTO: NEGATIVE MG/DL
LEUKOCYTE ESTERASE UR QL STRIP.AUTO: ABNORMAL
LIPASE SERPL-CCNC: 27 U/L (ref 13–75)
LYMPHOCYTES # BLD: 0.7 K/UL (ref 0.8–3.5)
LYMPHOCYTES NFR BLD: 13 % (ref 12–49)
MAGNESIUM SERPL-MCNC: 2.3 MG/DL (ref 1.6–2.4)
MCH RBC QN AUTO: 30.8 PG (ref 26–34)
MCHC RBC AUTO-ENTMCNC: 30.4 G/DL (ref 30–36.5)
MCV RBC AUTO: 101.4 FL (ref 80–99)
MONOCYTES # BLD: 0.6 K/UL (ref 0–1)
MONOCYTES NFR BLD: 11 % (ref 5–13)
NEUTS SEG # BLD: 4.3 K/UL (ref 1.8–8)
NEUTS SEG NFR BLD: 74 % (ref 32–75)
NITRITE UR QL STRIP.AUTO: NEGATIVE
NRBC # BLD: 0 K/UL (ref 0–0.01)
NRBC BLD-RTO: 0 PER 100 WBC
NT PRO BNP: ABNORMAL PG/ML
PH UR STRIP: 5.5 (ref 5–8)
PLATELET # BLD AUTO: 152 K/UL (ref 150–400)
PMV BLD AUTO: 11.5 FL (ref 8.9–12.9)
POTASSIUM SERPL-SCNC: 4.7 MMOL/L (ref 3.5–5.1)
PROT SERPL-MCNC: 7.6 G/DL (ref 6.4–8.2)
PROT UR STRIP-MCNC: 100 MG/DL
RBC # BLD AUTO: 3.67 M/UL (ref 3.8–5.2)
RBC #/AREA URNS HPF: ABNORMAL /HPF (ref 0–5)
RBC MORPH BLD: ABNORMAL
SERVICE CMNT-IMP: ABNORMAL
SODIUM SERPL-SCNC: 140 MMOL/L (ref 136–145)
SP GR UR REFRACTOMETRY: 1.01 (ref 1–1.03)
SPECIMEN HOLD: NORMAL
SPECIMEN HOLD: NORMAL
TROPONIN I SERPL HS-MCNC: 25 NG/L (ref 0–51)
UROBILINOGEN UR QL STRIP.AUTO: 0.2 EU/DL (ref 0.2–1)
WBC # BLD AUTO: 5.7 K/UL (ref 3.6–11)
WBC URNS QL MICRO: ABNORMAL /HPF (ref 0–4)

## 2024-04-09 PROCEDURE — 71046 X-RAY EXAM CHEST 2 VIEWS: CPT

## 2024-04-09 PROCEDURE — 1100000000 HC RM PRIVATE

## 2024-04-09 PROCEDURE — 6370000000 HC RX 637 (ALT 250 FOR IP): Performed by: FAMILY MEDICINE

## 2024-04-09 PROCEDURE — 83880 ASSAY OF NATRIURETIC PEPTIDE: CPT

## 2024-04-09 PROCEDURE — 6360000002 HC RX W HCPCS: Performed by: EMERGENCY MEDICINE

## 2024-04-09 PROCEDURE — 84484 ASSAY OF TROPONIN QUANT: CPT

## 2024-04-09 PROCEDURE — 2060000000 HC ICU INTERMEDIATE R&B

## 2024-04-09 PROCEDURE — 82962 GLUCOSE BLOOD TEST: CPT

## 2024-04-09 PROCEDURE — 36415 COLL VENOUS BLD VENIPUNCTURE: CPT

## 2024-04-09 PROCEDURE — 85025 COMPLETE CBC W/AUTO DIFF WBC: CPT

## 2024-04-09 PROCEDURE — 83735 ASSAY OF MAGNESIUM: CPT

## 2024-04-09 PROCEDURE — 81001 URINALYSIS AUTO W/SCOPE: CPT

## 2024-04-09 PROCEDURE — 80053 COMPREHEN METABOLIC PANEL: CPT

## 2024-04-09 PROCEDURE — 83690 ASSAY OF LIPASE: CPT

## 2024-04-09 PROCEDURE — 99285 EMERGENCY DEPT VISIT HI MDM: CPT

## 2024-04-09 RX ORDER — ATORVASTATIN CALCIUM 10 MG/1
10 TABLET, FILM COATED ORAL DAILY
Status: DISCONTINUED | OUTPATIENT
Start: 2024-04-09 | End: 2024-04-15 | Stop reason: HOSPADM

## 2024-04-09 RX ORDER — SODIUM CHLORIDE 0.9 % (FLUSH) 0.9 %
5-40 SYRINGE (ML) INJECTION EVERY 12 HOURS SCHEDULED
Status: DISCONTINUED | OUTPATIENT
Start: 2024-04-09 | End: 2024-04-15 | Stop reason: HOSPADM

## 2024-04-09 RX ORDER — GLIPIZIDE 5 MG/1
2.5 TABLET ORAL DAILY
Status: DISCONTINUED | OUTPATIENT
Start: 2024-04-09 | End: 2024-04-15 | Stop reason: HOSPADM

## 2024-04-09 RX ORDER — UREA 10 %
140 LOTION (ML) TOPICAL
Status: DISCONTINUED | OUTPATIENT
Start: 2024-04-10 | End: 2024-04-09 | Stop reason: CLARIF

## 2024-04-09 RX ORDER — INSULIN LISPRO 100 [IU]/ML
0-4 INJECTION, SOLUTION INTRAVENOUS; SUBCUTANEOUS NIGHTLY
Status: DISCONTINUED | OUTPATIENT
Start: 2024-04-09 | End: 2024-04-15 | Stop reason: HOSPADM

## 2024-04-09 RX ORDER — ACETAMINOPHEN 325 MG/1
650 TABLET ORAL EVERY 6 HOURS PRN
Status: DISCONTINUED | OUTPATIENT
Start: 2024-04-09 | End: 2024-04-15 | Stop reason: HOSPADM

## 2024-04-09 RX ORDER — INSULIN LISPRO 100 [IU]/ML
0-8 INJECTION, SOLUTION INTRAVENOUS; SUBCUTANEOUS
Status: DISCONTINUED | OUTPATIENT
Start: 2024-04-09 | End: 2024-04-15 | Stop reason: HOSPADM

## 2024-04-09 RX ORDER — ONDANSETRON 2 MG/ML
4 INJECTION INTRAMUSCULAR; INTRAVENOUS EVERY 6 HOURS PRN
Status: DISCONTINUED | OUTPATIENT
Start: 2024-04-09 | End: 2024-04-15 | Stop reason: HOSPADM

## 2024-04-09 RX ORDER — ONDANSETRON 4 MG/1
4 TABLET, ORALLY DISINTEGRATING ORAL EVERY 8 HOURS PRN
Status: DISCONTINUED | OUTPATIENT
Start: 2024-04-09 | End: 2024-04-15 | Stop reason: HOSPADM

## 2024-04-09 RX ORDER — HEPARIN SODIUM 5000 [USP'U]/ML
5000 INJECTION, SOLUTION INTRAVENOUS; SUBCUTANEOUS EVERY 8 HOURS SCHEDULED
Status: DISCONTINUED | OUTPATIENT
Start: 2024-04-09 | End: 2024-04-15 | Stop reason: HOSPADM

## 2024-04-09 RX ORDER — POLYETHYLENE GLYCOL 3350 17 G/17G
17 POWDER, FOR SOLUTION ORAL DAILY PRN
Status: DISCONTINUED | OUTPATIENT
Start: 2024-04-09 | End: 2024-04-15 | Stop reason: HOSPADM

## 2024-04-09 RX ORDER — CARVEDILOL 12.5 MG/1
12.5 TABLET ORAL 2 TIMES DAILY WITH MEALS
Status: DISCONTINUED | OUTPATIENT
Start: 2024-04-09 | End: 2024-04-15 | Stop reason: HOSPADM

## 2024-04-09 RX ORDER — ISOSORBIDE DINITRATE 5 MG/1
5 TABLET ORAL 2 TIMES DAILY
Status: DISCONTINUED | OUTPATIENT
Start: 2024-04-09 | End: 2024-04-13

## 2024-04-09 RX ORDER — DEXTROSE MONOHYDRATE 100 MG/ML
INJECTION, SOLUTION INTRAVENOUS CONTINUOUS PRN
Status: DISCONTINUED | OUTPATIENT
Start: 2024-04-09 | End: 2024-04-15 | Stop reason: HOSPADM

## 2024-04-09 RX ORDER — FUROSEMIDE 10 MG/ML
40 INJECTION INTRAMUSCULAR; INTRAVENOUS ONCE
Status: DISCONTINUED | OUTPATIENT
Start: 2024-04-09 | End: 2024-04-09

## 2024-04-09 RX ORDER — FERROUS SULFATE 325(65) MG
325 TABLET ORAL
Status: DISCONTINUED | OUTPATIENT
Start: 2024-04-10 | End: 2024-04-15 | Stop reason: HOSPADM

## 2024-04-09 RX ORDER — ASPIRIN 81 MG/1
81 TABLET ORAL DAILY
Status: DISCONTINUED | OUTPATIENT
Start: 2024-04-09 | End: 2024-04-15 | Stop reason: HOSPADM

## 2024-04-09 RX ORDER — FUROSEMIDE 10 MG/ML
80 INJECTION INTRAMUSCULAR; INTRAVENOUS ONCE
Status: COMPLETED | OUTPATIENT
Start: 2024-04-09 | End: 2024-04-09

## 2024-04-09 RX ORDER — ACETAMINOPHEN 650 MG/1
650 SUPPOSITORY RECTAL EVERY 6 HOURS PRN
Status: DISCONTINUED | OUTPATIENT
Start: 2024-04-09 | End: 2024-04-15 | Stop reason: HOSPADM

## 2024-04-09 RX ORDER — SODIUM CHLORIDE 0.9 % (FLUSH) 0.9 %
5-40 SYRINGE (ML) INJECTION PRN
Status: DISCONTINUED | OUTPATIENT
Start: 2024-04-09 | End: 2024-04-15 | Stop reason: HOSPADM

## 2024-04-09 RX ORDER — SODIUM CHLORIDE 9 MG/ML
INJECTION, SOLUTION INTRAVENOUS PRN
Status: DISCONTINUED | OUTPATIENT
Start: 2024-04-09 | End: 2024-04-15 | Stop reason: HOSPADM

## 2024-04-09 RX ORDER — SPIRONOLACTONE 25 MG/1
25 TABLET ORAL DAILY
Status: DISCONTINUED | OUTPATIENT
Start: 2024-04-09 | End: 2024-04-14

## 2024-04-09 RX ORDER — FUROSEMIDE 10 MG/ML
40 INJECTION INTRAMUSCULAR; INTRAVENOUS 2 TIMES DAILY
Status: DISCONTINUED | OUTPATIENT
Start: 2024-04-10 | End: 2024-04-10

## 2024-04-09 RX ORDER — MIRTAZAPINE 15 MG/1
15 TABLET, FILM COATED ORAL NIGHTLY
Status: DISCONTINUED | OUTPATIENT
Start: 2024-04-09 | End: 2024-04-15 | Stop reason: HOSPADM

## 2024-04-09 RX ADMIN — GLIPIZIDE 2.5 MG: 5 TABLET ORAL at 19:27

## 2024-04-09 RX ADMIN — FUROSEMIDE 80 MG: 10 INJECTION, SOLUTION INTRAMUSCULAR; INTRAVENOUS at 16:24

## 2024-04-09 RX ADMIN — ISOSORBIDE DINITRATE 5 MG: 5 TABLET ORAL at 19:26

## 2024-04-09 RX ADMIN — CARVEDILOL 12.5 MG: 12.5 TABLET, FILM COATED ORAL at 19:26

## 2024-04-09 RX ADMIN — SPIRONOLACTONE 25 MG: 25 TABLET ORAL at 19:25

## 2024-04-09 ASSESSMENT — LIFESTYLE VARIABLES
HOW OFTEN DO YOU HAVE A DRINK CONTAINING ALCOHOL: NEVER
HOW MANY STANDARD DRINKS CONTAINING ALCOHOL DO YOU HAVE ON A TYPICAL DAY: PATIENT DOES NOT DRINK

## 2024-04-09 ASSESSMENT — PAIN - FUNCTIONAL ASSESSMENT
PAIN_FUNCTIONAL_ASSESSMENT: NONE - DENIES PAIN
PAIN_FUNCTIONAL_ASSESSMENT: NONE - DENIES PAIN

## 2024-04-09 NOTE — ED TRIAGE NOTES
Pt c/o sob and fluid building up on her, worsening kidney function, +LEÓN, tachypneic on arrival, +leg swelling , denies cp , pt pale in triage

## 2024-04-09 NOTE — H&P
MAKER: Patient's daughter Corazon Perez is listed as her only contact [phone]        Signed By: Tom Delgadillo MD     April 9, 2024         Please note that this dictation may have been completed with Dragon, the computer voice recognition software.  Quite often unanticipated grammatical, syntax, homophones, and other interpretive errors are inadvertently transcribed by the computer software.  Please disregard these errors.  Please excuse any errors that have escaped final proofreading.

## 2024-04-09 NOTE — ED PROVIDER NOTES
[facility]  EMERGENCY DEPARTMENT ENCOUNTER      Pt Name: Nolvia Silver  MRN: [id number]  Birthdate 1950  Date of evaluation: 4/9/2024  Provider: Vitor Corbin MD    CHIEF COMPLAINT       Chief Complaint   Patient presents with    Shortness of Breath         HISTORY OF PRESENT ILLNESS   (Location/Symptom, Timing/Onset, Context/Setting, Quality, Duration, Modifying Factors, Severity)  Note limiting factors.   73-year-old female with PMHx of HFrEF (EF 20% to 25%) and CKD presents to the emergency department c/o dyspnea, bilateral lower extremity swelling for the last 2 weeks.  Patient reports adherence to her medications including 80 mg furosemide daily.  She has no additional complaints at this time    The history is provided by the patient and a caregiver.         Review of External Medical Records:     Nursing Notes were reviewed.    REVIEW OF SYSTEMS    (2-9 systems for level 4, 10 or more for level 5)     Review of Systems   Constitutional: Negative.    HENT: Negative.     Eyes: Negative.    Respiratory:  Positive for shortness of breath.    Cardiovascular:  Positive for leg swelling.   Gastrointestinal: Negative.    Genitourinary: Negative.    Musculoskeletal: Negative.    Skin: Negative.    Neurological: Negative.    Psychiatric/Behavioral: Negative.         Except as noted above the remainder of the review of systems was reviewed and negative.       PAST MEDICAL HISTORY   No past medical history on file.      SURGICAL HISTORY     No past surgical history on file.      CURRENT MEDICATIONS       Current Discharge Medication List        CONTINUE these medications which have NOT CHANGED    Details   simvastatin (ZOCOR) 20 MG tablet TAKE 1 TABLET BY MOUTH NIGHTLY FURTHER REFILLS THROUGH NEW PHYSICIAN  Qty: 30 tablet, Refills: 0    Associated Diagnoses: Pure hyperglyceridemia      carvedilol (COREG) 12.5 MG tablet TAKE 1 TABLET BY MOUTH TWICE A DAY WITH MEALS  Qty: 180 tablet, Refills: 3    Associated

## 2024-04-10 ENCOUNTER — TELEPHONE (OUTPATIENT)
Age: 74
End: 2024-04-10

## 2024-04-10 ENCOUNTER — APPOINTMENT (OUTPATIENT)
Facility: HOSPITAL | Age: 74
DRG: 291 | End: 2024-04-10
Attending: FAMILY MEDICINE
Payer: MEDICARE

## 2024-04-10 LAB
ANION GAP SERPL CALC-SCNC: 8 MMOL/L (ref 5–15)
BUN SERPL-MCNC: 63 MG/DL (ref 6–20)
BUN/CREAT SERPL: 27 (ref 12–20)
CALCIUM SERPL-MCNC: 8.8 MG/DL (ref 8.5–10.1)
CHLORIDE SERPL-SCNC: 109 MMOL/L (ref 97–108)
CHOLEST SERPL-MCNC: 72 MG/DL
CO2 SERPL-SCNC: 22 MMOL/L (ref 21–32)
CREAT SERPL-MCNC: 2.31 MG/DL (ref 0.55–1.02)
ECHO AO ROOT DIAM: 3.2 CM
ECHO AO ROOT INDEX: 2.04 CM/M2
ECHO AV AREA PEAK VELOCITY: 1.6 CM2
ECHO AV AREA/BSA PEAK VELOCITY: 1 CM2/M2
ECHO AV PEAK GRADIENT: 4 MMHG
ECHO AV PEAK VELOCITY: 1 M/S
ECHO AV VELOCITY RATIO: 0.5
ECHO BSA: 1.6 M2
ECHO BSA: 1.6 M2
ECHO EST RA PRESSURE: 15 MMHG
ECHO LA DIAMETER INDEX: 2.74 CM/M2
ECHO LA DIAMETER: 4.3 CM
ECHO LA TO AORTIC ROOT RATIO: 1.34
ECHO LV FRACTIONAL SHORTENING: 10 % (ref 28–44)
ECHO LV INTERNAL DIMENSION DIASTOLE INDEX: 3.82 CM/M2
ECHO LV INTERNAL DIMENSION DIASTOLIC: 6 CM (ref 3.9–5.3)
ECHO LV INTERNAL DIMENSION SYSTOLIC INDEX: 3.44 CM/M2
ECHO LV INTERNAL DIMENSION SYSTOLIC: 5.4 CM
ECHO LV IVSD: 0.9 CM (ref 0.6–0.9)
ECHO LV MASS 2D: 215.7 G (ref 67–162)
ECHO LV MASS INDEX 2D: 137.4 G/M2 (ref 43–95)
ECHO LV POSTERIOR WALL DIASTOLIC: 0.9 CM (ref 0.6–0.9)
ECHO LV RELATIVE WALL THICKNESS RATIO: 0.3
ECHO LVOT AREA: 3.5 CM2
ECHO LVOT DIAM: 2.1 CM
ECHO LVOT PEAK GRADIENT: 1 MMHG
ECHO LVOT PEAK VELOCITY: 0.5 M/S
ECHO MV A VELOCITY: 0.44 M/S
ECHO MV AREA PHT: 3.6 CM2
ECHO MV E DECELERATION TIME (DT): 208 MS
ECHO MV E VELOCITY: 0.85 M/S
ECHO MV E/A RATIO: 1.93
ECHO MV MAX VELOCITY: 0.9 M/S
ECHO MV MEAN GRADIENT: 1 MMHG
ECHO MV MEAN VELOCITY: 0.5 M/S
ECHO MV PEAK GRADIENT: 4 MMHG
ECHO MV PRESSURE HALF TIME (PHT): 60.3 MS
ECHO MV REGURGITANT PEAK GRADIENT: 121 MMHG
ECHO MV REGURGITANT PEAK VELOCITY: 5.5 M/S
ECHO MV REGURGITANT VTIA: 167.2 CM
ECHO MV VTI: 20 CM
ECHO RV TAPSE: 1 CM (ref 1.7–?)
GLUCOSE BLD STRIP.AUTO-MCNC: 142 MG/DL (ref 65–117)
GLUCOSE BLD STRIP.AUTO-MCNC: 158 MG/DL (ref 65–117)
GLUCOSE BLD STRIP.AUTO-MCNC: 288 MG/DL (ref 65–117)
GLUCOSE BLD STRIP.AUTO-MCNC: 332 MG/DL (ref 65–117)
GLUCOSE SERPL-MCNC: 194 MG/DL (ref 65–100)
HDLC SERPL-MCNC: 22 MG/DL
HDLC SERPL: 3.3 (ref 0–5)
LDLC SERPL CALC-MCNC: 32.8 MG/DL (ref 0–100)
MAGNESIUM SERPL-MCNC: 2.2 MG/DL (ref 1.6–2.4)
POTASSIUM SERPL-SCNC: 4 MMOL/L (ref 3.5–5.1)
SERVICE CMNT-IMP: ABNORMAL
SODIUM SERPL-SCNC: 139 MMOL/L (ref 136–145)
TRIGL SERPL-MCNC: 86 MG/DL
VLDLC SERPL CALC-MCNC: 17.2 MG/DL

## 2024-04-10 PROCEDURE — 80048 BASIC METABOLIC PNL TOTAL CA: CPT

## 2024-04-10 PROCEDURE — 97165 OT EVAL LOW COMPLEX 30 MIN: CPT

## 2024-04-10 PROCEDURE — 6370000000 HC RX 637 (ALT 250 FOR IP): Performed by: FAMILY MEDICINE

## 2024-04-10 PROCEDURE — 80061 LIPID PANEL: CPT

## 2024-04-10 PROCEDURE — 93321 DOPPLER ECHO F-UP/LMTD STD: CPT | Performed by: INTERNAL MEDICINE

## 2024-04-10 PROCEDURE — 6360000004 HC RX CONTRAST MEDICATION: Performed by: FAMILY MEDICINE

## 2024-04-10 PROCEDURE — 97161 PT EVAL LOW COMPLEX 20 MIN: CPT

## 2024-04-10 PROCEDURE — 6360000002 HC RX W HCPCS: Performed by: NURSE PRACTITIONER

## 2024-04-10 PROCEDURE — 93325 DOPPLER ECHO COLOR FLOW MAPG: CPT | Performed by: INTERNAL MEDICINE

## 2024-04-10 PROCEDURE — 93970 EXTREMITY STUDY: CPT

## 2024-04-10 PROCEDURE — 36415 COLL VENOUS BLD VENIPUNCTURE: CPT

## 2024-04-10 PROCEDURE — 2060000000 HC ICU INTERMEDIATE R&B

## 2024-04-10 PROCEDURE — 82962 GLUCOSE BLOOD TEST: CPT

## 2024-04-10 PROCEDURE — C8924 2D TTE W OR W/O FOL W/CON,FU: HCPCS

## 2024-04-10 PROCEDURE — 97116 GAIT TRAINING THERAPY: CPT

## 2024-04-10 PROCEDURE — 93308 TTE F-UP OR LMTD: CPT | Performed by: INTERNAL MEDICINE

## 2024-04-10 PROCEDURE — 2580000003 HC RX 258: Performed by: FAMILY MEDICINE

## 2024-04-10 PROCEDURE — 83735 ASSAY OF MAGNESIUM: CPT

## 2024-04-10 PROCEDURE — 97535 SELF CARE MNGMENT TRAINING: CPT

## 2024-04-10 PROCEDURE — 6360000002 HC RX W HCPCS: Performed by: FAMILY MEDICINE

## 2024-04-10 RX ORDER — FUROSEMIDE 10 MG/ML
40 INJECTION INTRAMUSCULAR; INTRAVENOUS 3 TIMES DAILY
Status: DISCONTINUED | OUTPATIENT
Start: 2024-04-10 | End: 2024-04-12

## 2024-04-10 RX ADMIN — CARVEDILOL 12.5 MG: 12.5 TABLET, FILM COATED ORAL at 18:21

## 2024-04-10 RX ADMIN — MIRTAZAPINE 15 MG: 15 TABLET, FILM COATED ORAL at 21:33

## 2024-04-10 RX ADMIN — CARVEDILOL 12.5 MG: 12.5 TABLET, FILM COATED ORAL at 09:04

## 2024-04-10 RX ADMIN — SODIUM CHLORIDE, PRESERVATIVE FREE 10 ML: 5 INJECTION INTRAVENOUS at 00:14

## 2024-04-10 RX ADMIN — SPIRONOLACTONE 25 MG: 25 TABLET ORAL at 09:04

## 2024-04-10 RX ADMIN — FUROSEMIDE 40 MG: 10 INJECTION, SOLUTION INTRAMUSCULAR; INTRAVENOUS at 14:38

## 2024-04-10 RX ADMIN — FUROSEMIDE 40 MG: 10 INJECTION, SOLUTION INTRAMUSCULAR; INTRAVENOUS at 21:33

## 2024-04-10 RX ADMIN — GLIPIZIDE 2.5 MG: 5 TABLET ORAL at 09:05

## 2024-04-10 RX ADMIN — ISOSORBIDE DINITRATE 5 MG: 5 TABLET ORAL at 09:05

## 2024-04-10 RX ADMIN — SODIUM CHLORIDE, PRESERVATIVE FREE 10 ML: 5 INJECTION INTRAVENOUS at 21:33

## 2024-04-10 RX ADMIN — PERFLUTREN 1.5 ML: 6.52 INJECTION, SUSPENSION INTRAVENOUS at 10:00

## 2024-04-10 RX ADMIN — Medication 4 UNITS: at 14:37

## 2024-04-10 RX ADMIN — ISOSORBIDE DINITRATE 5 MG: 5 TABLET ORAL at 14:38

## 2024-04-10 RX ADMIN — ASPIRIN 81 MG: 81 TABLET, COATED ORAL at 09:04

## 2024-04-10 RX ADMIN — MIRTAZAPINE 15 MG: 15 TABLET, FILM COATED ORAL at 00:12

## 2024-04-10 RX ADMIN — ATORVASTATIN CALCIUM 10 MG: 10 TABLET, FILM COATED ORAL at 21:33

## 2024-04-10 RX ADMIN — Medication 6 UNITS: at 18:21

## 2024-04-10 RX ADMIN — FUROSEMIDE 40 MG: 10 INJECTION, SOLUTION INTRAMUSCULAR; INTRAVENOUS at 05:48

## 2024-04-10 RX ADMIN — SODIUM CHLORIDE, PRESERVATIVE FREE 10 ML: 5 INJECTION INTRAVENOUS at 09:05

## 2024-04-10 RX ADMIN — FERROUS SULFATE TAB 325 MG (65 MG ELEMENTAL FE) 325 MG: 325 (65 FE) TAB at 09:04

## 2024-04-10 RX ADMIN — ATORVASTATIN CALCIUM 10 MG: 10 TABLET, FILM COATED ORAL at 00:12

## 2024-04-10 NOTE — WOUND CARE
WOCN Note:     New consult for healing blister to left lower leg      Nolvia Silver is a 73 y.o. y/o female who presented for Congestive heart failure with left ventricular diastolic dysfunction, acute on chronic ([facility]) [I50.33]  Acute on chronic HFrEF (heart failure with reduced ejection fraction) ([facility]) [I50.23]  Acute on chronic congestive heart failure, unspecified heart failure type ([facility]) [I50.9]  Admitted on 4/9/2024    No past medical history on file.    Lab Results   Component Value Date/Time    WBC 5.7 04/09/2024 11:05 AM    LABA1C 8.2 (H) 02/13/2024 04:48 PM    POCGLU 158 (H) 04/10/2024 08:47 AM    POCGLU 149 (H) 04/09/2024 05:33 PM    HGB 11.3 (L) 04/09/2024 11:05 AM    HCT 37.2 04/09/2024 11:05 AM     04/09/2024 11:05 AM        Tobacco Use      Smoking status: Never      Smokeless tobacco: Not on file     ADULT DIET; Regular; No Added Salt (3-4 gm)     Assessment:   Seen today in room 401/01   Alert and appropriately communicative.  Denies pain except that left lower leg is tender to touch  Mobile and repositions independently. Declined visual assessment of sacrum  Educated on the importance of repositioning and floating heels while in bed. Patient verbalized understanding   Continent and up to bedside commode  Surface: BENITA mattress  Bilateral heels intact and with blanching erythema.  Heels offloaded with pillows.    1. POA Healing Bullae to Left Anterior Lower Leg  Dry, healing bullae (approx 1 x 0.5 cm). Patient reports area is extremely tender and would like something that will not stick when removed but helps protect her skin from the sheets  Tx: applied MepitelTransfer for protective layer with gentle removal     Wound Recommendations:    Left lower leg: As Needed - apply new cut-to-size MepitelTransfer for protection    PI Prevention:  Turn/reposition approximately every 2 hours  Offload heels with heels hanging off end of pillow at

## 2024-04-10 NOTE — TELEPHONE ENCOUNTER
[facility] is calling to see if the doctor will follow the patient for home health care.patient will be discharged on 4/12/24    [phone] Pat

## 2024-04-10 NOTE — DISCHARGE INSTRUCTIONS
To access the American [employer] Association's Interactive Workbook \"Healthier Living with Heart Failure - Managing Symptoms and Reducing Risk\"  Scan the QR code below.                               Discharge Instructions       PATIENT ID: Nolvia Silver  MRN: [id number]   YOB: 1950    DATE OF ADMISSION: 4/9/2024   DATE OF DISCHARGE: 4/15/2024    PRIMARY CARE PROVIDER: No primary care provider on file.     ATTENDING PHYSICIAN: Marlen Poole MD   DISCHARGING PROVIDER: Marlen Poole MD    To contact this individual call [phone] and ask the  to page.   If unavailable ask to be transferred the Adult Hospitalist Department.    DISCHARGE DIAGNOSES CHF     CONSULTATIONS: [unfilled]    PROCEDURES/SURGERIES: * No surgery found *    PENDING TEST RESULTS:   At the time of discharge the following test results are still pending:     FOLLOW UP APPOINTMENTS:   [unfilled]     ADDITIONAL CARE RECOMMENDATIONS:     DIET: cardiac diet  Oral Nutritional Supplements:     ACTIVITY: activity as tolerated    WOUND CARE:     EQUIPMENT needed:       DISCHARGE MEDICATIONS:   See Medication Reconciliation Form    It is important that you take the medication exactly as they are prescribed.   Keep your medication in the bottles provided by the pharmacist and keep a list of the medication names, dosages, and times to be taken in your wallet.   Do not take other medications without consulting your doctor.       NOTIFY YOUR PHYSICIAN FOR ANY OF THE FOLLOWING:   Fever over 101 degrees for 24 hours.   Chest pain, shortness of breath, fever, chills, nausea, vomiting, diarrhea, change in mentation, falling, weakness, bleeding. Severe pain or pain not relieved by medications.  Or, any other signs or symptoms that you may have questions about.      DISPOSITION:  X  Home With:   OT  PT  HH  RN       SNF/Inpatient Rehab/LTAC    Independent/assisted living    Hospice    Other:     CDMP Checked:   Yes X     PROBLEM LIST Updated:  Yes

## 2024-04-10 NOTE — CARE COORDINATION
Care Management Initial Assessment       RUR: 21%  Readmission? No  1st IM letter given? Yes - 4/10/24  1st  letter given: No    Home health accepted with [facility] in [facility]. The patient's  uses this agency. The patient plans to discharge home with home health and family or friend to transport when stable for discharge. The patient owns a rolling walker, uses [facility] on [address], has had history of home health years ago but cannot recall agency and patient has no history of rehab. The patient is currently seeking a new PCP and declined CM to set up a new PCP but CM following for discharge needs. CM gave the patient a PCP list for choice.       04/10/24 7750   Service Assessment   Patient Orientation Alert and Oriented   Cognition Alert   History Provided By Patient   Primary Caregiver Spouse   Support Systems Spouse/Significant Other;Family Members;Friends/Neighbors   Patient's Healthcare Decision Maker is: Legal Next of Kin   PCP Verified by CM No  (Currently in search for a new pcp)   Prior Functional Level Independent in ADLs/IADLs   Current Functional Level Assistance with the following:;Mobility   Can patient return to prior living arrangement Yes   Ability to make needs known: Good   Family able to assist with home care needs: Yes   Would you like for me to discuss the discharge plan with any other family members/significant others, and if so, who? Yes  (Spouse)   Financial Resources Medicare   Social/Functional History   Lives With Spouse   Home Equipment Walker, rolling   ADL Assistance Independent   Ambulation Assistance Independent   Transfer Assistance Independent   Discharge Planning   Living Arrangements Spouse/Significant Other   Condition of Participation: Discharge Planning   The Plan for Transition of Care is related to the following treatment goals: The patient plans to discharge home.   The Patient and/or Patient Representative was provided with a Choice of Provider?

## 2024-04-10 NOTE — CONSULTS
[address]  [address]                              CONSULTATION      PATIENT NAME: FLAKO BUCK                  : 1950  MED REC NO: [id number]                       ROOM: 401  ACCOUNT NO: [id number]                       ADMIT DATE: 2024  PROVIDER: Manoj Weaver MD    DATE OF SERVICE:  04/10/2024    ATTENDING PHYSICIAN:  Tom Delgdaillo MD    REASON FOR CONSULTATION:  For evaluation and management of CKD.    HISTORY OF PRESENT ILLNESS:  The patient is a 73-year-old female with past medical history significant for diabetes, hypertension, CKD, CHF, who presented with worsening shortness of breath, leg edema, and fluid buildup.  She was tachypneic on arrival.  She was started on IV Lasix.  Nephrology consulted for management of CKD.  Her creatinine on admit was 2.5, and in spite of IV Lasix, she is down to 2.3 today.    She has stage 4 CKD.  Her baseline CR has ranged anywhere from 2.3 to 2.6.  She was admitted in 2024 with similar presentation, and at that time, her creatinine was 2.8 to 3.0 range.    She denies missing Lasix, which she takes 80 mg daily.  Denies any excessive salt or fluid loading.  No associated nausea, vomiting, chest pain, cough, or hemoptysis.    She had a repeat echocardiogram this admission and her EF remains low at 20% to 25%.  She has been seen by Cardiology.    She is feeling better.  She diuresed 1.3 L overnight.    REVIEW OF SYSTEMS:  As noted in HPI.  Remainder is negative.    PHYSICAL EXAMINATION:  GENERAL:  Elderly female, frail, in no acute distress.  VITAL SIGNS:  She is afebrile.  Pulse 73, /56, respirations of 16, O2 saturation is 98% on room air.  Weight is 60.4 kg.  HEENT:  Head is atraumatic, normocephalic.  Conjunctivae are pink.  Mucous membranes moist.  Sclerae anicteric.  NECK:  No JVD.  LUNGS:  Diminished breath sounds at bases, but grossly clear.  No respiratory 
   [facility] ([facility])         Nephrology and Hypertension         Patient seen and examined. Full consult dictated-[id number]    Assessment:  CKD stage 4 (Dr Madelyn Wallace): Basln Cr 2.3 to 2.6. renal fxn is at basln. Better than previous admit in Feb'24, when her Cr was 2.8 to 3.   Underlying DM nephropathy.      A on C Decompensated Systolic CHF: repeat Echo with same low EF 20-25%  Mild to mod MR with eccentric jet (underestimated severity)   HTN: stable    DM2 with DN: Last HgbA1c 8.2     Anemia: mild    Pt being diuresed with IV lasix. She was on Lasix 80 mg QD as outpt PTA. Will need higher maintenance dose on discharge.      Plan/Recommendations:  Ct IV lasix 40 mg TID  Would need at least lasix of 80 mg AM/40 mg PM or 80 mg BID on discharge  Salt restriction  FR of 1.5 Lday  Ct current BP meds  Ct Aldactone  Ct oral iron  If BP allows, can add low dose Hydralazine for afterload reduction. On nitrates already  Daily labs  Daily wt    D/W pt  Thanks for Renal consult. We will follow patient with you.    Signed by:  Manoj Weaver MD  Nephrology and HTN    
Nutrition Education    Educated on CHF MNT  Learners: Patient  Readiness: Acceptance  Method: Explanation and discussion - pt familiar with diet, follows well at home.  Declines need for more literature.  Response: Verbalizes Understanding    Rena Mcdonough RD  Available via [employer]    
heparin (porcine) injection 5,000 Units, 5,000 Units, SubCUTAneous, 3 times per day, Tom Delgadillo MD Lori D Warren, APRN - NP    [facility] Cardiology  [place] center: (P) [phone]  (F) [phone]

## 2024-04-11 LAB
ANION GAP SERPL CALC-SCNC: 8 MMOL/L (ref 5–15)
BUN SERPL-MCNC: 66 MG/DL (ref 6–20)
BUN/CREAT SERPL: 26 (ref 12–20)
CALCIUM SERPL-MCNC: 8.9 MG/DL (ref 8.5–10.1)
CHLORIDE SERPL-SCNC: 107 MMOL/L (ref 97–108)
CO2 SERPL-SCNC: 23 MMOL/L (ref 21–32)
CREAT SERPL-MCNC: 2.55 MG/DL (ref 0.55–1.02)
ERYTHROCYTE [DISTWIDTH] IN BLOOD BY AUTOMATED COUNT: 14.6 % (ref 11.5–14.5)
FERRITIN SERPL-MCNC: 126 NG/ML (ref 8–252)
GLUCOSE BLD STRIP.AUTO-MCNC: 101 MG/DL (ref 65–117)
GLUCOSE BLD STRIP.AUTO-MCNC: 189 MG/DL (ref 65–117)
GLUCOSE BLD STRIP.AUTO-MCNC: 191 MG/DL (ref 65–117)
GLUCOSE BLD STRIP.AUTO-MCNC: 236 MG/DL (ref 65–117)
GLUCOSE SERPL-MCNC: 131 MG/DL (ref 65–100)
HCT VFR BLD AUTO: 34.2 % (ref 35–47)
HGB BLD-MCNC: 10.9 G/DL (ref 11.5–16)
IRON SATN MFR SERPL: 10 % (ref 20–50)
IRON SERPL-MCNC: 29 UG/DL (ref 35–150)
MAGNESIUM SERPL-MCNC: 2.2 MG/DL (ref 1.6–2.4)
MCH RBC QN AUTO: 30.7 PG (ref 26–34)
MCHC RBC AUTO-ENTMCNC: 31.9 G/DL (ref 30–36.5)
MCV RBC AUTO: 96.3 FL (ref 80–99)
NRBC # BLD: 0 K/UL (ref 0–0.01)
NRBC BLD-RTO: 0 PER 100 WBC
PLATELET # BLD AUTO: 157 K/UL (ref 150–400)
PMV BLD AUTO: 11 FL (ref 8.9–12.9)
POTASSIUM SERPL-SCNC: 4.2 MMOL/L (ref 3.5–5.1)
RBC # BLD AUTO: 3.55 M/UL (ref 3.8–5.2)
SERVICE CMNT-IMP: ABNORMAL
SERVICE CMNT-IMP: NORMAL
SODIUM SERPL-SCNC: 138 MMOL/L (ref 136–145)
TIBC SERPL-MCNC: 288 UG/DL (ref 250–450)
WBC # BLD AUTO: 5.8 K/UL (ref 3.6–11)

## 2024-04-11 PROCEDURE — 2580000003 HC RX 258: Performed by: FAMILY MEDICINE

## 2024-04-11 PROCEDURE — 97530 THERAPEUTIC ACTIVITIES: CPT

## 2024-04-11 PROCEDURE — 36415 COLL VENOUS BLD VENIPUNCTURE: CPT

## 2024-04-11 PROCEDURE — 85027 COMPLETE CBC AUTOMATED: CPT

## 2024-04-11 PROCEDURE — 2700000000 HC OXYGEN THERAPY PER DAY

## 2024-04-11 PROCEDURE — 2060000000 HC ICU INTERMEDIATE R&B

## 2024-04-11 PROCEDURE — 83540 ASSAY OF IRON: CPT

## 2024-04-11 PROCEDURE — 82962 GLUCOSE BLOOD TEST: CPT

## 2024-04-11 PROCEDURE — 82728 ASSAY OF FERRITIN: CPT

## 2024-04-11 PROCEDURE — 97116 GAIT TRAINING THERAPY: CPT

## 2024-04-11 PROCEDURE — 6370000000 HC RX 637 (ALT 250 FOR IP): Performed by: FAMILY MEDICINE

## 2024-04-11 PROCEDURE — 83550 IRON BINDING TEST: CPT

## 2024-04-11 PROCEDURE — 6360000002 HC RX W HCPCS: Performed by: NURSE PRACTITIONER

## 2024-04-11 PROCEDURE — 97535 SELF CARE MNGMENT TRAINING: CPT

## 2024-04-11 PROCEDURE — 80048 BASIC METABOLIC PNL TOTAL CA: CPT

## 2024-04-11 PROCEDURE — 83735 ASSAY OF MAGNESIUM: CPT

## 2024-04-11 RX ADMIN — SPIRONOLACTONE 25 MG: 25 TABLET ORAL at 10:18

## 2024-04-11 RX ADMIN — CARVEDILOL 12.5 MG: 12.5 TABLET, FILM COATED ORAL at 18:17

## 2024-04-11 RX ADMIN — GLIPIZIDE 2.5 MG: 5 TABLET ORAL at 10:18

## 2024-04-11 RX ADMIN — FUROSEMIDE 40 MG: 10 INJECTION, SOLUTION INTRAMUSCULAR; INTRAVENOUS at 21:15

## 2024-04-11 RX ADMIN — MIRTAZAPINE 15 MG: 15 TABLET, FILM COATED ORAL at 21:15

## 2024-04-11 RX ADMIN — SODIUM CHLORIDE, PRESERVATIVE FREE 10 ML: 5 INJECTION INTRAVENOUS at 21:24

## 2024-04-11 RX ADMIN — ATORVASTATIN CALCIUM 10 MG: 10 TABLET, FILM COATED ORAL at 21:15

## 2024-04-11 RX ADMIN — FUROSEMIDE 40 MG: 10 INJECTION, SOLUTION INTRAMUSCULAR; INTRAVENOUS at 14:41

## 2024-04-11 RX ADMIN — ISOSORBIDE DINITRATE 5 MG: 5 TABLET ORAL at 10:00

## 2024-04-11 RX ADMIN — ISOSORBIDE DINITRATE 5 MG: 5 TABLET ORAL at 14:41

## 2024-04-11 RX ADMIN — CARVEDILOL 12.5 MG: 12.5 TABLET, FILM COATED ORAL at 10:00

## 2024-04-11 RX ADMIN — FERROUS SULFATE TAB 325 MG (65 MG ELEMENTAL FE) 325 MG: 325 (65 FE) TAB at 10:00

## 2024-04-11 RX ADMIN — FUROSEMIDE 40 MG: 10 INJECTION, SOLUTION INTRAMUSCULAR; INTRAVENOUS at 10:01

## 2024-04-11 RX ADMIN — SODIUM CHLORIDE, PRESERVATIVE FREE 10 ML: 5 INJECTION INTRAVENOUS at 10:01

## 2024-04-11 RX ADMIN — ASPIRIN 81 MG: 81 TABLET, COATED ORAL at 10:00

## 2024-04-11 NOTE — NURSE NAVIGATOR
Heart Failure Nurse Navigator rounds completed.  HFNN met with patient son, patient was working with PT/OT    HF NN provided introduction to self and nurse navigator role. Living With Heart Failure booklet given to patient, along with weight calendar, signs/symptoms magnet, and card with QR codes for HF video resources.   Patient son states patient does weigh herself daily. HFNN reiterated importance of a low sodium diet (1500-2000mg daily)    Advised family member that follow up appointment will be scheduled and placed on Discharge Instructions prior to discharge. He states the his sister accompanies patient to her appts.      Patient given [employer] flyer and is agreeable to services as needed.  Patient provided with contact information for HF NN and encouraged to call with questions. Will continue to follow until discharge.        Time spent with patient discussing HF education:  15 min   
medications may be potentially harmful in heart failure [Class 3]:  Calcium channel blockers (doxazosin, diltiazem, verapamil, nifedipine)  Antiarrhythmics (flecanide, disopyrimide, sotalol, dronedarone)  Diabetes medications (thiasolidinediones, saxagliptin, alogliptin)  NSAIDs and MILLARD 2 inhibitors    Consider vaccinations for respiratory illnesses (flu, pneumonia, covid) [Class 2b]    For eligible patients with LVEF < 35% consider discharge with wearable defibrillation [Class 2b] and/or referral for ICD implantation [Class 1] for prevention of sudden cardiac death.    Due to severely reduced LVEF < 25% and/or recurrent hospitalizations this patient may benefit from referral to Advanced Heart Failure Program to assess suitability for advanced therapies, such as left-ventricular assist device, heart transplantation, palliative inotropes or palliation [Class 1]. To obtain in-patient consultation or refer to [facility], call [phone]    Patient Education:     Teach back in heart failure education provided, including information about medical therapy, lifestyle modifications, diet and fluid restrictions, physical activity.  Educational resources provided: “Living with Heart Failure” booklet; Signs/Symptoms magnet; Weight Calendar; [facility] [facility] flyer; Preparation for Successful Discharge Checklist.  Information provided about HF support group.  Heart failure avoiding triggers on discharge instructions.      Plan for Transitional Care:    Post discharge follow up phone call to be made within 48-72 hours of discharge.  Patient will follow-up with PCP.  Patient will follow-up with Primary Cardiologist.  Obstructive sleep apnea screening done and patient was referred to Sleep Medicine.  Referral/follow-up with Cardiac Rehabilitation.  Referral/follow-up with Advanced Heart Failure Specialist.  Referral/follow-up with Palliative Care Specialist.      Heart Failure Nurse Navigator  Phone:

## 2024-04-11 NOTE — TELEPHONE ENCOUNTER
Verified patient with two types of identifiers. Spoke to Pat and let her know Dr. Guarddao would rather have PCP follow and though Mrs. Silver does not have a PCP currently it seems like case management could get her established prior to hospital discharge.     Asked her to call back and if needed Dr. Guardado can follow for short time and then have PCP cover after established.

## 2024-04-12 DIAGNOSIS — I50.22 CHRONIC SYSTOLIC HEART FAILURE (HCC): Primary | ICD-10-CM

## 2024-04-12 LAB
ANION GAP SERPL CALC-SCNC: 8 MMOL/L (ref 5–15)
BUN SERPL-MCNC: 69 MG/DL (ref 6–20)
BUN/CREAT SERPL: 25 (ref 12–20)
CALCIUM SERPL-MCNC: 9 MG/DL (ref 8.5–10.1)
CHLORIDE SERPL-SCNC: 105 MMOL/L (ref 97–108)
CO2 SERPL-SCNC: 26 MMOL/L (ref 21–32)
CREAT SERPL-MCNC: 2.73 MG/DL (ref 0.55–1.02)
ERYTHROCYTE [DISTWIDTH] IN BLOOD BY AUTOMATED COUNT: 14.5 % (ref 11.5–14.5)
ERYTHROCYTE [DISTWIDTH] IN BLOOD BY AUTOMATED COUNT: 14.6 % (ref 11.5–14.5)
GLUCOSE BLD STRIP.AUTO-MCNC: 130 MG/DL (ref 65–117)
GLUCOSE BLD STRIP.AUTO-MCNC: 131 MG/DL (ref 65–117)
GLUCOSE BLD STRIP.AUTO-MCNC: 152 MG/DL (ref 65–117)
GLUCOSE BLD STRIP.AUTO-MCNC: 276 MG/DL (ref 65–117)
GLUCOSE SERPL-MCNC: 162 MG/DL (ref 65–100)
HCT VFR BLD AUTO: 33.1 % (ref 35–47)
HCT VFR BLD AUTO: 34 % (ref 35–47)
HGB BLD-MCNC: 10.6 G/DL (ref 11.5–16)
HGB BLD-MCNC: 10.7 G/DL (ref 11.5–16)
MAGNESIUM SERPL-MCNC: 2.1 MG/DL (ref 1.6–2.4)
MCH RBC QN AUTO: 30.5 PG (ref 26–34)
MCH RBC QN AUTO: 31.2 PG (ref 26–34)
MCHC RBC AUTO-ENTMCNC: 31.2 G/DL (ref 30–36.5)
MCHC RBC AUTO-ENTMCNC: 32.3 G/DL (ref 30–36.5)
MCV RBC AUTO: 96.5 FL (ref 80–99)
MCV RBC AUTO: 98 FL (ref 80–99)
NRBC # BLD: 0 K/UL (ref 0–0.01)
NRBC # BLD: 0 K/UL (ref 0–0.01)
NRBC BLD-RTO: 0 PER 100 WBC
NRBC BLD-RTO: 0 PER 100 WBC
NT PRO BNP: ABNORMAL PG/ML
PLATELET # BLD AUTO: 170 K/UL (ref 150–400)
PLATELET # BLD AUTO: 188 K/UL (ref 150–400)
PMV BLD AUTO: 10.8 FL (ref 8.9–12.9)
PMV BLD AUTO: 11.5 FL (ref 8.9–12.9)
POTASSIUM SERPL-SCNC: 4 MMOL/L (ref 3.5–5.1)
RBC # BLD AUTO: 3.43 M/UL (ref 3.8–5.2)
RBC # BLD AUTO: 3.47 M/UL (ref 3.8–5.2)
SERVICE CMNT-IMP: ABNORMAL
SODIUM SERPL-SCNC: 139 MMOL/L (ref 136–145)
WBC # BLD AUTO: 6.7 K/UL (ref 3.6–11)
WBC # BLD AUTO: 7.6 K/UL (ref 3.6–11)

## 2024-04-12 PROCEDURE — 2700000000 HC OXYGEN THERAPY PER DAY

## 2024-04-12 PROCEDURE — 36415 COLL VENOUS BLD VENIPUNCTURE: CPT

## 2024-04-12 PROCEDURE — 83880 ASSAY OF NATRIURETIC PEPTIDE: CPT

## 2024-04-12 PROCEDURE — 97535 SELF CARE MNGMENT TRAINING: CPT

## 2024-04-12 PROCEDURE — 82962 GLUCOSE BLOOD TEST: CPT

## 2024-04-12 PROCEDURE — 2580000003 HC RX 258: Performed by: FAMILY MEDICINE

## 2024-04-12 PROCEDURE — 85027 COMPLETE CBC AUTOMATED: CPT

## 2024-04-12 PROCEDURE — 83735 ASSAY OF MAGNESIUM: CPT

## 2024-04-12 PROCEDURE — 94760 N-INVAS EAR/PLS OXIMETRY 1: CPT

## 2024-04-12 PROCEDURE — 6370000000 HC RX 637 (ALT 250 FOR IP): Performed by: INTERNAL MEDICINE

## 2024-04-12 PROCEDURE — 2060000000 HC ICU INTERMEDIATE R&B

## 2024-04-12 PROCEDURE — 6370000000 HC RX 637 (ALT 250 FOR IP): Performed by: FAMILY MEDICINE

## 2024-04-12 PROCEDURE — 80048 BASIC METABOLIC PNL TOTAL CA: CPT

## 2024-04-12 PROCEDURE — 6360000002 HC RX W HCPCS: Performed by: NURSE PRACTITIONER

## 2024-04-12 RX ORDER — FUROSEMIDE 10 MG/ML
40 INJECTION INTRAMUSCULAR; INTRAVENOUS 2 TIMES DAILY
Status: DISCONTINUED | OUTPATIENT
Start: 2024-04-13 | End: 2024-04-13

## 2024-04-12 RX ADMIN — ISOSORBIDE DINITRATE 5 MG: 5 TABLET ORAL at 13:58

## 2024-04-12 RX ADMIN — ATORVASTATIN CALCIUM 10 MG: 10 TABLET, FILM COATED ORAL at 20:31

## 2024-04-12 RX ADMIN — FUROSEMIDE 40 MG: 10 INJECTION, SOLUTION INTRAMUSCULAR; INTRAVENOUS at 09:00

## 2024-04-12 RX ADMIN — CARVEDILOL 12.5 MG: 12.5 TABLET, FILM COATED ORAL at 17:36

## 2024-04-12 RX ADMIN — ASPIRIN 81 MG: 81 TABLET, COATED ORAL at 08:59

## 2024-04-12 RX ADMIN — ACETAMINOPHEN 650 MG: 325 TABLET ORAL at 20:31

## 2024-04-12 RX ADMIN — CARVEDILOL 12.5 MG: 12.5 TABLET, FILM COATED ORAL at 09:00

## 2024-04-12 RX ADMIN — SODIUM CHLORIDE, PRESERVATIVE FREE 10 ML: 5 INJECTION INTRAVENOUS at 09:05

## 2024-04-12 RX ADMIN — ISOSORBIDE DINITRATE 5 MG: 5 TABLET ORAL at 09:00

## 2024-04-12 RX ADMIN — SPIRONOLACTONE 25 MG: 25 TABLET ORAL at 08:59

## 2024-04-12 RX ADMIN — Medication 4 UNITS: at 13:44

## 2024-04-12 RX ADMIN — SACUBITRIL AND VALSARTAN 0.5 TABLET: 24; 26 TABLET, FILM COATED ORAL at 20:31

## 2024-04-12 RX ADMIN — SODIUM CHLORIDE, PRESERVATIVE FREE 10 ML: 5 INJECTION INTRAVENOUS at 20:35

## 2024-04-12 RX ADMIN — FERROUS SULFATE TAB 325 MG (65 MG ELEMENTAL FE) 325 MG: 325 (65 FE) TAB at 09:00

## 2024-04-12 RX ADMIN — FUROSEMIDE 40 MG: 10 INJECTION, SOLUTION INTRAMUSCULAR; INTRAVENOUS at 13:59

## 2024-04-12 RX ADMIN — GLIPIZIDE 2.5 MG: 5 TABLET ORAL at 08:59

## 2024-04-12 RX ADMIN — MIRTAZAPINE 15 MG: 15 TABLET, FILM COATED ORAL at 20:31

## 2024-04-12 ASSESSMENT — PAIN SCALES - GENERAL: PAINLEVEL_OUTOF10: 10

## 2024-04-12 ASSESSMENT — PAIN DESCRIPTION - ORIENTATION: ORIENTATION: RIGHT

## 2024-04-12 ASSESSMENT — PAIN DESCRIPTION - LOCATION: LOCATION: FOOT

## 2024-04-12 NOTE — CARDIO/PULMONARY
Cardiac Rehab: visit to discuss cardiac rehab program, pt was not in room. Will place referral and [facility] cardiac rehab on AVS.

## 2024-04-13 ENCOUNTER — APPOINTMENT (OUTPATIENT)
Facility: HOSPITAL | Age: 74
DRG: 291 | End: 2024-04-13
Payer: MEDICARE

## 2024-04-13 LAB
ANION GAP SERPL CALC-SCNC: 7 MMOL/L (ref 5–15)
BUN SERPL-MCNC: 68 MG/DL (ref 6–20)
BUN/CREAT SERPL: 24 (ref 12–20)
CALCIUM SERPL-MCNC: 8.7 MG/DL (ref 8.5–10.1)
CHLORIDE SERPL-SCNC: 103 MMOL/L (ref 97–108)
CO2 SERPL-SCNC: 26 MMOL/L (ref 21–32)
CREAT SERPL-MCNC: 2.8 MG/DL (ref 0.55–1.02)
ERYTHROCYTE [DISTWIDTH] IN BLOOD BY AUTOMATED COUNT: 14.5 % (ref 11.5–14.5)
GLUCOSE BLD STRIP.AUTO-MCNC: 131 MG/DL (ref 65–117)
GLUCOSE BLD STRIP.AUTO-MCNC: 168 MG/DL (ref 65–117)
GLUCOSE BLD STRIP.AUTO-MCNC: 297 MG/DL (ref 65–117)
GLUCOSE BLD STRIP.AUTO-MCNC: 323 MG/DL (ref 65–117)
GLUCOSE SERPL-MCNC: 107 MG/DL (ref 65–100)
HCT VFR BLD AUTO: 34.8 % (ref 35–47)
HGB BLD-MCNC: 11.2 G/DL (ref 11.5–16)
MAGNESIUM SERPL-MCNC: 2.1 MG/DL (ref 1.6–2.4)
MCH RBC QN AUTO: 31.1 PG (ref 26–34)
MCHC RBC AUTO-ENTMCNC: 32.2 G/DL (ref 30–36.5)
MCV RBC AUTO: 96.7 FL (ref 80–99)
NRBC # BLD: 0 K/UL (ref 0–0.01)
NRBC BLD-RTO: 0 PER 100 WBC
NT PRO BNP: ABNORMAL PG/ML
PLATELET # BLD AUTO: 169 K/UL (ref 150–400)
PMV BLD AUTO: 10.8 FL (ref 8.9–12.9)
POTASSIUM SERPL-SCNC: 4.2 MMOL/L (ref 3.5–5.1)
RBC # BLD AUTO: 3.6 M/UL (ref 3.8–5.2)
SERVICE CMNT-IMP: ABNORMAL
SODIUM SERPL-SCNC: 136 MMOL/L (ref 136–145)
WBC # BLD AUTO: 6.9 K/UL (ref 3.6–11)

## 2024-04-13 PROCEDURE — 73590 X-RAY EXAM OF LOWER LEG: CPT

## 2024-04-13 PROCEDURE — 83735 ASSAY OF MAGNESIUM: CPT

## 2024-04-13 PROCEDURE — 2580000003 HC RX 258: Performed by: FAMILY MEDICINE

## 2024-04-13 PROCEDURE — 2060000000 HC ICU INTERMEDIATE R&B

## 2024-04-13 PROCEDURE — 6370000000 HC RX 637 (ALT 250 FOR IP): Performed by: FAMILY MEDICINE

## 2024-04-13 PROCEDURE — 99233 SBSQ HOSP IP/OBS HIGH 50: CPT | Performed by: SPECIALIST

## 2024-04-13 PROCEDURE — 2700000000 HC OXYGEN THERAPY PER DAY

## 2024-04-13 PROCEDURE — 85027 COMPLETE CBC AUTOMATED: CPT

## 2024-04-13 PROCEDURE — 6370000000 HC RX 637 (ALT 250 FOR IP): Performed by: INTERNAL MEDICINE

## 2024-04-13 PROCEDURE — 82962 GLUCOSE BLOOD TEST: CPT

## 2024-04-13 PROCEDURE — 83880 ASSAY OF NATRIURETIC PEPTIDE: CPT

## 2024-04-13 PROCEDURE — 6370000000 HC RX 637 (ALT 250 FOR IP): Performed by: SPECIALIST

## 2024-04-13 PROCEDURE — 6360000002 HC RX W HCPCS: Performed by: INTERNAL MEDICINE

## 2024-04-13 PROCEDURE — 36415 COLL VENOUS BLD VENIPUNCTURE: CPT

## 2024-04-13 PROCEDURE — 80048 BASIC METABOLIC PNL TOTAL CA: CPT

## 2024-04-13 PROCEDURE — 73600 X-RAY EXAM OF ANKLE: CPT

## 2024-04-13 RX ORDER — ISOSORBIDE DINITRATE 5 MG/1
10 TABLET ORAL 3 TIMES DAILY
Status: DISCONTINUED | OUTPATIENT
Start: 2024-04-13 | End: 2024-04-15 | Stop reason: HOSPADM

## 2024-04-13 RX ORDER — BUMETANIDE 1 MG/1
2 TABLET ORAL 2 TIMES DAILY
Status: DISCONTINUED | OUTPATIENT
Start: 2024-04-13 | End: 2024-04-14

## 2024-04-13 RX ADMIN — Medication 6 UNITS: at 17:34

## 2024-04-13 RX ADMIN — BUMETANIDE 2 MG: 1 TABLET ORAL at 17:34

## 2024-04-13 RX ADMIN — SODIUM CHLORIDE, PRESERVATIVE FREE 10 ML: 5 INJECTION INTRAVENOUS at 20:36

## 2024-04-13 RX ADMIN — Medication 4 UNITS: at 13:04

## 2024-04-13 RX ADMIN — SACUBITRIL AND VALSARTAN 0.5 TABLET: 24; 26 TABLET, FILM COATED ORAL at 08:45

## 2024-04-13 RX ADMIN — MIRTAZAPINE 15 MG: 15 TABLET, FILM COATED ORAL at 20:32

## 2024-04-13 RX ADMIN — ISOSORBIDE DINITRATE 5 MG: 5 TABLET ORAL at 08:47

## 2024-04-13 RX ADMIN — ATORVASTATIN CALCIUM 10 MG: 10 TABLET, FILM COATED ORAL at 20:32

## 2024-04-13 RX ADMIN — CARVEDILOL 12.5 MG: 12.5 TABLET, FILM COATED ORAL at 08:47

## 2024-04-13 RX ADMIN — FERROUS SULFATE TAB 325 MG (65 MG ELEMENTAL FE) 325 MG: 325 (65 FE) TAB at 08:46

## 2024-04-13 RX ADMIN — CARVEDILOL 12.5 MG: 12.5 TABLET, FILM COATED ORAL at 17:34

## 2024-04-13 RX ADMIN — ASPIRIN 81 MG: 81 TABLET, COATED ORAL at 08:45

## 2024-04-13 RX ADMIN — SPIRONOLACTONE 25 MG: 25 TABLET ORAL at 08:47

## 2024-04-13 RX ADMIN — SODIUM CHLORIDE, PRESERVATIVE FREE 10 ML: 5 INJECTION INTRAVENOUS at 08:48

## 2024-04-13 RX ADMIN — FUROSEMIDE 40 MG: 10 INJECTION, SOLUTION INTRAMUSCULAR; INTRAVENOUS at 08:47

## 2024-04-13 RX ADMIN — ACETAMINOPHEN 650 MG: 325 TABLET ORAL at 20:32

## 2024-04-13 RX ADMIN — ISOSORBIDE DINITRATE 10 MG: 5 TABLET ORAL at 20:32

## 2024-04-13 RX ADMIN — GLIPIZIDE 2.5 MG: 5 TABLET ORAL at 08:44

## 2024-04-13 RX ADMIN — ISOSORBIDE DINITRATE 5 MG: 5 TABLET ORAL at 13:04

## 2024-04-13 ASSESSMENT — PAIN DESCRIPTION - ORIENTATION
ORIENTATION: RIGHT
ORIENTATION: RIGHT

## 2024-04-13 ASSESSMENT — PAIN SCALES - GENERAL
PAINLEVEL_OUTOF10: 0
PAINLEVEL_OUTOF10: 6
PAINLEVEL_OUTOF10: 0
PAINLEVEL_OUTOF10: 6

## 2024-04-13 ASSESSMENT — PAIN DESCRIPTION - LOCATION
LOCATION: FOOT
LOCATION: FOOT

## 2024-04-14 LAB
ANION GAP SERPL CALC-SCNC: 6 MMOL/L (ref 5–15)
BASOPHILS # BLD: 0 K/UL (ref 0–0.1)
BASOPHILS NFR BLD: 0 % (ref 0–1)
BUN SERPL-MCNC: 77 MG/DL (ref 6–20)
BUN/CREAT SERPL: 24 (ref 12–20)
CALCIUM SERPL-MCNC: 8.6 MG/DL (ref 8.5–10.1)
CHLORIDE SERPL-SCNC: 102 MMOL/L (ref 97–108)
CO2 SERPL-SCNC: 29 MMOL/L (ref 21–32)
CREAT SERPL-MCNC: 3.22 MG/DL (ref 0.55–1.02)
DIFFERENTIAL METHOD BLD: ABNORMAL
EOSINOPHIL # BLD: 0.3 K/UL (ref 0–0.4)
EOSINOPHIL NFR BLD: 4 % (ref 0–7)
ERYTHROCYTE [DISTWIDTH] IN BLOOD BY AUTOMATED COUNT: 14.4 % (ref 11.5–14.5)
GLUCOSE BLD STRIP.AUTO-MCNC: 136 MG/DL (ref 65–117)
GLUCOSE BLD STRIP.AUTO-MCNC: 185 MG/DL (ref 65–117)
GLUCOSE BLD STRIP.AUTO-MCNC: 196 MG/DL (ref 65–117)
GLUCOSE BLD STRIP.AUTO-MCNC: 311 MG/DL (ref 65–117)
GLUCOSE SERPL-MCNC: 129 MG/DL (ref 65–100)
HCT VFR BLD AUTO: 36.2 % (ref 35–47)
HGB BLD-MCNC: 11.6 G/DL (ref 11.5–16)
IMM GRANULOCYTES # BLD AUTO: 0 K/UL (ref 0–0.04)
IMM GRANULOCYTES NFR BLD AUTO: 0 % (ref 0–0.5)
LYMPHOCYTES # BLD: 0.9 K/UL (ref 0.8–3.5)
LYMPHOCYTES NFR BLD: 14 % (ref 12–49)
MAGNESIUM SERPL-MCNC: 2.1 MG/DL (ref 1.6–2.4)
MCH RBC QN AUTO: 30.9 PG (ref 26–34)
MCHC RBC AUTO-ENTMCNC: 32 G/DL (ref 30–36.5)
MCV RBC AUTO: 96.5 FL (ref 80–99)
MONOCYTES # BLD: 1 K/UL (ref 0–1)
MONOCYTES NFR BLD: 14 % (ref 5–13)
NEUTS SEG # BLD: 4.8 K/UL (ref 1.8–8)
NEUTS SEG NFR BLD: 68 % (ref 32–75)
NRBC # BLD: 0 K/UL (ref 0–0.01)
NRBC BLD-RTO: 0 PER 100 WBC
NT PRO BNP: ABNORMAL PG/ML
PLATELET # BLD AUTO: 179 K/UL (ref 150–400)
PMV BLD AUTO: 10.8 FL (ref 8.9–12.9)
POTASSIUM SERPL-SCNC: 3.8 MMOL/L (ref 3.5–5.1)
RBC # BLD AUTO: 3.75 M/UL (ref 3.8–5.2)
SERVICE CMNT-IMP: ABNORMAL
SODIUM SERPL-SCNC: 137 MMOL/L (ref 136–145)
WBC # BLD AUTO: 7 K/UL (ref 3.6–11)

## 2024-04-14 PROCEDURE — 99233 SBSQ HOSP IP/OBS HIGH 50: CPT | Performed by: SPECIALIST

## 2024-04-14 PROCEDURE — 6370000000 HC RX 637 (ALT 250 FOR IP): Performed by: SPECIALIST

## 2024-04-14 PROCEDURE — 85025 COMPLETE CBC W/AUTO DIFF WBC: CPT

## 2024-04-14 PROCEDURE — 2580000003 HC RX 258: Performed by: FAMILY MEDICINE

## 2024-04-14 PROCEDURE — 36415 COLL VENOUS BLD VENIPUNCTURE: CPT

## 2024-04-14 PROCEDURE — 80048 BASIC METABOLIC PNL TOTAL CA: CPT

## 2024-04-14 PROCEDURE — 6360000002 HC RX W HCPCS: Performed by: FAMILY MEDICINE

## 2024-04-14 PROCEDURE — 83880 ASSAY OF NATRIURETIC PEPTIDE: CPT

## 2024-04-14 PROCEDURE — 2580000003 HC RX 258: Performed by: SPECIALIST

## 2024-04-14 PROCEDURE — 6370000000 HC RX 637 (ALT 250 FOR IP): Performed by: FAMILY MEDICINE

## 2024-04-14 PROCEDURE — 82962 GLUCOSE BLOOD TEST: CPT

## 2024-04-14 PROCEDURE — 2700000000 HC OXYGEN THERAPY PER DAY

## 2024-04-14 PROCEDURE — 2060000000 HC ICU INTERMEDIATE R&B

## 2024-04-14 PROCEDURE — 83735 ASSAY OF MAGNESIUM: CPT

## 2024-04-14 RX ORDER — SODIUM CHLORIDE 9 MG/ML
INJECTION, SOLUTION INTRAVENOUS CONTINUOUS
Status: DISCONTINUED | OUTPATIENT
Start: 2024-04-14 | End: 2024-04-15

## 2024-04-14 RX ORDER — BUMETANIDE 1 MG/1
2 TABLET ORAL 2 TIMES DAILY
Status: CANCELLED | OUTPATIENT
Start: 2024-04-14

## 2024-04-14 RX ADMIN — ISOSORBIDE DINITRATE 10 MG: 5 TABLET ORAL at 10:13

## 2024-04-14 RX ADMIN — GLIPIZIDE 2.5 MG: 5 TABLET ORAL at 10:13

## 2024-04-14 RX ADMIN — MIRTAZAPINE 15 MG: 15 TABLET, FILM COATED ORAL at 20:50

## 2024-04-14 RX ADMIN — SODIUM CHLORIDE: 9 INJECTION, SOLUTION INTRAVENOUS at 14:42

## 2024-04-14 RX ADMIN — ASPIRIN 81 MG: 81 TABLET, COATED ORAL at 10:14

## 2024-04-14 RX ADMIN — ATORVASTATIN CALCIUM 10 MG: 10 TABLET, FILM COATED ORAL at 20:50

## 2024-04-14 RX ADMIN — ISOSORBIDE DINITRATE 10 MG: 5 TABLET ORAL at 20:53

## 2024-04-14 RX ADMIN — Medication 6 UNITS: at 13:51

## 2024-04-14 RX ADMIN — ISOSORBIDE DINITRATE 10 MG: 5 TABLET ORAL at 14:32

## 2024-04-14 RX ADMIN — CARVEDILOL 12.5 MG: 12.5 TABLET, FILM COATED ORAL at 18:17

## 2024-04-14 RX ADMIN — ACETAMINOPHEN 650 MG: 325 TABLET ORAL at 20:50

## 2024-04-14 RX ADMIN — FERROUS SULFATE TAB 325 MG (65 MG ELEMENTAL FE) 325 MG: 325 (65 FE) TAB at 10:13

## 2024-04-14 RX ADMIN — SODIUM CHLORIDE, PRESERVATIVE FREE 10 ML: 5 INJECTION INTRAVENOUS at 20:53

## 2024-04-14 RX ADMIN — SODIUM CHLORIDE, PRESERVATIVE FREE 10 ML: 5 INJECTION INTRAVENOUS at 10:14

## 2024-04-14 RX ADMIN — BUMETANIDE 2 MG: 1 TABLET ORAL at 10:14

## 2024-04-14 RX ADMIN — CARVEDILOL 12.5 MG: 12.5 TABLET, FILM COATED ORAL at 10:13

## 2024-04-14 ASSESSMENT — PAIN SCALES - GENERAL
PAINLEVEL_OUTOF10: 7
PAINLEVEL_OUTOF10: 0

## 2024-04-14 ASSESSMENT — PAIN DESCRIPTION - ORIENTATION: ORIENTATION: RIGHT

## 2024-04-14 ASSESSMENT — PAIN DESCRIPTION - LOCATION: LOCATION: FOOT

## 2024-04-15 VITALS
OXYGEN SATURATION: 92 % | HEIGHT: 60 IN | SYSTOLIC BLOOD PRESSURE: 119 MMHG | WEIGHT: 117.95 LBS | TEMPERATURE: 97.6 F | DIASTOLIC BLOOD PRESSURE: 51 MMHG | RESPIRATION RATE: 18 BRPM | HEART RATE: 76 BPM | BODY MASS INDEX: 23.16 KG/M2

## 2024-04-15 LAB
ANION GAP SERPL CALC-SCNC: 8 MMOL/L (ref 5–15)
BUN SERPL-MCNC: 76 MG/DL (ref 6–20)
BUN/CREAT SERPL: 24 (ref 12–20)
CALCIUM SERPL-MCNC: 8.3 MG/DL (ref 8.5–10.1)
CHLORIDE SERPL-SCNC: 105 MMOL/L (ref 97–108)
CO2 SERPL-SCNC: 27 MMOL/L (ref 21–32)
COMMENT:: NORMAL
CREAT SERPL-MCNC: 3.21 MG/DL (ref 0.55–1.02)
GLUCOSE BLD STRIP.AUTO-MCNC: 132 MG/DL (ref 65–117)
GLUCOSE BLD STRIP.AUTO-MCNC: 298 MG/DL (ref 65–117)
GLUCOSE SERPL-MCNC: 119 MG/DL (ref 65–100)
POTASSIUM SERPL-SCNC: 3.9 MMOL/L (ref 3.5–5.1)
SERVICE CMNT-IMP: ABNORMAL
SERVICE CMNT-IMP: ABNORMAL
SODIUM SERPL-SCNC: 140 MMOL/L (ref 136–145)
SPECIMEN HOLD: NORMAL

## 2024-04-15 PROCEDURE — 2580000003 HC RX 258: Performed by: FAMILY MEDICINE

## 2024-04-15 PROCEDURE — 80048 BASIC METABOLIC PNL TOTAL CA: CPT

## 2024-04-15 PROCEDURE — 6370000000 HC RX 637 (ALT 250 FOR IP): Performed by: FAMILY MEDICINE

## 2024-04-15 PROCEDURE — 2580000003 HC RX 258: Performed by: SPECIALIST

## 2024-04-15 PROCEDURE — 97110 THERAPEUTIC EXERCISES: CPT

## 2024-04-15 PROCEDURE — 36415 COLL VENOUS BLD VENIPUNCTURE: CPT

## 2024-04-15 PROCEDURE — 82962 GLUCOSE BLOOD TEST: CPT

## 2024-04-15 PROCEDURE — 2500000003 HC RX 250 WO HCPCS: Performed by: NURSE PRACTITIONER

## 2024-04-15 PROCEDURE — 6370000000 HC RX 637 (ALT 250 FOR IP): Performed by: SPECIALIST

## 2024-04-15 PROCEDURE — 2700000000 HC OXYGEN THERAPY PER DAY

## 2024-04-15 PROCEDURE — 99233 SBSQ HOSP IP/OBS HIGH 50: CPT | Performed by: SPECIALIST

## 2024-04-15 RX ORDER — FUROSEMIDE 40 MG/1
80 TABLET ORAL DAILY
Status: DISCONTINUED | OUTPATIENT
Start: 2024-04-15 | End: 2024-04-15 | Stop reason: HOSPADM

## 2024-04-15 RX ADMIN — FERROUS SULFATE TAB 325 MG (65 MG ELEMENTAL FE) 325 MG: 325 (65 FE) TAB at 09:10

## 2024-04-15 RX ADMIN — ISOSORBIDE DINITRATE 10 MG: 5 TABLET ORAL at 09:10

## 2024-04-15 RX ADMIN — Medication 4 UNITS: at 12:18

## 2024-04-15 RX ADMIN — GLIPIZIDE 2.5 MG: 5 TABLET ORAL at 09:10

## 2024-04-15 RX ADMIN — ASPIRIN 81 MG: 81 TABLET, COATED ORAL at 09:10

## 2024-04-15 RX ADMIN — FUROSEMIDE 80 MG: 40 TABLET ORAL at 09:10

## 2024-04-15 RX ADMIN — CARVEDILOL 12.5 MG: 12.5 TABLET, FILM COATED ORAL at 09:10

## 2024-04-15 RX ADMIN — SODIUM CHLORIDE, PRESERVATIVE FREE 10 ML: 5 INJECTION INTRAVENOUS at 09:11

## 2024-04-15 RX ADMIN — MICONAZOLE NITRATE: 2 POWDER TOPICAL at 09:11

## 2024-04-15 RX ADMIN — SODIUM CHLORIDE: 9 INJECTION, SOLUTION INTRAVENOUS at 00:43

## 2024-04-15 RX ADMIN — MICONAZOLE NITRATE: 2 POWDER TOPICAL at 00:43

## 2024-04-15 RX ADMIN — ISOSORBIDE DINITRATE 10 MG: 5 TABLET ORAL at 13:48

## 2024-04-15 RX ADMIN — ACETAMINOPHEN 650 MG: 325 TABLET ORAL at 09:53

## 2024-04-15 ASSESSMENT — PAIN SCALES - GENERAL
PAINLEVEL_OUTOF10: 0
PAINLEVEL_OUTOF10: 3
PAINLEVEL_OUTOF10: 0

## 2024-04-15 ASSESSMENT — PAIN DESCRIPTION - LOCATION: LOCATION: HAND

## 2024-04-15 ASSESSMENT — PAIN DESCRIPTION - ORIENTATION: ORIENTATION: LEFT

## 2024-04-15 NOTE — DISCHARGE SUMMARY
Discharge Summary       PATIENT ID: Nolvia Silver  MRN: [id number]   YOB: 1950    DATE OF ADMISSION: 4/9/2024  3:15 PM    DATE OF DISCHARGE: 4/15/24   PRIMARY CARE PROVIDER: No primary care provider on file.     ATTENDING PHYSICIAN: MARLEN POOLE  DISCHARGING PROVIDER: Marlen Poole MD    To contact this individual call [phone] and ask the  to page.  If unavailable ask to be transferred the Adult Hospitalist Department.    CONSULTATIONS: IP CONSULT TO HEART FAILURE NURSE/COORDINATOR  IP CONSULT TO DIETITIAN  IP WOUND CARE NURSE CONSULT TO EVAL  IP CONSULT TO CARDIOLOGY  IP CONSULT TO NEPHROLOGY  IP CONSULT TO CASE MANAGEMENT    PROCEDURES/SURGERIES: * No surgery found *    ADMITTING DIAGNOSES & HOSPITAL COURSE:  CHF    Nolvia Silver is a 73 y.o. female who presents with c/o sob and fluid building up on her, worsening kidney function, +LEÓN, tachypneic on arrival, +leg swelling , denies cp , pt pale in triage. PMHx of HFrEF (EF 20% to 25%) and CKD presents to the emergency department c/o dyspnea, bilateral lower extremity swelling for the last 2 weeks. Patient reports adherence to her medications including 80 mg furosemide daily. She has no additional complaints at this time .  She tells me she has not had any evidence of infection no fevers or chills-she has not run out of any of her medications other than her mirtazapine-she tells me that she is without a primary care physician at this time due to correction of her previous primary care physician-she has no GI symptoms no nausea vomiting constipation or diarrhea she has no chest pain on admission.  She has been taking her diuretics as instructed but despite this has had worsening leg swelling with associated pain        1.  Acute on chronic congestive heart failure with acute exacerbation EF 15 to 20% NYHA IV at admission  -Patient discharged on beta-blocker not on ACE inhibitor due to CKD continue Jardiance Lasix Aldactone and Coreg

## 2024-04-15 NOTE — PLAN OF CARE
Problem: Discharge Planning  Goal: Discharge to home or other facility with appropriate resources  4/11/2024 1230 by Valerie Cordova RN  Outcome: Progressing  4/10/2024 2316 by Radha Wells RN  Outcome: Progressing  4/10/2024 2316 by Radha Wells RN  Outcome: Progressing  Flowsheets (Taken 4/10/2024 1955)  Discharge to home or other facility with appropriate resources:   Identify barriers to discharge with patient and caregiver   Identify discharge learning needs (meds, wound care, etc)     Problem: Safety - Adult  Goal: Free from fall injury  4/11/2024 1230 by Valerie Cordova RN  Outcome: Progressing  4/10/2024 2316 by Radha Wells RN  Outcome: Progressing  4/10/2024 2316 by Radha Wells RN  Outcome: Progressing     Problem: ABCDS Injury Assessment  Goal: Absence of physical injury  4/11/2024 1230 by Valerie Cordova RN  Outcome: Progressing  4/10/2024 2316 by Radha Wells RN  Outcome: Progressing  4/10/2024 2316 by Radha Wells RN  Outcome: Progressing     Problem: Chronic Conditions and Co-morbidities  Goal: Patient's chronic conditions and co-morbidity symptoms are monitored and maintained or improved  4/11/2024 1230 by Valerie Cordova RN  Outcome: Progressing  4/10/2024 2316 by Radha Wells RN  Outcome: Progressing  4/10/2024 2316 by Radha Wells RN  Outcome: Progressing  Flowsheets (Taken 4/10/2024 1955)  Care Plan - Patient's Chronic Conditions and Co-Morbidity Symptoms are Monitored and Maintained or Improved: Monitor and assess patient's chronic conditions and comorbid symptoms for stability, deterioration, or improvement     
  Problem: Discharge Planning  Goal: Discharge to home or other facility with appropriate resources  Outcome: Progressing     Problem: Safety - Adult  Goal: Free from fall injury  Outcome: Progressing     Problem: ABCDS Injury Assessment  Goal: Absence of physical injury  Outcome: Progressing     Problem: Chronic Conditions and Co-morbidities  Goal: Patient's chronic conditions and co-morbidity symptoms are monitored and maintained or improved  Outcome: Progressing     Problem: Occupational Therapy - Adult  Goal: By Discharge: Performs self-care activities at highest level of function for planned discharge setting.  See evaluation for individualized goals.  Description: FUNCTIONAL STATUS PRIOR TO ADMISSION:     , ADL Assistance: Independent,  ,  ,  ,  ,  ,  , Ambulation Assistance: Independent, Transfer Assistance: Independent,       HOME SUPPORT: Patient lived with son and , was Mod I with RW, Mod I - independent for ADLs son provides transport and pt  is currently at [facility].     Occupational Therapy Goals:  Initiated 4/10/2024  1.  Patient will perform grooming in standing for >4 minutes with Modified [place] within 7 day(s).  2.  Patient will perform upper body dressing and lower body dressing with Modified [place] within 7 day(s).  3.  Patient will perform toilet transfers with Modified [place]  within 7 day(s).  4.  Patient will perform all aspects of toileting with Modified [place] within 7 day(s).  5.  Patient will participate in upper extremity therapeutic exercise/activities with Modified [place] for 10 minutes within 7 day(s).    6.  Patient will utilize energy conservation techniques during functional activities with verbal and visual cues within 7 day(s).    4/12/2024 1317 by Lotus Perez OT  Outcome: Progressing     Problem: Pain  Goal: Verbalizes/displays adequate comfort level or baseline comfort level  Outcome: Progressing     
  Problem: Discharge Planning  Goal: Discharge to home or other facility with appropriate resources  Outcome: Progressing     Problem: Safety - Adult  Goal: Free from fall injury  Outcome: Progressing     Problem: ABCDS Injury Assessment  Goal: Absence of physical injury  Outcome: Progressing     Problem: Chronic Conditions and Co-morbidities  Goal: Patient's chronic conditions and co-morbidity symptoms are monitored and maintained or improved  Outcome: Progressing     Problem: Pain  Goal: Verbalizes/displays adequate comfort level or baseline comfort level  Outcome: Progressing     Problem: Skin/Tissue Integrity  Goal: Absence of new skin breakdown  Description: 1.  Monitor for areas of redness and/or skin breakdown  2.  Assess vascular access sites hourly  3.  Every 4-6 hours minimum:  Change oxygen saturation probe site  4.  Every 4-6 hours:  If on nasal continuous positive airway pressure, respiratory therapy assess nares and determine need for appliance change or resting period.  Outcome: Progressing     
  Problem: Discharge Planning  Goal: Discharge to home or other facility with appropriate resources  Outcome: Progressing     Problem: Safety - Adult  Goal: Free from fall injury  Outcome: Progressing     Problem: ABCDS Injury Assessment  Goal: Absence of physical injury  Outcome: Progressing     Problem: Chronic Conditions and Co-morbidities  Goal: Patient's chronic conditions and co-morbidity symptoms are monitored and maintained or improved  Outcome: Progressing     Problem: Pain  Goal: Verbalizes/displays adequate comfort level or baseline comfort level  Outcome: Progressing     Problem: Skin/Tissue Integrity  Goal: Absence of new skin breakdown  Description: 1.  Monitor for areas of redness and/or skin breakdown  2.  Assess vascular access sites hourly  3.  Every 4-6 hours minimum:  Change oxygen saturation probe site  4.  Every 4-6 hours:  If on nasal continuous positive airway pressure, respiratory therapy assess nares and determine need for appliance change or resting period.  Outcome: Progressing     
  Problem: Occupational Therapy - Adult  Goal: By Discharge: Performs self-care activities at highest level of function for planned discharge setting.  See evaluation for individualized goals.  Description: FUNCTIONAL STATUS PRIOR TO ADMISSION:     , ADL Assistance: Independent,  ,  ,  ,  ,  ,  , Ambulation Assistance: Independent, Transfer Assistance: Independent,       HOME SUPPORT: Patient lived with son and , was Mod I with RW, Mod I - independent for ADLs son provides transport and pt  is currently at [facility].     Occupational Therapy Goals:  Initiated 4/10/2024  1.  Patient will perform grooming in standing for >4 minutes with Modified [place] within 7 day(s).  2.  Patient will perform upper body dressing and lower body dressing with Modified [place] within 7 day(s).  3.  Patient will perform toilet transfers with Modified [place]  within 7 day(s).  4.  Patient will perform all aspects of toileting with Modified [place] within 7 day(s).  5.  Patient will participate in upper extremity therapeutic exercise/activities with Modified [place] for 10 minutes within 7 day(s).    6.  Patient will utilize energy conservation techniques during functional activities with verbal and visual cues within 7 day(s).    Outcome: Progressing     OCCUPATIONAL THERAPY TREATMENT  Patient: Nolvia Silver (73 y.o. female)  Date: 4/15/2024  Primary Diagnosis: Congestive heart failure with left ventricular diastolic dysfunction, acute on chronic (HCC) [I50.33]  Acute on chronic HFrEF (heart failure with reduced ejection fraction) ([facility]) [I50.23]  Acute on chronic congestive heart failure, unspecified heart failure type ([facility]) [I50.9]       Precautions:                  Chart, occupational therapy assessment, plan of care, and goals were reviewed.    ASSESSMENT  Patient continues to benefit from skilled OT services and is progressing towards goals. Pt received semi-reclined in bed, amendable to 
  Problem: Physical Therapy - Adult  Goal: By Discharge: Performs mobility at highest level of function for planned discharge setting.  See evaluation for individualized goals.  Description: FUNCTIONAL STATUS PRIOR TO ADMISSION: Patient was modified independent using a rolling walker for functional mobility.    HOME SUPPORT PRIOR TO ADMISSION: The patient lived with  and son but did not require assistance. Her son works during the day and is not home. The patient does not drive.    Physical Therapy Goals  Initiated 4/10/2024  1.  Patient will move from supine to sit and sit to supine in bed with independence within 7 day(s).    2.  Patient will perform sit to stand with modified independence within 7 day(s).  3.  Patient will transfer from bed to chair and chair to bed with modified independence using the least restrictive device within 7 day(s).  4.  Patient will ambulate with modified independence for 120 feet with the least restrictive device within 7 day(s).   5.  Patient will ascend/descend 20 stairs with 1 handrail(s) with modified independence within 7 day(s).    Outcome: Progressing   PHYSICAL THERAPY EVALUATION    Patient: Nolvia Silver (73 y.o. female)  Date: 4/10/2024  Primary Diagnosis: Congestive heart failure with left ventricular diastolic dysfunction, acute on chronic ([facility]) [I50.33]  Acute on chronic HFrEF (heart failure with reduced ejection fraction) ([facility]) [I50.23]  Acute on chronic congestive heart failure, unspecified heart failure type ([facility]) [I50.9]       Precautions:                        ASSESSMENT :   DEFICITS/IMPAIRMENTS:   The patient is limited by decreased functional mobility, activity tolerance S/P CHF and LE edema. Patient demonstrates stable vital signs. She is able to transition supine to sit without assistance and demonstrates good sitting balance once EOB. Patient is SBA to stand and ambulate with use of RW in and out of the restroom. She is left in bedside chair with vitals 
  Problem: Physical Therapy - Adult  Goal: By Discharge: Performs mobility at highest level of function for planned discharge setting.  See evaluation for individualized goals.  Description: FUNCTIONAL STATUS PRIOR TO ADMISSION: Patient was modified independent using a rolling walker for functional mobility.    HOME SUPPORT PRIOR TO ADMISSION: The patient lived with  and son but did not require assistance. Her son works during the day and is not home. The patient does not drive.    Physical Therapy Goals  Initiated 4/10/2024  1.  Patient will move from supine to sit and sit to supine in bed with independence within 7 day(s).    2.  Patient will perform sit to stand with modified independence within 7 day(s).  3.  Patient will transfer from bed to chair and chair to bed with modified independence using the least restrictive device within 7 day(s).  4.  Patient will ambulate with modified independence for 120 feet with the least restrictive device within 7 day(s).   5.  Patient will ascend/descend 20 stairs with 1 handrail(s) with modified independence within 7 day(s).    Outcome: Progressing   PHYSICAL THERAPY TREATMENT    Patient: Nolvia Silver (73 y.o. female)  Date: 4/11/2024  Diagnosis: Congestive heart failure with left ventricular diastolic dysfunction, acute on chronic (HCC) [I50.33]  Acute on chronic HFrEF (heart failure with reduced ejection fraction) ([facility]) [I50.23]  Acute on chronic congestive heart failure, unspecified heart failure type (HCC) [I50.9] Congestive heart failure with left ventricular diastolic dysfunction, acute on chronic (HCC)      Precautions:                        ASSESSMENT:  Patient continues to benefit from skilled PT services and is progressing towards goals. Patient received sitting on side of bed finishing lunch. Pt ambulated to bathroom to perform ADLs with OT. Once finished, pt progressed toward ambulating in hallway. Noted minor gait deviations and fair steadiness, required 
  Problem: Safety - Adult  Goal: Free from fall injury  Outcome: Progressing  Flowsheets (Taken 4/12/2024 0633)  Free From Fall Injury:   Instruct family/caregiver on patient safety   Based on caregiver fall risk screen, instruct family/caregiver to ask for assistance with transferring infant if caregiver noted to have fall risk factors     Problem: ABCDS Injury Assessment  Goal: Absence of physical injury  Outcome: Progressing  Flowsheets (Taken 4/12/2024 0633)  Absence of Physical Injury: Implement safety measures based on patient assessment     Problem: Chronic Conditions and Co-morbidities  Goal: Patient's chronic conditions and co-morbidity symptoms are monitored and maintained or improved  Outcome: Progressing  Flowsheets (Taken 4/12/2024 0633)  Care Plan - Patient's Chronic Conditions and Co-Morbidity Symptoms are Monitored and Maintained or Improved:   Monitor and assess patient's chronic conditions and comorbid symptoms for stability, deterioration, or improvement   Update acute care plan with appropriate goals if chronic or comorbid symptoms are exacerbated and prevent overall improvement and discharge     
  Problem: Skin/Tissue Integrity  Goal: Absence of new skin breakdown  Description: 1.  Monitor for areas of redness and/or skin breakdown  2.  Assess vascular access sites hourly  3.  Every 4-6 hours minimum:  Change oxygen saturation probe site  4.  Every 4-6 hours:  If on nasal continuous positive airway pressure, respiratory therapy assess nares and determine need for appliance change or resting period.  4/15/2024 0147 by Axel Phipps RN  Outcome: Progressing     Problem: Discharge Planning  Goal: Discharge to home or other facility with appropriate resources  4/15/2024 1009 by Opal Thurman RN  Outcome: [facility] Progressing  4/15/2024 0147 by Axel Phipps RN  Outcome: Progressing     Problem: Safety - Adult  Goal: Free from fall injury  4/15/2024 1009 by Opal Thurman RN  Outcome: [facility] Progressing  4/15/2024 0147 by Axel Phipps RN  Outcome: Progressing     Problem: ABCDS Injury Assessment  Goal: Absence of physical injury  4/15/2024 1009 by Opal Thurman RN  Outcome: /[facility] Progressing  4/15/2024 0147 by Axel Phipps RN  Outcome: Progressing     Problem: Chronic Conditions and Co-morbidities  Goal: Patient's chronic conditions and co-morbidity symptoms are monitored and maintained or improved  4/15/2024 1009 by Opal Thurman RN  Outcome: [facility] Progressing  4/15/2024 0147 by Axel Phipps RN  Outcome: Progressing     Problem: Pain  Goal: Verbalizes/displays adequate comfort level or baseline comfort level  4/15/2024 1009 by Opal Thurman RN  Outcome: [facility] Progressing  Flowsheets (Taken 4/15/2024 0400 by Axel Phipps RN)  Verbalizes/displays adequate comfort level or baseline comfort level: Assess pain using appropriate pain scale  4/15/2024 0147 by Axel Phipps RN  Outcome: Progressing     
7 day(s).    2.  Patient will perform sit to stand with modified independence within 7 day(s).  3.  Patient will transfer from bed to chair and chair to bed with modified independence using the least restrictive device within 7 day(s).  4.  Patient will ambulate with modified independence for 120 feet with the least restrictive device within 7 day(s).   5.  Patient will ascend/descend 20 stairs with 1 handrail(s) with modified independence within 7 day(s).    4/10/2024 1429 by Amparo Watson, PT  Outcome: Progressing     Problem: Occupational Therapy - Adult  Goal: By Discharge: Performs self-care activities at highest level of function for planned discharge setting.  See evaluation for individualized goals.  Description: FUNCTIONAL STATUS PRIOR TO ADMISSION:     , ADL Assistance: Independent,  ,  ,  ,  ,  ,  , Ambulation Assistance: Independent, Transfer Assistance: Independent,       HOME SUPPORT: Patient lived with son and , was Mod I with RW, Mod I - independent for ADLs son provides transport and pt  is currently at [facility].     Occupational Therapy Goals:  Initiated 4/10/2024  1.  Patient will perform grooming in standing for >4 minutes with Modified [place] within 7 day(s).  2.  Patient will perform upper body dressing and lower body dressing with Modified [place] within 7 day(s).  3.  Patient will perform toilet transfers with Modified [place]  within 7 day(s).  4.  Patient will perform all aspects of toileting with Modified [place] within 7 day(s).  5.  Patient will participate in upper extremity therapeutic exercise/activities with Modified [place] for 10 minutes within 7 day(s).    6.  Patient will utilize energy conservation techniques during functional activities with verbal and visual cues within 7 day(s).    4/10/2024 1500 by Mayr Mitchell, ANIBAL  Outcome: Progressing     
ANTHONY Joseph  Outcome: [facility] Progressing     Problem: Chronic Conditions and Co-morbidities  Goal: Patient's chronic conditions and co-morbidity symptoms are monitored and maintained or improved  4/15/2024 1637 by Opal Thurman RN  Outcome: [facility] Resolved Met  4/15/2024 1009 by Opal Thurman RN  Outcome: [facility] Progressing     Problem: Pain  Goal: Verbalizes/displays adequate comfort level or baseline comfort level  4/15/2024 1637 by Opal Thurman RN  Outcome: [facility] Resolved Met  4/15/2024 1009 by Opal Thurman RN  Outcome: [facility] Progressing  Flowsheets (Taken 4/15/2024 0400 by Axel Phipps RN)  Verbalizes/displays adequate comfort level or baseline comfort level: Assess pain using appropriate pain scale     Problem: Skin/Tissue Integrity  Goal: Absence of new skin breakdown  Description: 1.  Monitor for areas of redness and/or skin breakdown  2.  Assess vascular access sites hourly  3.  Every 4-6 hours minimum:  Change oxygen saturation probe site  4.  Every 4-6 hours:  If on nasal continuous positive airway pressure, respiratory therapy assess nares and determine need for appliance change or resting period.  Outcome: HH/[facility] Resolved Met     
Pt on 3L O2 NC, Spo2 stable throughout session, supportive son present Pt required SBA for all functional mobility with RW for dynamic standing balance and min VCs for RW management. Pt completed all aspects of toileting, LB dressing, and grooming ADLs in bathroom with overall CGA to MIN A for distal LE access. Pt ambulated around unit with PT (see PT note for gait details). Pt returned to seated in chair, Pt repositioned for comfort, call bell within reach, all needs met, NAD. Anticipate no OT needs following discharge.      PLAN :  Patient continues to benefit from skilled intervention to address the above impairments.  Continue treatment per established plan of care to address goals.    Recommend with staff: OOB for meals 3x/day, toileting in bathroom    Recommend next OT session: cont. grooming standing at sink, BUE HEP    Recommendation for discharge: (in order for the patient to meet his/her long term goals): No skilled occupational therapy    Other factors to consider for discharge: no additional factors    IF patient discharges home will need the following DME: patient owns DME required for discharge       SUBJECTIVE:   Patient stated “If they want to talk to me, they can come see me in person.”    OBJECTIVE DATA SUMMARY:   Cognitive/Behavioral Status:  Orientation  Overall Orientation Status: Within Normal Limits  Orientation Level: Oriented X4  Cognition  Overall Cognitive Status: WNL    Functional Mobility and Transfers for ADLs:  Bed Mobility:  Bed Mobility Training  Bed Mobility Training: No     Transfers:   Transfer Training  Transfer Training: Yes  Sit to Stand: Adaptive equipment;Stand-by assistance  Stand to Sit: Stand-by assistance;Adaptive equipment           Balance:  Standing: Intact  Balance  Sitting: Intact  Standing: Intact  Standing - Static: Good  Standing - Dynamic: Constant support;Good      ADL Intervention:    Grooming: .  - Oral Care: Stand-by Assistance and Standing at sink  - Washing 
Good;Constant support  Standing - Dynamic: Constant support;Good      ADL Intervention:    Grooming: .  - Face Washing : Supervision and Standing at Sink  - Oral Care: Supervision and Standing at sink  - Brushing/Combing Hair : Supervision and Standing at sink  - Washing Hands : Supervision and Standing at sink    Toileting: .  - Bowel Hygiene : Supervision and Seated  - Bladder Hygiene : Supervision and Seated   - Clothing Management : Supervision, Standing, and Seated    Lower Extremity Dressing: .  - Shoes; Slip On : Supervision and Seated EOB    Pain Rating:  None reported    Activity Tolerance:   Good  Please refer to the flowsheet for vital signs taken during this treatment.    After treatment:   Patient left in no apparent distress sitting up in chair, Call bell within reach, and Bed/ chair alarm activated    COMMUNICATION/EDUCATION:   The patient's plan of care was discussed with: registered nurse    Patient Education  Education Given To: Patient  Education Provided: Plan of Care;ADL Adaptive Strategies;Transfer Training;Energy Conservation;Fall Prevention Strategies;IADL Safety;Equipment  Education Method: Demonstration;Verbal  Barriers to Learning: None  Education Outcome: Verbalized understanding;Demonstrated understanding    Thank you for this referral.  Lotus Perez OT  Minutes: 50    
Intervention and task modifications:        Pt educated on safe transfer techniques, with specific emphasis on proper hand placement to push up from seated surface rather than attempt to pull self up, fully positioning self in-front of desired seated location, feeling chair on back of legs and reaching back with 1-2 UE to slowly lower self to seated position.  Patient instructed and indicated understanding the benefits of maintaining activity tolerance, functional mobility, and independence with self care tasks during acute stay  to ensure safe return home and to baseline. Encouraged patient to increase frequency and duration OOB, be out of bed for all meals, perform daily ADLs (as approved by RN/MD regarding bathing etc), and performing functional mobility to/from bathroom.            [facility] \"6 Clicks\"                                                       Daily Activity Inpatient Short Form  How much help from another person does the patient currently need... Total; A Lot A Little None   1.  Putting on and taking off regular lower body clothing? []  1 []  2 []  3 [x]  4   2.  Bathing (including washing, rinsing, drying)? []  1 []  2 [x]  3 []  4   3.  Toileting, which includes using toilet, bedpan or urinal? [] 1 []  2 [x]  3 []  4   4.  Putting on and taking off regular upper body clothing? []  1 []  2 []  3 [x]  4   5.  Taking care of personal grooming such as brushing teeth? []  1 []  2 []  3 [x]  4   6.  Eating meals? []  1 []  2 []  3 [x]  4   © 2007, Trustees of [facility], under license to [email or web address]. All rights reserved     Score: 22/24     Interpretation of Tool:  Represents clinically-significant functional categories (i.e. Activities of daily living).    Cutoff score 39.4 (19) correlates to a good likelihood of discharging home versus a facility  Tara Myers, Nolvia Rutledge, Luke Hanson, Inga Rosen, Nic Mera, Alan Myers, AM-PAC “6-Clicks”

## 2024-04-15 NOTE — PROGRESS NOTES
Name: Nolvia Silver   MRN: [id number]  : 1950    Assessment:  CKD stage 4 (Dr Madelyn Wallace): Basln Cr 2.3 to 2.6. renal fxn is at basln. Better than previous admit in , when her Cr was 2.8 to 3.   Underlying DM nephropathy.      A on C Decompensated Systolic CHF: repeat Echo with same low EF 15-20%  Mild to mod MR with eccentric jet (underestimated severity)   HTN: stable     DM2 with DN: Last HgbA1c 8.2     Anemia: mild     Pt being diuresed with IV lasix. She was on Lasix 80 mg QD as outpt PTA. Will need higher maintenance dose on discharge. Cr up a little to 2.7 from needed diuresis. She has been as high as Cr of 3 in past.      Plan/Recommendations:  Ct IV lasix 40 mg TID- likely start tapering from tomm  Would need at least lasix of 80 mg AM/40 mg PM or 80 mg BID on discharge  Salt restriction  FR of 1.5 Lday  Ct current BP meds  Ct Aldactone  Ct oral iron  Daily labs  Daily wt       Subjective:  Oob in chair.   Sleeping comfortably during rounds    Exam:  BP (!) 122/58   Pulse 80   Temp 99 °F (37.2 °C) (Oral)   Resp 14   Ht 1.524 m (5')   Wt 57.5 kg (126 lb 11.2 oz)   SpO2 94%   BMI 24.74 kg/m²     NAD  Sleeping comfortably    Labs/Data:    Lab Results   Component Value Date    WBC 7.6 2024    HGB 10.7 (L) 2024    HCT 33.1 (L) 2024    MCV 96.5 2024     2024       Lab Results   Component Value Date/Time     2024 03:58 AM    K 4.0 2024 03:58 AM     2024 03:58 AM    CO2 26 2024 03:58 AM    BUN 69 2024 03:58 AM    CREATININE 2.73 2024 03:58 AM    GLUCOSE 162 2024 03:58 AM    CALCIUM 9.0 2024 03:58 AM    LABGLOM 18 2024 03:58 AM    LABGLOM 15 2022 11:10 AM        Wt Readings from Last 3 Encounters:   24 57.5 kg (126 lb 11.2 oz)   24 57.9 kg 
                                                                                                                                              Name: Nolvia Silver   MRN: [id number]  : 1950    Assessment:  CKD stage 4 (Dr Madelyn Wallace): Basln Cr 2.3 to 2.6. renal fxn is at basln. Better than previous admit in , when her Cr was 2.8 to 3.   Underlying DM nephropathy.      A on C Decompensated Systolic CHF: repeat Echo with same low EF 15-20%  Mild to mod MR with eccentric jet (underestimated severity)   HTN: stable     DM2 with DN: Last HgbA1c 8.2     Anemia: mild     Pt being diuresed with IV lasix. She was on Lasix 80 mg QD as outpt PTA. Will need higher maintenance dose on discharge. Cr stable at 2.5 today; recorded UOP OK, but wt is trending down; Echo with      Plan/Recommendations:  Ct IV lasix 40 mg TID  Would need at least lasix of 80 mg AM/40 mg PM or 80 mg BID on discharge  Salt restriction  FR of 1.5 Lday  Ct current BP meds  Ct Aldactone  Ct oral iron  If BP allows, can add low dose Hydralazine for afterload reduction. On nitrates already  Daily labs  Daily wt       Subjective:  Feels better; edema and wt improving    ROS:   No nausea, no vomiting  No chest pain, +shortness of breath    Exam:  BP (!) 119/55   Pulse 72   Temp 97.4 °F (36.3 °C) (Oral)   Resp 16   Ht 1.524 m (5')   Wt 57.4 kg (126 lb 8.7 oz)   SpO2 99%   BMI 24.71 kg/m²     NAD  Tr edema  AOx3    Labs/Data:    Lab Results   Component Value Date    WBC 5.8 2024    HGB 10.9 (L) 2024    HCT 34.2 (L) 2024    MCV 96.3 2024     2024       Lab Results   Component Value Date/Time     2024 04:03 AM    K 4.2 2024 04:03 AM     2024 04:03 AM    CO2 23 2024 04:03 AM    BUN 66 2024 04:03 AM    CREATININE 2.55 2024 04:03 AM    GLUCOSE 131 2024 04:03 AM    CALCIUM 8.9 2024 04:03 AM    LABGLOM 19 2024 04:03 AM    LABGLOM 15 2022 11:10 AM    
                                                                                                Hospitalist Progress Note  Marlen Poole MD  Answering service: [phone] OR [phone] from in house phone        Date of Service:  2024  NAME:  Nolvia Silver  :  1950  MRN:  [id number]      Admission Summary:   Nolvia Silver is a 73 y.o. female who presents with c/o sob and fluid building up on her, worsening kidney function, +LEÓN, tachypneic on arrival, +leg swelling , denies cp , pt pale in triage. PMHx of HFrEF (EF 20% to 25%) and CKD presents to the emergency department c/o dyspnea, bilateral lower extremity swelling for the last 2 weeks. Patient reports adherence to her medications including 80 mg furosemide daily. She has no additional complaints at this time .  She tells me she has not had any evidence of infection no fevers or chills-she has not run out of any of her medications other than her mirtazapine-she tells me that she is without a primary care physician at this time due to [facility] of her previous primary care physician-she has no GI symptoms no nausea vomiting constipation or diarrhea she has no chest pain on admission.  She has been taking her diuretics as instructed but despite this has had worsening leg swelling with associated pain        Interval history / Subjective:   Patient seen and examined; reviewed her vitals labs test results and care plan  Seen by cardiology and nephrology continue diuresis I's and O's Daily weight  Said her legs are swollen but there is no fluid in the leg may be some abdominal swelling but otherwise patient seems to be dry on room air laying in bed no distress     Assessment & Plan:      1.  Acute on chronic congestive heart failure with acute exacerbation EF 15 to 20% NYHA IV at admission  Continue on diuresis 40 mg Lasix 3 times daily watch electrolytes I's and O's Daily weight  Echocardiogram EF 15 to 20% AR TR  EKG shows no acute changes from previous; very 
                                                                                 [facility]  Cardiology Care Note                  []Initial visit     [x]Established visit     Patient Name: Nolvia Silver - :1950 - MRN:[id number]  Primary Cardiologist: Heidi Guardado MD, Navid Moreno MD  Consulting Cardiologist: Hernan Weaver MD  Reason for initial visit:HFrEF exacerbation       Assessment/Plan/Discussion:Cardiology Attending:     Patient seen on the day of progress note and examined  reviewed  with Advance Practice Provider (BELINDA, NP,PA)       A/P:  HFpeF exacerbation- cardiorenal syndrome  GFR too low for most GD OMT  Continue Coreg  Lasix 80 daily  Will sign off< PRN  Future Appointments   Date Time Provider Department Center   2024  9:40 AM Radha Nelson, APRN - NP RD [facility] AMB   2024  2:00 PM Heidi Guardado MD [facility] AM[facility]   1/15/2025  9:40 AM [id number] AM[facility]   1/15/2025 10:00 AM Mirella Wheatley, APRN - NP RD [facility] AM[facility]      S:Nolvia Silver is a 73 y.o. female   Did well with fluids and now off this am, restarting lasix  Seems to be nearing maximal hospital benefit  O:BP (!) 127/59   Pulse 76   Temp 99.3 °F (37.4 °C) (Oral)   Resp 16   Ht 1.524 m (5')   Wt 53.5 kg (117 lb 15.1 oz)   SpO2 100%   BMI 23.03 kg/m²     NC AT no JVD, Clear lungs, good air movement, RRR s r 1/6  murmur , no edema 1+    L:  Lab Results   Component Value Date    CREATININE 3.21 (H) 04/15/2024      Lab Results   Component Value Date    HGB 11.6 2024    ECHO (TTE) LIMITED (PRN CONTRAST/BUBBLE/STRAIN/3D) 04/10/2024  6:32 PM (Final)    Interpretation Summary    Left Ventricle: Severely reduced left ventricular systolic function with a visually estimated EF of 15 - 20%. Left ventricle is severely dilated. Severe global hypokinesis present.    Right Ventricle: Right ventricle is mildly dilated. Lead present in the right ventricle. Reduced systolic 
                                                                                 [facility]  Cardiology Care Note                  []Initial visit     [x]Established visit     Patient Name: Nolvia Silver - :1950 - MRN:[id number]  Primary Cardiologist: Heidi Guardado MD, Navid Moreno MD  Consulting Cardiologist: Hernan Weaver MD     Reason for initial visit:HFrEF exacerbation          Assessment/Plan/Discussion: Cardiology Attendin2024 11:17 AM     This patient was seen and examined by me in a face-to-face visit today. I reviewed the medical record including lab results, imaging and other provider notes. I confirmed the history as described above. I spoke to the patient, obtained history and examined the patient.  I discussed assessments and plans in detail with BELINDA and patient .     ASSESSMENT    Shortness of breath secondary to acute on chronic systolic heart failure  Nonischemic with ejection fraction of 15to 20 %  Status post [employer] AICD  Hypertension  Chronic kidney disease  Hyperlipidemia  Diabetes mellitus       PLAN   Patient shortness of breath much better today.  Still volume overloaded-continue Lasix 40 mg IV 3 times daily  Regarding guideline mediated heart failure therapy continue Coreg 12.5 mg twice daily along with isosorbide 5 mg p.o. twice daily along with spironolactone 25 mg once daily  May consider addition of Entresto/Farxiga once near euvolemic-  Results of echo which showed ejection fraction of 15 to 20% discussed with the patient        Rest as BELINDA    IV diuresis requiring electrolyte monitoring - high risk medication for electrolyte disorder, arrhythmia and High Risk Therapy requiring intensive monitoring for toxicity / side effects:         Objective :/87   Pulse 80   Temp 98.8 °F (37.1 °C) (Oral)   Resp 27   Ht 1.524 m (5')   Wt 57.4 kg (126 lb 8.7 oz)   SpO2 91%   BMI 24.71 kg/m²     Physical Exam  Cardiovascular:      Rate and Rhythm: 
AVS reviewed with pt, all questions answered. Ok to discharge per Virgilio LEE. Transported by this RN to discharge area  
Bedside shift change report given to ANTHONY Rudd (oncoming nurse) by ANTHONY De La Cruz (offgoing nurse). Report included the following information Nurse Handoff Report, Intake/Output, MAR, Recent Results, Cardiac Rhythm NSR, Quality Measures, and Neuro Assessment.    
Bedside shift change report given to ANTHONY Solares (oncoming nurse) by ANTHONY De La Cruz (offgoing nurse). Report included the following information Nurse Handoff Report, Intake/Output, MAR, Recent Results, Cardiac Rhythm NSR, Quality Measures, and Neuro Assessment.    
RENAL  PROGRESS NOTE        Subjective:   Reports feeling better overall. Remains on O2.      Objective:   VITALS SIGNS:    BP (!) 109/48   Pulse 77   Temp 99.3 °F (37.4 °C) (Oral)   Resp 16   Ht 1.524 m (5')   Wt 53.5 kg (117 lb 15.1 oz)   SpO2 100%   BMI 23.03 kg/m²             Temp (24hrs), Av.6 °F (37 °C), Min:98 °F (36.7 °C), Max:99.3 °F (37.4 °C)         PHYSICAL EXAM:  NAD  No edema    DATA REVIEW:     INTAKE / OUTPUT:   Last shift:      04/15 0701 - 04/15 1900  In: 536.5 [I.V.:536.5]  Out: -   Last 3 shifts: 1901 - 04/15 0700  In: 1304.3 [P.O.:300; I.V.:1004.3]  Out: 900 [Urine:900]    Intake/Output Summary (Last 24 hours) at 4/15/2024 1225  Last data filed at 4/15/2024 0720  Gross per 24 hour   Intake 1540.74 ml   Output 600 ml   Net 940.74 ml           LABS:   LABS:  Recent Labs     04/15/24  0446 248 24  0358 04/10/24  04324  1105    137 136 139   < > 140   K 3.9 3.8 4.2 4.0   < > 4.7    102 103 105   < > 107   CO2 27 29 26 26   < > 24   BUN 76* 77* 68* 69*   < > 65*   CREATININE 3.21* 3.22* 2.80* 2.73*   < > 2.50*   CALCIUM 8.3* 8.6 8.7 9.0   < > 9.0   LABALBU  --   --   --   --   --  3.3*   MG  --  2.1 2.1 2.1   < > 2.3    < > = values in this interval not displayed.       Recent Labs     24  0438 24  0343 24  1105   WBC 7.0 6.9 7.6   HGB 11.6 11.2* 10.7*   HCT 36.2 34.8* 33.1*    169 188       No results for input(s): \"SHEMAR\", \"KU\", \"CLU\", \"CREAU\" in the last 720 hours.    Invalid input(s): \"PROU\"      Assessment:   ssessment:  CKD stage 4 (Dr Madelyn Wallace): Basln Cr 2.3 to 2.6. renal fxn is at basln. Better than previous admit in , when her Cr was 2.8 to 3.   Underlying DM nephropathy.      A on C Decompensated Systolic CHF: repeat Echo with same low EF 15-20%  Mild to mod MR with eccentric jet (underestimated severity)   HTN: stable     DM2 with DN: Last HgbA1c 8.2     Anemia: mild     Pt required IV lasix. 
RENAL  PROGRESS NOTE        Subjective:   resting  Objective:   VITALS SIGNS:    /71   Pulse 75   Temp 97.8 °F (36.6 °C) (Oral)   Resp 20   Ht 1.524 m (5')   Wt 56.3 kg (124 lb 1.9 oz)   SpO2 95%   BMI 24.24 kg/m²             Temp (24hrs), Av °F (36.7 °C), Min:97.4 °F (36.3 °C), Max:99.2 °F (37.3 °C)         PHYSICAL EXAM:  resting    DATA REVIEW:     INTAKE / OUTPUT:   Last shift:      No intake/output data recorded.  Last 3 shifts:  1901 -  0700  In: -   Out: 1460 [Urine:1460]    Intake/Output Summary (Last 24 hours) at 2024 1507  Last data filed at 2024 0318  Gross per 24 hour   Intake --   Output 400 ml   Net -400 ml         LABS:   LABS:  Recent Labs     24  0343 24  0358 24  0403 04/10/24  0431 24  1105    139 138   < > 140   K 4.2 4.0 4.2   < > 4.7    105 107   < > 107   CO2 26 26 23   < > 24   BUN 68* 69* 66*   < > 65*   CREATININE 2.80* 2.73* 2.55*   < > 2.50*   CALCIUM 8.7 9.0 8.9   < > 9.0   LABALBU  --   --   --   --  3.3*   MG 2.1 2.1 2.2   < > 2.3    < > = values in this interval not displayed.     Recent Labs     24  0343 24  1105 24  0358   WBC 6.9 7.6 6.7   HGB 11.2* 10.7* 10.6*   HCT 34.8* 33.1* 34.0*    188 170     No results for input(s): \"SHEMAR\", \"KU\", \"CLU\", \"CREAU\" in the last 720 hours.    Invalid input(s): \"PROU\"      Assessment:   ssessment:  CKD stage 4 (Dr Madelyn Wallace): Basln Cr 2.3 to 2.6. renal fxn is at basln. Better than previous admit in , when her Cr was 2.8 to 3.   Underlying DM nephropathy.      A on C Decompensated Systolic CHF: repeat Echo with same low EF 15-20%  Mild to mod MR with eccentric jet (underestimated severity)   HTN: stable     DM2 with DN: Last HgbA1c 8.2     Anemia: mild     Pt being diuresed with IV lasix. She was on Lasix 80 mg QD as outpt PTA. Will need higher maintenance dose on discharge. Cr up a little to 2.7 from needed diuresis. She has been as high as Cr of 
RENAL  PROGRESS NOTE        Subjective:   weak  Objective:   VITALS SIGNS:    BP (!) 109/54   Pulse 77   Temp 97.7 °F (36.5 °C) (Oral)   Resp 16   Ht 1.524 m (5')   Wt 53.5 kg (118 lb)   SpO2 91%   BMI 23.05 kg/m²             Temp (24hrs), Av.3 °F (36.8 °C), Min:97.7 °F (36.5 °C), Max:99.3 °F (37.4 °C)         PHYSICAL EXAM:  resting    DATA REVIEW:     INTAKE / OUTPUT:   Last shift:      701 - 1900  In: 300 [P.O.:300]  Out: -   Last 3 shifts: 1901 -  0700  In: -   Out: 700 [Urine:700]    Intake/Output Summary (Last 24 hours) at 2024 1222  Last data filed at 2024 1016  Gross per 24 hour   Intake 300 ml   Output 300 ml   Net 0 ml           LABS:   LABS:  Recent Labs     24  0358 04/10/24  04324  1105    136 139   < > 140   K 3.8 4.2 4.0   < > 4.7    103 105   < > 107   CO2 29 26 26   < > 24   BUN 77* 68* 69*   < > 65*   CREATININE 3.22* 2.80* 2.73*   < > 2.50*   CALCIUM 8.6 8.7 9.0   < > 9.0   LABALBU  --   --   --   --  3.3*   MG 2.1 2.1 2.1   < > 2.3    < > = values in this interval not displayed.       Recent Labs     243 24  1105   WBC 7.0 6.9 7.6   HGB 11.6 11.2* 10.7*   HCT 36.2 34.8* 33.1*    169 188       No results for input(s): \"SHEMAR\", \"KU\", \"CLU\", \"CREAU\" in the last 720 hours.    Invalid input(s): \"PROU\"      Assessment:   ssessment:  CKD stage 4 (Dr Madelyn Wallace): Basln Cr 2.3 to 2.6. renal fxn is at basln. Better than previous admit in , when her Cr was 2.8 to 3.   Underlying DM nephropathy.      A on C Decompensated Systolic CHF: repeat Echo with same low EF 15-20%  Mild to mod MR with eccentric jet (underestimated severity)   HTN: stable     DM2 with DN: Last HgbA1c 8.2     Anemia: mild     Pt being diuresed with IV lasix. She was on Lasix 80 mg QD as outpt PTA. Will need higher maintenance dose on discharge. Cr up a little to 2.7 from needed diuresis. She has 
Spiritual Care Assessment/Progress Note  [facility]    Name: Nolvia Silver MRN: [id number]    Age: 73 y.o.     Sex: female   Language: English     Date: 4/12/2024            Total Time Calculated: 45 min              Spiritual Assessment begun in [address]  Service Provided For:: Patient     Encounter Overview/Reason : Initial Encounter    Spiritual beliefs:      [x] Involved in a eliza tradition/spiritual practice: Ayush     [] Supported by a eliza community:      [] Claims no spiritual orientation:      [] Seeking spiritual identity:           [] Adheres to an individual form of spirituality:      [] Not able to assess:                Identified resources for coping and support system:   Support System: Children, Family members       [x] Prayer                  [] Devotional reading               [] Music                  [] Guided Imagery     [] Pet visits                                        [] Other: (COMMENT)     Specific area/focus of visit   Encounter:    Crisis:    Spiritual/Emotional needs:    Ritual, Rites and Sacraments:    Grief, Loss, and Adjustments:    Ethics/Mediation:    Behavioral Health:    Palliative Care:    Advance Care Planning:      Plan/Referrals: Developed Care Plan (see consult note)    Narrative:  initiated visit to Nolvia Silver in [address] for spiritual assessment. Reviewed the patient's medical record prior to this encounter.     Assessment: Ms. Silver was experiencing spiritual well-being as evidenced by her robust support system, her peace and acceptance, and her connection to the sacred.  Ms. Silver shared that she came from a family of ten children, eight of whom are still alive.  Food and recipes are an emotional resource that connects her to family and previously generous.  Her children are sources of significant support.  Ms. Silver's Mandaeism eliza helps her to cope with her medical circumstances. Ms. Silver shared her eliza provides her a sense of peace and 
-- 88 -- -- --   04/11/24 2352 133/67 97.8 °F (36.6 °C) Oral 89 15 96 % --   04/11/24 2153 -- -- -- 85 -- -- --   04/11/24 1954 -- -- -- 80 -- -- --   04/11/24 1943 124/65 97.4 °F (36.3 °C) Oral 82 18 100 % --   04/11/24 1800 -- -- -- 72 -- -- --   04/11/24 1503 (!) 119/55 97.4 °F (36.3 °C) Oral 72 16 99 % --   04/11/24 1400 -- -- -- 79 -- -- --   04/11/24 1200 -- -- -- 70 -- -- --            Intake/Output Summary (Last 24 hours) at 4/12/2024 1135  Last data filed at 4/11/2024 2115  Gross per 24 hour   Intake --   Output 1060 ml   Net -1060 ml          Physical Examination:     I had a face to face encounter with this patient and independently examined them on 4/12/2024 as outlined below:          General : alert , awake, no acute distress,   HEENT: PEERL, EOMI, moist mucus membrane  Neck: supple, no JVD, no meningeal signs  Chest: Decreased breath sounds bilaterally  CVS: IRR, 2 out of 6 systolic ejection murmur  Abd: soft/ non tender, non distended, BS physiological,   Ext: no clubbing, no cyanosis, severe bilateral lower extremity edema pitting  Neuro/Psych: pleasant mood and affect, CN 2-12 grossly intact, generalized weakness but no focal deficits-slurred speech at baseline  Skin: warm            Data Review:    Review and/or order of clinical lab test  Review and/or order of tests in the radiology section of CPT  Review and/or order of tests in the medicine section of CPT    XR CHEST (2 VW)    Result Date: 4/9/2024  Unchanged cardiomegaly. No acute pulmonary process.      Results       Procedure Component Value Units Date/Time    Urine Culture Hold Sample [[id number]] Collected: 04/09/24 1952    Order Status: Completed Specimen: Urine Updated: 04/09/24 2000     Specimen HOld       Urine on hold in Microbiology dept for 2 days.  If unpreserved urine is submitted, it cannot be used for addtional testing after 24 hours, recollection will be required.                    I have independently reviewed and 
reviewed since prior progress note. Most recent are:  Patient Vitals for the past 24 hrs:   BP Temp Temp src Pulse Resp SpO2 Weight   04/14/24 1200 -- -- -- 77 -- -- --   04/14/24 1109 (!) 109/54 97.7 °F (36.5 °C) Oral 78 16 91 % --   04/14/24 1000 -- -- -- 78 -- -- --   04/14/24 0800 -- -- -- 73 -- -- --   04/14/24 0704 132/60 99.3 °F (37.4 °C) Oral 77 17 95 % --   04/14/24 0553 -- -- -- 72 -- -- --   04/14/24 0400 128/66 97.7 °F (36.5 °C) Oral 75 16 96 % 53.5 kg (118 lb)   04/14/24 0347 -- -- -- 71 -- -- --   04/14/24 0200 -- -- -- 74 -- -- --   04/13/24 2315 (!) 111/54 -- -- 80 14 94 % --   04/13/24 2152 -- -- -- 85 -- -- --   04/13/24 2038 118/62 99 °F (37.2 °C) Oral 85 16 98 % --   04/13/24 2032 118/62 -- -- -- -- -- --   04/13/24 1952 -- -- -- 80 -- -- --   04/13/24 1935 (!) 119/28 98 °F (36.7 °C) Oral 82 18 99 % --   04/13/24 1800 -- -- -- 81 -- -- --   04/13/24 1734 (!) 112/58 -- -- 79 -- -- --   04/13/24 1607 (!) 118/58 97.9 °F (36.6 °C) Axillary 83 20 97 % --   04/13/24 1600 -- -- -- 74 -- -- --   04/13/24 1400 -- -- -- 75 -- -- --          Intake/Output Summary (Last 24 hours) at 4/14/2024 1318  Last data filed at 4/14/2024 1016  Gross per 24 hour   Intake 300 ml   Output 300 ml   Net 0 ml        Physical Examination:     I had a face to face encounter with this patient and independently examined them on 4/14/2024 as outlined below:          General : alert , awake, no acute distress, 2 LPM/NC  HEENT: PEERL, EOMI, moist mucus membrane  Neck: supple, no JVD, no meningeal signs  Chest: Decreased breath sounds bilaterally  CVS: IRR, 2 out of 6 systolic ejection murmur  Abd: soft/ non tender, non distended, BS physiological,   Ext: no clubbing, no cyanosis, no edema. right lower leg /tenderness/resolved, intact sensations and pedal pulses  Neuro/Psych: pleasant mood and affect, CN 2-12 grossly intact, generalized weakness but no focal deficits-slurred speech at baseline  Skin: warm            Data Review:    
cannot be used for addtional testing after 24 hours, recollection will be required.                    I have independently reviewed and interpreted patient's lab and all other diagnostic data    Notes reviewed from all clinical/nonclinical/nursing services involved in patient's clinical care. Care coordination discussions were held with appropriate clinical/nonclinical/ nursing providers based on care coordination needs.     Labs:   BUN/Creatinine:    Lab Results   Component Value Date/Time    BUN 63 04/10/2024 04:31 AM    CREATININE 2.31 04/10/2024 04:31 AM       Recent Labs     04/09/24  1105   WBC 5.7   HGB 11.3*   HCT 37.2        Recent Labs     04/09/24  1105 04/10/24  0431    139   K 4.7 4.0    109*   CO2 24 22   BUN 65* 63*   MG 2.3 2.2     Recent Labs     04/09/24  1105   ALT 20   GLOB 4.3*     No results for input(s): \"INR\", \"APTT\" in the last 72 hours.    Invalid input(s): \"PTP\"   No results for input(s): \"TIBC\", \"FERR\" in the last 72 hours.    Invalid input(s): \"FE\", \"PSAT\"   No results found for: \"FOL\", \"RBCF\"   No results for input(s): \"PH\", \"PCO2\", \"PO2\" in the last 72 hours.  No results for input(s): \"CPK\" in the last 72 hours.    Invalid input(s): \"CPKMB\", \"CKNDX\", \"TROIQ\"  Lab Results   Component Value Date/Time    CHOL 72 04/10/2024 04:31 AM    HDL 22 04/10/2024 04:31 AM     No results found for: \"GLUCPOC\"        Medications Reviewed:     Current Facility-Administered Medications   Medication Dose Route Frequency    sodium chloride flush 0.9 % injection 5-40 mL  5-40 mL IntraVENous 2 times per day    sodium chloride flush 0.9 % injection 5-40 mL  5-40 mL IntraVENous PRN    0.9 % sodium chloride infusion   IntraVENous PRN    ondansetron (ZOFRAN-ODT) disintegrating tablet 4 mg  4 mg Oral Q8H PRN    Or    ondansetron (ZOFRAN) injection 4 mg  4 mg IntraVENous Q6H PRN    polyethylene glycol (GLYCOLAX) packet 17 g  17 g Oral Daily PRN    acetaminophen (TYLENOL) tablet 650 mg  650 mg 
(most recent):  CT Result (most recent):  No results found for this or any previous visit from the past 3650 days.    Xray Result (most recent):  XR CHEST STANDARD TWO VW 04/09/2024    Narrative  EXAM: XR CHEST (2 VW)    INDICATION:  sob    COMPARISON: February 13, 2024    TECHNIQUE: PA and lateral chest views    FINDINGS: Left-sided pacemaker is stable in position heart size remains  enlarged. The pulmonary vasculature is within normal limits.    The lungs and pleural spaces are clear. The visualized bones and upper abdomen  are age-appropriate.    Impression  Unchanged cardiomegaly. No acute pulmonary process.        Recent Labs     04/11/24  0403 04/12/24  0358 04/13/24  0343    139 136   K 4.2 4.0 4.2    105 103   CO2 23 26 26   BUN 66* 69* 68*   MG 2.2 2.1 2.1       Recent Labs     04/12/24  0358 04/12/24  1105 04/13/24  0343   WBC 6.7 7.6 6.9   HGB 10.6* 10.7* 11.2*   HCT 34.0* 33.1* 34.8*    188 169       Creatinine (MG/DL)   Date Value   04/13/2024 2.80 (H)   04/12/2024 2.73 (H)   04/11/2024 2.55 (H)   04/10/2024 2.31 (H)   04/09/2024 2.50 (H)       Current meds:    Current Facility-Administered Medications:     furosemide (LASIX) injection 40 mg, 40 mg, IntraVENous, BID, Hernan Weaver MD, 40 mg at 04/13/24 0847    sacubitril-valsartan (ENTRESTO) 24-26 MG per tablet 0.5 tablet, 0.5 tablet, Oral, BID, Hernan Weaver MD, 0.5 tablet at 04/13/24 0845    sodium chloride flush 0.9 % injection 5-40 mL, 5-40 mL, IntraVENous, 2 times per day, Tom Delgadillo MD, 10 mL at 04/13/24 0848    sodium chloride flush 0.9 % injection 5-40 mL, 5-40 mL, IntraVENous, PRN, Tom Delgadillo MD    0.9 % sodium chloride infusion, , IntraVENous, PRN, Tom Delgadillo MD    ondansetron (ZOFRAN-ODT) disintegrating tablet 4 mg, 4 mg, Oral, Q8H PRN **OR** ondansetron (ZOFRAN) injection 4 mg, 4 mg, IntraVENous, Q6H PRN, Tom Delgadillo MD    polyethylene glycol (GLYCOLAX) packet 17 g, 17 g, Oral, Daily PRN, Leona, 
5,000 Units SubCUTAneous 3 times per day     ______________________________________________________________________  EXPECTED LENGTH OF STAY: Unable to retrieve estimated LOS  ACTUAL LENGTH OF STAY:          4                 Avel Manzano MD    
Oral, Daily with breakfast, Tom Delgadillo MD, 325 mg at 04/14/24 1013    heparin (porcine) injection 5,000 Units, 5,000 Units, SubCUTAneous, 3 times per day, Tom Delgadillo MD Vipal Sabharwal, MD    [facility]  Call center: (P) [phone]  (F) [phone]                
mg, Oral, Q8H PRN **OR** ondansetron (ZOFRAN) injection 4 mg, 4 mg, IntraVENous, Q6H PRN, Tom Delgadillo MD    polyethylene glycol (GLYCOLAX) packet 17 g, 17 g, Oral, Daily PRN, Tom Delgadillo MD    acetaminophen (TYLENOL) tablet 650 mg, 650 mg, Oral, Q6H PRN **OR** acetaminophen (TYLENOL) suppository 650 mg, 650 mg, Rectal, Q6H PRN, Tom Delgadillo MD    spironolactone (ALDACTONE) tablet 25 mg, 25 mg, Oral, Daily, Tom Delgadillo MD, 25 mg at 04/11/24 1018    glucose chewable tablet 16 g, 4 tablet, Oral, PRN, Tom Delgadillo MD    dextrose bolus 10% 125 mL, 125 mL, IntraVENous, PRN **OR** dextrose bolus 10% 250 mL, 250 mL, IntraVENous, PRN, Tom Delgadillo MD    glucagon injection 1 mg, 1 mg, SubCUTAneous, PRN, Tom Delgadillo MD    dextrose 10 % infusion, , IntraVENous, Continuous PRN, Tom Delgadillo MD    insulin lispro (HUMALOG) injection vial 0-8 Units, 0-8 Units, SubCUTAneous, TID , Tom Delgadillo MD, 6 Units at 04/10/24 1821    insulin lispro (HUMALOG) injection vial 0-4 Units, 0-4 Units, SubCUTAneous, Nightly, Tom Delgadillo MD    aspirin EC tablet 81 mg, 81 mg, Oral, Daily, Tom Delgadillo MD, 81 mg at 04/11/24 1000    carvedilol (COREG) tablet 12.5 mg, 12.5 mg, Oral, BID eLona AYALA Gerald L, MD, 12.5 mg at 04/11/24 1817    glipiZIDE (GLUCOTROL) tablet 2.5 mg, 2.5 mg, Oral, Daily, Tom Delgadillo MD, 2.5 mg at 04/11/24 1018    isosorbide dinitrate (ISORDIL) tablet 5 mg, 5 mg, Oral, BID, Tom Delgadillo MD, 5 mg at 04/11/24 1441    mirtazapine (REMERON) tablet 15 mg, 15 mg, Oral, Nightly, Tom Delgadillo MD, 15 mg at 04/11/24 2115    atorvastatin (LIPITOR) tablet 10 mg, 10 mg, Oral, Daily, Tom Delgadillo MD, 10 mg at 04/11/24 2115    ferrous sulfate (IRON 325) tablet 325 mg, 325 mg, Oral, Daily with breakfast, Tom Delgadillo MD, 325 mg at 04/11/24 1000    heparin (porcine) injection 5,000 Units, 5,000 Units, SubCUTAneous, 3 times per day, Tom Delgadillo MD Lori D Warren, APRN -

## 2024-04-16 ENCOUNTER — TELEPHONE (OUTPATIENT)
Age: 74
End: 2024-04-16

## 2024-04-16 NOTE — TELEPHONE ENCOUNTER
Anup called to say that he has found the patient a primary care physician, and is asking if dr diaz would follow her through 4/26?    Anup # [phone]

## 2024-04-23 DIAGNOSIS — E78.1 PURE HYPERGLYCERIDEMIA: ICD-10-CM

## 2024-04-23 NOTE — TELEPHONE ENCOUNTER
[facility]: ADHERENCE REVIEW  Identified care gap per [place] fills with [facility]: Statin adherence    Medicare Advantage - MRMA  Per insurer report, LIS-0 - co-pays are based on tiers and patient is subject to coverage gap.    ASSESSMENT    STATIN ADHERENCE    Insurance Records claims through  24  (Prior Year PDC = 88% - PASSED ; YTD PDC = FIRST FILL; Potential Fail Date: 24):   SIMVASTATIN TAB 20 MG last filled on 24 for 30 day supply. Next refill due: 24    Prescribed si tablet/capsule daily    Per Reconcile Dispense History and Insurer Portal: last filled on 24 for 90 day supply.     Per [facility]: Billed through [place]. 1 refills remaining. will get 30 day supply ready to  since past due.    Lab Results   Component Value Date    CHOL 72 04/10/2024    TRIG 86 04/10/2024    HDL 22 04/10/2024    LDLCALC 32.8 04/10/2024    LDLDIRECT 58 2021     ALT   Date Value Ref Range Status   2024 20 12 - 78 U/L Final     AST   Date Value Ref Range Status   2024 9 (L) 15 - 37 U/L Final     The ASCVD Risk score ([place] DK, et al., 2019) failed to calculate for the following reasons:    The valid total cholesterol range is 130 to 320 mg/dL     PLAN  The following are interventions that have been identified:   Patient overdue refilling SIMVASTATIN TAB 20 MG and active on home medication list.   Refill/s of SIMVASTATIN TAB 20 MG READY to  at patient's pharmacy [facility].  Patient needs future refills for SIMVASTATIN TAB 20 MG, per last prescription, FURTHER REFILLS THROUGH NEW PHYSICIAN. Appt isn't until 07.10.24.    Outreach:    Patient:  Letter sent to patient.  Attempting to reach patient to review - unable to leave message.    Pharmacist:  Will you see if Mirella Odonnell MD will send future refills for SIMVASTATIN TAB 20 MG until her appt with her new provider on 07.10.24.    Last Visit: 23  Next Visit: none    Sapna Miller

## 2024-04-24 NOTE — PROGRESS NOTES
Nolvia Silver     1950        Date of Visit-4/28/2024     PCP is No primary care provider on file.   Cardiologist:  Heidi Guardado MD, [facility]    HPI:  Nolvia Silver is a 73 y.o. female     Discharged from hospitalization for CHF on 4/15/24    PMH of HFrEF, Non-ischemic, dilated cardiomyopathy, s/p ICD (2017), type II DM, HTN, hypertriglyceridemia, CKD, and anemia of CKD. She was admitted in February 2024 with HF exacerbation, echo completed during this hospitalization showed EF remained 20-25%. During that admission she was continued on Coreg, not on ACE/ARNi/ARB d/t renal dysfunction, not on Aldactone or Jardiance for the same reason. She was placed on Isordil with plans to consider Hydralazine outpt. She was d/c on Lasix 80mg daily. She did not show for 2 office visits in our office after hospital discharge.      This more recent admission she presented to the hospital with worsening edema and shortness of breath.    In ED CXR showed lungs/pleural spaces were clear, ProBNP >35,000 which was unchanged from last admission. HS troponin 25, K 4.7, Creatinine 2.50, Mag 2.3, Hgb 11.3. She was diuresed with IV Lasix. BP was too low for full GDMT for CHF.  She was discharged 4/15/24.    Today...  She is feeling well  No chest pain or palpitations  No SOB, walks up 21 steps without having to stop now  No PND or orthopnea   No dizziness or lightheadedness  No LE edema   She plans to have BMP checked at nephrology office on Friday   She is upset about an incident regarding a bug bite while she was hospitalized and her perception that her complaints were ignored by the physicians, I gave her the number for patient advocacy to express her complaints     History:  Systolic CHF with normal coronaries.   HTN  Seen in 4/2010 with an EF of 20% and previous normal coronaries, had pleural effusions with thoracentesis  Echo 5/13/10 EF 35%  cath 5/26/10 EF 25%, normal hemodynamics, CO 4.4, normal cors  AICD

## 2024-04-25 ENCOUNTER — OFFICE VISIT (OUTPATIENT)
Age: 74
End: 2024-04-25
Payer: MEDICARE

## 2024-04-25 VITALS
OXYGEN SATURATION: 100 % | BODY MASS INDEX: 23.05 KG/M2 | HEART RATE: 74 BPM | WEIGHT: 118 LBS | DIASTOLIC BLOOD PRESSURE: 66 MMHG | SYSTOLIC BLOOD PRESSURE: 132 MMHG

## 2024-04-25 DIAGNOSIS — E78.5 DYSLIPIDEMIA: ICD-10-CM

## 2024-04-25 DIAGNOSIS — I50.22 CHRONIC SYSTOLIC (CONGESTIVE) HEART FAILURE (HCC): Primary | ICD-10-CM

## 2024-04-25 DIAGNOSIS — I10 ESSENTIAL (PRIMARY) HYPERTENSION: ICD-10-CM

## 2024-04-25 DIAGNOSIS — N18.4 CKD (CHRONIC KIDNEY DISEASE) STAGE 4, GFR 15-29 ML/MIN (HCC): ICD-10-CM

## 2024-04-25 PROCEDURE — 99214 OFFICE O/P EST MOD 30 MIN: CPT | Performed by: NURSE PRACTITIONER

## 2024-04-25 NOTE — PATIENT INSTRUCTIONS
Please ask your insurance about copay for jardiance 10mg daily OR farxiga 10mg daily and let me know if they will be cost prohibitive     [facility] Patient Advocacy - [phone]    Please ask your doctor to send us a copy of your lab results that will be done this friday

## 2024-05-01 RX ORDER — SIMVASTATIN 20 MG
20 TABLET ORAL NIGHTLY
Qty: 100 TABLET | Refills: 2 | Status: CANCELLED | OUTPATIENT
Start: 2024-05-01

## 2024-05-01 NOTE — TELEPHONE ENCOUNTER
Radha Nelson NP,     Nolvia SAINI Nadeem has been flagged for Adherence by [facility].   Patient's insurance will allow a 100 day supply for a $0 Copay.    SIMVASTATIN 20MG last filled on 4/23/2024 for  qty 30 / 30 day supply. Next refill due: 5/23/2024   0 refills remain    Patient'sPCP was originally Dr. Jimy Perez, who has since retired- a 1 time 30 day refill was supplied by Dr. Odonnell.  As her Cardiology office- would you feel comfortable taking over and prescribing/refilling the simvastatin?    Prescriber::  Previous provider: RITA ODONNELL/ JIMY PEREZ  Pharmacy: [facility]pharm[facility] #[id number] [place], [place] - [address] - P [phone] - F [phone]       I have pended refills for your approval and changed to a 100 day supply. Please let me know if there is anything I can help with.    LOV:4/25/2024  NOV:6/11/2024     Thank you,  Padmini Mcgee, PharmD, [facility]  Population Health Pharmacist  [facility]   Department, toll free: [phone] Option 2    Requested Prescriptions     Pending Prescriptions Disp Refills    simvastatin (ZOCOR) 20 MG tablet 100 tablet 2     Sig: Take 1 tablet by mouth nightly        ================================================================================

## 2024-05-04 DIAGNOSIS — E78.1 PURE HYPERGLYCERIDEMIA: ICD-10-CM

## 2024-05-04 RX ORDER — SIMVASTATIN 20 MG
TABLET ORAL
Qty: 90 TABLET | Refills: 1 | Status: SHIPPED | OUTPATIENT
Start: 2024-05-04

## 2024-05-16 ENCOUNTER — TELEPHONE (OUTPATIENT)
Dept: PHARMACY | Facility: CLINIC | Age: 74
End: 2024-05-16

## 2024-05-16 NOTE — TELEPHONE ENCOUNTER
[facility] CLINICAL PHARMACY: ADHERENCE REVIEW  Identified care gap per [place] fills with [facility]: Statin adherence    Medicare Advantage - MRMA  Per insurer report, LIS-0 - co-pays are based on tiers and patient is subject to coverage gap.  Patient also appears to be prescribed: Diabetes    ASSESSMENT    DIABETES ADHERENCE    Insurance Records claims through  05.10.24  (Prior Year PDC = 84% - PASSED ; YTD PDC = FIRST FILL; Potential Fail Date: n/a- med not in database for  yet):   GLIPIZIDE TAB 5 MG last filled on 23 for 90 day supply. Next refill due: 24    Prescribed sig:  Take 0.5 tablets by mouth daily    Per Reconcile Dispense History and Insurer Portal: last filled on 23 for 90 day supply.     Per [facility]: last picked up on 23 for 90 day supply. Billed through [employer]. 0 refills remaining.    Lab Results   Component Value Date    LABA1C 8.2 (H) 2024    LABA1C 8.2 (H) 2023    LABA1C 7.4 (H) 2023       STATIN ADHERENCE    Insurance Records claims through  05.10.24  (Prior Year PDC = 88% - PASSED ; YTD PDC = FIRST FILL; Potential Fail Date: 24):   SIMVASTATIN TAB 20 MG last filled on 24 for 30 day supply. Next refill due: 24    Prescribed si tablet/capsule daily    Per Insurer Portal: last filled on 24 for 30 day supply.     Per [facility]: Billed through [employer]. 2 refills remaining. will get 90 day supply ready to  since past due.    Lab Results   Component Value Date    CHOL 72 04/10/2024    TRIG 86 04/10/2024    HDL 22 04/10/2024    LDLDIRECT 58 2021     Lab Results   Component Value Date    LDL 32.8 04/10/2024    LDLDIRECT 58 2021      ALT   Date Value Ref Range Status   2024 20 12 - 78 U/L Final     AST   Date Value Ref Range Status   2024 9 (L) 15 - 37 U/L Final     The ASCVD Risk score ([place] DK, et al., 2019) failed to calculate for the following reasons:    The valid total

## 2024-06-11 ENCOUNTER — TELEPHONE (OUTPATIENT)
Age: 74
End: 2024-06-11

## 2024-06-11 NOTE — TELEPHONE ENCOUNTER
Patient did not show for appointment today  Please send her \"no show\" letter and call to recommend rescheduling this appointment

## 2024-06-25 ENCOUNTER — APPOINTMENT (OUTPATIENT)
Facility: HOSPITAL | Age: 74
DRG: 286 | End: 2024-06-25
Payer: MEDICARE

## 2024-06-25 ENCOUNTER — HOSPITAL ENCOUNTER (INPATIENT)
Facility: HOSPITAL | Age: 74
LOS: 8 days | Discharge: HOME | DRG: 286 | End: 2024-07-03
Attending: STUDENT IN AN ORGANIZED HEALTH CARE EDUCATION/TRAINING PROGRAM | Admitting: INTERNAL MEDICINE
Payer: MEDICARE

## 2024-06-25 DIAGNOSIS — I50.9 ACUTE ON CHRONIC CONGESTIVE HEART FAILURE, UNSPECIFIED HEART FAILURE TYPE (HCC): Primary | ICD-10-CM

## 2024-06-25 DIAGNOSIS — I50.9 HEART FAILURE (HCC): ICD-10-CM

## 2024-06-25 DIAGNOSIS — I50.23 ACUTE ON CHRONIC SYSTOLIC CONGESTIVE HEART FAILURE (HCC): ICD-10-CM

## 2024-06-25 LAB
ALBUMIN SERPL-MCNC: 3 G/DL (ref 3.5–5)
ALBUMIN/GLOB SERPL: 0.8 (ref 1.1–2.2)
ALP SERPL-CCNC: 97 U/L (ref 45–117)
ALT SERPL-CCNC: 17 U/L (ref 12–78)
ANION GAP SERPL CALC-SCNC: 7 MMOL/L (ref 5–15)
AST SERPL-CCNC: 12 U/L (ref 15–37)
BASOPHILS # BLD: 0 K/UL (ref 0–0.1)
BASOPHILS NFR BLD: 0 % (ref 0–1)
BILIRUB SERPL-MCNC: 0.5 MG/DL (ref 0.2–1)
BUN SERPL-MCNC: 67 MG/DL (ref 6–20)
BUN/CREAT SERPL: 25 (ref 12–20)
CALCIUM SERPL-MCNC: 8.3 MG/DL (ref 8.5–10.1)
CHLORIDE SERPL-SCNC: 107 MMOL/L (ref 97–108)
CO2 SERPL-SCNC: 22 MMOL/L (ref 21–32)
COMMENT:: NORMAL
CREAT SERPL-MCNC: 2.67 MG/DL (ref 0.55–1.02)
DIFFERENTIAL METHOD BLD: ABNORMAL
EKG ATRIAL RATE: 69 BPM
EKG DIAGNOSIS: NORMAL
EKG P AXIS: 39 DEGREES
EKG P-R INTERVAL: 154 MS
EKG Q-T INTERVAL: 438 MS
EKG QRS DURATION: 92 MS
EKG QTC CALCULATION (BAZETT): 469 MS
EKG R AXIS: 146 DEGREES
EKG T AXIS: 183 DEGREES
EKG VENTRICULAR RATE: 69 BPM
EOSINOPHIL # BLD: 0.1 K/UL (ref 0–0.4)
EOSINOPHIL NFR BLD: 2 % (ref 0–7)
ERYTHROCYTE [DISTWIDTH] IN BLOOD BY AUTOMATED COUNT: 16.6 % (ref 11.5–14.5)
EST. AVERAGE GLUCOSE BLD GHB EST-MCNC: 183 MG/DL
GLOBULIN SER CALC-MCNC: 4 G/DL (ref 2–4)
GLUCOSE BLD STRIP.AUTO-MCNC: 172 MG/DL (ref 65–117)
GLUCOSE BLD STRIP.AUTO-MCNC: 215 MG/DL (ref 65–117)
GLUCOSE SERPL-MCNC: 306 MG/DL (ref 65–100)
HBA1C MFR BLD: 8 % (ref 4–5.6)
HCT VFR BLD AUTO: 35.7 % (ref 35–47)
HGB BLD-MCNC: 10.9 G/DL (ref 11.5–16)
IMM GRANULOCYTES # BLD AUTO: 0 K/UL (ref 0–0.04)
IMM GRANULOCYTES NFR BLD AUTO: 0 % (ref 0–0.5)
LYMPHOCYTES # BLD: 0.5 K/UL (ref 0.8–3.5)
LYMPHOCYTES NFR BLD: 11 % (ref 12–49)
MAGNESIUM SERPL-MCNC: 2.4 MG/DL (ref 1.6–2.4)
MCH RBC QN AUTO: 31 PG (ref 26–34)
MCHC RBC AUTO-ENTMCNC: 30.5 G/DL (ref 30–36.5)
MCV RBC AUTO: 101.4 FL (ref 80–99)
MONOCYTES # BLD: 0.4 K/UL (ref 0–1)
MONOCYTES NFR BLD: 9 % (ref 5–13)
NEUTS SEG # BLD: 3.6 K/UL (ref 1.8–8)
NEUTS SEG NFR BLD: 78 % (ref 32–75)
NRBC # BLD: 0 K/UL (ref 0–0.01)
NRBC BLD-RTO: 0 PER 100 WBC
NT PRO BNP: ABNORMAL PG/ML
PLATELET # BLD AUTO: 151 K/UL (ref 150–400)
PMV BLD AUTO: 11.6 FL (ref 8.9–12.9)
POTASSIUM SERPL-SCNC: 4.5 MMOL/L (ref 3.5–5.1)
PROT SERPL-MCNC: 7 G/DL (ref 6.4–8.2)
RBC # BLD AUTO: 3.52 M/UL (ref 3.8–5.2)
RBC MORPH BLD: ABNORMAL
SERVICE CMNT-IMP: ABNORMAL
SERVICE CMNT-IMP: ABNORMAL
SODIUM SERPL-SCNC: 136 MMOL/L (ref 136–145)
SPECIMEN HOLD: NORMAL
TROPONIN I SERPL HS-MCNC: 25 NG/L (ref 0–51)
WBC # BLD AUTO: 4.6 K/UL (ref 3.6–11)

## 2024-06-25 PROCEDURE — 93010 ELECTROCARDIOGRAM REPORT: CPT | Performed by: SPECIALIST

## 2024-06-25 PROCEDURE — 83735 ASSAY OF MAGNESIUM: CPT

## 2024-06-25 PROCEDURE — 82962 GLUCOSE BLOOD TEST: CPT

## 2024-06-25 PROCEDURE — 6360000002 HC RX W HCPCS: Performed by: STUDENT IN AN ORGANIZED HEALTH CARE EDUCATION/TRAINING PROGRAM

## 2024-06-25 PROCEDURE — 93005 ELECTROCARDIOGRAM TRACING: CPT

## 2024-06-25 PROCEDURE — 84484 ASSAY OF TROPONIN QUANT: CPT

## 2024-06-25 PROCEDURE — 2060000000 HC ICU INTERMEDIATE R&B

## 2024-06-25 PROCEDURE — 83036 HEMOGLOBIN GLYCOSYLATED A1C: CPT

## 2024-06-25 PROCEDURE — 36415 COLL VENOUS BLD VENIPUNCTURE: CPT

## 2024-06-25 PROCEDURE — 6360000002 HC RX W HCPCS: Performed by: INTERNAL MEDICINE

## 2024-06-25 PROCEDURE — 85025 COMPLETE CBC W/AUTO DIFF WBC: CPT

## 2024-06-25 PROCEDURE — 83880 ASSAY OF NATRIURETIC PEPTIDE: CPT

## 2024-06-25 PROCEDURE — 80053 COMPREHEN METABOLIC PANEL: CPT

## 2024-06-25 PROCEDURE — 6370000000 HC RX 637 (ALT 250 FOR IP): Performed by: INTERNAL MEDICINE

## 2024-06-25 PROCEDURE — 2580000003 HC RX 258: Performed by: INTERNAL MEDICINE

## 2024-06-25 PROCEDURE — 99285 EMERGENCY DEPT VISIT HI MDM: CPT

## 2024-06-25 PROCEDURE — 71045 X-RAY EXAM CHEST 1 VIEW: CPT

## 2024-06-25 RX ORDER — SODIUM CHLORIDE 0.9 % (FLUSH) 0.9 %
5-40 SYRINGE (ML) INJECTION PRN
Status: DISCONTINUED | OUTPATIENT
Start: 2024-06-25 | End: 2024-07-03 | Stop reason: HOSPADM

## 2024-06-25 RX ORDER — MIRTAZAPINE 15 MG/1
15 TABLET, FILM COATED ORAL NIGHTLY
Status: DISCONTINUED | OUTPATIENT
Start: 2024-06-25 | End: 2024-07-03 | Stop reason: HOSPADM

## 2024-06-25 RX ORDER — FERROUS SULFATE 325(65) MG
325 TABLET ORAL
Status: DISCONTINUED | OUTPATIENT
Start: 2024-06-26 | End: 2024-06-26

## 2024-06-25 RX ORDER — ONDANSETRON 2 MG/ML
4 INJECTION INTRAMUSCULAR; INTRAVENOUS EVERY 6 HOURS PRN
Status: DISCONTINUED | OUTPATIENT
Start: 2024-06-25 | End: 2024-07-03 | Stop reason: HOSPADM

## 2024-06-25 RX ORDER — ASPIRIN 81 MG/1
81 TABLET ORAL DAILY
Status: DISCONTINUED | OUTPATIENT
Start: 2024-06-25 | End: 2024-07-03 | Stop reason: HOSPADM

## 2024-06-25 RX ORDER — CARVEDILOL 12.5 MG/1
12.5 TABLET ORAL 2 TIMES DAILY WITH MEALS
Status: DISCONTINUED | OUTPATIENT
Start: 2024-06-25 | End: 2024-07-03 | Stop reason: HOSPADM

## 2024-06-25 RX ORDER — SODIUM CHLORIDE 0.9 % (FLUSH) 0.9 %
5-40 SYRINGE (ML) INJECTION EVERY 12 HOURS SCHEDULED
Status: DISCONTINUED | OUTPATIENT
Start: 2024-06-25 | End: 2024-07-03 | Stop reason: HOSPADM

## 2024-06-25 RX ORDER — ACETAMINOPHEN 325 MG/1
650 TABLET ORAL EVERY 6 HOURS PRN
Status: DISCONTINUED | OUTPATIENT
Start: 2024-06-25 | End: 2024-07-03 | Stop reason: HOSPADM

## 2024-06-25 RX ORDER — HYDRALAZINE HYDROCHLORIDE 10 MG/1
10 TABLET, FILM COATED ORAL EVERY 8 HOURS SCHEDULED
Status: DISCONTINUED | OUTPATIENT
Start: 2024-06-25 | End: 2024-06-27

## 2024-06-25 RX ORDER — SODIUM CHLORIDE 9 MG/ML
INJECTION, SOLUTION INTRAVENOUS PRN
Status: DISCONTINUED | OUTPATIENT
Start: 2024-06-25 | End: 2024-07-03 | Stop reason: HOSPADM

## 2024-06-25 RX ORDER — ACETAMINOPHEN 650 MG/1
650 SUPPOSITORY RECTAL EVERY 6 HOURS PRN
Status: DISCONTINUED | OUTPATIENT
Start: 2024-06-25 | End: 2024-07-03 | Stop reason: HOSPADM

## 2024-06-25 RX ORDER — HEPARIN SODIUM 5000 [USP'U]/ML
5000 INJECTION, SOLUTION INTRAVENOUS; SUBCUTANEOUS EVERY 8 HOURS SCHEDULED
Status: DISCONTINUED | OUTPATIENT
Start: 2024-06-25 | End: 2024-07-03 | Stop reason: HOSPADM

## 2024-06-25 RX ORDER — INSULIN LISPRO 100 [IU]/ML
0-4 INJECTION, SOLUTION INTRAVENOUS; SUBCUTANEOUS NIGHTLY
Status: DISCONTINUED | OUTPATIENT
Start: 2024-06-25 | End: 2024-07-03 | Stop reason: HOSPADM

## 2024-06-25 RX ORDER — ISOSORBIDE DINITRATE 10 MG/1
5 TABLET ORAL 2 TIMES DAILY
Status: DISCONTINUED | OUTPATIENT
Start: 2024-06-25 | End: 2024-06-27

## 2024-06-25 RX ORDER — POLYETHYLENE GLYCOL 3350 17 G/17G
17 POWDER, FOR SOLUTION ORAL DAILY PRN
Status: DISCONTINUED | OUTPATIENT
Start: 2024-06-25 | End: 2024-07-03 | Stop reason: HOSPADM

## 2024-06-25 RX ORDER — FUROSEMIDE 10 MG/ML
40 INJECTION INTRAMUSCULAR; INTRAVENOUS 2 TIMES DAILY
Status: DISCONTINUED | OUTPATIENT
Start: 2024-06-25 | End: 2024-06-26

## 2024-06-25 RX ORDER — INSULIN LISPRO 100 [IU]/ML
0-8 INJECTION, SOLUTION INTRAVENOUS; SUBCUTANEOUS
Status: DISCONTINUED | OUTPATIENT
Start: 2024-06-25 | End: 2024-07-03 | Stop reason: HOSPADM

## 2024-06-25 RX ORDER — FUROSEMIDE 10 MG/ML
40 INJECTION INTRAMUSCULAR; INTRAVENOUS ONCE
Status: COMPLETED | OUTPATIENT
Start: 2024-06-25 | End: 2024-06-25

## 2024-06-25 RX ORDER — ATORVASTATIN CALCIUM 10 MG/1
10 TABLET, FILM COATED ORAL DAILY
Status: DISCONTINUED | OUTPATIENT
Start: 2024-06-25 | End: 2024-07-03 | Stop reason: HOSPADM

## 2024-06-25 RX ORDER — ONDANSETRON 4 MG/1
4 TABLET, ORALLY DISINTEGRATING ORAL EVERY 8 HOURS PRN
Status: DISCONTINUED | OUTPATIENT
Start: 2024-06-25 | End: 2024-07-03 | Stop reason: HOSPADM

## 2024-06-25 RX ADMIN — CARVEDILOL 12.5 MG: 12.5 TABLET, FILM COATED ORAL at 17:15

## 2024-06-25 RX ADMIN — FUROSEMIDE 40 MG: 10 INJECTION, SOLUTION INTRAMUSCULAR; INTRAVENOUS at 17:16

## 2024-06-25 RX ADMIN — SODIUM CHLORIDE, PRESERVATIVE FREE 10 ML: 5 INJECTION INTRAVENOUS at 21:18

## 2024-06-25 RX ADMIN — MIRTAZAPINE 15 MG: 15 TABLET, FILM COATED ORAL at 21:15

## 2024-06-25 RX ADMIN — INSULIN LISPRO 2 UNITS: 100 INJECTION, SOLUTION INTRAVENOUS; SUBCUTANEOUS at 17:16

## 2024-06-25 RX ADMIN — ISOSORBIDE DINITRATE 5 MG: 5 TABLET ORAL at 17:18

## 2024-06-25 RX ADMIN — HYDRALAZINE HYDROCHLORIDE 10 MG: 10 TABLET, FILM COATED ORAL at 17:20

## 2024-06-25 RX ADMIN — HYDRALAZINE HYDROCHLORIDE 10 MG: 10 TABLET, FILM COATED ORAL at 21:15

## 2024-06-25 ASSESSMENT — PAIN - FUNCTIONAL ASSESSMENT: PAIN_FUNCTIONAL_ASSESSMENT: NONE - DENIES PAIN

## 2024-06-25 NOTE — ED NOTES
TRANSFER - OUT REPORT:    Verbal report given to Luly CRUZ on Nolvia Silver  being transferred to [facility] for routine progression of patient care       Report consisted of patient's Situation, Background, Assessment and   Recommendations(SBAR).     Information from the following report(s) ED Encounter Summary, ED SBAR, MAR, and Recent Results was reviewed with the receiving nurse.    Kinder Fall Assessment:                           Lines:   Peripheral IV 06/25/24 Left Antecubital (Active)   Site Assessment Clean, dry & intact 06/25/24 1237   Line Status Blood return noted;Specimen collected;Normal saline locked;Flushed 06/25/24 1237   Line Care Connections checked and tightened 06/25/24 1237   Phlebitis Assessment No symptoms 06/25/24 1237   Infiltration Assessment 0 06/25/24 1237   Dressing Status New dressing applied;Clean, dry & intact 06/25/24 1237   Dressing Type Transparent 06/25/24 1237   Dressing Intervention New 06/25/24 1237        Opportunity for questions and clarification was provided.      Patient transported with:  Monitor and Registered Nurse

## 2024-06-25 NOTE — ED PROVIDER NOTES
[facility] EMERGENCY DEP  EMERGENCY DEPARTMENT ENCOUNTER      Pt Name: Nolvia Silver  MRN: [id number]  Birthdate 1950  Date of evaluation: 6/25/2024  Provider: Antonina Duke MD    CHIEF COMPLAINT       Chief Complaint   Patient presents with    Leg Swelling     HISTORY OF PRESENT ILLNESS   (Location/Symptom, Timing/Onset, Context/Setting, Quality, Duration, Modifying Factors, Severity)  Note limiting factors.   HPI  74-year-old female with past medical history of CHF with severely reduced EF(follows with Dr. Guardado), CKD, type 2 diabetes, status post ICD implantation, presents to the ER for evaluation of progressive shortness of breath over the past 2 weeks.  Patient reports associated bilateral lower extremity swelling, leakage of of fluid from both legs associated with pain, orthopnea, LEÓN, and weight gain of approximately 15 lbs over this period of time. States symptoms are similar to all her previous episodes of CHF exacerbation. Patient takes 40mg of lasix BID, and reports compliance with this medication. She denies any chest pain.    Review of External Medical Records:     Nursing Notes were reviewed.    REVIEW OF SYSTEMS    (2-9 systems for level 4, 10 or more for level 5)     Review of Systems   All other systems reviewed and are negative.      Except as noted above the remainder of the review of systems was reviewed and negative.       PAST MEDICAL HISTORY     Past Medical History:   Diagnosis Date    CHF (congestive heart failure) (HCC)          SURGICAL HISTORY     History reviewed. No pertinent surgical history.      CURRENT MEDICATIONS       Previous Medications    ASPIRIN 81 MG EC TABLET    Take 1 tablet by mouth daily    CARVEDILOL (COREG) 12.5 MG TABLET    TAKE 1 TABLET BY MOUTH TWICE A DAY WITH MEALS    FERROUS SULFATE (SLOW FE) 140 (45 FE) MG EXTENDED RELEASE TABLET    Take 1 tablet by mouth daily (with breakfast)    FUROSEMIDE (LASIX) 40 MG TABLET    Take 2 tablets by mouth daily    GLIPIZIDE

## 2024-06-25 NOTE — ED NOTES
12:23 PM    Patient is an 74 y.o. female with history of CKD, diabetes with neuropathy, heart failure, cardiomyopathy, depression, ICD, and anemia who presents to the ER with reports of fluid retention and shortness of breath. Patient reports she has about 15lbs of fluid right now. Patient does take 40mg Lasix daily.     Cardiologist: Dr. Guardado     I have evaluated the patient as the Provider in Rapid Medical Evaluation (RME). I have reviewed her vital signs and the triage nurse assessment. I have talked with the patient and any available family and advised that I am the provider in triage and have ordered the appropriate study to initiate their work up based on the clinical presentation during my assessment. I have advised that the patient will be accommodated in the Main ED as soon as possible. I have also requested to contact the triage nurse or myself immediately if the patient experiences any changes in their condition during this brief waiting period.    BRANDON Cueva Reagan, PA-C  06/25/24 2235

## 2024-06-25 NOTE — H&P
Albumin/Globulin Ratio 0.8 (*)     All other components within normal limits   BRAIN NATRIURETIC PEPTIDE - Abnormal; Notable for the following components:    NT Pro-BNP >35,000 (*)     All other components within normal limits     IMAGING:   XR CHEST PORTABLE   Final Result      No acute process.         Electronically signed by Slade Gamboa         Notes reviewed from all clinical/nonclinical/nursing services involved in patient's clinical care. Care coordination discussions were held with appropriate clinical/nonclinical/ nursing providers based on care coordination needs.     Assessment/Plan:   Given the patient's current clinical presentation, there is a high level of concern for decompensation if discharged from the emergency department. Complex decision making was performed, which includes reviewing the patient's available past medical records, laboratory results, and imaging studies.    Acute on chronic HFrEF (heart failure with reduced ejection fraction) ([facility])  - admit inpatient intermediate care  - CXR no acute process  - IV furosemide IBD  - strict I/Os, daily weight, low Na diet  - consult Cards Dr Guardado  - limited TTE 4/10/2024 EF 15-20%, severe global hypokinesis, mod MR, severely dilated LA, trace pericardial effusion  - recent BLE venous duplex no DVT  - continue home carvedilol, Isordil, hydralazine  - no ACEi/ARB, SGLT2-inhibitor due to CKD  - would benefit from referral to AHF team if Cards agreeable    CKD Stage 4  - follows with Dr Mitchel Weaver  - Cr stable at baseline  - monitor while on IV diuresis    T2DM, uncontrolled with hyperglycemia  - A1c 8.2%  - check POC glucose with SSI, hypoglycemia protocol  - hold home glipizide for now    Anemia of chronic disease  - stable H/H    HTN  - controlled with home meds    HLD  - continue home statis    DIET: ADULT DIET; Regular; 4 carb choices (60 gm/meal); No Added Salt (3-4 gm)   ISOLATION PRECAUTIONS: No active isolations  CODE STATUS: DNR per

## 2024-06-26 PROBLEM — I50.9 ACUTE ON CHRONIC CONGESTIVE HEART FAILURE (HCC): Status: ACTIVE | Noted: 2024-04-09

## 2024-06-26 LAB
ALBUMIN SERPL-MCNC: 2.9 G/DL (ref 3.5–5)
ALBUMIN SERPL-MCNC: 2.9 G/DL (ref 3.5–5)
ALBUMIN/GLOB SERPL: 0.8 (ref 1.1–2.2)
ALP SERPL-CCNC: 83 U/L (ref 45–117)
ALT SERPL-CCNC: 17 U/L (ref 12–78)
ANION GAP SERPL CALC-SCNC: 7 MMOL/L (ref 5–15)
ANION GAP SERPL CALC-SCNC: 8 MMOL/L (ref 5–15)
AST SERPL-CCNC: 12 U/L (ref 15–37)
BASOPHILS # BLD: 0 K/UL (ref 0–0.1)
BASOPHILS NFR BLD: 1 % (ref 0–1)
BILIRUB SERPL-MCNC: 0.6 MG/DL (ref 0.2–1)
BUN SERPL-MCNC: 68 MG/DL (ref 6–20)
BUN SERPL-MCNC: 68 MG/DL (ref 6–20)
BUN/CREAT SERPL: 25 (ref 12–20)
BUN/CREAT SERPL: 27 (ref 12–20)
CALCIUM SERPL-MCNC: 8.2 MG/DL (ref 8.5–10.1)
CALCIUM SERPL-MCNC: 8.4 MG/DL (ref 8.5–10.1)
CHLORIDE SERPL-SCNC: 107 MMOL/L (ref 97–108)
CHLORIDE SERPL-SCNC: 109 MMOL/L (ref 97–108)
CHOLEST SERPL-MCNC: 60 MG/DL
CO2 SERPL-SCNC: 22 MMOL/L (ref 21–32)
CO2 SERPL-SCNC: 24 MMOL/L (ref 21–32)
CREAT SERPL-MCNC: 2.55 MG/DL (ref 0.55–1.02)
CREAT SERPL-MCNC: 2.74 MG/DL (ref 0.55–1.02)
DIFFERENTIAL METHOD BLD: ABNORMAL
EOSINOPHIL # BLD: 0.1 K/UL (ref 0–0.4)
EOSINOPHIL NFR BLD: 3 % (ref 0–7)
ERYTHROCYTE [DISTWIDTH] IN BLOOD BY AUTOMATED COUNT: 16.6 % (ref 11.5–14.5)
FERRITIN SERPL-MCNC: 113 NG/ML (ref 8–252)
GLOBULIN SER CALC-MCNC: 3.8 G/DL (ref 2–4)
GLUCOSE BLD STRIP.AUTO-MCNC: 137 MG/DL (ref 65–117)
GLUCOSE BLD STRIP.AUTO-MCNC: 195 MG/DL (ref 65–117)
GLUCOSE BLD STRIP.AUTO-MCNC: 226 MG/DL (ref 65–117)
GLUCOSE BLD STRIP.AUTO-MCNC: 315 MG/DL (ref 65–117)
GLUCOSE SERPL-MCNC: 183 MG/DL (ref 65–100)
GLUCOSE SERPL-MCNC: 201 MG/DL (ref 65–100)
HCT VFR BLD AUTO: 33.5 % (ref 35–47)
HDLC SERPL-MCNC: 24 MG/DL
HDLC SERPL: 2.5 (ref 0–5)
HGB BLD-MCNC: 10.6 G/DL (ref 11.5–16)
IMM GRANULOCYTES # BLD AUTO: 0 K/UL (ref 0–0.04)
IMM GRANULOCYTES NFR BLD AUTO: 0 % (ref 0–0.5)
IRON SATN MFR SERPL: 16 % (ref 20–50)
IRON SERPL-MCNC: 48 UG/DL (ref 35–150)
LACTATE SERPL-SCNC: 1.3 MMOL/L (ref 0.4–2)
LDLC SERPL CALC-MCNC: 20.6 MG/DL (ref 0–100)
LYMPHOCYTES # BLD: 0.6 K/UL (ref 0.8–3.5)
LYMPHOCYTES NFR BLD: 15 % (ref 12–49)
MAGNESIUM SERPL-MCNC: 2.4 MG/DL (ref 1.6–2.4)
MCH RBC QN AUTO: 31.2 PG (ref 26–34)
MCHC RBC AUTO-ENTMCNC: 31.6 G/DL (ref 30–36.5)
MCV RBC AUTO: 98.5 FL (ref 80–99)
MONOCYTES # BLD: 0.4 K/UL (ref 0–1)
MONOCYTES NFR BLD: 10 % (ref 5–13)
NEUTS SEG # BLD: 3 K/UL (ref 1.8–8)
NEUTS SEG NFR BLD: 71 % (ref 32–75)
NRBC # BLD: 0 K/UL (ref 0–0.01)
NRBC BLD-RTO: 0 PER 100 WBC
NT PRO BNP: ABNORMAL PG/ML
PHOSPHATE SERPL-MCNC: 5 MG/DL (ref 2.6–4.7)
PLATELET # BLD AUTO: 139 K/UL (ref 150–400)
PMV BLD AUTO: 11.2 FL (ref 8.9–12.9)
POTASSIUM SERPL-SCNC: 3.8 MMOL/L (ref 3.5–5.1)
POTASSIUM SERPL-SCNC: 4.2 MMOL/L (ref 3.5–5.1)
PROCALCITONIN SERPL-MCNC: <0.05 NG/ML
PROT SERPL-MCNC: 6.7 G/DL (ref 6.4–8.2)
RBC # BLD AUTO: 3.4 M/UL (ref 3.8–5.2)
RBC MORPH BLD: ABNORMAL
RBC MORPH BLD: ABNORMAL
SERVICE CMNT-IMP: ABNORMAL
SODIUM SERPL-SCNC: 138 MMOL/L (ref 136–145)
SODIUM SERPL-SCNC: 139 MMOL/L (ref 136–145)
TIBC SERPL-MCNC: 299 UG/DL (ref 250–450)
TRIGL SERPL-MCNC: 77 MG/DL
VLDLC SERPL CALC-MCNC: 15.4 MG/DL
WBC # BLD AUTO: 4.1 K/UL (ref 3.6–11)

## 2024-06-26 PROCEDURE — 85025 COMPLETE CBC W/AUTO DIFF WBC: CPT

## 2024-06-26 PROCEDURE — 36415 COLL VENOUS BLD VENIPUNCTURE: CPT

## 2024-06-26 PROCEDURE — 80053 COMPREHEN METABOLIC PANEL: CPT

## 2024-06-26 PROCEDURE — 2060000000 HC ICU INTERMEDIATE R&B

## 2024-06-26 PROCEDURE — 2580000003 HC RX 258: Performed by: NURSE PRACTITIONER

## 2024-06-26 PROCEDURE — 97165 OT EVAL LOW COMPLEX 30 MIN: CPT

## 2024-06-26 PROCEDURE — 2580000003 HC RX 258: Performed by: INTERNAL MEDICINE

## 2024-06-26 PROCEDURE — 84145 PROCALCITONIN (PCT): CPT

## 2024-06-26 PROCEDURE — 83605 ASSAY OF LACTIC ACID: CPT

## 2024-06-26 PROCEDURE — 97161 PT EVAL LOW COMPLEX 20 MIN: CPT

## 2024-06-26 PROCEDURE — 80069 RENAL FUNCTION PANEL: CPT

## 2024-06-26 PROCEDURE — 97535 SELF CARE MNGMENT TRAINING: CPT

## 2024-06-26 PROCEDURE — 83550 IRON BINDING TEST: CPT

## 2024-06-26 PROCEDURE — 6360000002 HC RX W HCPCS: Performed by: NURSE PRACTITIONER

## 2024-06-26 PROCEDURE — 82962 GLUCOSE BLOOD TEST: CPT

## 2024-06-26 PROCEDURE — 99222 1ST HOSP IP/OBS MODERATE 55: CPT | Performed by: NURSE PRACTITIONER

## 2024-06-26 PROCEDURE — 99222 1ST HOSP IP/OBS MODERATE 55: CPT | Performed by: SPECIALIST

## 2024-06-26 PROCEDURE — 6370000000 HC RX 637 (ALT 250 FOR IP): Performed by: INTERNAL MEDICINE

## 2024-06-26 PROCEDURE — 97116 GAIT TRAINING THERAPY: CPT

## 2024-06-26 PROCEDURE — 83735 ASSAY OF MAGNESIUM: CPT

## 2024-06-26 PROCEDURE — 82728 ASSAY OF FERRITIN: CPT

## 2024-06-26 PROCEDURE — 83880 ASSAY OF NATRIURETIC PEPTIDE: CPT

## 2024-06-26 PROCEDURE — 80061 LIPID PANEL: CPT

## 2024-06-26 PROCEDURE — 83540 ASSAY OF IRON: CPT

## 2024-06-26 PROCEDURE — 6360000002 HC RX W HCPCS: Performed by: INTERNAL MEDICINE

## 2024-06-26 RX ORDER — BUMETANIDE 0.25 MG/ML
2 INJECTION INTRAMUSCULAR; INTRAVENOUS EVERY 12 HOURS
Status: DISCONTINUED | OUTPATIENT
Start: 2024-06-26 | End: 2024-06-27

## 2024-06-26 RX ORDER — GINSENG 100 MG
CAPSULE ORAL DAILY
Status: DISCONTINUED | OUTPATIENT
Start: 2024-06-26 | End: 2024-07-03 | Stop reason: HOSPADM

## 2024-06-26 RX ADMIN — ATORVASTATIN CALCIUM 10 MG: 10 TABLET, FILM COATED ORAL at 08:57

## 2024-06-26 RX ADMIN — MIRTAZAPINE 15 MG: 15 TABLET, FILM COATED ORAL at 21:04

## 2024-06-26 RX ADMIN — CARVEDILOL 12.5 MG: 12.5 TABLET, FILM COATED ORAL at 08:57

## 2024-06-26 RX ADMIN — HYDRALAZINE HYDROCHLORIDE 10 MG: 10 TABLET, FILM COATED ORAL at 07:12

## 2024-06-26 RX ADMIN — ISOSORBIDE DINITRATE 5 MG: 5 TABLET ORAL at 08:57

## 2024-06-26 RX ADMIN — FUROSEMIDE 40 MG: 10 INJECTION, SOLUTION INTRAMUSCULAR; INTRAVENOUS at 08:56

## 2024-06-26 RX ADMIN — BUMETANIDE 2 MG: 0.25 INJECTION INTRAMUSCULAR; INTRAVENOUS at 21:04

## 2024-06-26 RX ADMIN — INSULIN LISPRO 4 UNITS: 100 INJECTION, SOLUTION INTRAVENOUS; SUBCUTANEOUS at 22:48

## 2024-06-26 RX ADMIN — ASPIRIN 81 MG: 81 TABLET, COATED ORAL at 08:57

## 2024-06-26 RX ADMIN — FERROUS SULFATE TAB 325 MG (65 MG ELEMENTAL FE) 325 MG: 325 (65 FE) TAB at 08:57

## 2024-06-26 RX ADMIN — SODIUM CHLORIDE 125 MG: 9 INJECTION, SOLUTION INTRAVENOUS at 17:54

## 2024-06-26 RX ADMIN — SODIUM CHLORIDE, PRESERVATIVE FREE 10 ML: 5 INJECTION INTRAVENOUS at 21:04

## 2024-06-26 RX ADMIN — ISOSORBIDE DINITRATE 5 MG: 5 TABLET ORAL at 15:24

## 2024-06-26 RX ADMIN — HYDRALAZINE HYDROCHLORIDE 10 MG: 10 TABLET, FILM COATED ORAL at 15:24

## 2024-06-26 RX ADMIN — HYDRALAZINE HYDROCHLORIDE 10 MG: 10 TABLET, FILM COATED ORAL at 21:04

## 2024-06-26 RX ADMIN — CARVEDILOL 12.5 MG: 12.5 TABLET, FILM COATED ORAL at 17:56

## 2024-06-26 RX ADMIN — ACETAMINOPHEN 650 MG: 325 TABLET ORAL at 21:03

## 2024-06-26 RX ADMIN — INSULIN LISPRO 2 UNITS: 100 INJECTION, SOLUTION INTRAVENOUS; SUBCUTANEOUS at 11:32

## 2024-06-26 RX ADMIN — SODIUM CHLORIDE, PRESERVATIVE FREE 10 ML: 5 INJECTION INTRAVENOUS at 11:36

## 2024-06-26 ASSESSMENT — PAIN DESCRIPTION - ORIENTATION: ORIENTATION: RIGHT;LEFT

## 2024-06-26 ASSESSMENT — PAIN DESCRIPTION - DESCRIPTORS: DESCRIPTORS: ACHING

## 2024-06-26 ASSESSMENT — PAIN SCALES - GENERAL
PAINLEVEL_OUTOF10: 9
PAINLEVEL_OUTOF10: 9

## 2024-06-26 ASSESSMENT — PAIN DESCRIPTION - LOCATION: LOCATION: LEG

## 2024-06-26 NOTE — DISCHARGE INSTRUCTIONS
To access the American Heart Association's Interactive Workbook \"Healthier Living with Heart Failure - Managing Symptoms and Reducing Risk\"  Scan the QR code below.

## 2024-06-26 NOTE — NURSE NAVIGATOR
Heart Failure Nurse Navigator (HFNN) rounds completed.    HF NN provided introduction to self and nurse navigator role. AHA Discharge Packet for Patients Diagnosed with Heart Failure booklet given to patient, along with weight calendar, signs/symptoms magnet, and card with QR codes for HF video resources.       Patient had previously been seen by a HFNN in 4/2024.  Patient has been doing daily weights and has followed a low salt diet.  Her son does her food shopping and makes sure her food is low salt. She has seen her cardiologist once since discharge.  She states her \"fluid pill\" does not seem to be working anymore.  She has had no changes in her habits.     Advised patient that follow up appointment will be scheduled and placed on Discharge Instructions prior to discharge. Will continue to follow until discharge.        Time spent with patient discussing HF education:  10 minutes  
dronedarone)  Diabetes medications (thiasolidinediones, saxagliptin, alogliptin)  NSAIDs and MILLARD 2 inhibitors    Consider vaccinations for respiratory illnesses (flu, pneumonia, covid) [Class 2b]      Due to severely reduced LVEF < 25% and/or recurrent hospitalizations this patient may benefit from referral to Advanced Heart Failure Program to assess suitability for advanced therapies, such as left-ventricular assist device, heart transplantation, palliative inotropes or palliation [Class 1]. To obtain in-patient consultation or refer to [facility] Advanced [facility] Failure [facility], call [phone]    Patient Education:     Heart failure education to be provided, including information about medical therapy, lifestyle modifications, diet and fluid restrictions, physical activity.  Educational resources to be provided: “AHA Discharge packet for patients diagnosed with Heart Failure\" booklet; Signs/Symptoms magnet; Weight Calendar; [facility] flyer.  Information to be provided about HF support group.  Learning about Self-Care for Heart failure on discharge instructions.      Plan for Transitional Care:    Post discharge follow up phone call to be made within 48-72 hours of discharge.  Patient will follow-up with PCP.  Patient will follow-up with Primary Cardiologist.  Obstructive sleep apnea screening done and patient was referred to Sleep Medicine.  Referral/follow-up with Cardiac Rehabilitation.  Referral/follow-up with Advanced Heart Failure Specialist.  Referral/follow-up with Palliative Care Specialist.      Heart Failure Nurse Navigator  Phone: [phone]  /  [phone]    *Recommendations listed above are based on 2022 AHA/ACC/HFSA Guideline for the Management of Heart Failure: A Report of the American College of Cardiology/American Heart Association Joint Committee on Clinical Practice Guidelines. Circulation 2022; e1032. and 2017 AHA/ACC/HRS guideline for management of patients with ventricular

## 2024-06-26 NOTE — WOUND CARE
WOCN Note:     New consult for bilateral lower legs     Nolvia Silver is a 74 y.o. y/o female who presented for Acute on chronic HFrEF (heart failure with reduced ejection fraction) ([facility]) [I50.23]  Acute on chronic congestive heart failure, unspecified heart failure type ([facility]) [I50.9]  Admitted on 6/25/2024    Past Medical History:   Diagnosis Date    CHF (congestive heart failure) ([facility])        Lab Results   Component Value Date/Time    WBC 4.1 06/26/2024 03:21 AM    LABA1C 8.0 (H) 06/25/2024 12:37 PM    POCGLU 226 (H) 06/26/2024 11:25 AM    POCGLU 137 (H) 06/26/2024 06:41 AM    HGB 10.6 (L) 06/26/2024 03:21 AM    HCT 33.5 (L) 06/26/2024 03:21 AM     (L) 06/26/2024 03:21 AM        Tobacco Use      Smoking status: Never      Smokeless tobacco: Not on file     ADULT DIET; Regular; 4 carb choices (60 gm/meal); No Added Salt (3-4 gm)     Assessment:   Seen today in room 453/01   Alert and appropriately communicative. Sedated and intubated. Reports wounds are tender when touched  Mobile and ambulates with walker   Continent.  Surface: foam mattress  Left heel intact and without erythema.  Heels offloaded with pillows.    1. POA Weeping Wounds to Left Lower Leg  About 4 scattered wounds on anterior lower leg  Three are crusted, the largest 1.5 x 1 cm   The only open wound is 1 x 1 x 0.1 cm with red base after being cleansed, moderate to large serous drainage  Periwounds intact and without erythema    Tx: cleansed with Vashe damp gauze, covered with ABD and secured with roll gauze - patient declined use of Optifoam Genlte Ag as she states any adhesive will cause an allergic reaction     2. POA Bulla to Right Heel  ~ 0.4 x 0.4 cm intact white bulla with surrounding erythema to posterior heel  Appears to be where her shoe rubs and patient agrees with this  Tx: covered with ABD and roll gauze     Wound Recommendations:    BLE: Daily- cleanse with Vashe damp

## 2024-06-26 NOTE — PLAN OF CARE
Problem: Discharge Planning  Goal: Discharge to home or other facility with appropriate resources  Outcome: Progressing  Flowsheets (Taken 6/26/2024 0004)  Discharge to home or other facility with appropriate resources: Identify barriers to discharge with patient and caregiver     Problem: Safety - Adult  Goal: Free from fall injury  Outcome: Progressing

## 2024-06-26 NOTE — CARE COORDINATION
Care Management Initial Assessment       RUR:22%  Readmission? No  1st IM letter given? Yes - 6/26/24  1st  letter given: N/A    Transition of Care Plan:    RUR: 22%  Prior Level of Functioning: independent  Disposition: return to independent functioning  If SNF or IPR: Date FOC offered: N/A  Date FOC received:   Accepting facility:   Date authorization started with reference number:   Date authorization received and expires:   Follow up appointments:   DME needed: None  Transportation at discharge: family  IM/IMM Medicare/ letter given: Yes 6/26/24  Is patient a  and connected with VA? No   If yes, was  transfer form completed and VA notified?   Caregiver Contact: Corwin Perez  [phone]  Discharge Caregiver contacted prior to discharge?   Care Conference needed?   Barriers to discharge:     Reason for Admission: Acute on chronic HFrEF       CKD Stage 4     Consults: nephrology, cardiology                   Plan for utilizing home health: None         PCP: First and Last name:  No primary care provider on file.  Per patient , she has a new PCP and daughter has contact information  Nephrologsst: Dr Mitchel Weaver  Cardiologist: Dr Bowers    Current Advanced Directive/Advance Care Plan: DNR                   CM met with patient. Patient lives with her 56 year old son.  Patient lives in a  third floor [facility] with 21 entry steps. Patient's  resides at [facility].  Local family support includes daughter, 4 grandchildren (5 great grand children). Patient was ambulatory prior to admission. Patient confirmed PCP, health insurance, and prescription coverage.   Previous home health :[facility]cent briefly with [facility]  Previous IPR/ SNF rehab :None  DME : rolling walker     06/26/24 1005   Service Assessment   Patient Orientation Alert and Oriented   Cognition Alert   History Provided By Patient   Primary Caregiver Self   Accompanied By/Relationship None   Support Systems

## 2024-06-26 NOTE — DISCHARGE INSTR - DIET
Heart Failure Nutrition Therapy (2023)     This nutrition therapy for heart failure focuses on limiting sodium in your diet to manage your heart failure symptoms and maintaining your body weight.   You may need to limit fluid in your diet. Your registered dietitian nutritionist (RDN) will provide you with the Fluid Restriction Nutrition Therapy handout if you need to limit your fluid intake.  Sodium  Salt (sodium) makes your body hold water. You may feel shortness of breath and experience swelling when your body is holding water. You can help to prevent these symptoms by eating less salt. You may need to limit the sodium you get from food and drinks to less than 2,300 milligrams per day.  Read the nutrition label to find out how much sodium is in 1 serving of a food. Select foods with 140 milligrams of sodium or less per serving.  Foods with more than 300 milligrams of sodium per serving may not fit into a reduced-sodium meal plan depending on your other food selections. Your RDN can help you determine which foods fit into your eating plan.    Check serving sizes. If you eat more than 1 serving, you will get more sodium than the amount listed.  Tips for Limiting Sodium in Your Diet  How to limit sodium Strategies   Avoid processed foods    Choose fresh and frozen fruits and vegetables without added juices or sauces. These foods are naturally low in sodium.     Choose fresh meats. These foods are lower in sodium than processed meats, such as dobson, sausage, and hot dogs. Read the nutrition label to help you find fresh meat that is low in sodium.   Use less salt at the table and when cooking Leave the salt out of recipes for pasta, casseroles, and soups. Just 1 teaspoon of table salt has 2,300 milligrams of sodium.  Ask your RDN how to cook your favorite recipes without sodium.   Understand  claims about sodium Choose food packages that include the following claims about sodium:    “Salt-free” or

## 2024-06-26 NOTE — PLAN OF CARE
Problem: Discharge Planning  Goal: Discharge to home or other facility with appropriate resources  6/26/2024 1016 by Hector Schuler RN  Outcome: Progressing  6/26/2024 0004 by Connie Schwartz RN  Outcome: Progressing  Flowsheets (Taken 6/26/2024 0004)  Discharge to home or other facility with appropriate resources: Identify barriers to discharge with patient and caregiver     Problem: Safety - Adult  Goal: Free from fall injury  6/26/2024 1016 by Hector Schuler RN  Outcome: Progressing  6/26/2024 0004 by Connie Schwartz RN  Outcome: Progressing     Problem: Chronic Conditions and Co-morbidities  Goal: Patient's chronic conditions and co-morbidity symptoms are monitored and maintained or improved  Outcome: Progressing

## 2024-06-26 NOTE — CONSULTS
[address]  [address]                              CONSULTATION      PATIENT NAME: FLAKO BUCK                  : 1950  MED REC NO: [id number]                       ROOM: 453  ACCOUNT NO: [id number]                       ADMIT DATE: 2024  PROVIDER: Dori Simons MD    DATE OF SERVICE:  2024    ATTENDING PHYSICIAN:  MYA MOCK    REASON FOR CONSULTATION:  Seen for renal failure.    HISTORY OF PRESENT ILLNESS:  Thanks for the consult.  We had a pleasure seeing the patient.  She was admitted via ER yesterday for leg swelling.  She is known to have chronic kidney disease, low EF, more leg swelling, and more blisters in her legs.  Her baseline creatinine has been 2.5-2.9, came with a creatinine of 2.5, stable.  She is admitted with volume overload and maybe suspicion of cellulitis.    PAST MEDICAL HISTORY:  Includes CKD stage 4, low EF, hypertension, dyslipidemia, lower extremity edema, uncontrolled diabetes, anemia.    SOCIAL HISTORY:  Reviewed.    FAMILY HISTORY:  Reviewed.    REVIEW OF SYSTEMS:  As noted in the HPI.  Other systems negative.    ALLERGIES:  TO 7 MEDICINES.  LOOK AT THE LIST.      MEDICATIONS:  As an inpatient are as follows:  1. Aspirin.  2. Coreg.  3. Iron.  4. Lasix 40 mg IV twice a day.  5. Heparin subcu.  6. Remeron.    PHYSICAL EXAMINATION:  GENERAL:  Not in distress.  VITAL SIGNS:  /59, saturating 97%.  Urine output not recorded.  NECK:  JVD negative.  EXTREMITIES:  Lower extremities, some blisters, some edema, some redness.    LABS:  Sodium 138, potassium 4.2, BUN 68, creatinine 2.5, bicarb 22, calcium is 8.4.  Hemoglobin 10.6, platelets 139, WBC 4.1.    IMPRESSION:    1. Chronic kidney disease, stage 4.  Creatinine at baseline.  2. Hypertension, acceptable.  3. Congestive heart failure, fairly stable.  4. Lower extremity edema with some blisters.  Need to make sure there is no 
Consult received.  Will review chart and see patient.  Full note to follow.    Mellissa Lima, APRN-NP  Advanced Heart Failure  
Nutrition Note:   Acknowledge Consult for Education: heart failure diet education.    Initial education to be completed by Heart Failure Nurse Navigator. Will defer RD intervention at this time, low sodium diet information attached to AVS.  Navigator/provider will reconsult RD if additional diet education intervention needed.    Thank you.    Rena Mcdonough RD    
Seen and examined  Thanks for the consult  A/P:  CKD-4 ,creat at baseline  Lower ext edema  DM  Low EF    IV diuretics  Creat stable  Will follow  Discussed with her  
  04/15/2024 3.21 (H)   04/14/2024 3.22 (H)   04/13/2024 2.80 (H)       Current meds:    Current Facility-Administered Medications:     aspirin EC tablet 81 mg, 81 mg, Oral, Daily, Harleen Perez MD    carvedilol (COREG) tablet 12.5 mg, 12.5 mg, Oral, BID , Harleen Perez MD, 12.5 mg at 06/25/24 1715    ferrous sulfate (IRON 325) tablet 325 mg, 325 mg, Oral, Daily with breakfast, Harleen Perez MD    hydrALAZINE (APRESOLINE) tablet 10 mg, 10 mg, Oral, 3 times per day, Harleen Perez MD, 10 mg at 06/26/24 0712    isosorbide dinitrate (ISORDIL) tablet 5 mg, 5 mg, Oral, BID, Harleen Perez MD, 5 mg at 06/25/24 1718    mirtazapine (REMERON) tablet 15 mg, 15 mg, Oral, Nightly, Harleen Perez MD, 15 mg at 06/25/24 2115    atorvastatin (LIPITOR) tablet 10 mg, 10 mg, Oral, Daily, Harleen Perez MD    insulin lispro (HUMALOG,ADMELOG) injection vial 0-8 Units, 0-8 Units, SubCUTAneous, TID , Harleen Perez MD, 2 Units at 06/25/24 1716    insulin lispro (HUMALOG,ADMELOG) injection vial 0-4 Units, 0-4 Units, SubCUTAneous, Nightly, Harleen Perez MD    sodium chloride flush 0.9 % injection 5-40 mL, 5-40 mL, IntraVENous, 2 times per day, Harleen Perez MD, 10 mL at 06/25/24 2118    sodium chloride flush 0.9 % injection 5-40 mL, 5-40 mL, IntraVENous, PRN, Harleen Perez MD    0.9 % sodium chloride infusion, , IntraVENous, PRN, Harleen Perez MD    ondansetron (ZOFRAN-ODT) disintegrating tablet 4 mg, 4 mg, Oral, Q8H PRN **OR** ondansetron (ZOFRAN) injection 4 mg, 4 mg, IntraVENous, Q6H PRN, Harleen Perez MD    polyethylene glycol (GLYCOLAX) packet 17 g, 17 g, Oral, Daily PRN, Harleen Perez MD    acetaminophen (TYLENOL) tablet 650 mg, 650 mg, Oral, Q6H PRN **OR** acetaminophen (TYLENOL) suppository 650 mg, 650 mg, Rectal, Q6H PRN, Harleen Perez MD    heparin (porcine) injection 5,000 Units, 5,000 Units, SubCUTAneous, 3 times per day, Harleen Perez MD    furosemide (LASIX) injection 40 mg, 40 mg, IntraVENous, BID, Ana

## 2024-06-27 ENCOUNTER — APPOINTMENT (OUTPATIENT)
Facility: HOSPITAL | Age: 74
DRG: 286 | End: 2024-06-27
Attending: NURSE PRACTITIONER
Payer: MEDICARE

## 2024-06-27 LAB
ALBUMIN SERPL-MCNC: 2.7 G/DL (ref 3.5–5)
ALBUMIN/GLOB SERPL: 0.7 (ref 1.1–2.2)
ALP SERPL-CCNC: 113 U/L (ref 45–117)
ALT SERPL-CCNC: 20 U/L (ref 12–78)
ANION GAP SERPL CALC-SCNC: 7 MMOL/L (ref 5–15)
AST SERPL-CCNC: 10 U/L (ref 15–37)
BASOPHILS # BLD: 0 K/UL (ref 0–0.1)
BASOPHILS NFR BLD: 0 % (ref 0–1)
BILIRUB SERPL-MCNC: 0.6 MG/DL (ref 0.2–1)
BUN SERPL-MCNC: 68 MG/DL (ref 6–20)
BUN/CREAT SERPL: 25 (ref 12–20)
CALCIUM SERPL-MCNC: 7.9 MG/DL (ref 8.5–10.1)
CHLORIDE SERPL-SCNC: 111 MMOL/L (ref 97–108)
CO2 SERPL-SCNC: 21 MMOL/L (ref 21–32)
CREAT SERPL-MCNC: 2.68 MG/DL (ref 0.55–1.02)
DIFFERENTIAL METHOD BLD: ABNORMAL
ECHO AV AREA PEAK VELOCITY: 3.6 CM2
ECHO AV AREA/BSA PEAK VELOCITY: 2.3 CM2/M2
ECHO AV PEAK GRADIENT: 4 MMHG
ECHO AV PEAK VELOCITY: 1 M/S
ECHO AV VELOCITY RATIO: 0.9
ECHO BSA: 1.59 M2
ECHO EST RA PRESSURE: 3 MMHG
ECHO LA DIAMETER INDEX: 3.03 CM/M2
ECHO LA DIAMETER: 4.7 CM
ECHO LV FRACTIONAL SHORTENING: 14 % (ref 28–44)
ECHO LV INTERNAL DIMENSION DIASTOLE INDEX: 3.81 CM/M2
ECHO LV INTERNAL DIMENSION DIASTOLIC: 5.9 CM (ref 3.9–5.3)
ECHO LV INTERNAL DIMENSION SYSTOLIC INDEX: 3.29 CM/M2
ECHO LV INTERNAL DIMENSION SYSTOLIC: 5.1 CM
ECHO LV IVSD: 0.9 CM (ref 0.6–0.9)
ECHO LV MASS 2D: 153.4 G (ref 67–162)
ECHO LV MASS INDEX 2D: 99 G/M2 (ref 43–95)
ECHO LV POSTERIOR WALL DIASTOLIC: 0.5 CM (ref 0.6–0.9)
ECHO LV RELATIVE WALL THICKNESS RATIO: 0.17
ECHO LVOT AREA: 3.8 CM2
ECHO LVOT DIAM: 2.2 CM
ECHO LVOT PEAK GRADIENT: 3 MMHG
ECHO LVOT PEAK VELOCITY: 0.9 M/S
ECHO MV A VELOCITY: 0.69 M/S
ECHO MV E VELOCITY: 0.76 M/S
ECHO MV E/A RATIO: 1.1
ECHO MV EROA PISA: 0.6 CM2
ECHO MV REGURGITANT ALIASING (NYQUIST) VELOCITY: 35 CM/S
ECHO MV REGURGITANT PEAK GRADIENT: 125 MMHG
ECHO MV REGURGITANT PEAK VELOCITY: 5.6 M/S
ECHO MV REGURGITANT RADIUS PISA: 1.19 CM
ECHO MV REGURGITANT VELOCITY PISA: 5.5 M/S
ECHO MV REGURGITANT VOLUME PISA: 101.64 ML
ECHO MV REGURGITANT VTIA: 179.6 CM
ECHO PULMONARY ARTERY END DIASTOLIC PRESSURE: 24 MMHG
ECHO RIGHT VENTRICULAR SYSTOLIC PRESSURE (RVSP): 56 MMHG
ECHO RV TAPSE: 1.6 CM (ref 1.7–?)
ECHO TV REGURGITANT MAX VELOCITY: 3.64 M/S
ECHO TV REGURGITANT PEAK GRADIENT: 53 MMHG
EOSINOPHIL # BLD: 0.1 K/UL (ref 0–0.4)
EOSINOPHIL NFR BLD: 2 % (ref 0–7)
ERYTHROCYTE [DISTWIDTH] IN BLOOD BY AUTOMATED COUNT: 16.7 % (ref 11.5–14.5)
GLOBULIN SER CALC-MCNC: 4 G/DL (ref 2–4)
GLUCOSE BLD STRIP.AUTO-MCNC: 119 MG/DL (ref 65–117)
GLUCOSE BLD STRIP.AUTO-MCNC: 167 MG/DL (ref 65–117)
GLUCOSE BLD STRIP.AUTO-MCNC: 201 MG/DL (ref 65–117)
GLUCOSE BLD STRIP.AUTO-MCNC: 327 MG/DL (ref 65–117)
GLUCOSE SERPL-MCNC: 136 MG/DL (ref 65–100)
HCT VFR BLD AUTO: 34.4 % (ref 35–47)
HGB BLD-MCNC: 11 G/DL (ref 11.5–16)
IMM GRANULOCYTES # BLD AUTO: 0 K/UL (ref 0–0.04)
IMM GRANULOCYTES NFR BLD AUTO: 0 % (ref 0–0.5)
LYMPHOCYTES # BLD: 0.7 K/UL (ref 0.8–3.5)
LYMPHOCYTES NFR BLD: 16 % (ref 12–49)
MCH RBC QN AUTO: 31.4 PG (ref 26–34)
MCHC RBC AUTO-ENTMCNC: 32 G/DL (ref 30–36.5)
MCV RBC AUTO: 98.3 FL (ref 80–99)
MONOCYTES # BLD: 0.5 K/UL (ref 0–1)
MONOCYTES NFR BLD: 11 % (ref 5–13)
NEUTS SEG # BLD: 3.3 K/UL (ref 1.8–8)
NEUTS SEG NFR BLD: 71 % (ref 32–75)
NRBC # BLD: 0 K/UL (ref 0–0.01)
NRBC BLD-RTO: 0 PER 100 WBC
NT PRO BNP: ABNORMAL PG/ML
PLATELET # BLD AUTO: 156 K/UL (ref 150–400)
PMV BLD AUTO: 11.2 FL (ref 8.9–12.9)
POTASSIUM SERPL-SCNC: 3.9 MMOL/L (ref 3.5–5.1)
PROT SERPL-MCNC: 6.7 G/DL (ref 6.4–8.2)
RBC # BLD AUTO: 3.5 M/UL (ref 3.8–5.2)
RBC MORPH BLD: ABNORMAL
SERVICE CMNT-IMP: ABNORMAL
SODIUM SERPL-SCNC: 139 MMOL/L (ref 136–145)
WBC # BLD AUTO: 4.6 K/UL (ref 3.6–11)

## 2024-06-27 PROCEDURE — 85025 COMPLETE CBC W/AUTO DIFF WBC: CPT

## 2024-06-27 PROCEDURE — 6370000000 HC RX 637 (ALT 250 FOR IP): Performed by: PHYSICIAN ASSISTANT

## 2024-06-27 PROCEDURE — 99233 SBSQ HOSP IP/OBS HIGH 50: CPT | Performed by: INTERNAL MEDICINE

## 2024-06-27 PROCEDURE — 99233 SBSQ HOSP IP/OBS HIGH 50: CPT | Performed by: SPECIALIST

## 2024-06-27 PROCEDURE — 82962 GLUCOSE BLOOD TEST: CPT

## 2024-06-27 PROCEDURE — 2060000000 HC ICU INTERMEDIATE R&B

## 2024-06-27 PROCEDURE — 6370000000 HC RX 637 (ALT 250 FOR IP): Performed by: NURSE PRACTITIONER

## 2024-06-27 PROCEDURE — 93306 TTE W/DOPPLER COMPLETE: CPT

## 2024-06-27 PROCEDURE — 83880 ASSAY OF NATRIURETIC PEPTIDE: CPT

## 2024-06-27 PROCEDURE — 6370000000 HC RX 637 (ALT 250 FOR IP): Performed by: INTERNAL MEDICINE

## 2024-06-27 PROCEDURE — 36415 COLL VENOUS BLD VENIPUNCTURE: CPT

## 2024-06-27 PROCEDURE — APPNB30 APP NON BILLABLE TIME 0-30 MINS: Performed by: NURSE PRACTITIONER

## 2024-06-27 PROCEDURE — 80053 COMPREHEN METABOLIC PANEL: CPT

## 2024-06-27 PROCEDURE — 2580000003 HC RX 258: Performed by: NURSE PRACTITIONER

## 2024-06-27 PROCEDURE — 6360000002 HC RX W HCPCS: Performed by: NURSE PRACTITIONER

## 2024-06-27 PROCEDURE — 6360000004 HC RX CONTRAST MEDICATION: Performed by: FAMILY MEDICINE

## 2024-06-27 PROCEDURE — 6360000002 HC RX W HCPCS: Performed by: INTERNAL MEDICINE

## 2024-06-27 PROCEDURE — 93306 TTE W/DOPPLER COMPLETE: CPT | Performed by: SPECIALIST

## 2024-06-27 PROCEDURE — 2580000003 HC RX 258: Performed by: INTERNAL MEDICINE

## 2024-06-27 RX ORDER — HYDRALAZINE HYDROCHLORIDE 25 MG/1
25 TABLET, FILM COATED ORAL EVERY 8 HOURS SCHEDULED
Status: DISCONTINUED | OUTPATIENT
Start: 2024-06-27 | End: 2024-06-28

## 2024-06-27 RX ORDER — ISOSORBIDE DINITRATE 10 MG/1
10 TABLET ORAL 2 TIMES DAILY
Status: DISCONTINUED | OUTPATIENT
Start: 2024-06-27 | End: 2024-06-30

## 2024-06-27 RX ORDER — BUMETANIDE 0.25 MG/ML
2 INJECTION INTRAMUSCULAR; INTRAVENOUS 3 TIMES DAILY
Status: DISCONTINUED | OUTPATIENT
Start: 2024-06-27 | End: 2024-06-30

## 2024-06-27 RX ADMIN — MIRTAZAPINE 15 MG: 15 TABLET, FILM COATED ORAL at 21:54

## 2024-06-27 RX ADMIN — HYDRALAZINE HYDROCHLORIDE 25 MG: 25 TABLET ORAL at 21:53

## 2024-06-27 RX ADMIN — ATORVASTATIN CALCIUM 10 MG: 10 TABLET, FILM COATED ORAL at 09:56

## 2024-06-27 RX ADMIN — ACETAMINOPHEN 650 MG: 325 TABLET ORAL at 21:53

## 2024-06-27 RX ADMIN — PERFLUTREN 1.5 ML: 6.52 INJECTION, SUSPENSION INTRAVENOUS at 12:21

## 2024-06-27 RX ADMIN — HYDRALAZINE HYDROCHLORIDE 25 MG: 25 TABLET ORAL at 13:50

## 2024-06-27 RX ADMIN — CARVEDILOL 12.5 MG: 12.5 TABLET, FILM COATED ORAL at 16:51

## 2024-06-27 RX ADMIN — ASPIRIN 81 MG: 81 TABLET, COATED ORAL at 09:56

## 2024-06-27 RX ADMIN — CARVEDILOL 12.5 MG: 12.5 TABLET, FILM COATED ORAL at 09:56

## 2024-06-27 RX ADMIN — ISOSORBIDE DINITRATE 10 MG: 10 TABLET ORAL at 13:50

## 2024-06-27 RX ADMIN — INSULIN LISPRO 6 UNITS: 100 INJECTION, SOLUTION INTRAVENOUS; SUBCUTANEOUS at 11:37

## 2024-06-27 RX ADMIN — ACETAMINOPHEN 650 MG: 325 TABLET ORAL at 13:59

## 2024-06-27 RX ADMIN — BUMETANIDE 2 MG: 0.25 INJECTION INTRAMUSCULAR; INTRAVENOUS at 15:00

## 2024-06-27 RX ADMIN — BACITRACIN 1 EACH: 500 OINTMENT TOPICAL at 15:29

## 2024-06-27 RX ADMIN — HYDRALAZINE HYDROCHLORIDE 10 MG: 10 TABLET, FILM COATED ORAL at 06:55

## 2024-06-27 RX ADMIN — SODIUM CHLORIDE, PRESERVATIVE FREE 10 ML: 5 INJECTION INTRAVENOUS at 10:04

## 2024-06-27 RX ADMIN — ISOSORBIDE DINITRATE 5 MG: 5 TABLET ORAL at 09:56

## 2024-06-27 RX ADMIN — SODIUM CHLORIDE, PRESERVATIVE FREE 10 ML: 5 INJECTION INTRAVENOUS at 21:54

## 2024-06-27 RX ADMIN — BUMETANIDE 2 MG: 0.25 INJECTION INTRAMUSCULAR; INTRAVENOUS at 09:56

## 2024-06-27 RX ADMIN — BUMETANIDE 2 MG: 0.25 INJECTION INTRAMUSCULAR; INTRAVENOUS at 21:54

## 2024-06-27 ASSESSMENT — PAIN SCALES - GENERAL
PAINLEVEL_OUTOF10: 3
PAINLEVEL_OUTOF10: 9
PAINLEVEL_OUTOF10: 8
PAINLEVEL_OUTOF10: 0
PAINLEVEL_OUTOF10: 0
PAINLEVEL_OUTOF10: 8

## 2024-06-27 ASSESSMENT — PAIN DESCRIPTION - LOCATION
LOCATION: LEG
LOCATION: LEG

## 2024-06-27 ASSESSMENT — PAIN DESCRIPTION - DESCRIPTORS
DESCRIPTORS: ACHING
DESCRIPTORS: ACHING

## 2024-06-27 ASSESSMENT — PAIN DESCRIPTION - ORIENTATION
ORIENTATION: LEFT;RIGHT;LOWER
ORIENTATION: LEFT;RIGHT;LOWER

## 2024-06-27 NOTE — PLAN OF CARE
Problem: Discharge Planning  Goal: Discharge to home or other facility with appropriate resources  Outcome: Progressing     Problem: Safety - Adult  Goal: Free from fall injury  6/27/2024 1104 by Hector Schuler RN  Outcome: Progressing  6/27/2024 0109 by Clyde Marr RN  Flowsheets (Taken 6/27/2024 0109)  Free From Fall Injury: Instruct family/caregiver on patient safety     Problem: Chronic Conditions and Co-morbidities  Goal: Patient's chronic conditions and co-morbidity symptoms are monitored and maintained or improved  6/27/2024 1104 by Hector Schuler RN  Outcome: Progressing  6/27/2024 0109 by Clyde Marr RN  Flowsheets (Taken 6/27/2024 0109)  Care Plan - Patient's Chronic Conditions and Co-Morbidity Symptoms are Monitored and Maintained or Improved: Monitor and assess patient's chronic conditions and comorbid symptoms for stability, deterioration, or improvement     Problem: Pain  Goal: Verbalizes/displays adequate comfort level or baseline comfort level  6/27/2024 1104 by Hector Schuler RN  Outcome: Progressing  6/27/2024 0109 by Clyde Marr RN  Flowsheets (Taken 6/27/2024 0109)  Verbalizes/displays adequate comfort level or baseline comfort level:   Encourage patient to monitor pain and request assistance   Assess pain using appropriate pain scale   Administer analgesics based on type and severity of pain and evaluate response

## 2024-06-27 NOTE — PLAN OF CARE
1930: Bedside and Verbal shift change report given to ANTHONY Sampson (oncoming nurse) by ANTHONY Young (offgoing nurse). Report included the following information Nurse Handoff Report.      Problem: Safety - Adult  Goal: Free from fall injury  Flowsheets (Taken 6/27/2024 0109)  Free From Fall Injury: Instruct family/caregiver on patient safety     Problem: Chronic Conditions and Co-morbidities  Goal: Patient's chronic conditions and co-morbidity symptoms are monitored and maintained or improved  Flowsheets (Taken 6/27/2024 0109)  Care Plan - Patient's Chronic Conditions and Co-Morbidity Symptoms are Monitored and Maintained or Improved: Monitor and assess patient's chronic conditions and comorbid symptoms for stability, deterioration, or improvement     Problem: Pain  Goal: Verbalizes/displays adequate comfort level or baseline comfort level  Flowsheets (Taken 6/27/2024 0109)  Verbalizes/displays adequate comfort level or baseline comfort level:   Encourage patient to monitor pain and request assistance   Assess pain using appropriate pain scale   Administer analgesics based on type and severity of pain and evaluate response

## 2024-06-28 ENCOUNTER — PREP FOR PROCEDURE (OUTPATIENT)
Age: 74
End: 2024-06-28

## 2024-06-28 LAB
ALBUMIN SERPL-MCNC: 2.8 G/DL (ref 3.5–5)
ALBUMIN/GLOB SERPL: 0.7 (ref 1.1–2.2)
ALP SERPL-CCNC: 84 U/L (ref 45–117)
ALT SERPL-CCNC: 11 U/L (ref 12–78)
ANION GAP SERPL CALC-SCNC: 9 MMOL/L (ref 5–15)
AST SERPL-CCNC: 11 U/L (ref 15–37)
BASOPHILS # BLD: 0 K/UL (ref 0–0.1)
BASOPHILS NFR BLD: 0 % (ref 0–1)
BILIRUB SERPL-MCNC: 0.7 MG/DL (ref 0.2–1)
BUN SERPL-MCNC: 66 MG/DL (ref 6–20)
BUN/CREAT SERPL: 25 (ref 12–20)
CALCIUM SERPL-MCNC: 8.9 MG/DL (ref 8.5–10.1)
CHLORIDE SERPL-SCNC: 107 MMOL/L (ref 97–108)
CO2 SERPL-SCNC: 23 MMOL/L (ref 21–32)
COMMENT:: NORMAL
CREAT SERPL-MCNC: 2.68 MG/DL (ref 0.55–1.02)
DIFFERENTIAL METHOD BLD: ABNORMAL
EOSINOPHIL # BLD: 0.1 K/UL (ref 0–0.4)
EOSINOPHIL NFR BLD: 3 % (ref 0–7)
ERYTHROCYTE [DISTWIDTH] IN BLOOD BY AUTOMATED COUNT: 16.7 % (ref 11.5–14.5)
GLOBULIN SER CALC-MCNC: 3.9 G/DL (ref 2–4)
GLUCOSE BLD STRIP.AUTO-MCNC: 139 MG/DL (ref 65–117)
GLUCOSE BLD STRIP.AUTO-MCNC: 244 MG/DL (ref 65–117)
GLUCOSE BLD STRIP.AUTO-MCNC: 255 MG/DL (ref 65–117)
GLUCOSE BLD STRIP.AUTO-MCNC: 275 MG/DL (ref 65–117)
GLUCOSE SERPL-MCNC: 162 MG/DL (ref 65–100)
HCT VFR BLD AUTO: 35.7 % (ref 35–47)
HGB BLD-MCNC: 11.5 G/DL (ref 11.5–16)
IMM GRANULOCYTES # BLD AUTO: 0 K/UL (ref 0–0.04)
IMM GRANULOCYTES NFR BLD AUTO: 0 % (ref 0–0.5)
LYMPHOCYTES # BLD: 0.8 K/UL (ref 0.8–3.5)
LYMPHOCYTES NFR BLD: 16 % (ref 12–49)
MAGNESIUM SERPL-MCNC: 2.2 MG/DL (ref 1.6–2.4)
MCH RBC QN AUTO: 31.2 PG (ref 26–34)
MCHC RBC AUTO-ENTMCNC: 32.2 G/DL (ref 30–36.5)
MCV RBC AUTO: 96.7 FL (ref 80–99)
MONOCYTES # BLD: 0.5 K/UL (ref 0–1)
MONOCYTES NFR BLD: 11 % (ref 5–13)
NEUTS SEG # BLD: 3.5 K/UL (ref 1.8–8)
NEUTS SEG NFR BLD: 70 % (ref 32–75)
NRBC # BLD: 0 K/UL (ref 0–0.01)
NRBC BLD-RTO: 0 PER 100 WBC
NT PRO BNP: ABNORMAL PG/ML
PHOSPHATE SERPL-MCNC: 3.7 MG/DL (ref 2.6–4.7)
PLATELET # BLD AUTO: 156 K/UL (ref 150–400)
PMV BLD AUTO: 11.1 FL (ref 8.9–12.9)
POTASSIUM SERPL-SCNC: 3.5 MMOL/L (ref 3.5–5.1)
PROT SERPL-MCNC: 6.7 G/DL (ref 6.4–8.2)
RBC # BLD AUTO: 3.69 M/UL (ref 3.8–5.2)
RBC MORPH BLD: ABNORMAL
SERVICE CMNT-IMP: ABNORMAL
SODIUM SERPL-SCNC: 139 MMOL/L (ref 136–145)
SPECIMEN HOLD: NORMAL
WBC # BLD AUTO: 4.9 K/UL (ref 3.6–11)

## 2024-06-28 PROCEDURE — 6370000000 HC RX 637 (ALT 250 FOR IP): Performed by: PHYSICIAN ASSISTANT

## 2024-06-28 PROCEDURE — 85025 COMPLETE CBC W/AUTO DIFF WBC: CPT

## 2024-06-28 PROCEDURE — 2580000003 HC RX 258: Performed by: INTERNAL MEDICINE

## 2024-06-28 PROCEDURE — 84100 ASSAY OF PHOSPHORUS: CPT

## 2024-06-28 PROCEDURE — 6370000000 HC RX 637 (ALT 250 FOR IP): Performed by: INTERNAL MEDICINE

## 2024-06-28 PROCEDURE — 2060000000 HC ICU INTERMEDIATE R&B

## 2024-06-28 PROCEDURE — 6370000000 HC RX 637 (ALT 250 FOR IP): Performed by: NURSE PRACTITIONER

## 2024-06-28 PROCEDURE — 36415 COLL VENOUS BLD VENIPUNCTURE: CPT

## 2024-06-28 PROCEDURE — 83880 ASSAY OF NATRIURETIC PEPTIDE: CPT

## 2024-06-28 PROCEDURE — 80053 COMPREHEN METABOLIC PANEL: CPT

## 2024-06-28 PROCEDURE — 82962 GLUCOSE BLOOD TEST: CPT

## 2024-06-28 PROCEDURE — 99232 SBSQ HOSP IP/OBS MODERATE 35: CPT | Performed by: SPECIALIST

## 2024-06-28 PROCEDURE — 99233 SBSQ HOSP IP/OBS HIGH 50: CPT | Performed by: INTERNAL MEDICINE

## 2024-06-28 PROCEDURE — 6360000002 HC RX W HCPCS: Performed by: INTERNAL MEDICINE

## 2024-06-28 PROCEDURE — 83735 ASSAY OF MAGNESIUM: CPT

## 2024-06-28 RX ORDER — POTASSIUM CHLORIDE 750 MG/1
40 TABLET, FILM COATED, EXTENDED RELEASE ORAL ONCE
Status: COMPLETED | OUTPATIENT
Start: 2024-06-28 | End: 2024-06-28

## 2024-06-28 RX ORDER — METOLAZONE 2.5 MG/1
5 TABLET ORAL ONCE
Status: COMPLETED | OUTPATIENT
Start: 2024-06-28 | End: 2024-06-28

## 2024-06-28 RX ORDER — HYDRALAZINE HYDROCHLORIDE 50 MG/1
50 TABLET, FILM COATED ORAL EVERY 8 HOURS SCHEDULED
Status: DISCONTINUED | OUTPATIENT
Start: 2024-06-28 | End: 2024-06-29

## 2024-06-28 RX ADMIN — SODIUM CHLORIDE, PRESERVATIVE FREE 10 ML: 5 INJECTION INTRAVENOUS at 14:27

## 2024-06-28 RX ADMIN — ISOSORBIDE DINITRATE 10 MG: 10 TABLET ORAL at 10:23

## 2024-06-28 RX ADMIN — HYDRALAZINE HYDROCHLORIDE 25 MG: 25 TABLET ORAL at 06:12

## 2024-06-28 RX ADMIN — HYDRALAZINE HYDROCHLORIDE 50 MG: 50 TABLET ORAL at 14:48

## 2024-06-28 RX ADMIN — BUMETANIDE 2 MG: 0.25 INJECTION INTRAMUSCULAR; INTRAVENOUS at 21:24

## 2024-06-28 RX ADMIN — INSULIN LISPRO 4 UNITS: 100 INJECTION, SOLUTION INTRAVENOUS; SUBCUTANEOUS at 18:19

## 2024-06-28 RX ADMIN — ISOSORBIDE DINITRATE 10 MG: 10 TABLET ORAL at 14:48

## 2024-06-28 RX ADMIN — CARVEDILOL 12.5 MG: 12.5 TABLET, FILM COATED ORAL at 10:23

## 2024-06-28 RX ADMIN — CARVEDILOL 12.5 MG: 12.5 TABLET, FILM COATED ORAL at 18:19

## 2024-06-28 RX ADMIN — INSULIN LISPRO 4 UNITS: 100 INJECTION, SOLUTION INTRAVENOUS; SUBCUTANEOUS at 12:46

## 2024-06-28 RX ADMIN — BACITRACIN 1 EACH: 500 OINTMENT TOPICAL at 10:24

## 2024-06-28 RX ADMIN — SODIUM CHLORIDE, PRESERVATIVE FREE 10 ML: 5 INJECTION INTRAVENOUS at 21:31

## 2024-06-28 RX ADMIN — ACETAMINOPHEN 650 MG: 325 TABLET ORAL at 21:23

## 2024-06-28 RX ADMIN — MIRTAZAPINE 15 MG: 15 TABLET, FILM COATED ORAL at 21:28

## 2024-06-28 RX ADMIN — ACETAMINOPHEN 650 MG: 325 TABLET ORAL at 14:48

## 2024-06-28 RX ADMIN — ACETAMINOPHEN 650 MG: 325 TABLET ORAL at 06:12

## 2024-06-28 RX ADMIN — METOLAZONE 5 MG: 2.5 TABLET ORAL at 10:22

## 2024-06-28 RX ADMIN — BUMETANIDE 2 MG: 0.25 INJECTION INTRAMUSCULAR; INTRAVENOUS at 14:48

## 2024-06-28 RX ADMIN — HYDRALAZINE HYDROCHLORIDE 50 MG: 50 TABLET ORAL at 21:28

## 2024-06-28 RX ADMIN — POTASSIUM CHLORIDE 40 MEQ: 750 TABLET, FILM COATED, EXTENDED RELEASE ORAL at 10:23

## 2024-06-28 RX ADMIN — ATORVASTATIN CALCIUM 10 MG: 10 TABLET, FILM COATED ORAL at 10:23

## 2024-06-28 RX ADMIN — BUMETANIDE 2 MG: 0.25 INJECTION INTRAMUSCULAR; INTRAVENOUS at 10:23

## 2024-06-28 RX ADMIN — ASPIRIN 81 MG: 81 TABLET, COATED ORAL at 10:23

## 2024-06-28 ASSESSMENT — PAIN SCALES - GENERAL
PAINLEVEL_OUTOF10: 9
PAINLEVEL_OUTOF10: 5
PAINLEVEL_OUTOF10: 9
PAINLEVEL_OUTOF10: 9

## 2024-06-28 ASSESSMENT — PAIN DESCRIPTION - ORIENTATION
ORIENTATION: RIGHT;LEFT;LOWER
ORIENTATION: LEFT;RIGHT;LOWER
ORIENTATION: LEFT;RIGHT

## 2024-06-28 ASSESSMENT — PAIN DESCRIPTION - LOCATION
LOCATION: LEG
LOCATION: LEG
LOCATION: LEG;HAND

## 2024-06-28 ASSESSMENT — PAIN DESCRIPTION - DESCRIPTORS: DESCRIPTORS: DISCOMFORT

## 2024-06-28 NOTE — PLAN OF CARE
Problem: Discharge Planning  Goal: Discharge to home or other facility with appropriate resources  Outcome: Progressing  Flowsheets (Taken 6/27/2024 2000)  Discharge to home or other facility with appropriate resources: Identify barriers to discharge with patient and caregiver     Problem: Safety - Adult  Goal: Free from fall injury  Outcome: Progressing     Problem: Chronic Conditions and Co-morbidities  Goal: Patient's chronic conditions and co-morbidity symptoms are monitored and maintained or improved  Outcome: Progressing  Flowsheets (Taken 6/27/2024 2000)  Care Plan - Patient's Chronic Conditions and Co-Morbidity Symptoms are Monitored and Maintained or Improved: Monitor and assess patient's chronic conditions and comorbid symptoms for stability, deterioration, or improvement     Problem: Pain  Goal: Verbalizes/displays adequate comfort level or baseline comfort level  Outcome: Progressing

## 2024-06-29 LAB
ALBUMIN SERPL-MCNC: 2.8 G/DL (ref 3.5–5)
ALBUMIN/GLOB SERPL: 0.7 (ref 1.1–2.2)
ALP SERPL-CCNC: 98 U/L (ref 45–117)
ALT SERPL-CCNC: 11 U/L (ref 12–78)
ANION GAP SERPL CALC-SCNC: 5 MMOL/L (ref 5–15)
AST SERPL-CCNC: 9 U/L (ref 15–37)
BASOPHILS # BLD: 0.1 K/UL (ref 0–0.1)
BASOPHILS NFR BLD: 1 % (ref 0–1)
BILIRUB SERPL-MCNC: 0.6 MG/DL (ref 0.2–1)
BUN SERPL-MCNC: 66 MG/DL (ref 6–20)
BUN/CREAT SERPL: 23 (ref 12–20)
CALCIUM SERPL-MCNC: 9 MG/DL (ref 8.5–10.1)
CHLORIDE SERPL-SCNC: 105 MMOL/L (ref 97–108)
CO2 SERPL-SCNC: 27 MMOL/L (ref 21–32)
CREAT SERPL-MCNC: 2.81 MG/DL (ref 0.55–1.02)
DIFFERENTIAL METHOD BLD: ABNORMAL
EOSINOPHIL # BLD: 0.2 K/UL (ref 0–0.4)
EOSINOPHIL NFR BLD: 3 % (ref 0–7)
ERYTHROCYTE [DISTWIDTH] IN BLOOD BY AUTOMATED COUNT: 16.5 % (ref 11.5–14.5)
GLOBULIN SER CALC-MCNC: 4.1 G/DL (ref 2–4)
GLUCOSE BLD STRIP.AUTO-MCNC: 165 MG/DL (ref 65–117)
GLUCOSE BLD STRIP.AUTO-MCNC: 280 MG/DL (ref 65–117)
GLUCOSE BLD STRIP.AUTO-MCNC: 290 MG/DL (ref 65–117)
GLUCOSE BLD STRIP.AUTO-MCNC: 323 MG/DL (ref 65–117)
GLUCOSE SERPL-MCNC: 182 MG/DL (ref 65–100)
HCT VFR BLD AUTO: 36 % (ref 35–47)
HGB BLD-MCNC: 11.3 G/DL (ref 11.5–16)
IMM GRANULOCYTES # BLD AUTO: 0.1 K/UL (ref 0–0.04)
IMM GRANULOCYTES NFR BLD AUTO: 1 % (ref 0–0.5)
LYMPHOCYTES # BLD: 0.7 K/UL (ref 0.8–3.5)
LYMPHOCYTES NFR BLD: 13 % (ref 12–49)
MAGNESIUM SERPL-MCNC: 2 MG/DL (ref 1.6–2.4)
MCH RBC QN AUTO: 30.7 PG (ref 26–34)
MCHC RBC AUTO-ENTMCNC: 31.4 G/DL (ref 30–36.5)
MCV RBC AUTO: 97.8 FL (ref 80–99)
MONOCYTES # BLD: 0.6 K/UL (ref 0–1)
MONOCYTES NFR BLD: 11 % (ref 5–13)
NEUTS SEG # BLD: 3.8 K/UL (ref 1.8–8)
NEUTS SEG NFR BLD: 71 % (ref 32–75)
NRBC # BLD: 0 K/UL (ref 0–0.01)
NRBC BLD-RTO: 0 PER 100 WBC
NT PRO BNP: ABNORMAL PG/ML
PLATELET # BLD AUTO: 165 K/UL (ref 150–400)
PMV BLD AUTO: 11.1 FL (ref 8.9–12.9)
POTASSIUM SERPL-SCNC: 3.7 MMOL/L (ref 3.5–5.1)
PROT SERPL-MCNC: 6.9 G/DL (ref 6.4–8.2)
RBC # BLD AUTO: 3.68 M/UL (ref 3.8–5.2)
RBC MORPH BLD: ABNORMAL
RBC MORPH BLD: ABNORMAL
SERVICE CMNT-IMP: ABNORMAL
SODIUM SERPL-SCNC: 137 MMOL/L (ref 136–145)
WBC # BLD AUTO: 5.5 K/UL (ref 3.6–11)

## 2024-06-29 PROCEDURE — 6370000000 HC RX 637 (ALT 250 FOR IP): Performed by: INTERNAL MEDICINE

## 2024-06-29 PROCEDURE — 83735 ASSAY OF MAGNESIUM: CPT

## 2024-06-29 PROCEDURE — 99232 SBSQ HOSP IP/OBS MODERATE 35: CPT | Performed by: NURSE PRACTITIONER

## 2024-06-29 PROCEDURE — 6370000000 HC RX 637 (ALT 250 FOR IP): Performed by: NURSE PRACTITIONER

## 2024-06-29 PROCEDURE — 80053 COMPREHEN METABOLIC PANEL: CPT

## 2024-06-29 PROCEDURE — 6360000002 HC RX W HCPCS: Performed by: INTERNAL MEDICINE

## 2024-06-29 PROCEDURE — 2580000003 HC RX 258: Performed by: INTERNAL MEDICINE

## 2024-06-29 PROCEDURE — 83880 ASSAY OF NATRIURETIC PEPTIDE: CPT

## 2024-06-29 PROCEDURE — 6370000000 HC RX 637 (ALT 250 FOR IP): Performed by: PHYSICIAN ASSISTANT

## 2024-06-29 PROCEDURE — 82962 GLUCOSE BLOOD TEST: CPT

## 2024-06-29 PROCEDURE — 36415 COLL VENOUS BLD VENIPUNCTURE: CPT

## 2024-06-29 PROCEDURE — 85025 COMPLETE CBC W/AUTO DIFF WBC: CPT

## 2024-06-29 PROCEDURE — 2060000000 HC ICU INTERMEDIATE R&B

## 2024-06-29 RX ORDER — POTASSIUM CHLORIDE 750 MG/1
40 TABLET, FILM COATED, EXTENDED RELEASE ORAL ONCE
Status: COMPLETED | OUTPATIENT
Start: 2024-06-29 | End: 2024-06-29

## 2024-06-29 RX ORDER — SODIUM CHLORIDE 0.9 % (FLUSH) 0.9 %
5-40 SYRINGE (ML) INJECTION EVERY 12 HOURS SCHEDULED
Status: CANCELLED | OUTPATIENT
Start: 2024-06-29

## 2024-06-29 RX ORDER — SODIUM CHLORIDE 9 MG/ML
INJECTION, SOLUTION INTRAVENOUS CONTINUOUS
Status: CANCELLED | OUTPATIENT
Start: 2024-06-29

## 2024-06-29 RX ADMIN — HYDRALAZINE HYDROCHLORIDE 75 MG: 50 TABLET ORAL at 14:45

## 2024-06-29 RX ADMIN — BUMETANIDE 2 MG: 0.25 INJECTION INTRAMUSCULAR; INTRAVENOUS at 14:45

## 2024-06-29 RX ADMIN — ISOSORBIDE DINITRATE 10 MG: 10 TABLET ORAL at 09:31

## 2024-06-29 RX ADMIN — POTASSIUM CHLORIDE 40 MEQ: 750 TABLET, FILM COATED, EXTENDED RELEASE ORAL at 11:53

## 2024-06-29 RX ADMIN — CARVEDILOL 12.5 MG: 12.5 TABLET, FILM COATED ORAL at 16:58

## 2024-06-29 RX ADMIN — ACETAMINOPHEN 650 MG: 325 TABLET ORAL at 06:59

## 2024-06-29 RX ADMIN — SODIUM CHLORIDE, PRESERVATIVE FREE 10 ML: 5 INJECTION INTRAVENOUS at 21:02

## 2024-06-29 RX ADMIN — ATORVASTATIN CALCIUM 10 MG: 10 TABLET, FILM COATED ORAL at 09:31

## 2024-06-29 RX ADMIN — ACETAMINOPHEN 650 MG: 325 TABLET ORAL at 16:58

## 2024-06-29 RX ADMIN — HYDRALAZINE HYDROCHLORIDE 50 MG: 50 TABLET ORAL at 05:01

## 2024-06-29 RX ADMIN — BUMETANIDE 2 MG: 0.25 INJECTION INTRAMUSCULAR; INTRAVENOUS at 21:03

## 2024-06-29 RX ADMIN — BUMETANIDE 2 MG: 0.25 INJECTION INTRAMUSCULAR; INTRAVENOUS at 10:07

## 2024-06-29 RX ADMIN — INSULIN LISPRO 4 UNITS: 100 INJECTION, SOLUTION INTRAVENOUS; SUBCUTANEOUS at 11:53

## 2024-06-29 RX ADMIN — ISOSORBIDE DINITRATE 10 MG: 10 TABLET ORAL at 14:46

## 2024-06-29 RX ADMIN — HYDRALAZINE HYDROCHLORIDE 75 MG: 50 TABLET ORAL at 21:03

## 2024-06-29 RX ADMIN — ASPIRIN 81 MG: 81 TABLET, COATED ORAL at 09:30

## 2024-06-29 RX ADMIN — SODIUM CHLORIDE, PRESERVATIVE FREE 10 ML: 5 INJECTION INTRAVENOUS at 09:31

## 2024-06-29 RX ADMIN — CARVEDILOL 12.5 MG: 12.5 TABLET, FILM COATED ORAL at 09:31

## 2024-06-29 RX ADMIN — BACITRACIN 1 EACH: 500 OINTMENT TOPICAL at 09:34

## 2024-06-29 RX ADMIN — INSULIN LISPRO 6 UNITS: 100 INJECTION, SOLUTION INTRAVENOUS; SUBCUTANEOUS at 16:58

## 2024-06-29 RX ADMIN — MIRTAZAPINE 15 MG: 15 TABLET, FILM COATED ORAL at 21:03

## 2024-06-29 ASSESSMENT — PAIN DESCRIPTION - DESCRIPTORS: DESCRIPTORS: DISCOMFORT;ACHING

## 2024-06-29 ASSESSMENT — PAIN DESCRIPTION - ORIENTATION: ORIENTATION: RIGHT;LEFT

## 2024-06-29 ASSESSMENT — PAIN DESCRIPTION - LOCATION: LOCATION: LEG

## 2024-06-29 ASSESSMENT — PAIN SCALES - GENERAL
PAINLEVEL_OUTOF10: 0
PAINLEVEL_OUTOF10: 0
PAINLEVEL_OUTOF10: 9

## 2024-06-29 NOTE — PLAN OF CARE
Problem: Discharge Planning  Goal: Discharge to home or other facility with appropriate resources  Outcome: Progressing     Problem: Safety - Adult  Goal: Free from fall injury  Outcome: Progressing  Flowsheets (Taken 6/29/2024 0000)  Free From Fall Injury: Instruct family/caregiver on patient safety     Problem: Chronic Conditions and Co-morbidities  Goal: Patient's chronic conditions and co-morbidity symptoms are monitored and maintained or improved  Outcome: Progressing     Problem: Pain  Goal: Verbalizes/displays adequate comfort level or baseline comfort level  Outcome: Progressing     Problem: Skin/Tissue Integrity  Goal: Absence of new skin breakdown  Description: 1.  Monitor for areas of redness and/or skin breakdown  2.  Assess vascular access sites hourly  3.  Every 4-6 hours minimum:  Change oxygen saturation probe site  4.  Every 4-6 hours:  If on nasal continuous positive airway pressure, respiratory therapy assess nares and determine need for appliance change or resting period.  Outcome: Progressing

## 2024-06-30 LAB
ALBUMIN SERPL-MCNC: 2.9 G/DL (ref 3.5–5)
ALBUMIN/GLOB SERPL: 0.7 (ref 1.1–2.2)
ALP SERPL-CCNC: 114 U/L (ref 45–117)
ALT SERPL-CCNC: 12 U/L (ref 12–78)
ANION GAP SERPL CALC-SCNC: 5 MMOL/L (ref 5–15)
AST SERPL-CCNC: 12 U/L (ref 15–37)
BASOPHILS # BLD: 0 K/UL (ref 0–0.1)
BASOPHILS NFR BLD: 1 % (ref 0–1)
BILIRUB SERPL-MCNC: 0.5 MG/DL (ref 0.2–1)
BUN SERPL-MCNC: 72 MG/DL (ref 6–20)
BUN/CREAT SERPL: 24 (ref 12–20)
CALCIUM SERPL-MCNC: 8.7 MG/DL (ref 8.5–10.1)
CHLORIDE SERPL-SCNC: 104 MMOL/L (ref 97–108)
CO2 SERPL-SCNC: 29 MMOL/L (ref 21–32)
CREAT SERPL-MCNC: 3.02 MG/DL (ref 0.55–1.02)
DIFFERENTIAL METHOD BLD: ABNORMAL
EOSINOPHIL # BLD: 0.1 K/UL (ref 0–0.4)
EOSINOPHIL NFR BLD: 2 % (ref 0–7)
ERYTHROCYTE [DISTWIDTH] IN BLOOD BY AUTOMATED COUNT: 16.4 % (ref 11.5–14.5)
GLOBULIN SER CALC-MCNC: 3.9 G/DL (ref 2–4)
GLUCOSE BLD STRIP.AUTO-MCNC: 220 MG/DL (ref 65–117)
GLUCOSE BLD STRIP.AUTO-MCNC: 287 MG/DL (ref 65–117)
GLUCOSE BLD STRIP.AUTO-MCNC: 296 MG/DL (ref 65–117)
GLUCOSE BLD STRIP.AUTO-MCNC: 318 MG/DL (ref 65–117)
GLUCOSE SERPL-MCNC: 271 MG/DL (ref 65–100)
HCT VFR BLD AUTO: 36.7 % (ref 35–47)
HGB BLD-MCNC: 11.2 G/DL (ref 11.5–16)
IMM GRANULOCYTES # BLD AUTO: 0 K/UL (ref 0–0.04)
IMM GRANULOCYTES NFR BLD AUTO: 0 % (ref 0–0.5)
LYMPHOCYTES # BLD: 0.8 K/UL (ref 0.8–3.5)
LYMPHOCYTES NFR BLD: 14 % (ref 12–49)
MAGNESIUM SERPL-MCNC: 1.9 MG/DL (ref 1.6–2.4)
MCH RBC QN AUTO: 30.6 PG (ref 26–34)
MCHC RBC AUTO-ENTMCNC: 30.5 G/DL (ref 30–36.5)
MCV RBC AUTO: 100.3 FL (ref 80–99)
MONOCYTES # BLD: 0.6 K/UL (ref 0–1)
MONOCYTES NFR BLD: 10 % (ref 5–13)
NEUTS SEG # BLD: 4.4 K/UL (ref 1.8–8)
NEUTS SEG NFR BLD: 73 % (ref 32–75)
NRBC # BLD: 0 K/UL (ref 0–0.01)
NRBC BLD-RTO: 0 PER 100 WBC
NT PRO BNP: ABNORMAL PG/ML
PLATELET # BLD AUTO: 171 K/UL (ref 150–400)
PMV BLD AUTO: 10.9 FL (ref 8.9–12.9)
POTASSIUM SERPL-SCNC: 4.1 MMOL/L (ref 3.5–5.1)
PROT SERPL-MCNC: 6.8 G/DL (ref 6.4–8.2)
RBC # BLD AUTO: 3.66 M/UL (ref 3.8–5.2)
SERVICE CMNT-IMP: ABNORMAL
SODIUM SERPL-SCNC: 138 MMOL/L (ref 136–145)
WBC # BLD AUTO: 6 K/UL (ref 3.6–11)

## 2024-06-30 PROCEDURE — 83735 ASSAY OF MAGNESIUM: CPT

## 2024-06-30 PROCEDURE — 6360000002 HC RX W HCPCS: Performed by: NURSE PRACTITIONER

## 2024-06-30 PROCEDURE — 82962 GLUCOSE BLOOD TEST: CPT

## 2024-06-30 PROCEDURE — 2060000000 HC ICU INTERMEDIATE R&B

## 2024-06-30 PROCEDURE — 6370000000 HC RX 637 (ALT 250 FOR IP): Performed by: PHYSICIAN ASSISTANT

## 2024-06-30 PROCEDURE — 80053 COMPREHEN METABOLIC PANEL: CPT

## 2024-06-30 PROCEDURE — 6360000002 HC RX W HCPCS: Performed by: INTERNAL MEDICINE

## 2024-06-30 PROCEDURE — 6370000000 HC RX 637 (ALT 250 FOR IP): Performed by: INTERNAL MEDICINE

## 2024-06-30 PROCEDURE — 83880 ASSAY OF NATRIURETIC PEPTIDE: CPT

## 2024-06-30 PROCEDURE — 2580000003 HC RX 258: Performed by: INTERNAL MEDICINE

## 2024-06-30 PROCEDURE — 6370000000 HC RX 637 (ALT 250 FOR IP): Performed by: NURSE PRACTITIONER

## 2024-06-30 PROCEDURE — 85025 COMPLETE CBC W/AUTO DIFF WBC: CPT

## 2024-06-30 PROCEDURE — 99232 SBSQ HOSP IP/OBS MODERATE 35: CPT | Performed by: NURSE PRACTITIONER

## 2024-06-30 PROCEDURE — 36415 COLL VENOUS BLD VENIPUNCTURE: CPT

## 2024-06-30 RX ORDER — ISOSORBIDE DINITRATE 10 MG/1
20 TABLET ORAL 2 TIMES DAILY
Status: DISCONTINUED | OUTPATIENT
Start: 2024-06-30 | End: 2024-07-02

## 2024-06-30 RX ORDER — BUMETANIDE 0.25 MG/ML
2 INJECTION INTRAMUSCULAR; INTRAVENOUS 2 TIMES DAILY
Status: DISCONTINUED | OUTPATIENT
Start: 2024-06-30 | End: 2024-07-01

## 2024-06-30 RX ORDER — HYDRALAZINE HYDROCHLORIDE 50 MG/1
100 TABLET, FILM COATED ORAL EVERY 8 HOURS SCHEDULED
Status: DISCONTINUED | OUTPATIENT
Start: 2024-06-30 | End: 2024-07-03 | Stop reason: HOSPADM

## 2024-06-30 RX ADMIN — ISOSORBIDE DINITRATE 10 MG: 10 TABLET ORAL at 08:21

## 2024-06-30 RX ADMIN — BUMETANIDE 2 MG: 0.25 INJECTION INTRAMUSCULAR; INTRAVENOUS at 08:21

## 2024-06-30 RX ADMIN — HYDRALAZINE HYDROCHLORIDE 75 MG: 50 TABLET ORAL at 06:35

## 2024-06-30 RX ADMIN — ACETAMINOPHEN 650 MG: 325 TABLET ORAL at 07:40

## 2024-06-30 RX ADMIN — HYDRALAZINE HYDROCHLORIDE 100 MG: 50 TABLET ORAL at 16:28

## 2024-06-30 RX ADMIN — ACETAMINOPHEN 650 MG: 325 TABLET ORAL at 00:10

## 2024-06-30 RX ADMIN — ACETAMINOPHEN 650 MG: 325 TABLET ORAL at 16:42

## 2024-06-30 RX ADMIN — INSULIN LISPRO 4 UNITS: 100 INJECTION, SOLUTION INTRAVENOUS; SUBCUTANEOUS at 18:04

## 2024-06-30 RX ADMIN — BUMETANIDE 2 MG: 0.25 INJECTION INTRAMUSCULAR; INTRAVENOUS at 18:03

## 2024-06-30 RX ADMIN — BACITRACIN 1 EACH: 500 OINTMENT TOPICAL at 08:22

## 2024-06-30 RX ADMIN — SODIUM CHLORIDE, PRESERVATIVE FREE 10 ML: 5 INJECTION INTRAVENOUS at 08:22

## 2024-06-30 RX ADMIN — CARVEDILOL 12.5 MG: 12.5 TABLET, FILM COATED ORAL at 08:21

## 2024-06-30 RX ADMIN — CARVEDILOL 12.5 MG: 12.5 TABLET, FILM COATED ORAL at 18:03

## 2024-06-30 RX ADMIN — INSULIN LISPRO 6 UNITS: 100 INJECTION, SOLUTION INTRAVENOUS; SUBCUTANEOUS at 13:06

## 2024-06-30 RX ADMIN — ISOSORBIDE DINITRATE 20 MG: 10 TABLET ORAL at 16:28

## 2024-06-30 RX ADMIN — ACETAMINOPHEN 650 MG: 325 TABLET ORAL at 22:39

## 2024-06-30 RX ADMIN — INSULIN LISPRO 2 UNITS: 100 INJECTION, SOLUTION INTRAVENOUS; SUBCUTANEOUS at 08:21

## 2024-06-30 RX ADMIN — HYDRALAZINE HYDROCHLORIDE 100 MG: 50 TABLET ORAL at 22:39

## 2024-06-30 RX ADMIN — ATORVASTATIN CALCIUM 10 MG: 10 TABLET, FILM COATED ORAL at 08:21

## 2024-06-30 RX ADMIN — MIRTAZAPINE 15 MG: 15 TABLET, FILM COATED ORAL at 20:36

## 2024-06-30 RX ADMIN — ASPIRIN 81 MG: 81 TABLET, COATED ORAL at 08:21

## 2024-06-30 RX ADMIN — SODIUM CHLORIDE, PRESERVATIVE FREE 10 ML: 5 INJECTION INTRAVENOUS at 20:38

## 2024-06-30 ASSESSMENT — PAIN DESCRIPTION - LOCATION
LOCATION: LEG

## 2024-06-30 ASSESSMENT — PAIN DESCRIPTION - ORIENTATION
ORIENTATION: RIGHT;LEFT
ORIENTATION: LEFT;RIGHT
ORIENTATION: RIGHT;LEFT
ORIENTATION: RIGHT;LEFT

## 2024-06-30 ASSESSMENT — PAIN SCALES - GENERAL
PAINLEVEL_OUTOF10: 3
PAINLEVEL_OUTOF10: 5
PAINLEVEL_OUTOF10: 3
PAINLEVEL_OUTOF10: 2
PAINLEVEL_OUTOF10: 3
PAINLEVEL_OUTOF10: 4
PAINLEVEL_OUTOF10: 2

## 2024-06-30 ASSESSMENT — PAIN DESCRIPTION - DESCRIPTORS
DESCRIPTORS: ACHING

## 2024-06-30 NOTE — CARE COORDINATION
Transition of Care Plan:    RUR: 23% - high  Prior Level of Functioning: independent  Disposition: home with family  Follow up appointments: PCP; Cardiology  DME needed: none  Transportation at discharge: family  IM/IMM Medicare/ letter given: needs 2nd IMM  Caregiver Contact: daughter - Corazon Perez- p: [phone]  Discharge Caregiver contacted prior to discharge? no  Care Conference needed? no  Barriers to discharge: medical    Chart reviewed. Patient is at her functional independent baseline. No skilled therapy recommended for discharge. Patient is to have [facility] tomorrow by Cardiology. CM will continue to monitor for disposition needs.    Baseline - patient is independent and resides with her son in a 3rd story condo (21 steps to ambulate). She has a hx of HH services with [facility].     No need indicated for HH services at this time.    Disposition Plan:  Home with family  Transportation: Family    Jack Flores, MPH  Care Manager [facility]  Available via [employer]

## 2024-06-30 NOTE — PLAN OF CARE
Problem: Discharge Planning  Goal: Discharge to home or other facility with appropriate resources  Outcome: Progressing  Flowsheets (Taken 6/30/2024 0810)  Discharge to home or other facility with appropriate resources: Identify barriers to discharge with patient and caregiver     Problem: Safety - Adult  Goal: Free from fall injury  Outcome: Progressing     Problem: Chronic Conditions and Co-morbidities  Goal: Patient's chronic conditions and co-morbidity symptoms are monitored and maintained or improved  Outcome: Progressing  Flowsheets (Taken 6/30/2024 0810)  Care Plan - Patient's Chronic Conditions and Co-Morbidity Symptoms are Monitored and Maintained or Improved: Monitor and assess patient's chronic conditions and comorbid symptoms for stability, deterioration, or improvement     Problem: Pain  Goal: Verbalizes/displays adequate comfort level or baseline comfort level  Outcome: Progressing     Problem: Skin/Tissue Integrity  Goal: Absence of new skin breakdown  Description: 1.  Monitor for areas of redness and/or skin breakdown  2.  Assess vascular access sites hourly  3.  Every 4-6 hours minimum:  Change oxygen saturation probe site  4.  Every 4-6 hours:  If on nasal continuous positive airway pressure, respiratory therapy assess nares and determine need for appliance change or resting period.  Outcome: Progressing

## 2024-07-01 LAB
ALBUMIN SERPL-MCNC: 2.9 G/DL (ref 3.5–5)
ALBUMIN/GLOB SERPL: 0.8 (ref 1.1–2.2)
ALP SERPL-CCNC: 97 U/L (ref 45–117)
ALT SERPL-CCNC: 13 U/L (ref 12–78)
ANION GAP SERPL CALC-SCNC: 8 MMOL/L (ref 5–15)
AST SERPL-CCNC: 14 U/L (ref 15–37)
BASOPHILS # BLD: 0 K/UL (ref 0–0.1)
BASOPHILS NFR BLD: 1 % (ref 0–1)
BILIRUB SERPL-MCNC: 0.6 MG/DL (ref 0.2–1)
BUN SERPL-MCNC: 78 MG/DL (ref 6–20)
BUN/CREAT SERPL: 25 (ref 12–20)
CALCIUM SERPL-MCNC: 9 MG/DL (ref 8.5–10.1)
CHLORIDE SERPL-SCNC: 102 MMOL/L (ref 97–108)
CO2 SERPL-SCNC: 29 MMOL/L (ref 21–32)
CREAT SERPL-MCNC: 3.15 MG/DL (ref 0.55–1.02)
DIFFERENTIAL METHOD BLD: ABNORMAL
ECHO BSA: 1.59 M2
EOSINOPHIL # BLD: 0.2 K/UL (ref 0–0.4)
EOSINOPHIL NFR BLD: 3 % (ref 0–7)
ERYTHROCYTE [DISTWIDTH] IN BLOOD BY AUTOMATED COUNT: 16 % (ref 11.5–14.5)
GLOBULIN SER CALC-MCNC: 3.8 G/DL (ref 2–4)
GLUCOSE BLD STRIP.AUTO-MCNC: 192 MG/DL (ref 65–117)
GLUCOSE BLD STRIP.AUTO-MCNC: 313 MG/DL (ref 65–117)
GLUCOSE BLD STRIP.AUTO-MCNC: 345 MG/DL (ref 65–117)
GLUCOSE SERPL-MCNC: 239 MG/DL (ref 65–100)
HCT VFR BLD AUTO: 37.3 % (ref 35–47)
HGB BLD-MCNC: 11.7 G/DL (ref 11.5–16)
IMM GRANULOCYTES # BLD AUTO: 0 K/UL (ref 0–0.04)
IMM GRANULOCYTES NFR BLD AUTO: 0 % (ref 0–0.5)
LYMPHOCYTES # BLD: 1 K/UL (ref 0.8–3.5)
LYMPHOCYTES NFR BLD: 17 % (ref 12–49)
MAGNESIUM SERPL-MCNC: 2 MG/DL (ref 1.6–2.4)
MCH RBC QN AUTO: 30.8 PG (ref 26–34)
MCHC RBC AUTO-ENTMCNC: 31.4 G/DL (ref 30–36.5)
MCV RBC AUTO: 98.2 FL (ref 80–99)
MONOCYTES # BLD: 0.6 K/UL (ref 0–1)
MONOCYTES NFR BLD: 10 % (ref 5–13)
NEUTS SEG # BLD: 4.1 K/UL (ref 1.8–8)
NEUTS SEG NFR BLD: 69 % (ref 32–75)
NRBC # BLD: 0 K/UL (ref 0–0.01)
NRBC BLD-RTO: 0 PER 100 WBC
NT PRO BNP: ABNORMAL PG/ML
PLATELET # BLD AUTO: 181 K/UL (ref 150–400)
PMV BLD AUTO: 10.8 FL (ref 8.9–12.9)
POTASSIUM SERPL-SCNC: 3.8 MMOL/L (ref 3.5–5.1)
PROT SERPL-MCNC: 6.7 G/DL (ref 6.4–8.2)
RBC # BLD AUTO: 3.8 M/UL (ref 3.8–5.2)
SERVICE CMNT-IMP: ABNORMAL
SODIUM SERPL-SCNC: 139 MMOL/L (ref 136–145)
WBC # BLD AUTO: 5.9 K/UL (ref 3.6–11)

## 2024-07-01 PROCEDURE — 80053 COMPREHEN METABOLIC PANEL: CPT

## 2024-07-01 PROCEDURE — 36415 COLL VENOUS BLD VENIPUNCTURE: CPT

## 2024-07-01 PROCEDURE — 83735 ASSAY OF MAGNESIUM: CPT

## 2024-07-01 PROCEDURE — 6360000002 HC RX W HCPCS: Performed by: INTERNAL MEDICINE

## 2024-07-01 PROCEDURE — C1894 INTRO/SHEATH, NON-LASER: HCPCS | Performed by: INTERNAL MEDICINE

## 2024-07-01 PROCEDURE — 4A023N6 MEASUREMENT OF CARDIAC SAMPLING AND PRESSURE, RIGHT HEART, PERCUTANEOUS APPROACH: ICD-10-PCS | Performed by: INTERNAL MEDICINE

## 2024-07-01 PROCEDURE — 93451 RIGHT HEART CATH: CPT | Performed by: INTERNAL MEDICINE

## 2024-07-01 PROCEDURE — C1725 CATH, TRANSLUMIN NON-LASER: HCPCS | Performed by: INTERNAL MEDICINE

## 2024-07-01 PROCEDURE — 83880 ASSAY OF NATRIURETIC PEPTIDE: CPT

## 2024-07-01 PROCEDURE — 82962 GLUCOSE BLOOD TEST: CPT

## 2024-07-01 PROCEDURE — 6370000000 HC RX 637 (ALT 250 FOR IP): Performed by: INTERNAL MEDICINE

## 2024-07-01 PROCEDURE — 6370000000 HC RX 637 (ALT 250 FOR IP): Performed by: NURSE PRACTITIONER

## 2024-07-01 PROCEDURE — 2060000000 HC ICU INTERMEDIATE R&B

## 2024-07-01 PROCEDURE — 6370000000 HC RX 637 (ALT 250 FOR IP)

## 2024-07-01 PROCEDURE — C1713 ANCHOR/SCREW BN/BN,TIS/BN: HCPCS | Performed by: INTERNAL MEDICINE

## 2024-07-01 PROCEDURE — 76937 US GUIDE VASCULAR ACCESS: CPT | Performed by: INTERNAL MEDICINE

## 2024-07-01 PROCEDURE — 2709999900 HC NON-CHARGEABLE SUPPLY: Performed by: INTERNAL MEDICINE

## 2024-07-01 PROCEDURE — 99233 SBSQ HOSP IP/OBS HIGH 50: CPT | Performed by: INTERNAL MEDICINE

## 2024-07-01 PROCEDURE — 85025 COMPLETE CBC W/AUTO DIFF WBC: CPT

## 2024-07-01 PROCEDURE — 2500000003 HC RX 250 WO HCPCS: Performed by: INTERNAL MEDICINE

## 2024-07-01 PROCEDURE — 2580000003 HC RX 258: Performed by: INTERNAL MEDICINE

## 2024-07-01 PROCEDURE — 6370000000 HC RX 637 (ALT 250 FOR IP): Performed by: PHYSICIAN ASSISTANT

## 2024-07-01 RX ORDER — BUMETANIDE 1 MG/1
2 TABLET ORAL 2 TIMES DAILY
Status: DISCONTINUED | OUTPATIENT
Start: 2024-07-01 | End: 2024-07-01

## 2024-07-01 RX ORDER — SODIUM CHLORIDE 9 MG/ML
INJECTION, SOLUTION INTRAVENOUS CONTINUOUS
Status: DISCONTINUED | OUTPATIENT
Start: 2024-07-01 | End: 2024-07-01 | Stop reason: HOSPADM

## 2024-07-01 RX ORDER — SODIUM CHLORIDE 0.9 % (FLUSH) 0.9 %
5-40 SYRINGE (ML) INJECTION EVERY 12 HOURS SCHEDULED
Status: DISCONTINUED | OUTPATIENT
Start: 2024-07-01 | End: 2024-07-01 | Stop reason: HOSPADM

## 2024-07-01 RX ORDER — LIDOCAINE HYDROCHLORIDE 10 MG/ML
INJECTION, SOLUTION INFILTRATION; PERINEURAL PRN
Status: DISCONTINUED | OUTPATIENT
Start: 2024-07-01 | End: 2024-07-01 | Stop reason: HOSPADM

## 2024-07-01 RX ORDER — BUMETANIDE 0.25 MG/ML
2 INJECTION INTRAMUSCULAR; INTRAVENOUS ONCE
Status: COMPLETED | OUTPATIENT
Start: 2024-07-01 | End: 2024-07-01

## 2024-07-01 RX ORDER — BUMETANIDE 1 MG/1
2 TABLET ORAL 2 TIMES DAILY
Status: DISCONTINUED | OUTPATIENT
Start: 2024-07-02 | End: 2024-07-03 | Stop reason: HOSPADM

## 2024-07-01 RX ORDER — OXYCODONE HYDROCHLORIDE 5 MG/1
2.5 TABLET ORAL ONCE AS NEEDED
Status: COMPLETED | OUTPATIENT
Start: 2024-07-01 | End: 2024-07-01

## 2024-07-01 RX ORDER — FUROSEMIDE 40 MG/1
40 TABLET ORAL 2 TIMES DAILY
Status: DISCONTINUED | OUTPATIENT
Start: 2024-07-02 | End: 2024-07-01

## 2024-07-01 RX ADMIN — ATORVASTATIN CALCIUM 10 MG: 10 TABLET, FILM COATED ORAL at 11:44

## 2024-07-01 RX ADMIN — HYDRALAZINE HYDROCHLORIDE 100 MG: 50 TABLET ORAL at 13:09

## 2024-07-01 RX ADMIN — ACETAMINOPHEN 650 MG: 325 TABLET ORAL at 11:50

## 2024-07-01 RX ADMIN — BACITRACIN 1 EACH: 500 OINTMENT TOPICAL at 11:47

## 2024-07-01 RX ADMIN — ASPIRIN 81 MG: 81 TABLET, COATED ORAL at 11:44

## 2024-07-01 RX ADMIN — ISOSORBIDE DINITRATE 20 MG: 10 TABLET ORAL at 13:09

## 2024-07-01 RX ADMIN — ACETAMINOPHEN 650 MG: 325 TABLET ORAL at 21:52

## 2024-07-01 RX ADMIN — MIRTAZAPINE 15 MG: 15 TABLET, FILM COATED ORAL at 21:52

## 2024-07-01 RX ADMIN — SODIUM CHLORIDE, PRESERVATIVE FREE 10 ML: 5 INJECTION INTRAVENOUS at 21:55

## 2024-07-01 RX ADMIN — HYDRALAZINE HYDROCHLORIDE 100 MG: 50 TABLET ORAL at 07:06

## 2024-07-01 RX ADMIN — INSULIN LISPRO 6 UNITS: 100 INJECTION, SOLUTION INTRAVENOUS; SUBCUTANEOUS at 16:44

## 2024-07-01 RX ADMIN — HYDRALAZINE HYDROCHLORIDE 100 MG: 50 TABLET ORAL at 21:52

## 2024-07-01 RX ADMIN — INSULIN LISPRO 4 UNITS: 100 INJECTION, SOLUTION INTRAVENOUS; SUBCUTANEOUS at 21:52

## 2024-07-01 RX ADMIN — BUMETANIDE 2 MG: 0.25 INJECTION INTRAMUSCULAR; INTRAVENOUS at 17:35

## 2024-07-01 RX ADMIN — OXYCODONE 2.5 MG: 5 TABLET ORAL at 04:43

## 2024-07-01 ASSESSMENT — PAIN DESCRIPTION - LOCATION
LOCATION: LEG
LOCATION: LEG

## 2024-07-01 ASSESSMENT — PAIN DESCRIPTION - ORIENTATION
ORIENTATION: RIGHT;LEFT
ORIENTATION: LEFT;RIGHT

## 2024-07-01 ASSESSMENT — PAIN SCALES - GENERAL
PAINLEVEL_OUTOF10: 1
PAINLEVEL_OUTOF10: 5
PAINLEVEL_OUTOF10: 7
PAINLEVEL_OUTOF10: 5
PAINLEVEL_OUTOF10: 9
PAINLEVEL_OUTOF10: 0
PAINLEVEL_OUTOF10: 9
PAINLEVEL_OUTOF10: 7

## 2024-07-01 ASSESSMENT — ENCOUNTER SYMPTOMS
NAUSEA: 0
CONSTIPATION: 0
SHORTNESS OF BREATH: 0
COUGH: 0
DIARRHEA: 0

## 2024-07-01 NOTE — PLAN OF CARE
Problem: Discharge Planning  Goal: Discharge to home or other facility with appropriate resources  6/30/2024 2049 by Connie Schwartz RN  Outcome: Progressing  Flowsheets (Taken 6/30/2024 0810 by Facundo Boyd, RN)  Discharge to home or other facility with appropriate resources: Identify barriers to discharge with patient and caregiver  6/30/2024 1633 by Facundo Boyd, RN  Outcome: Progressing  Flowsheets (Taken 6/30/2024 0810)  Discharge to home or other facility with appropriate resources: Identify barriers to discharge with patient and caregiver     Problem: Safety - Adult  Goal: Free from fall injury  6/30/2024 2049 by Connie Schwartz RN  Outcome: Progressing  Flowsheets (Taken 6/29/2024 0000 by Delfin Rodrigues RN)  Free From Fall Injury: Instruct family/caregiver on patient safety  6/30/2024 1633 by Facundo Boyd RN  Outcome: Progressing     Problem: Chronic Conditions and Co-morbidities  Goal: Patient's chronic conditions and co-morbidity symptoms are monitored and maintained or improved  6/30/2024 2049 by Connie Schwartz RN  Outcome: Progressing  Flowsheets (Taken 6/30/2024 0810 by Facundo Boyd, RN)  Care Plan - Patient's Chronic Conditions and Co-Morbidity Symptoms are Monitored and Maintained or Improved: Monitor and assess patient's chronic conditions and comorbid symptoms for stability, deterioration, or improvement  6/30/2024 1633 by Facundo Boyd, RN  Outcome: Progressing  Flowsheets (Taken 6/30/2024 0810)  Care Plan - Patient's Chronic Conditions and Co-Morbidity Symptoms are Monitored and Maintained or Improved: Monitor and assess patient's chronic conditions and comorbid symptoms for stability, deterioration, or improvement     Problem: Pain  Goal: Verbalizes/displays adequate comfort level or baseline comfort level  6/30/2024 2049 by Connie Schwartz RN  Outcome: Progressing  Flowsheets (Taken 6/30/2024 2049)  Verbalizes/displays adequate comfort level or baseline

## 2024-07-02 LAB
ALBUMIN SERPL-MCNC: 2.8 G/DL (ref 3.5–5)
ALBUMIN/GLOB SERPL: 0.7 (ref 1.1–2.2)
ALP SERPL-CCNC: 109 U/L (ref 45–117)
ALT SERPL-CCNC: 17 U/L (ref 12–78)
ANION GAP SERPL CALC-SCNC: 7 MMOL/L (ref 5–15)
AST SERPL-CCNC: 34 U/L (ref 15–37)
BASOPHILS # BLD: 0 K/UL (ref 0–0.1)
BASOPHILS NFR BLD: 1 % (ref 0–1)
BILIRUB SERPL-MCNC: 0.6 MG/DL (ref 0.2–1)
BUN SERPL-MCNC: 83 MG/DL (ref 6–20)
BUN/CREAT SERPL: 24 (ref 12–20)
CALCIUM SERPL-MCNC: 8.8 MG/DL (ref 8.5–10.1)
CHLORIDE SERPL-SCNC: 104 MMOL/L (ref 97–108)
CO2 SERPL-SCNC: 28 MMOL/L (ref 21–32)
CREAT SERPL-MCNC: 3.4 MG/DL (ref 0.55–1.02)
DIFFERENTIAL METHOD BLD: ABNORMAL
EOSINOPHIL # BLD: 0.2 K/UL (ref 0–0.4)
EOSINOPHIL NFR BLD: 3 % (ref 0–7)
ERYTHROCYTE [DISTWIDTH] IN BLOOD BY AUTOMATED COUNT: 16.3 % (ref 11.5–14.5)
GLOBULIN SER CALC-MCNC: 4.1 G/DL (ref 2–4)
GLUCOSE BLD STRIP.AUTO-MCNC: 156 MG/DL (ref 65–117)
GLUCOSE BLD STRIP.AUTO-MCNC: 227 MG/DL (ref 65–117)
GLUCOSE BLD STRIP.AUTO-MCNC: 348 MG/DL (ref 65–117)
GLUCOSE BLD STRIP.AUTO-MCNC: 414 MG/DL (ref 65–117)
GLUCOSE SERPL-MCNC: 216 MG/DL (ref 65–100)
HCT VFR BLD AUTO: 38.3 % (ref 35–47)
HGB BLD-MCNC: 12 G/DL (ref 11.5–16)
IMM GRANULOCYTES # BLD AUTO: 0 K/UL (ref 0–0.04)
IMM GRANULOCYTES NFR BLD AUTO: 0 % (ref 0–0.5)
LYMPHOCYTES # BLD: 1.1 K/UL (ref 0.8–3.5)
LYMPHOCYTES NFR BLD: 18 % (ref 12–49)
MAGNESIUM SERPL-MCNC: 2.1 MG/DL (ref 1.6–2.4)
MCH RBC QN AUTO: 31 PG (ref 26–34)
MCHC RBC AUTO-ENTMCNC: 31.3 G/DL (ref 30–36.5)
MCV RBC AUTO: 99 FL (ref 80–99)
MONOCYTES # BLD: 0.7 K/UL (ref 0–1)
MONOCYTES NFR BLD: 12 % (ref 5–13)
NEUTS SEG # BLD: 3.9 K/UL (ref 1.8–8)
NEUTS SEG NFR BLD: 66 % (ref 32–75)
NRBC # BLD: 0 K/UL (ref 0–0.01)
NRBC BLD-RTO: 0 PER 100 WBC
NT PRO BNP: ABNORMAL PG/ML
PLATELET # BLD AUTO: 187 K/UL (ref 150–400)
PMV BLD AUTO: 10.5 FL (ref 8.9–12.9)
POTASSIUM SERPL-SCNC: 3.8 MMOL/L (ref 3.5–5.1)
PROT SERPL-MCNC: 6.9 G/DL (ref 6.4–8.2)
RBC # BLD AUTO: 3.87 M/UL (ref 3.8–5.2)
SERVICE CMNT-IMP: ABNORMAL
SODIUM SERPL-SCNC: 139 MMOL/L (ref 136–145)
WBC # BLD AUTO: 5.9 K/UL (ref 3.6–11)

## 2024-07-02 PROCEDURE — 80053 COMPREHEN METABOLIC PANEL: CPT

## 2024-07-02 PROCEDURE — 6370000000 HC RX 637 (ALT 250 FOR IP): Performed by: INTERNAL MEDICINE

## 2024-07-02 PROCEDURE — 2060000000 HC ICU INTERMEDIATE R&B

## 2024-07-02 PROCEDURE — 83735 ASSAY OF MAGNESIUM: CPT

## 2024-07-02 PROCEDURE — 99233 SBSQ HOSP IP/OBS HIGH 50: CPT | Performed by: INTERNAL MEDICINE

## 2024-07-02 PROCEDURE — 85025 COMPLETE CBC W/AUTO DIFF WBC: CPT

## 2024-07-02 PROCEDURE — 82962 GLUCOSE BLOOD TEST: CPT

## 2024-07-02 PROCEDURE — 36415 COLL VENOUS BLD VENIPUNCTURE: CPT

## 2024-07-02 PROCEDURE — APPNB45 APP NON BILLABLE 31-45 MINUTES: Performed by: NURSE PRACTITIONER

## 2024-07-02 PROCEDURE — 6370000000 HC RX 637 (ALT 250 FOR IP): Performed by: STUDENT IN AN ORGANIZED HEALTH CARE EDUCATION/TRAINING PROGRAM

## 2024-07-02 PROCEDURE — 6370000000 HC RX 637 (ALT 250 FOR IP): Performed by: NURSE PRACTITIONER

## 2024-07-02 PROCEDURE — 2580000003 HC RX 258: Performed by: INTERNAL MEDICINE

## 2024-07-02 PROCEDURE — 83880 ASSAY OF NATRIURETIC PEPTIDE: CPT

## 2024-07-02 PROCEDURE — 6370000000 HC RX 637 (ALT 250 FOR IP): Performed by: PHYSICIAN ASSISTANT

## 2024-07-02 RX ORDER — ISOSORBIDE DINITRATE 10 MG/1
20 TABLET ORAL 3 TIMES DAILY
Status: DISCONTINUED | OUTPATIENT
Start: 2024-07-02 | End: 2024-07-03 | Stop reason: HOSPADM

## 2024-07-02 RX ORDER — INSULIN LISPRO 100 [IU]/ML
15 INJECTION, SOLUTION INTRAVENOUS; SUBCUTANEOUS ONCE
Status: COMPLETED | OUTPATIENT
Start: 2024-07-02 | End: 2024-07-02

## 2024-07-02 RX ADMIN — ATORVASTATIN CALCIUM 10 MG: 10 TABLET, FILM COATED ORAL at 08:44

## 2024-07-02 RX ADMIN — ISOSORBIDE DINITRATE 20 MG: 10 TABLET ORAL at 14:58

## 2024-07-02 RX ADMIN — ACETAMINOPHEN 650 MG: 325 TABLET ORAL at 11:51

## 2024-07-02 RX ADMIN — ISOSORBIDE DINITRATE 20 MG: 10 TABLET ORAL at 21:30

## 2024-07-02 RX ADMIN — INSULIN LISPRO 6 UNITS: 100 INJECTION, SOLUTION INTRAVENOUS; SUBCUTANEOUS at 17:19

## 2024-07-02 RX ADMIN — CARVEDILOL 12.5 MG: 12.5 TABLET, FILM COATED ORAL at 08:43

## 2024-07-02 RX ADMIN — BUMETANIDE 2 MG: 1 TABLET ORAL at 17:19

## 2024-07-02 RX ADMIN — ASPIRIN 81 MG: 81 TABLET, COATED ORAL at 08:43

## 2024-07-02 RX ADMIN — CARVEDILOL 12.5 MG: 12.5 TABLET, FILM COATED ORAL at 17:19

## 2024-07-02 RX ADMIN — MIRTAZAPINE 15 MG: 15 TABLET, FILM COATED ORAL at 21:30

## 2024-07-02 RX ADMIN — BUMETANIDE 2 MG: 1 TABLET ORAL at 08:44

## 2024-07-02 RX ADMIN — ACETAMINOPHEN 650 MG: 325 TABLET ORAL at 18:05

## 2024-07-02 RX ADMIN — INSULIN LISPRO 15 UNITS: 100 INJECTION, SOLUTION INTRAVENOUS; SUBCUTANEOUS at 11:51

## 2024-07-02 RX ADMIN — HYDRALAZINE HYDROCHLORIDE 100 MG: 50 TABLET ORAL at 07:04

## 2024-07-02 RX ADMIN — HYDRALAZINE HYDROCHLORIDE 100 MG: 50 TABLET ORAL at 14:58

## 2024-07-02 RX ADMIN — HYDRALAZINE HYDROCHLORIDE 100 MG: 50 TABLET ORAL at 21:30

## 2024-07-02 RX ADMIN — SODIUM CHLORIDE, PRESERVATIVE FREE 10 ML: 5 INJECTION INTRAVENOUS at 21:33

## 2024-07-02 RX ADMIN — BACITRACIN: 500 OINTMENT TOPICAL at 17:19

## 2024-07-02 ASSESSMENT — PAIN SCALES - GENERAL
PAINLEVEL_OUTOF10: 0

## 2024-07-02 ASSESSMENT — ENCOUNTER SYMPTOMS
CONSTIPATION: 0
SHORTNESS OF BREATH: 0
COUGH: 0
NAUSEA: 0
DIARRHEA: 0

## 2024-07-03 VITALS
RESPIRATION RATE: 17 BRPM | BODY MASS INDEX: 23.2 KG/M2 | OXYGEN SATURATION: 95 % | TEMPERATURE: 98.1 F | DIASTOLIC BLOOD PRESSURE: 52 MMHG | HEART RATE: 81 BPM | SYSTOLIC BLOOD PRESSURE: 131 MMHG | HEIGHT: 60 IN | WEIGHT: 118.17 LBS

## 2024-07-03 LAB
ANION GAP SERPL CALC-SCNC: 11 MMOL/L (ref 5–15)
BUN SERPL-MCNC: 84 MG/DL (ref 6–20)
BUN/CREAT SERPL: 26 (ref 12–20)
CALCIUM SERPL-MCNC: 9.2 MG/DL (ref 8.5–10.1)
CHLORIDE SERPL-SCNC: 100 MMOL/L (ref 97–108)
CO2 SERPL-SCNC: 25 MMOL/L (ref 21–32)
COMMENT:: NORMAL
CREAT SERPL-MCNC: 3.21 MG/DL (ref 0.55–1.02)
GLUCOSE BLD STRIP.AUTO-MCNC: 133 MG/DL (ref 65–117)
GLUCOSE BLD STRIP.AUTO-MCNC: 357 MG/DL (ref 65–117)
GLUCOSE SERPL-MCNC: 166 MG/DL (ref 65–100)
POTASSIUM SERPL-SCNC: 3.8 MMOL/L (ref 3.5–5.1)
SERVICE CMNT-IMP: ABNORMAL
SERVICE CMNT-IMP: ABNORMAL
SODIUM SERPL-SCNC: 136 MMOL/L (ref 136–145)
SPECIMEN HOLD: NORMAL

## 2024-07-03 PROCEDURE — 2580000003 HC RX 258: Performed by: INTERNAL MEDICINE

## 2024-07-03 PROCEDURE — 6370000000 HC RX 637 (ALT 250 FOR IP): Performed by: INTERNAL MEDICINE

## 2024-07-03 PROCEDURE — 6370000000 HC RX 637 (ALT 250 FOR IP): Performed by: PHYSICIAN ASSISTANT

## 2024-07-03 PROCEDURE — 36415 COLL VENOUS BLD VENIPUNCTURE: CPT

## 2024-07-03 PROCEDURE — 80048 BASIC METABOLIC PNL TOTAL CA: CPT

## 2024-07-03 PROCEDURE — 6370000000 HC RX 637 (ALT 250 FOR IP): Performed by: STUDENT IN AN ORGANIZED HEALTH CARE EDUCATION/TRAINING PROGRAM

## 2024-07-03 PROCEDURE — 82962 GLUCOSE BLOOD TEST: CPT

## 2024-07-03 PROCEDURE — 6370000000 HC RX 637 (ALT 250 FOR IP): Performed by: NURSE PRACTITIONER

## 2024-07-03 PROCEDURE — 99232 SBSQ HOSP IP/OBS MODERATE 35: CPT | Performed by: NURSE PRACTITIONER

## 2024-07-03 RX ORDER — ISOSORBIDE DINITRATE 20 MG/1
20 TABLET ORAL 3 TIMES DAILY
Qty: 90 TABLET | Refills: 0 | Status: SHIPPED | OUTPATIENT
Start: 2024-07-03 | End: 2024-08-02

## 2024-07-03 RX ORDER — INSULIN LISPRO 100 [IU]/ML
7 INJECTION, SOLUTION INTRAVENOUS; SUBCUTANEOUS ONCE
Status: COMPLETED | OUTPATIENT
Start: 2024-07-03 | End: 2024-07-03

## 2024-07-03 RX ORDER — BUMETANIDE 2 MG/1
2 TABLET ORAL 2 TIMES DAILY
Qty: 60 TABLET | Refills: 0 | Status: SHIPPED | OUTPATIENT
Start: 2024-07-03 | End: 2024-08-02

## 2024-07-03 RX ORDER — HYDRALAZINE HYDROCHLORIDE 100 MG/1
100 TABLET, FILM COATED ORAL EVERY 8 HOURS SCHEDULED
Qty: 90 TABLET | Refills: 0 | Status: SHIPPED | OUTPATIENT
Start: 2024-07-03 | End: 2024-08-02

## 2024-07-03 RX ADMIN — ACETAMINOPHEN 650 MG: 325 TABLET ORAL at 03:32

## 2024-07-03 RX ADMIN — INSULIN LISPRO 7 UNITS: 100 INJECTION, SOLUTION INTRAVENOUS; SUBCUTANEOUS at 12:02

## 2024-07-03 RX ADMIN — ISOSORBIDE DINITRATE 20 MG: 10 TABLET ORAL at 08:14

## 2024-07-03 RX ADMIN — BACITRACIN 1 EACH: 500 OINTMENT TOPICAL at 08:19

## 2024-07-03 RX ADMIN — SODIUM CHLORIDE, PRESERVATIVE FREE 10 ML: 5 INJECTION INTRAVENOUS at 08:14

## 2024-07-03 RX ADMIN — ATORVASTATIN CALCIUM 10 MG: 10 TABLET, FILM COATED ORAL at 08:14

## 2024-07-03 RX ADMIN — HYDRALAZINE HYDROCHLORIDE 100 MG: 50 TABLET ORAL at 13:00

## 2024-07-03 RX ADMIN — ASPIRIN 81 MG: 81 TABLET, COATED ORAL at 08:13

## 2024-07-03 RX ADMIN — CARVEDILOL 12.5 MG: 12.5 TABLET, FILM COATED ORAL at 08:14

## 2024-07-03 RX ADMIN — HYDRALAZINE HYDROCHLORIDE 100 MG: 50 TABLET ORAL at 06:58

## 2024-07-03 RX ADMIN — INSULIN LISPRO 8 UNITS: 100 INJECTION, SOLUTION INTRAVENOUS; SUBCUTANEOUS at 12:01

## 2024-07-03 RX ADMIN — ISOSORBIDE DINITRATE 20 MG: 10 TABLET ORAL at 13:00

## 2024-07-03 RX ADMIN — BUMETANIDE 2 MG: 1 TABLET ORAL at 08:13

## 2024-07-03 ASSESSMENT — PAIN SCALES - GENERAL: PAINLEVEL_OUTOF10: 9

## 2024-07-03 ASSESSMENT — ENCOUNTER SYMPTOMS
COUGH: 0
CONSTIPATION: 0
SHORTNESS OF BREATH: 0
NAUSEA: 0
DIARRHEA: 0

## 2024-07-03 ASSESSMENT — PAIN DESCRIPTION - LOCATION: LOCATION: LEG

## 2024-07-03 ASSESSMENT — PAIN DESCRIPTION - ORIENTATION: ORIENTATION: LEFT;RIGHT

## 2024-07-03 NOTE — PROGRESS NOTES
Name: Nolvia Silver   MRN: [id number]  : 1950      Assessment:  CKD-4 (Dr Mitchel Weaver)- 2nd to DM/HTN. Basln Cr has ranged lately from 2.5 to 2.9  HTN  DM-2  LE edema with blisters on presentation  Chronic systolic CHF    Patient has diuresed well since admission.  She lost 13 pounds with IV Bumex.  Her oral Lasix dose has been variable over course of last few months.  She reportedly tells me, she was taking 40 mg p.o. daily prior to admit.  She is listed as taking 80 mg daily on her admission records.  Will need to transition her to oral diuretics.    Creatinine has trended up to 3.15 to 3.4 today, mainly due to much-needed diuresis.  Exhibiting cardiorenal features.  Will have to accept higher creatinine to treat her fluid overload    Plan/Recommendations:  Plan was to transition to PO Lasix from today, but now going to be on Bumex  Starting Bumex 2 mg PO BID from today  Salt and fluid restriction  Continue current BP meds  A.m. labs  If clinically stable, might be able to go home by tom    Subjective:  Feels Ok. No events overnight    ROS:   No nausea, no vomiting  No chest pain, no shortness of breath    Exam:  /77   Pulse 80   Temp 97.6 °F (36.4 °C) (Oral)   Resp 20   Ht 1.524 m (5')   Wt 54.3 kg (119 lb 11.4 oz)   SpO2 97%   BMI 23.38 kg/m²     Elderly, frail, NAD  No significant peripheral edema  Bilateral LE covered with dressing  Aox3    Labs/Data:    Lab Results   Component Value Date    WBC 5.9 2024    HGB 12.0 2024    HCT 38.3 2024    MCV 99.0 2024     2024       Lab Results   Component Value Date/Time     2024 03:51 AM    K 3.8 2024 03:51 AM     2024 03:51 AM    CO2 28 2024 03:51 AM    BUN 83 2024 03:51 AM    CREATININE 3.40 2024 03:51 AM    GLUCOSE 216 2024 
                                                                                 [facility] CARDIOLOGY  Cardiology Care Note                  []Initial visit     [x]Established visit     Patient Name: Nolvia Silver - :1950 - MRN:[id number]  Primary Cardiologist: Heidi Guardado MD  Consulting Cardiologist: Alphonse Wick MD     Reason for initial visit: heart failure    HPI:   Ms. Silver is a 74 y.o. female with PMH of HFrEF, Non-ischemic, dilated cardiomyopathy, s/p ICD (2017) with most recent EF 15-20% per echo 4/10/24, type II DM, HTN, hypertriglyceridemia, CKD with cardiorenal syndrome, and anemia of CKD. She has a hx of non compliance with office follow up, she did not show for her last appt 24. She was hospitalized  and  for CHF exacerbations. She has been prescribed Coreg, Hydralazine, Isordil, and Lasix. No ARNI or Aldactone d/t CKD. When last in office  discussion about Jardiance or Farxiga and pt wanted to check on cost before starting. She presented to ED yest with leg swelling and SOB. She reported 5 pillow orthopnea. In ED Creatinine 2.67, ProBNP >35,000, glucose 306, Hgb 10.9. CXR no acute process. Started on IV Lasix BID.       SUBJECTIVE: Pt reports being a little less sob with ambulation today. Concerned about her diet. Denies CP, palpitations or worsening edema.        Assessment and Plan   Patient seen on the day of progress note and examined  and agree with Advance Practice Provider (BELINDA, NP,PA)  assessment and plans.  Somewhat better today she tells me.  Lower extreme edema is improving and shortness of breath also has improved.  Negative I's and O's - cc.    Significant elevation proBNP nonetheless about 35,000 with further reduction in ejection fraction now estimated 15 to 20%.    Continue IV Lasix continue hydralazine and Isordil and Coreg.    Jardiance or Farxiga on the back burner unless GFR improves to above 30.    Advanced heart failure team also closely 
                                                                                 [facility] CARDIOLOGY  Cardiology Care Note                  []Initial visit     [x]Established visit     Patient Name: Nolvia Silver - :1950 - MRN:[id number]  Primary Cardiologist: Heidi Guardado MD  Consulting Cardiologist: Alphonse Wick MD     Reason for initial visit: heart failure    HPI:   Ms. Silver is a 74 y.o. female with PMH of HFrEF, Non-ischemic, dilated cardiomyopathy, s/p ICD (2017) with most recent EF 15-20% per echo 4/10/24, type II DM, HTN, hypertriglyceridemia, CKD with cardiorenal syndrome, and anemia of CKD. She has a hx of non compliance with office follow up, she did not show for her last appt 24. She was hospitalized  and  for CHF exacerbations. She has been prescribed Coreg, Hydralazine, Isordil, and Lasix. No ARNI or Aldactone d/t CKD. When last in office  discussion about Jardiance or Farxiga and pt wanted to check on cost before starting. She presented to ED yest with leg swelling and SOB. She reported 5 pillow orthopnea. In ED Creatinine 2.67, ProBNP >35,000, glucose 306, Hgb 10.9. CXR no acute process. Started on IV Lasix BID.       SUBJECTIVE: Pt reports her SOB is stable at rest. Still having swelling in her thigh areas. Denies CP or palpitations.        Assessment and Plan   Patient seen on the day of progress note and examined  and agree with Advance Practice Provider (BELINDA, NP,PA)  assessment and plans.    She seems better compensated again and able to lay flat lower extreme edema is improved she has been ambulating and she denies any chest pain.    1.  Cardiomyopathy: After IV diuresis, shortness of breath is improved.  Renal function is still abnormal.  Unclear about the volume status.  Reasonable to proceed with right cardiac catheterization especially in light of pulmonary hypertension.I discussed the risks/benefits/alternatives of the procedure with the patient. Risks include 
      [facility] [place] [facility]                                                                                          Hospitalist Progress Note  Afia Francisco MD  Answering service: [phone] OR [phone] from in house phone        Date of Service:  2024  NAME:  Nolvia Silver  :  1950  MRN:  [id number]       Admission Summary:   \"Nolvia Silver is a 74 y.o. female with chronic HFrEF (Dr Guardado), T2DM, HLD, HTN, and CKD Stage 4 (Dr Mitchel Weaver) who presents leg swelling and SOB x a few weeks. She also c/o leg pains clear fluid oozing from her legs. She is taking her diuretic as prescribed but does not think it is working as she is not urinating as much as she used to. She reports 5-pillow orthopnea. She denies f/c/n/v, CP, abdominal pain, diarrhea, constipation, dysuria, recent falls, injuires, LOC. She denies dietary salt and excessive fluid intake. She is intolerant of compression socks.\"      Interval history / Subjective:   No acute interval events. Plan for RHC Monday, no acute medical complaints     Assessment & Plan:      Acute on chronic HFrEF  - CXR shows no acute process  - limited TTE 4/10/2024 EF 15-20%, severe global hypokinesis, mod MR, severely dilated LA, trace pericardial effusion   - recent BLE venous duplex shows no presence of DVTs   - Advanced HF following, appreciate assistance  - Cardiology following  - GDMT per AHF  - Continue IV Bumex  - AICD: [employer] Single chamber ICD. Device interrogation 24 without events. Managed by Dr Moreno.    - Plan for RHC on 24     CKD Stage 4  - Baseline creatinine has been 2.5-2.9, came with a creatinine of 2.5  - Nephrology following, expertise appreciated     Type II diabetes mellitus  - Continue SSI and hold home glipizide  - A1C 8.2%, glu 226    Anemia of chronic disease  - Stable H/H, continue to monitor    HTN  HLD  - Continue statin  - Continue carvedilol, hydralizine, isorsorbide dinitrate  - no 
      [facility] [place] [facility]                                                                                          Hospitalist Progress Note  Aline Benavides  Answering service: [phone] OR [phone] from in house phone        Date of Service:  2024  NAME:  Nolvia Silver  :  1950  MRN:  [id number]    *ATTENTION:  This note has been created by a physician assistant student for educational purposes only.  Please do not refer to the content of this note for clinical decision-making, billing, or other purposes.  Please see attending physician’s note to obtain clinical information on this patient.*      Admission Summary:   \"Nolvia Silver is a 74 y.o. female with chronic HFrEF (Dr Guardado), T2DM, HLD, HTN, and CKD Stage 4 (Dr Mitchel Weaver) who presents leg swelling and SOB x a few weeks. She also c/o leg pains clear fluid oozing from her legs. She is taking her diuretic as prescribed but does not think it is working as she is not urinating as much as she used to. She reports 5-pillow orthopnea. She denies f/c/n/v, CP, abdominal pain, diarrhea, constipation, dysuria, recent falls, injuires, LOC. She denies dietary salt and excessive fluid intake. She is intolerant of compression socks.\"        Interval history / Subjective:   Patient was seen and examined at the bedside. Patient aroused from sleep. She reports her SOB continues to improve and denies any current difficulty with breathing at this time. Denies any chest pain, N/V, dizziness, abdominal pain, or changes in appetite or bowel movements. She reports LE pain from her previously weeping wounds. Rates pain 7/10 today but is stable from yesterday. Last wound dressing change yesterday.     Patient is refusing heparin injections and states she would like an oral formulation. Discussed that heparin is the preferred anticoagulant given her reduced renal function.      Assessment & Plan:      Acute on chronic HFrEF  - BNP extremely elevated 
      [facility] [place] [facility]                                                                                          Hospitalist Progress Note  Aline Benavides  Answering service: [phone] OR [phone] from in house phone        Date of Service:  2024  NAME:  Nolvia Silver  :  1950  MRN:  [id number]    *ATTENTION:  This note has been created by a physician assistant student for educational purposes only.  Please do not refer to the content of this note for clinical decision-making, billing, or other purposes.  Please see attending physician’s note to obtain clinical information on this patient.*      Admission Summary:   \"Nolvia Silver is a 74 y.o. female with chronic HFrEF (Dr Guardado), T2DM, HLD, HTN, and CKD Stage 4 (Dr Mitchel Weaver) who presents leg swelling and SOB x a few weeks. She also c/o leg pains clear fluid oozing from her legs. She is taking her diuretic as prescribed but does not think it is working as she is not urinating as much as she used to. She reports 5-pillow orthopnea. She denies f/c/n/v, CP, abdominal pain, diarrhea, constipation, dysuria, recent falls, injuires, LOC. She denies dietary salt and excessive fluid intake. She is intolerant of compression socks.\"        Interval history / Subjective:   Patient was seen and examined at the bedside. Echo being completed during exam. Patient states she is \"tired and frustrated\" after difficulty with obtaining morning labs. She reports a history of difficulty with venipuncture. She does admit that her SOB has improved since yesterday and notes decreased weeping from LEs. Denies any chest pain, N/V, dizziness, abdominal pain, or changes in appetite or bowel movements.      Assessment & Plan:      Acute on chronic HFrEF  - BNP extremely elevated at >35,000; bibasilar crackles on exam  - CXR shows no acute process  - limited TTE 4/10/2024 EF 15-20%, severe global hypokinesis, mod MR, severely dilated LA, trace pericardial 
      [facility] [facility]                                                                                          Hospitalist Progress Note  David M Milligram, PA-C  Answering service: [phone] OR [phone] from in house phone        Date of Service:  2024  NAME:  Nolvia Silver  :  1950  MRN:  [id number]       Admission Summary:   \"Nolvia Silver is a 74 y.o. female with chronic HFrEF (Dr Guardado), T2DM, HLD, HTN, and CKD Stage 4 (Dr Mitchel Weaver) who presents leg swelling and SOB x a few weeks. She also c/o leg pains clear fluid oozing from her legs. She is taking her diuretic as prescribed but does not think it is working as she is not urinating as much as she used to. She reports 5-pillow orthopnea. She denies f/c/n/v, CP, abdominal pain, diarrhea, constipation, dysuria, recent falls, injuires, LOC. She denies dietary salt and excessive fluid intake. She is intolerant of compression socks.\"      Interval history / Subjective:   No acute interval events. Patient reports improvement in her shortness of breath.     Assessment & Plan:      Acute on chronic HFrEF  - BNP extremely elevated at >35,000; bibasilar crackles on exam  - CXR shows no acute process  - limited TTE 4/10/2024 EF 15-20%, severe global hypokinesis, mod MR, severely dilated LA, trace pericardial effusion   - recent BLE venous duplex shows no presence of DVTs   - Advanced HF following, appreciate assistance  - Cardiology following  - GDMT per AHF  - Continue IV Bumex TID  - AICD: [employer] Single chamber ICD. Device interrogation 24 without events. Managed by Dr Moreno.      CKD Stage 4  - Baseline creatinine has been 2.5-2.9, came with a creatinine of 2.5  - Nephrology following, expertise appreciated     Type II diabetes mellitus  - Continue SSI and hold home glipizide  - A1C 8.2%, glu 226    Anemia of chronic disease  - Stable H/H, continue to monitor    HTN  HLD  - Continue statin  - Continue carvedilol, 
      [facility] [place] [facility]                                                                                          Hospitalist Progress Note  Aline Benavides  Answering service: [phone] OR [phone] from in house phone        Date of Service:  2024  NAME:  Nolvia Silver  :  1950  MRN:  [id number]    *ATTENTION:  This note has been created by a physician assistant student for educational purposes only.  Please do not refer to the content of this note for clinical decision-making, billing, or other purposes.  Please see attending physician’s note to obtain clinical information on this patient.*      Admission Summary:   \"Nolvia Silver is a 74 y.o. female with chronic HFrEF (Dr Guardado), T2DM, HLD, HTN, and CKD Stage 4 (Dr Mitchel Weaver) who presents leg swelling and SOB x a few weeks. She also c/o leg pains clear fluid oozing from her legs. She is taking her diuretic as prescribed but does not think it is working as she is not urinating as much as she used to. She reports 5-pillow orthopnea. She denies f/c/n/v, CP, abdominal pain, diarrhea, constipation, dysuria, recent falls, injuires, LOC. She denies dietary salt and excessive fluid intake. She is intolerant of compression socks.\"        Interval history / Subjective:   Patient was seen and examined at the bedside. She reports the LEÓN was gradual in onset over the past 2 weeks and started to affect her ADLs. Sleeps with 5 pillows ar night due to SOB. Admits SOB is improved today and denies labored breathing. Reports fluid leaking from her lower extremities and states they are very painful to the touch. Intermittent dry cough present. Denies chest pain, fever, chills, abdominal pain, changes in appetite or bowel movements.      Assessment & Plan:      Acute on chronic HFrEF  - BNP extremely elevated at >35,000; bibasilar crackles on exam  - CXR shows no acute process  - limited TTE 4/10/2024 EF 15-20%, severe global hypokinesis, mod 
  Cardiac Cath Lab Recovery Arrival Note:      Nolvia Silver arrived to Cardiac Cath Lab, Recovery Area. Staff introduced to patient. Patient identifiers verified with NAME and DATE OF BIRTH. Procedure verified with patient. Consent forms reviewed and signed by patient or authorized representative and verified. Allergies verified.     Patient and family oriented to department. Patient and family informed of procedure and plan of care.     Questions answered with review. Patient prepped for procedure, per orders from physician, prior to arrival.    Patient on cardiac monitor, non-invasive blood pressure, SPO2 monitor. On room air. Patient is A&Ox 4. Patient reports concern about changing patches, worried about skin tears.     Patient in stretcher, in low position, with side rails up, call bell within reach, patient instructed to call if assistance as needed.    Patient prep in: [facility] Recovery Area, [place] [address].   Patient family has pager # Corazon, daughter [phone]    
1730: report received from ED RN Alie.    1615: pt arrived to unit. Vital signs stable. Pt assessed and oriented to unit. Call bell within reach. Bed in lowest position.     1930: Bedside and Verbal shift change report given to ANTHONY Rosales (oncoming nurse) by ANTHONY Mauricio (offgoing nurse). Report included the following information Nurse Handoff Report, Intake/Output, and Cardiac Rhythm NSR .       
1930: Bedside and Verbal shift change report given to Shauna RN (oncoming nurse) by Hector RN (offgoing nurse). Report included the following information Nurse Handoff Report, Index, Adult Overview, Intake/Output, MAR, and Cardiac Rhythm SR .     0600: messaged Luisa ROSE about pt L knuckle swelling, per NP Luisa ornelas MD assess    0730:  Bedside and Verbal shift change report given to Hector RN (oncoming nurse) by Shauna RN (offgoing nurse). Report included the following information Nurse Handoff Report, Index, Adult Overview, Intake/Output, MAR, and Cardiac Rhythm SR.   
1950  Given verbal report to Nurse ANTHONY Sampson for patient  Endorsed about the wound on the leg    1545  Put in a Woundcare consult for patients bilateral lower extremities due to clear fluid oozing    0745  Received verbal report from ANTHONY Rosales at bedside.  Patient noted to have PIV on left AC, currently on sinus rhythm on tele  
2015  Verbal report given to Nurse ANTHONY collazo  Endorsed dressing on both lower extremities  Patient awake in chair watching TV    0745  Received verbal report from Nurse ANTHONY Villatoro  Currently on sinus rhythm, noted to have PIV on right arm      
2030  Given verbal report to Nurse ANTHONY Villatoro regarding patient without IV line, swelling of left wrist with redness     1720  Noted to have leaking IV, removed the IV  Explained to patient regarding importance of obtaining another IV  Tried to insert an IV on the right AC but failed  Informed Charge Nurse of the event    
6990  Informed Dr. Johnson via perfectsave regarding patient refusing Heparin injection since yesterday and if possible to switch her to oral    0740  Received verbal report from ANTHONY Sampson regarding patient.  Currently with PIV on left AC  Noted still with swelling on both lower extremities covered with dressing  Refusing gripper socks  
[facility] FAIL[facility]  [facility] in [place], [place]  Heart Failure Inpatient Note    Patient name: Nolvia Silver  Patient : 1950  Patient MRN: [id number]  Date of service: 24    REASON for REFERRAL  Decompensated HF      INTERVAL EVENTS  No acute events overnight, HDS with slightly higher Bps (-140s)  Net negative 2.1 L, weight 129-->127  No complaints, feel better today    ASSESSMENT AND PLAN:  Acute on Chronic decompensated heart failure   Heart failure/Cardiomyopathy (EF 15-20%) : Stage C NYHA class IIIB  Continue current medical therapy for heart failure:  Beta-blocker: Continue Carvedilol 12.5 BID  ACE/ARB/ARNi: not on due to CKD  Hydralazine/Isordil: Increase hydralazine to 50 mgTID and isordil to 10 mg TID  MRA: not on due to CKD  SGLT2 inhibitor: Will start if kidney function improves and GFR>30  Repeat echo reviewed--> EF 15-20%  2 g sodium limit  1500cc fluid limit  Daily standing weights  Strict I/Os  Lactic acid normal  Ferritin and iron sat low--> IV Iron ordered (Venofer on back order so equivalent dosing of Ferrlecit given x 2 doses)   Labs: Daily CMP, BNP   NICOM done and CI 2.2  Will need RHC prior to discharge once euvolemic--> plan for Monday  As per notes had LHC in  at [facility] that showed normal coronaries (will try and request records)  Nuclear stress test  did not show ischemia but did show Inferolateral infarct.    Continue Aspirin and Lipitor  Would recommend repeat LHC if kidney function improves but not ideal at this time due to CKD    Volume overload  Bumex IV 2mg to TID  Will give 1 dose of metolazone today since BNP still > 35,000  Goal K > 4, Mg > 2, replete PRN    CKD stage 4  Renal function stable this admission  Nephrology following       Arrhythmia/ICD:  S/p [employer] single chamber ICD  Follow-up with EP cardiologist, Dr. Moreno  ICD interrogation q3 months.     Diabetes:  Last A1c 8, management per hospitalist while 
[facility] FAILURE [facility]  [facility] in [place], [place]  Heart Failure Inpatient Note    Patient name: Nolvia Silver  Patient : 1950  Patient MRN: [id number]  Date of service: 24    REASON for REFERRAL  Decompensated HF      INTERVAL EVENTS  NAEO  VSS  Labs reviewed- creatinine stable at 3.2  She states she feels well and is ready to go home       ASSESSMENT AND PLAN:  Acute on Chronic decompensated heart failure   Continue current medical therapy for heart failure:  Beta-blocker: Continue Carvedilol 12.5 mg BID  ACE/ARB/ARNi: unable to tolerate due to renal dysfunction  Hydralazine/Isordil: Continue Hydralazine 100 mgTID and Isordil to 20 mg TID  MRA: Unable to tolerate due to renal dysfunction  SGLT2 inhibitor: Unable to tolerate due to renal dysfunction  2 g sodium limit  1500cc fluid limit  Daily standing weights  Strict I/Os  Ferritin and iron sat low-->s/p IV iron (Venofer on back order so equivalent dosing of Ferrlecit given x 2 doses)   Labs: Daily CMP, BNP   As per notes had LHC in  at [facility] that showed normal coronaries (will try and request records)  Nuclear stress test  did not show ischemia but did show Inferolateral infarct.      Volume management  Continue Bumex 2 mg PO BID  Did not have good diuretic response to Lasix at home, so will discharge on Bumex this admission  Goal K > 4, Mg > 2, replete PRN    CKD stage 4  Renal function worsening but RHC suggest some ongoing excess volume  Diuretics as above  Nephrology following     Arrhythmia/ICD:  S/p [place] [employer] single chamber ICD  Follow-up with EP cardiologist, Dr. Moreno  ICD interrogation q3 months.     Diabetes:  Last A1c 8, management per hospitalist while inpatient    Hypertension:  BP improved with serial titration of Hydralazine. Target BP<130/80    Suspected JOSE:  Will need sleep studies outpatient    Ok to DC home today, has [facility] appt on     Problem List:  Acute on chronic systolic 
[facility] FAILURE [facility]  [facility] in [place], [place]  Heart Failure Inpatient Note    Patient name: Nolvia Silver  Patient : 1950  Patient MRN: [id number]  Date of service: 24    REASON for REFERRAL  Decompensated HF      INTERVAL EVENTS  No acute events overnight, HDS, still with slightly higher Bps (-140s)  Net negative 2.1 L, weight 127-->123  No complaints, feel well today    ASSESSMENT AND PLAN:  Acute on Chronic decompensated heart failure   Heart failure/Cardiomyopathy (EF 15-20%) : Stage C NYHA class IIIB  Continue current medical therapy for heart failure:  Beta-blocker: Continue Carvedilol 12.5 BID  ACE/ARB/ARNi: not on due to CKD  Hydralazine/Isordil: Increase hydralazine to 75 mgTID and continue isordil to 10 mg TID  MRA: not on due to CKD  SGLT2 inhibitor: Will start if kidney function improves and GFR>30  Repeat echo reviewed--> EF 15-20%  2 g sodium limit  1500cc fluid limit  Daily standing weights  Strict I/Os  Lactic acid normal  Ferritin and iron sat low--> IV Iron ordered (Venofer on back order so equivalent dosing of Ferrlecit given x 2 doses)   Labs: Daily CMP, BNP   NICOM done and CI 2.2  Will need RHC prior to discharge once euvolemic--> plan for Monday  As per notes had LHC in  at [facility] that showed normal coronaries (will try and request records)  Nuclear stress test  did not show ischemia but did show Inferolateral infarct.    Continue Aspirin and Lipitor  Would recommend repeat LHC if kidney function improves but not ideal at this time due to CKD    Volume overload  Bumex IV 2mg to TID  BNP still > 35,000  Goal K > 4, Mg > 2, replete PRN    CKD stage 4  Renal function stable this admission  Nephrology following       Arrhythmia/ICD:  S/p [employer] single chamber ICD  Follow-up with EP cardiologist, Dr. Moreno  ICD interrogation q3 months.     Diabetes:  Last A1c 8, management per hospitalist while 
[facility]  [facility] in [place], [place]  Heart Failure Inpatient Note    Patient name: Nolvia Silver  Patient : 1950  Patient MRN: [id number]  Date of service: 24    REASON for REFERRAL  Decompensated HF      INTERVAL EVENTS  No acute events overnight, HDS, still with slightly higher Bps (-140s)  Net negative 1.7 L, weight 123-->120  No complaints, feel well today  Plan for RHC tomorrow, advised to hold off on eating breakfast until the time of RHC is known    ASSESSMENT AND PLAN:  Acute on Chronic decompensated heart failure   Heart failure/Cardiomyopathy (EF 15-20%) : Stage C NYHA class IIIB  Continue current medical therapy for heart failure:  Beta-blocker: Continue Carvedilol 12.5 BID  ACE/ARB/ARNi: not on due to CKD  Hydralazine/Isordil: Increase hydralazine to 100 mgTID and isordil to 20 mg TID  MRA: not on due to CKD  SGLT2 inhibitor: Will start if kidney function improves and GFR>30  Repeat echo reviewed--> EF 15-20%  2 g sodium limit  1500cc fluid limit  Daily standing weights  Strict I/Os  Lactic acid normal  Ferritin and iron sat low-->s/p IV iron (Venofer on back order so equivalent dosing of Ferrlecit given x 2 doses)   Labs: Daily CMP, BNP   NICOM done and CI 2.2  Will need RHC prior to discharge once euvolemic--> plan for Monday  As per notes had LHC in  at [facility] that showed normal coronaries (will try and request records)  Nuclear stress test  did not show ischemia but did show Inferolateral infarct.    Continue Aspirin and Lipitor  Would recommend repeat LHC if kidney function improves but not ideal at this time due to CKD    Volume overload  Appears close to euvolemia and close to goal weight today.  Decrease 2 mg IV Bumex TID to BID  BNP still > 35,000  Goal K > 4, Mg > 2, replete PRN    CKD stage 4  Renal function worsening but still making good U/O  Nephrology following       Arrhythmia/ICD:  S/p [employer] single 
CATH LAB to RECOVERY ROOM REPORT    Procedure: [facility]    MD: IVAN Gold MD    The procedure was diagnostic only.    Verbal Report given to Recovery Nurse on patient being transferred to Cardiac Cath Lab  for routine post-op. Patient stable upon transfer to .  Vitals, mental status, MAR, procedural summary discussed with recovery RN.    Medication given during procedure:          Sheaths:    .    Right internal jugular vein sheath pulled at 1100 , secured with a band-aid.            
General cardiology will sign off as Pt followed by F for CHF/cardiomyopathy management.  RHC completed per Dr. Gold.  Has followup with Dr. Guardado in 8/28/24.  Thank you.   Future Appointments   Date Time Provider Department Center   7/10/2024  9:00 AM Charlee Morales MD [facility] BS AMB   8/28/2024  2:00 PM Heidi Guardado MD [facility] BS AMB   1/15/2025  9:40 AM AAKASH VARGAS BS AMB   1/15/2025 10:00 AM Mirella Wheatley, APRN - NP RD BS AMB       
NICOM Hemodynamic Monitoring     BP (!) 141/50   Pulse 74   Temp 97.5 °F (36.4 °C) (Oral)   Resp 16   Ht 1.524 m (5')   Wt 59.9 kg (132 lb 0.9 oz)   SpO2 96%   BMI 25.79 kg/m²       Baseline assessment:        CO 3.4        CI 2.2        SVI 32        SVV 52.8                
OCCUPATIONAL THERAPY EVALUATION/DISCHARGE  Patient: Nolvia Silver (74 y.o. female)  Date: 6/26/2024  Primary Diagnosis: Acute on chronic HFrEF (heart failure with reduced ejection fraction) ([facility]) [I50.23]  Acute on chronic congestive heart failure, unspecified heart failure type ([facility]) [I50.9]         Precautions: None                  ASSESSMENT :  Based on the objective data below, the patient presents with BLE edema and weeping, good balance, impaired LE access at baseline and wears slip on shoes only, good activity tolerance, and reports no SOB on room air this session. Patient received sitting at EOB and ambulatory with RW, which patient reports using at all times at baseline. Ambulated to bathroom for toileting and grooming at sink before ambulating in hallway and completed several steps. Patient reports no concerns for discharge, feels she is at her baseline and has supportive family. Will complete OT orders at this time as patient with no acute OT needs, recommend discharge home with family when medically appropriate.     Functional Outcome Measure:  AM-PAC Inpatient Daily Activity Raw Score: 24/24 which is indicative of Cutoff score 39.4 (19) correlates to a good likelihood of discharging home versus a facility.      Further skilled acute occupational therapy is not indicated at this time.     PLAN :  Recommend with staff: up ad vivian in room with RW, assist for line management    Recommendation for discharge: (in order for the patient to meet his/her long term goals): No skilled occupational therapy    Other factors to consider for discharge: no additional factors    IF patient discharges home will need the following DME: patient owns DME required for discharge     SUBJECTIVE:   Patient stated, “I've carried my walker up the stairs too but my son got mad at me when I did that.”    OBJECTIVE DATA SUMMARY:     Past Medical History:   Diagnosis Date    CHF (congestive heart failure) ([facility])    History reviewed. No 
Occupational Therapy  06/30/24    Orders received and chart reviewed, noted patient evaluated 6/26/2024 by OT services and is functioning at reported baseline. Please see prior evaluation for further information.     Lotus Perez, OTD, OTR/L    
PHYSICAL THERAPY EVALUATION/DISCHARGE    Patient: Nolvia Silver (74 y.o. female)  Date: 6/26/2024  Primary Diagnosis: Acute on chronic HFrEF (heart failure with reduced ejection fraction) ([facility]) [I50.23]  Acute on chronic congestive heart failure, unspecified heart failure type ([facility]) [I50.9]       Precautions: None                      ASSESSMENT AND RECOMMENDATIONS:  This is a 74 year old female who presented to hospital with weakness, SOB, BLE edema. Patient received on EOB agreeable to treatment. She overall required supervision for all mobility with use of RW. Patient with slow but steady gait pattern, noted to have some shuffling steps but patient states this is her baseline and due to edema in bilateral feet. She demonstrates stair training with supervision as well (did not attempt 21 steps). Patient reports she never ascends/descends steps without assistance, her family helps to carry her RW and assists if she needs it. Based on the objective data below, the patient appears to be at her functional baseline and has no further acute PT needs. Recommend OOB to chair 3x per day and walking 2x daily to maintain current function.    Functional Outcome Measure:  The patient scored 23/24 on the [facility] outcome measure which is indicative of no need for rehab at discharge.      Further skilled acute physical therapy is not indicated at this time.       PLAN :  Recommendation for discharge: (in order for the patient to meet his/her long term goals): No skilled physical therapy    Other factors to consider for discharge: no additional factors    IF patient discharges home will need the following DME: patient owns DME required for discharge       SUBJECTIVE:   Patient stated “I feel back to normal.”    OBJECTIVE DATA SUMMARY:     Past Medical History:   Diagnosis Date    CHF (congestive heart failure) ([facility])    History reviewed. No pertinent surgical history.    Home Situation and Prior Level of Function: independent with use 
Physical Therapy:     Duplicate order received, chart reviewed, discussed with RN and patient.  Was evaluated on 6/26/204 by PT services and is functioning at her baseline.  She has no further PT needs at this time.  Will sign off.      Joycelyn Echevarria, PT, DPT   
Preceptee, GODWIN Schuler RN, also received report.  Charting and pt care for patient Nolvia Silver performed by preceptee from 0730 to 1930. Documentation and care supervised and reviewed.    
RENAL  PROGRESS NOTE        Subjective:   Doing better    Objective:   VITALS SIGNS:    BP (!) 140/71   Pulse 77   Temp 97.8 °F (36.6 °C) (Oral)   Resp 29   Ht 1.524 m (5')   Wt 58.9 kg (129 lb 13.6 oz)   SpO2 90%   BMI 25.36 kg/m²             Temp (24hrs), Av.8 °F (36.6 °C), Min:97.5 °F (36.4 °C), Max:98 °F (36.7 °C)         PHYSICAL EXAM:  NAD  Legs covered    DATA REVIEW:     INTAKE / OUTPUT:   Last shift:      No intake/output data recorded.  Last 3 shifts:  1901 -  0700  In: 1070 [P.O.:1060; I.V.:10]  Out: 3130 [Urine:3130]    Intake/Output Summary (Last 24 hours) at 2024 0856  Last data filed at 2024 0645  Gross per 24 hour   Intake 660 ml   Output 2080 ml   Net -1420 ml         LABS:   LABS:  Recent Labs     24  0330 24  1300 24  0321 24  1237    139 138 136   K 3.9 3.8 4.2 4.5   * 107 109* 107   CO2  22 22   BUN 68* 68* 68* 67*   CREATININE 2.68* 2.74* 2.55* 2.67*   CALCIUM 7.9* 8.2* 8.4* 8.3*   PHOS  --   --  5.0*  --    MG  --   --  2.4 2.4     Recent Labs     24  0330 24  0321 24  1237   WBC 4.6 4.1 4.6   HGB 11.0* 10.6* 10.9*   HCT 34.4* 33.5* 35.7    139* 151     No results for input(s): \"KU\", \"CLU\" in the last 720 hours.    Invalid input(s): \"SHEMAR\", \"CREAU\", \"PROU\"      Assessment:   CKD-4 ,creat at baseline  Lower ext edema  DM  Low EF     IV diuretics  Creat stable  Will follow       Discussed with her  Dori Simons MD         
RENAL  PROGRESS NOTE        Subjective:   resting    Objective:   VITALS SIGNS:    BP (!) 137/50   Pulse 71   Temp 98 °F (36.7 °C) (Oral)   Resp 14   Ht 1.524 m (5')   Wt 57.6 kg (127 lb)   SpO2 95%   BMI 24.80 kg/m²             Temp (24hrs), Av.6 °F (36.4 °C), Min:97.3 °F (36.3 °C), Max:98 °F (36.7 °C)         PHYSICAL EXAM:  NAD  Legs covered    DATA REVIEW:     INTAKE / OUTPUT:   Last shift:      No intake/output data recorded.  Last 3 shifts:  1901 -  0700  In: 1143 [P.O.:1115; I.V.:20]  Out: 3750 [Urine:3750]    Intake/Output Summary (Last 24 hours) at 2024 0845  Last data filed at 2024 0428  Gross per 24 hour   Intake 675 ml   Output 2850 ml   Net -2175 ml           LABS:   LABS:  Recent Labs     24  0621 24  0330 24  1300 24  0321 24  1237    139 139 138 136   K 3.5 3.9 3.8 4.2 4.5    111* 107 109* 107   CO2 23  24 22 22   BUN 66* 68* 68* 68* 67*   CREATININE 2.68* 2.68* 2.74* 2.55* 2.67*   CALCIUM 8.9 7.9* 8.2* 8.4* 8.3*   PHOS 3.7  --   --  5.0*  --    MG 2.2  --   --  2.4 2.4       Recent Labs     24  0620 24  0330 24  0321   WBC 4.9 4.6 4.1   HGB 11.5 11.0* 10.6*   HCT 35.7 34.4* 33.5*    156 139*       No results for input(s): \"KU\", \"CLU\" in the last 720 hours.    Invalid input(s): \"SHEMAR\", \"CREAU\", \"PROU\"      Assessment:   CKD-4 ,creat at baseline  Lower ext edema  DM  Low EF     IV diuretics  Creat stable  Will follow back on Monday;please call us if any questions over the weekend         Dori Simons MD         
Referral source:   Nolvia SAINI Nadeem at [facility] in [facility] CARDIAC CATH/EP LAB.  attended rounds on the CV Services Unit as part of the Interdisciplinary team where the patient's ongoing care was discussed. I reviewed the medical record as part of this encounter.     Outcome: Interdisciplinary team are aware of  availability and were encouraged to request Spiritual Health support as needed.      The  on-call can be reached at [phone]).     Rev. Maxine Deng MDiv, [facility]  Staff     
Referral source:   Nolvia Silver at [facility] in [facility] 4 CV SERVICES UNIT.  attended rounds on the CV Services Unit as part of the Interdisciplinary team where the patient's ongoing care was discussed. I reviewed the medical record as part of this encounter.     Outcome: Interdisciplinary team are aware of  availability and were encouraged to request Spiritual Health support as needed.      The  on-call can be reached at [phone]).     Rev. Maxine Deng MDiv, [facility]  Staff     
Spiritual Care Assessment/Progress Note  [facility]    Name: Nolvia Silver MRN: [id number]    Age: 74 y.o.     Sex: female   Language: English     Date: 6/26/2024            Total Time Calculated: 37 min              Spiritual Assessment begun in [facility] CV SERVICES UNIT  Service Provided For: Patient  Referral/Consult From: Rounding  Encounter Overview/Reason: Initial Encounter, Spiritual/Emotional Needs    Spiritual beliefs:      [x] Involved in a eliza tradition/spiritual practice: Ayush     [x] Supported by a eliza community: [place] [email or web address] [place]     [] Claims no spiritual orientation:      [] Seeking spiritual identity:           [] Adheres to an individual form of spirituality:      [] Not able to assess:                Identified resources for coping and support system:   Support System: Spouse, Family members, Church/eliza community       [] Prayer                  [] Devotional reading               [] Music                  [] Guided Imagery     [] Pet visits                                        [] Other:Reading the Bible     Specific area/focus of visit   Encounter:    Crisis:    Spiritual/Emotional needs: Type: Spiritual Support  Ritual, Rites and Sacraments:    Grief, Loss, and Adjustments: Type: Adjustment to illness  Ethics/Mediation:    Behavioral Health:    Palliative Care:    Advance Care Planning:           Narrative: Referral source:  initiated visit to Nolvia Silver at [facility] in [facility] 4 CV SERVICES UNIT. I reviewed the medical record prior to this encounter.     Spiritual Assessment:      Outcome: Mrs Nolvia Silver was sitting on the side of her bed. She appeared very welcoming as she shared that she was feeling better since her admission last evening. The oldest of 10 siblings (8 siblings still living), Ms De Souza is , has 2 adult children, 4 grands and 5 great grands - one born 2 weeks ago and  another great grand due in August. She is a retired leasing 
TRANSFER - IN Cath Lab RR REPORT:    Verbal report received from Connie CRUZ on Nolvia Silver  being received from CVSU room 453 for routine progression of patient care      Report consisted of patient's Situation, Background, Assessment and   Recommendations(SBAR).     Information from the following report(s) Nurse Handoff Report, Intake/Output, MAR, and Cardiac Rhythm SR  was reviewed with the receiving nurse.    Opportunity for questions and clarification was provided.      Assessment completed upon patient's arrival to unit and care assumed.    [facility] for 1315. NPO since MN  
TRANSFER - IN REPORT:    Verbal report received from ANTHONY Mazariegos on Nolvia Silver  being received from Cath Lab for routine post-op. Report consisted of patient’s Situation, Background, Assessment and Recommendations(SBAR). Information from the following report(s) SBAR, Procedure Summary, Intake/Output, MAR, Recent Results, Med Rec Status, and Cardiac Rhythm NSR  was reviewed with the receiving clinician. Opportunity for questions and clarification was provided. Assessment completed upon patient’s arrival to Cardiac Cath Lab RECOVERY AREA and care assumed.       Cardiac Cath Lab Recovery Arrival Note:    Nolvia Silver arrived to [facility] recovery area.  Patient procedure= RHC. Patient on cardiac monitor, non-invasive blood pressure, SPO2 monitor. On room air  Patient status doing well without problems. Patient is A&Ox 4. Patient reports no complaints.    PROCEDURE SITE CHECK:    Procedure site:without any bleeding or hematoma, no pain/discomfort reported at procedure site.     No change in patient status. Continue to monitor patient and status.   
TRANSFER - OUT REPORT:    Verbal report given to ANTHONY Alicia  on Nolvia Silver  being transferred to CVSU for routine progression of patient care       Report consisted of patient's Situation, Background, Assessment and   Recommendations(SBAR).     Information from the following report(s) Nurse Handoff Report, Surgery Report, Intake/Output, MAR, Med Rec Status, and Cardiac Rhythm NSR  was reviewed with the receiving nurse.           Lines:   Peripheral IV 06/27/24 Right;Anterior Forearm (Active)   Site Assessment Clean, dry & intact 07/01/24 0330   Line Status Capped;Flushed;Normal saline locked 06/30/24 2048   Line Care Connections checked and tightened 06/30/24 2048   Phlebitis Assessment No symptoms 06/30/24 2048   Infiltration Assessment 0NSR 06/30/24 2048   Alcohol Cap Used Yes 06/30/24 2048   Dressing Status Clean, dry & intact 06/30/24 2048   Dressing Type Transparent 06/30/24 2048   Dressing Intervention New 06/27/24 2100        Opportunity for questions and clarification was provided.      Patient transported with:  Monitor and Registered Nurse       
07/01/2024 03:47 AM    CREATININE 3.15 07/01/2024 03:47 AM    GLUCOSE 239 07/01/2024 03:47 AM    CALCIUM 9.0 07/01/2024 03:47 AM    LABGLOM 15 07/01/2024 03:47 AM    LABGLOM 15 04/15/2024 04:46 AM    LABGLOM 13 02/15/2023 03:10 AM    LABGLOM 15 04/20/2022 11:10 AM        Wt Readings from Last 3 Encounters:   07/01/24 54.2 kg (119 lb 7.8 oz)   04/25/24 53.5 kg (118 lb)   04/15/24 53.5 kg (117 lb 15.1 oz)         Intake/Output Summary (Last 24 hours) at 7/1/2024 1359  Last data filed at 7/1/2024 1148  Gross per 24 hour   Intake 220 ml   Output 2365 ml   Net -2145 ml       Patient seen and examined. Chart reviewed. Labs, data and other pertinent notes reviewed in last 24 hrs.    PMH/SH/FH reviewed and unchanged compared to H&P    Discussed with patient      Manoj Weaver MD           
CREATININE 3.21 07/03/2024 03:39 AM    GLUCOSE 166 07/03/2024 03:39 AM    CALCIUM 9.2 07/03/2024 03:39 AM    LABGLOM 15 07/03/2024 03:39 AM    LABGLOM 15 04/15/2024 04:46 AM    LABGLOM 13 02/15/2023 03:10 AM    LABGLOM 15 04/20/2022 11:10 AM        Wt Readings from Last 3 Encounters:   07/03/24 53.6 kg (118 lb 2.7 oz)   04/25/24 53.5 kg (118 lb)   04/15/24 53.5 kg (117 lb 15.1 oz)         Intake/Output Summary (Last 24 hours) at 7/3/2024 1223  Last data filed at 7/3/2024 1103  Gross per 24 hour   Intake 400 ml   Output 950 ml   Net -550 ml         Patient seen and examined. Chart reviewed. Labs, data and other pertinent notes reviewed in last 24 hrs.    PMH/SH/FH reviewed and unchanged compared to H&P      Manoj Weaver MD           
      No results for input(s): \"INR\", \"APTT\" in the last 72 hours.    Invalid input(s): \"PTP\"   No results for input(s): \"TIBC\" in the last 72 hours.    Invalid input(s): \"FE\", \"PSAT\", \"FERR\"     No results found for: \"RBCF\"   No results for input(s): \"PH\", \"PCO2\", \"PO2\" in the last 72 hours.  No results for input(s): \"CPK\" in the last 72 hours.    Invalid input(s): \"CPKMB\", \"CKNDX\", \"TROIQ\"  Lab Results   Component Value Date/Time    CHOL 60 06/26/2024 03:21 AM    HDL 24 06/26/2024 03:21 AM    LDL 20.6 06/26/2024 03:21 AM    LDL 32.8 04/10/2024 04:31 AM     No results found for: \"GLUCPOC\"  [unfilled]      Medications Reviewed:     Current Facility-Administered Medications   Medication Dose Route Frequency    bumetanide (BUMEX) injection 2 mg  2 mg IntraVENous BID    hydrALAZINE (APRESOLINE) tablet 100 mg  100 mg Oral 3 times per day    isosorbide dinitrate (ISORDIL) tablet 20 mg  20 mg Oral BID    bacitracin ointment   Topical Daily    aspirin EC tablet 81 mg  81 mg Oral Daily    carvedilol (COREG) tablet 12.5 mg  12.5 mg Oral BID WC    mirtazapine (REMERON) tablet 15 mg  15 mg Oral Nightly    atorvastatin (LIPITOR) tablet 10 mg  10 mg Oral Daily    insulin lispro (HUMALOG,ADMELOG) injection vial 0-8 Units  0-8 Units SubCUTAneous TID WC    insulin lispro (HUMALOG,ADMELOG) injection vial 0-4 Units  0-4 Units SubCUTAneous Nightly    sodium chloride flush 0.9 % injection 5-40 mL  5-40 mL IntraVENous 2 times per day    sodium chloride flush 0.9 % injection 5-40 mL  5-40 mL IntraVENous PRN    0.9 % sodium chloride infusion   IntraVENous PRN    ondansetron (ZOFRAN-ODT) disintegrating tablet 4 mg  4 mg Oral Q8H PRN    Or    ondansetron (ZOFRAN) injection 4 mg  4 mg IntraVENous Q6H PRN    polyethylene glycol (GLYCOLAX) packet 17 g  17 g Oral Daily PRN    acetaminophen (TYLENOL) tablet 650 mg  650 mg Oral Q6H PRN    Or    acetaminophen (TYLENOL) suppository 650 mg  650 mg Rectal Q6H PRN    heparin (porcine) injection 5,000 
in the last 72 hours.    Invalid input(s): \"FE\", \"PSAT\", \"FERR\"     No results found for: \"RBCF\"   No results for input(s): \"PH\", \"PCO2\", \"PO2\" in the last 72 hours.  No results for input(s): \"CPK\" in the last 72 hours.    Invalid input(s): \"CPKMB\", \"CKNDX\", \"TROIQ\"  Lab Results   Component Value Date/Time    CHOL 60 06/26/2024 03:21 AM    HDL 24 06/26/2024 03:21 AM    LDL 20.6 06/26/2024 03:21 AM    LDL 32.8 04/10/2024 04:31 AM     No results found for: \"GLUCPOC\"  [unfilled]      Medications Reviewed:     Current Facility-Administered Medications   Medication Dose Route Frequency    bumetanide (BUMEX) injection 2 mg  2 mg IntraVENous BID    hydrALAZINE (APRESOLINE) tablet 100 mg  100 mg Oral 3 times per day    isosorbide dinitrate (ISORDIL) tablet 20 mg  20 mg Oral BID    bacitracin ointment   Topical Daily    aspirin EC tablet 81 mg  81 mg Oral Daily    carvedilol (COREG) tablet 12.5 mg  12.5 mg Oral BID WC    mirtazapine (REMERON) tablet 15 mg  15 mg Oral Nightly    atorvastatin (LIPITOR) tablet 10 mg  10 mg Oral Daily    insulin lispro (HUMALOG,ADMELOG) injection vial 0-8 Units  0-8 Units SubCUTAneous TID     insulin lispro (HUMALOG,ADMELOG) injection vial 0-4 Units  0-4 Units SubCUTAneous Nightly    sodium chloride flush 0.9 % injection 5-40 mL  5-40 mL IntraVENous 2 times per day    sodium chloride flush 0.9 % injection 5-40 mL  5-40 mL IntraVENous PRN    0.9 % sodium chloride infusion   IntraVENous PRN    ondansetron (ZOFRAN-ODT) disintegrating tablet 4 mg  4 mg Oral Q8H PRN    Or    ondansetron (ZOFRAN) injection 4 mg  4 mg IntraVENous Q6H PRN    polyethylene glycol (GLYCOLAX) packet 17 g  17 g Oral Daily PRN    acetaminophen (TYLENOL) tablet 650 mg  650 mg Oral Q6H PRN    Or    acetaminophen (TYLENOL) suppository 650 mg  650 mg Rectal Q6H PRN    heparin (porcine) injection 5,000 Units  5,000 Units SubCUTAneous 3 times per day 
systolic function.    Aortic Valve: Mild regurgitation.    Mitral Valve: Moderate regurgitation.    Left Atrium: Left atrium is severely dilated.    Right Atrium: Lead present in the right atrium. Right atrium is dilated.    Pericardium: There is evidence of epicardial fat. Trivial pericardial effusion present.  Echo 5/31/18  Left ventricle: The ventricle was mildly dilated. Systolic function was  severely reduced by visual assessment. Ejection fraction was estimated to  be 25 %. There was severe diffuse hypokinesis with distinct regional wall  motion abnormalities. Wall thickness was at the upper limits of normal.    NM stress test 2/14/2023  IMPRESSION:  No ischemia. Inferolateral infarct. LVEF 27%.    LHC- apparently done in [facility] at [facility] and showed clean coronaries--> need to request records    RHC 7/1/24: RA 9, RV 72/7, RVEDP 13, PA 74/26 (46), PCW 20, skilled nursing 2.52, TDCI 2.49, PVR 6.96      REVIEW OF SYSTEMS:  Review of Systems   Constitutional:  Negative for activity change and fatigue.   Respiratory:  Negative for cough and shortness of breath.    Cardiovascular:  Negative for leg swelling.   Gastrointestinal:  Negative for constipation, diarrhea and nausea.   Genitourinary:  Negative for dysuria and frequency.   Neurological:  Negative for dizziness.      PHYSICAL EXAM:  /77   Pulse 80   Temp 97.6 °F (36.4 °C) (Oral)   Resp 20   Ht 1.524 m (5')   Wt 54.3 kg (119 lb 11.4 oz)   SpO2 97%   BMI 23.38 kg/m²     Physical Exam  HENT:      Head: Normocephalic.      Nose: Nose normal.      Mouth/Throat:      Mouth: Mucous membranes are moist.   Eyes:      Conjunctiva/sclera: Conjunctivae normal.   Neck:      Vascular: No JVD.   Cardiovascular:      Rate and Rhythm: Normal rate and regular rhythm.      Pulses: Normal pulses.      Heart sounds: Normal heart sounds.   Pulmonary:      Effort: Pulmonary effort is normal.      Breath sounds: Normal breath sounds.   Abdominal:      General: There is no 
ECG: Resting ECG shows non-specific ST-segment abnormalities.    Stress ECG: Arrhythmias during recovery: PVCs.    MPI: No ischemia. Inferolateral infarct. LVEF 27%.    Rest and Lexiscan myocardial perfusion SPECT imaging is performed with IV injections of 10.6 and 32.6 mCi technetium 99m Sestamibi respectively.    SPECT quantitative perfusion analysis and images displayed in three planes demonstrate large inferolateral transmural absence of radiotracer on both sequences compatible with infarct. There is no ischemic reversibility.    Gated SPECT quantitative analysis and images reviewed in 3 planes exhibit lateral wall akinesis and lack of LV systolic wall thickening. The calculated LV ejection fraction is 27%. Pulmonary uptake is normal. LV cavity size appears slightly large on both sequences.  Impression  : No ischemia. Inferolateral infarct. LVEF 27%.        Systolic CHF with normal coronaries.   HTN  Seen in 4/2010 with an EF of 20% and previous normal coronaries, had pleural effusions with thoracentesis  Echo 5/13/10 EF 35%  cath 5/26/10 EF 25%, normal hemodynamics, CO 4.4, normal cors  AICD placed in October 2017. s/p [employer] single lead ICD (DOI 10/10/2017) for NICM & CHF (NYHA II).  Echo 5-31-18 EF 25% mod LAE, mild MR  Dr. Miller (nephrology) .   Known hesitancy for any new medications  Echo 8/23: EF 20-25%, severely dilated LV, severe GK with akinesis of mid to basal anterolateral wall   Echo 2/24: EF 20-25%, mildly dilated LV, severe GK, abnormal dd, normal RV function, mild to mod MR  Echo 4/24: EF 15-20%, severely dilated LV, severe GK, RV with reduced systolic function, mild AI, mod MR      Most recent HS troponins:  Recent Labs     06/25/24  1237   TROPHS 25       ECG: normal sinus rhythm, LPFB present, inverted T wave inferior leads    Review of Systems:    [x]All other systems reviewed and all negative except as written in HPI    [] Patient unable to provide secondary to 
PRN    acetaminophen (TYLENOL) tablet 650 mg  650 mg Oral Q6H PRN    Or    acetaminophen (TYLENOL) suppository 650 mg  650 mg Rectal Q6H PRN    heparin (porcine) injection 5,000 Units  5,000 Units SubCUTAneous 3 times per day     ______________________________________________________________________  EXPECTED LENGTH OF STAY: 5  ACTUAL LENGTH OF STAY:          3                 David M Milligram, PA-C     
Q6H PRN    Or    acetaminophen (TYLENOL) suppository 650 mg  650 mg Rectal Q6H PRN    heparin (porcine) injection 5,000 Units  5,000 Units SubCUTAneous 3 times per day     ______________________________________________________________________  EXPECTED LENGTH OF STAY: 9  ACTUAL LENGTH OF STAY:          7                 Afia Francisco MD     
injection 5-40 mL  5-40 mL IntraVENous PRN    0.9 % sodium chloride infusion   IntraVENous PRN    ondansetron (ZOFRAN-ODT) disintegrating tablet 4 mg  4 mg Oral Q8H PRN    Or    ondansetron (ZOFRAN) injection 4 mg  4 mg IntraVENous Q6H PRN    polyethylene glycol (GLYCOLAX) packet 17 g  17 g Oral Daily PRN    acetaminophen (TYLENOL) tablet 650 mg  650 mg Oral Q6H PRN    Or    acetaminophen (TYLENOL) suppository 650 mg  650 mg Rectal Q6H PRN    heparin (porcine) injection 5,000 Units  5,000 Units SubCUTAneous 3 times per day     ______________________________________________________________________  EXPECTED LENGTH OF STAY: 2  ACTUAL LENGTH OF STAY:          1                 David M Milligram, PA-C     
Reactions    Adhesive Tape     Amoxicillin Hives    Codeine Hives and Other (See Comments)    Honey Itching and Other (See Comments)     allergic to therahoney    Penicillin G Dermatitis    Penicillins Hives and Other (See Comments)    Iodine I 131 Albumin Rash       CURRENT MEDICATIONS:    Current Facility-Administered Medications:     hydrALAZINE (APRESOLINE) tablet 25 mg, 25 mg, Oral, 3 times per day, Mellissa Lima, DOUGIE Zepeda NP, 25 mg at 06/27/24 1350    isosorbide dinitrate (ISORDIL) tablet 10 mg, 10 mg, Oral, BID, Hugo Zhong MD, 10 mg at 06/27/24 1350    bumetanide (BUMEX) injection 2 mg, 2 mg, IntraVENous, TID, Hugo Zhong MD    bacitracin ointment, , Topical, Daily, Milligram, Kimo BOLTON PA-C    ferric gluconate (FERRLECIT) 125 mg in sodium chloride 0.9 % 100 mL IVPB, 125 mg, IntraVENous, Daily, Mellissa Lima APRN - NP, Stopped at 06/26/24 1855    aspirin EC tablet 81 mg, 81 mg, Oral, Daily, Harleen Perez MD, 81 mg at 06/27/24 0956    carvedilol (COREG) tablet 12.5 mg, 12.5 mg, Oral, BID , Harleen Perez MD, 12.5 mg at 06/27/24 0956    mirtazapine (REMERON) tablet 15 mg, 15 mg, Oral, Nightly, Harleen Perez MD, 15 mg at 06/26/24 2104    atorvastatin (LIPITOR) tablet 10 mg, 10 mg, Oral, Daily, Harleen Perez MD, 10 mg at 06/27/24 0956    insulin lispro (HUMALOG,ADMELOG) injection vial 0-8 Units, 0-8 Units, SubCUTAneous, TID , Harleen Perez MD, 6 Units at 06/27/24 1137    insulin lispro (HUMALOG,ADMELOG) injection vial 0-4 Units, 0-4 Units, SubCUTAneous, Nightly, Harleen Perez MD, 4 Units at 06/26/24 2248    sodium chloride flush 0.9 % injection 5-40 mL, 5-40 mL, IntraVENous, 2 times per day, Harleen Perez MD, 10 mL at 06/27/24 1004    sodium chloride flush 0.9 % injection 5-40 mL, 5-40 mL, IntraVENous, PRN, Harleen Perez MD    0.9 % sodium chloride infusion, , IntraVENous, PRN, Harleen Perez MD    ondansetron (ZOFRAN-ODT) disintegrating tablet 4 mg, 4 mg, Oral, Q8H PRN **OR** 
07/01/24 1144    insulin lispro (HUMALOG,ADMELOG) injection vial 0-8 Units, 0-8 Units, SubCUTAneous, TID WC, Harleen Perez MD, 6 Units at 07/01/24 1644    insulin lispro (HUMALOG,ADMELOG) injection vial 0-4 Units, 0-4 Units, SubCUTAneous, Nightly, Harleen Perez MD, 4 Units at 06/26/24 2248    sodium chloride flush 0.9 % injection 5-40 mL, 5-40 mL, IntraVENous, 2 times per day, Harleen Perez MD, 10 mL at 06/30/24 2038    sodium chloride flush 0.9 % injection 5-40 mL, 5-40 mL, IntraVENous, PRN, Harleen Perez MD    0.9 % sodium chloride infusion, , IntraVENous, PRN, Harleen Perez MD    ondansetron (ZOFRAN-ODT) disintegrating tablet 4 mg, 4 mg, Oral, Q8H PRN **OR** ondansetron (ZOFRAN) injection 4 mg, 4 mg, IntraVENous, Q6H PRN, Harleen Perez MD    polyethylene glycol (GLYCOLAX) packet 17 g, 17 g, Oral, Daily PRN, Harleen Perez MD    acetaminophen (TYLENOL) tablet 650 mg, 650 mg, Oral, Q6H PRN, 650 mg at 07/01/24 1150 **OR** acetaminophen (TYLENOL) suppository 650 mg, 650 mg, Rectal, Q6H PRN, Harleen Perez MD    heparin (porcine) injection 5,000 Units, 5,000 Units, SubCUTAneous, 3 times per day, Harleen Perez MD    Thank you for your referral and allowing me to participate in this patient's care.    Bird Zhong MD  [facility]  [facility]  [address] [place] [address], [address]  Phone: [phone]  Fax: [phone]    PATIENT CARE TEAM:  Patient Care Team:  Heidi Guardado MD as Consulting Physician (Cardiology)    
MD    polyethylene glycol (GLYCOLAX) packet 17 g, 17 g, Oral, Daily PRN, Harleen Perez MD    acetaminophen (TYLENOL) tablet 650 mg, 650 mg, Oral, Q6H PRN **OR** acetaminophen (TYLENOL) suppository 650 mg, 650 mg, Rectal, Q6H PRN, Harleen Perez MD    heparin (porcine) injection 5,000 Units, 5,000 Units, SubCUTAneous, 3 times per day, Harleen Perez MD    Thank you for your referral and allowing me to participate in this patient's care.    Mellissa Lima [facility], FNP-BC  Advanced Care Nurse Practitioner    [facility]  [facility]  [address], [address]  Phone: [phone]  Fax: [phone]    PATIENT CARE TEAM:  Patient Care Team:  Heidi Guardado MD as Consulting Physician (Cardiology)    60 minutes of time spent in chart preparation, reviewing recent laboratories, reviewing imaging studies, reviewing physician and advanced practitioner notes, providing heart failure specific education, updating medications and arranging outpatient/follow-up services.  (> 50% spent face-to-face counseling)

## 2024-07-03 NOTE — CARDIO/PULMONARY
Chart reviewed: Patient is 74 y.o. female admitted with Acute on chronic HFrEF (heart failure with reduced ejection fraction) ([facility]) [I50.23]  Acute on chronic congestive heart failure, unspecified heart failure type ([facility]) [I50.9]. Most recent EF is EF: 15%.      Patient meets criteria for cardiac rehab enrollment with a diagnosis of SHF. Due to the patient's non-compliance and lower leg wound, patient is not a candidate for cardiac rehab.     RADHA CHIN RN

## 2024-07-03 NOTE — CARE COORDINATION
MISSY    Patient is ready for d/c this date. CM met w patient at bedside to discuss the d/c plan w going home and to review an important message from Medicare. Patient verbalized understanding and gave permission for possible discharge within 4 hours of receiving IMM. All questions have been answered and son will transport home around 2pm.  CM wished patient well.    CELINE Mahan CCM  Care Management

## 2024-07-03 NOTE — PLAN OF CARE
0800: BLE wound care completed.     1130: Notified MD Nolan about patient's blood sugar being 357. Per MD Nolan give an additional 7 units on top of sliding scale.     1300: I have reviewed discharge instruction with Patient. Time for questions was allowed and the Patient verbalized understanding. The following were sent with the patient upon discharge.    Red Cardiac Surgery Bracelet: N/A  Valve Card: N/A  Antibiotic Card (for Valves): N/A  Stent Card: N/A  Pacemaker home transmitter: N/A  Signed Prescriptions (controlled substances and/or no pharmacy on record): N/A  DME: no      Care Plan:  Problem: Discharge Planning  Goal: Discharge to home or other facility with appropriate resources  7/3/2024 0842 by Ale Becerril RN  Outcome: Progressing  7/2/2024 2148 by Dulce Mane RN  Outcome: Progressing     Problem: Safety - Adult  Goal: Free from fall injury  7/3/2024 0842 by Ale Becerril RN  Outcome: Progressing  7/2/2024 2148 by Dulce Mane RN  Outcome: Progressing     Problem: Chronic Conditions and Co-morbidities  Goal: Patient's chronic conditions and co-morbidity symptoms are monitored and maintained or improved  7/3/2024 0842 by Ale Becerril RN  Outcome: Progressing  7/2/2024 2148 by Dulce Mane RN  Outcome: Progressing     Problem: Pain  Goal: Verbalizes/displays adequate comfort level or baseline comfort level  7/3/2024 0842 by Ale Becerril RN  Outcome: Progressing  7/2/2024 2148 by Dulce Mane RN  Outcome: Progressing     Problem: Skin/Tissue Integrity  Goal: Absence of new skin breakdown  Description: 1.  Monitor for areas of redness and/or skin breakdown  2.  Assess vascular access sites hourly  3.  Every 4-6 hours minimum:  Change oxygen saturation probe site  4.  Every 4-6 hours:  If on nasal continuous positive airway pressure, respiratory therapy assess nares and determine need for appliance change or resting period.  7/3/2024 0842 by Ale Becerril

## 2024-07-03 NOTE — DISCHARGE SUMMARY
FE     glipiZIDE 5 MG tablet  Commonly known as: GLUCOTROL  Take 0.5 tablets by mouth daily     mirtazapine 15 MG tablet  Commonly known as: REMERON  Take 1 tablet by mouth nightly     potassium chloride 20 MEQ Tbcr extended release tablet  Commonly known as: K-TAB  Take 1 tablet by mouth daily     simvastatin 20 MG tablet  Commonly known as: ZOCOR  TAKE 1 TABLET BY MOUTH NIGHTLY            STOP taking these medications      furosemide 40 MG tablet  Commonly known as: LASIX               Where to Get Your Medications        These medications were sent to [facility]/pharmacy #[id number] - [place], [place] - [address] - P [phone] - F [phone]  [address], [facility] [address]      Phone: [phone]   bumetanide 2 MG tablet  hydrALAZINE 100 MG tablet  isosorbide dinitrate 20 MG tablet           NOTIFY YOUR PHYSICIAN FOR ANY OF THE FOLLOWING:   Fever over 101 degrees for 24 hours.   Chest pain, shortness of breath, fever, chills, nausea, vomiting, diarrhea, change in mentation, falling, weakness, bleeding. Severe pain or pain not relieved by medications.  Or, any other signs or symptoms that you may have questions about.    DISPOSITION:    Home With:   OT  PT  HH  RN       Long term SNF/Inpatient Rehab   x Independent/assisted living    Hospice    Other:       PATIENT CONDITION AT DISCHARGE:     Functional status    Poor     Deconditioned    x Independent      Cognition    x Lucid     Forgetful     Dementia      Catheters/lines (plus indication)    Milian     PICC     PEG    x None      Code status     Full code    x DNR      PHYSICAL EXAMINATION AT DISCHARGE:    General : alert x 3, awake, no acute distress,   HEENT: PEERL, EOMI, moist mucus membrane  Neck: supple, no JVD, no meningeal signs  Chest: Clear to auscultation bilaterally   CVS: S1 S2 heard, Capillary refill less than 2 seconds  Abd: soft/ Non tender, non distended, BS physiological,   Ext: no clubbing, no cyanosis, no edema, brisk 2+ DP

## 2024-07-05 NOTE — PROGRESS NOTES
[facility]  [facility] in [place], [place]  Outpatient Clinic Note    Patient name: Nolvia Silver  Patient : 1950  Patient MRN: [id number]  Date of service: 24    Chief Complaint   Patient presents with    Follow-Up from Hospital        INTERVAL EVENTS  24 Nolvia Silver presents for hospital follow up, she states she feels well today and is happy to be home. Her weight has continued to come down on the bumex and she denies worsening SOB and her fatigue has improved. She will need to follow up with nephrology in the next few weeks and she will see CAV in August.   VSS  Labs from  reviewed      ASSESSMENT AND PLAN:  Chronic decompensated heart failure   Continue current medical therapy for heart failure:  Beta-blocker: Continue Carvedilol 12.5 mg BID  ACE/ARB/ARNi: unable to tolerate due to renal dysfunction  Hydralazine/Isordil: Continue Hydralazine 100 mg TID and Isordil to 20 mg TID  MRA: Unable to tolerate due to renal dysfunction  SGLT2 inhibitor: Unable to tolerate due to renal dysfunction  2 g sodium limit  1500cc fluid limit  Trend home weights   S/p IV iron  while admitted  Labs: Repeat HF labs in 1 month   Repeat TTE in 6 months- order at next visit      Volume management  Continue Bumex 2 mg PO BID    CKD stage 4  Renal function worsening but RHC suggest some ongoing excess volume  Diuretics as above  Will need to follow up with nephrology      Arrhythmia/ICD:  S/p [employer] single chamber ICD  Follow-up with EP cardiologist, Dr. Moreno  ICD interrogation q3 months.     Diabetes:  Last A1c 8, managed on glipizide, per PCP     Hypertension:  BP improved with serial titration of Hydralazine. Target BP<130/80    Suspected JOSE:  Will need sleep studies outpatient- will most likely do a home study but referral sent     Return in about 3 months (around 10/8/2024).     Problem List:  Acute on chronic systolic heart failure  Stage C  Non ischemic

## 2024-07-08 ENCOUNTER — OFFICE VISIT (OUTPATIENT)
Age: 74
End: 2024-07-08
Payer: MEDICARE

## 2024-07-08 VITALS
OXYGEN SATURATION: 97 % | DIASTOLIC BLOOD PRESSURE: 64 MMHG | HEIGHT: 59 IN | BODY MASS INDEX: 23.18 KG/M2 | SYSTOLIC BLOOD PRESSURE: 112 MMHG | RESPIRATION RATE: 16 BRPM | TEMPERATURE: 97 F | WEIGHT: 115 LBS | HEART RATE: 80 BPM

## 2024-07-08 DIAGNOSIS — I50.22 CHRONIC SYSTOLIC (CONGESTIVE) HEART FAILURE (HCC): Primary | ICD-10-CM

## 2024-07-08 DIAGNOSIS — Z79.899 ENCOUNTER FOR MONITORING DIURETIC THERAPY: ICD-10-CM

## 2024-07-08 DIAGNOSIS — Z51.81 ENCOUNTER FOR MONITORING DIURETIC THERAPY: ICD-10-CM

## 2024-07-08 PROCEDURE — 1123F ACP DISCUSS/DSCN MKR DOCD: CPT | Performed by: NURSE PRACTITIONER

## 2024-07-08 PROCEDURE — 3078F DIAST BP <80 MM HG: CPT | Performed by: NURSE PRACTITIONER

## 2024-07-08 PROCEDURE — 3074F SYST BP LT 130 MM HG: CPT | Performed by: NURSE PRACTITIONER

## 2024-07-08 PROCEDURE — 99214 OFFICE O/P EST MOD 30 MIN: CPT | Performed by: NURSE PRACTITIONER

## 2024-07-08 RX ORDER — BUMETANIDE 2 MG/1
2 TABLET ORAL 2 TIMES DAILY
Qty: 60 TABLET | Refills: 3 | Status: SHIPPED | OUTPATIENT
Start: 2024-07-08 | End: 2024-11-05

## 2024-07-08 ASSESSMENT — PATIENT HEALTH QUESTIONNAIRE - PHQ9
SUM OF ALL RESPONSES TO PHQ QUESTIONS 1-9: 1
1. LITTLE INTEREST OR PLEASURE IN DOING THINGS: NOT AT ALL
SUM OF ALL RESPONSES TO PHQ QUESTIONS 1-9: 1
2. FEELING DOWN, DEPRESSED OR HOPELESS: SEVERAL DAYS
SUM OF ALL RESPONSES TO PHQ9 QUESTIONS 1 & 2: 1

## 2024-07-08 NOTE — PATIENT INSTRUCTIONS
Medication changes:    none    Testing Ordered:    Lab work has been ordered for August Please present to a [facility] location of your choice with the orders provided to have lab work done. If results are normal you will just receive a message through my chart. Please make sure to have an active my chart account. Having issues logging in? Contact the [facility] Help Desk at [phone].Our office will notify you of any abnormal results requiring changes to your current plan of care.       Referrals:    A referral to sleep medicine has been placed. You will be contacted for scheduling. If you don't hear from them in 2 weeks please call and schedule your appointment with Sleep Medicine.  You can call one of the numbers listed below.            [address]   [place] [address]   Tel.  [phone]   Fax. [phone] [address]   [place] [address]   Tel.  [phone]   Fax. [phone]  [address] [place] [address]   Tel.  [phone]   Fax. [phone]       Other Recommendations:     Please follow up with nephrology and primary care     - record blood pressure and heart rate daily before medication and two hours after medication  - record weight daily upon waking/after using the bathroom.   - keep a written records of your weights and blood pressure and bring to your next appointment.   - Call the clinic at if you have a weight gain of 3 or more pounds overnight OR 5 or more pounds in one week, new/worsened shortness of breath or swelling, or if your blood pressure begins to consistently run below 90/60 and/or you begin to experience dizziness or lightheadedness. Our office phone number [phone] option 2.  - Ensure you are drinking an adequate amount of water with a goal of 6-8 eight ounce glasses (1.5-2 liters) of fluid daily. Your urine should be clear and light yellow straw colored.       Follow up in 3 months with [facility]

## 2024-07-09 ENCOUNTER — TELEPHONE (OUTPATIENT)
Age: 74
End: 2024-07-09

## 2024-07-18 DIAGNOSIS — I50.22 CHRONIC SYSTOLIC (CONGESTIVE) HEART FAILURE (HCC): ICD-10-CM

## 2024-07-18 DIAGNOSIS — Z51.81 ENCOUNTER FOR MONITORING DIURETIC THERAPY: ICD-10-CM

## 2024-07-18 DIAGNOSIS — Z79.899 ENCOUNTER FOR MONITORING DIURETIC THERAPY: ICD-10-CM

## 2024-07-19 LAB
ALBUMIN SERPL-MCNC: 4.1 G/DL (ref 3.5–5)
ALBUMIN/GLOB SERPL: 1 (ref 1.1–2.2)
ALP SERPL-CCNC: 126 U/L (ref 45–117)
ALT SERPL-CCNC: 34 U/L (ref 12–78)
ANION GAP SERPL CALC-SCNC: 10 MMOL/L (ref 5–15)
AST SERPL-CCNC: 40 U/L (ref 15–37)
BILIRUB SERPL-MCNC: 0.7 MG/DL (ref 0.2–1)
BUN SERPL-MCNC: 99 MG/DL (ref 6–20)
BUN/CREAT SERPL: 32 (ref 12–20)
CALCIUM SERPL-MCNC: 9.5 MG/DL (ref 8.5–10.1)
CHLORIDE SERPL-SCNC: 105 MMOL/L (ref 97–108)
CO2 SERPL-SCNC: 22 MMOL/L (ref 21–32)
CREAT SERPL-MCNC: 3.14 MG/DL (ref 0.55–1.02)
GLOBULIN SER CALC-MCNC: 4.2 G/DL (ref 2–4)
GLUCOSE SERPL-MCNC: 113 MG/DL (ref 65–100)
MAGNESIUM SERPL-MCNC: 2.4 MG/DL (ref 1.6–2.4)
NT PRO BNP: 8318 PG/ML
POTASSIUM SERPL-SCNC: 5.5 MMOL/L (ref 3.5–5.1)
PROT SERPL-MCNC: 8.3 G/DL (ref 6.4–8.2)
SODIUM SERPL-SCNC: 137 MMOL/L (ref 136–145)

## 2024-07-24 PROCEDURE — 93295 DEV INTERROG REMOTE 1/2/MLT: CPT | Performed by: INTERNAL MEDICINE

## 2024-10-07 ENCOUNTER — TELEPHONE (OUTPATIENT)
Age: 74
End: 2024-10-07

## 2024-10-08 ENCOUNTER — OFFICE VISIT (OUTPATIENT)
Age: 74
End: 2024-10-08

## 2024-10-08 VITALS
DIASTOLIC BLOOD PRESSURE: 60 MMHG | RESPIRATION RATE: 16 BRPM | TEMPERATURE: 97.8 F | HEART RATE: 71 BPM | SYSTOLIC BLOOD PRESSURE: 122 MMHG | WEIGHT: 124.4 LBS | OXYGEN SATURATION: 97 % | HEIGHT: 59 IN | BODY MASS INDEX: 25.08 KG/M2

## 2024-10-08 DIAGNOSIS — N18.4 CKD (CHRONIC KIDNEY DISEASE) STAGE 4, GFR 15-29 ML/MIN (HCC): ICD-10-CM

## 2024-10-08 DIAGNOSIS — I42.9 CARDIOMYOPATHY, UNSPECIFIED TYPE (HCC): Primary | ICD-10-CM

## 2024-10-08 DIAGNOSIS — I50.22 CHRONIC SYSTOLIC (CONGESTIVE) HEART FAILURE (HCC): ICD-10-CM

## 2024-10-08 ASSESSMENT — ENCOUNTER SYMPTOMS
COUGH: 0
SHORTNESS OF BREATH: 0
NAUSEA: 0
CONSTIPATION: 0
DIARRHEA: 0

## 2024-10-08 ASSESSMENT — PATIENT HEALTH QUESTIONNAIRE - PHQ9
3. TROUBLE FALLING OR STAYING ASLEEP: NEARLY EVERY DAY
5. POOR APPETITE OR OVEREATING: NOT AT ALL
SUM OF ALL RESPONSES TO PHQ QUESTIONS 1-9: 9
SUM OF ALL RESPONSES TO PHQ QUESTIONS 1-9: 9
SUM OF ALL RESPONSES TO PHQ9 QUESTIONS 1 & 2: 3
4. FEELING TIRED OR HAVING LITTLE ENERGY: NEARLY EVERY DAY
6. FEELING BAD ABOUT YOURSELF - OR THAT YOU ARE A FAILURE OR HAVE LET YOURSELF OR YOUR FAMILY DOWN: NOT AT ALL
2. FEELING DOWN, DEPRESSED OR HOPELESS: NEARLY EVERY DAY
8. MOVING OR SPEAKING SO SLOWLY THAT OTHER PEOPLE COULD HAVE NOTICED. OR THE OPPOSITE, BEING SO FIGETY OR RESTLESS THAT YOU HAVE BEEN MOVING AROUND A LOT MORE THAN USUAL: NOT AT ALL
7. TROUBLE CONCENTRATING ON THINGS, SUCH AS READING THE NEWSPAPER OR WATCHING TELEVISION: NOT AT ALL
SUM OF ALL RESPONSES TO PHQ QUESTIONS 1-9: 9
9. THOUGHTS THAT YOU WOULD BE BETTER OFF DEAD, OR OF HURTING YOURSELF: NOT AT ALL
10. IF YOU CHECKED OFF ANY PROBLEMS, HOW DIFFICULT HAVE THESE PROBLEMS MADE IT FOR YOU TO DO YOUR WORK, TAKE CARE OF THINGS AT HOME, OR GET ALONG WITH OTHER PEOPLE: NOT DIFFICULT AT ALL
SUM OF ALL RESPONSES TO PHQ QUESTIONS 1-9: 9
1. LITTLE INTEREST OR PLEASURE IN DOING THINGS: NOT AT ALL

## 2024-10-08 NOTE — PROGRESS NOTES
Physical Exam  Vitals reviewed.   Constitutional:       Appearance: Normal appearance.   HENT:      Head: Normocephalic.      Nose: Nose normal.      Mouth/Throat:      Mouth: Mucous membranes are moist.   Eyes:      Conjunctiva/sclera: Conjunctivae normal.   Neck:      Vascular: No JVD.   Cardiovascular:      Rate and Rhythm: Normal rate and regular rhythm.      Pulses: Normal pulses.      Heart sounds: Normal heart sounds.   Pulmonary:      Effort: Pulmonary effort is normal.      Breath sounds: Normal breath sounds.   Abdominal:      General: There is no distension.      Palpations: Abdomen is soft.      Tenderness: There is no abdominal tenderness.   Musculoskeletal:      Right lower leg: No edema.      Left lower leg: No edema.   Skin:     General: Skin is warm.      Coloration: Skin is pale.      Findings: No lesion.   Neurological:      Mental Status: She is alert and oriented to person, place, and time.          PAST MEDICAL HISTORY:  Past Medical History:   Diagnosis Date    CHF (congestive heart failure) (HCC)     Hyperlipidemia     Hypertension        PAST SURGICAL HISTORY:  Past Surgical History:   Procedure Laterality Date    CARDIAC PROCEDURE N/A 7/1/2024    Right heart cath performed by Carla Gold MD at [facility] CARDIAC CATH LAB       FAMILY HISTORY:  No family history on file.    SOCIAL HISTORY:  Social History     Socioeconomic History    Marital status:      Spouse name: Not on file    Number of children: Not on file    Years of education: Not on file    Highest education level: Not on file   Occupational History    Not on file   Tobacco Use    Smoking status: Never     Passive exposure: Never    Smokeless tobacco: Never   Vaping Use    Vaping status: Never Used   Substance and Sexual Activity    Alcohol use: Never    Drug use: Never    Sexual activity: Not on file   Other Topics Concern    Not on file   Social History Narrative    Not on file     Social Determinants of Health

## 2024-10-08 NOTE — PATIENT INSTRUCTIONS
Medication changes:    No medication changes     Please take this to your pharmacy to notify them of the change in medications.     Testing Ordered:    Please call [phone] to schedule echo in 3 months (prior to next appointment)     Lab work has been ordered to be done with nephrology.       Referrals:      Other Recommendations:      - Record blood pressure and heart rate daily before medication and two hours after medication  - Record weight daily upon waking/after using the bathroom.   - Keep a written records of your weights and blood pressure and bring to your next appointment.   - Call the clinic at if you have a weight gain of 3 or more pounds overnight OR 5 or more pounds in one week, new/worsened shortness of breath or swelling, or if your blood pressure begins to consistently run below 90/60 and/or you begin to experience dizziness or lightheadedness. Our office phone number [phone] option 2.  - Ensure you are drinking an adequate amount of water with a goal of 6-8 eight ounce glasses (1.5-2 liters) of fluid daily. Your urine should be clear and light yellow straw colored.       Follow up 3 months with np  with [facility]    Our monthly Heart Failure Support Group is held on the last Wednesday of every month from 5-6pm at [facility]. If you would like to attend, please RSVP to [email or web address]    Thank you for allowing us the privilege of being a part of your healthcare team! Please do not hesitate to contact our office at [phone] option 2 with any questions or concerns.

## 2024-10-14 ENCOUNTER — TELEPHONE (OUTPATIENT)
Age: 74
End: 2024-10-14

## 2024-10-22 RX ORDER — BUMETANIDE 2 MG/1
TABLET ORAL
Qty: 180 TABLET | Refills: 0 | Status: SHIPPED | OUTPATIENT
Start: 2024-10-22

## 2024-10-22 NOTE — TELEPHONE ENCOUNTER
Requested Prescriptions     Pending Prescriptions Disp Refills    bumetanide (BUMEX) 2 MG tablet [Pharmacy Med Name: BUMETANIDE 2 MG TABLET] 180 tablet 0     Sig: TAKE 1 TABLET BY MOUTH IN THE MORNING AND IN THE EVENING      Last appt 10/8  Next appt 1/10

## 2024-11-08 RX ORDER — BUMETANIDE 2 MG/1
TABLET ORAL
Qty: 180 TABLET | Refills: 0 | Status: SHIPPED | OUTPATIENT
Start: 2024-11-08

## 2024-11-08 NOTE — TELEPHONE ENCOUNTER
Requested Prescriptions     Pending Prescriptions Disp Refills    bumetanide (BUMEX) 2 MG tablet [Pharmacy Med Name: BUMETANIDE 2 MG TABLET] 180 tablet 0     Sig: TAKE 1 TABLET BY MOUTH TWICE A DAY IN THE MORNING AND IN THE EVENING     Last visit 10/8  Next visit 1/10

## 2024-11-13 PROCEDURE — 93295 DEV INTERROG REMOTE 1/2/MLT: CPT | Performed by: INTERNAL MEDICINE

## 2024-12-05 ENCOUNTER — TELEPHONE (OUTPATIENT)
Age: 74
End: 2024-12-05

## 2024-12-05 NOTE — TELEPHONE ENCOUNTER
Called and left voice message for patient to call back to have appointments that was scheduled for 01/15/2025 rescheduled. Patient can be scheduled to the next available  appointment with either of the EP NP's.

## 2025-01-08 ENCOUNTER — TELEPHONE (OUTPATIENT)
Age: 75
End: 2025-01-08

## 2025-01-08 NOTE — TELEPHONE ENCOUNTER
Telephone Call RE:  Appointment reminder     Outcome:     [] Patient confirmed appointment   [] Patient rescheduled appointment for    [] Unable to reach  [x] Left message              [] Other:       First attempt, pt needs to update ins.

## 2025-01-09 ENCOUNTER — TELEPHONE (OUTPATIENT)
Age: 75
End: 2025-01-09

## 2025-01-10 ENCOUNTER — TELEPHONE (OUTPATIENT)
Age: 75
End: 2025-01-10

## 2025-01-10 NOTE — TELEPHONE ENCOUNTER
Left voicemail requesting rtn call to reschedule 1/9/25 appt we had to cx due to water issues at [facility].

## 2025-01-18 DIAGNOSIS — I10 ESSENTIAL HYPERTENSION, BENIGN: ICD-10-CM

## 2025-01-18 DIAGNOSIS — I50.22 CHRONIC SYSTOLIC (CONGESTIVE) HEART FAILURE (HCC): ICD-10-CM

## 2025-01-18 DIAGNOSIS — E78.1 PURE HYPERGLYCERIDEMIA: ICD-10-CM

## 2025-01-20 RX ORDER — CARVEDILOL 12.5 MG/1
12.5 TABLET ORAL 2 TIMES DAILY WITH MEALS
Qty: 180 TABLET | Refills: 3 | Status: SHIPPED | OUTPATIENT
Start: 2025-01-20

## 2025-01-20 RX ORDER — SIMVASTATIN 20 MG
TABLET ORAL
Qty: 90 TABLET | Refills: 3 | Status: SHIPPED | OUTPATIENT
Start: 2025-01-20

## 2025-01-20 NOTE — TELEPHONE ENCOUNTER
Per VO by MD.    Future Appointments   Date Time Provider Department Center   1/29/2025  1:00 PM Chuyita Torre APRN - [facility]

## 2025-01-27 ENCOUNTER — TELEPHONE (OUTPATIENT)
Age: 75
End: 2025-01-27

## 2025-01-28 ENCOUNTER — TELEPHONE (OUTPATIENT)
Age: 75
End: 2025-01-28

## 2025-01-28 NOTE — TELEPHONE ENCOUNTER
Telephone Call RE:  Appointment reminder     Outcome:     [x] Patient confirmed appointment   [] Patient rescheduled appointment for    [] Unable to reach  [] Left message              [] Other:       Covered all appt details, including where we're located at [facility].

## 2025-01-29 ENCOUNTER — OFFICE VISIT (OUTPATIENT)
Age: 75
End: 2025-01-29

## 2025-01-29 VITALS
SYSTOLIC BLOOD PRESSURE: 134 MMHG | BODY MASS INDEX: 24.74 KG/M2 | OXYGEN SATURATION: 93 % | HEART RATE: 82 BPM | HEIGHT: 60 IN | TEMPERATURE: 97.9 F | DIASTOLIC BLOOD PRESSURE: 68 MMHG | RESPIRATION RATE: 18 BRPM | WEIGHT: 126 LBS

## 2025-01-29 DIAGNOSIS — I50.22 CHRONIC SYSTOLIC (CONGESTIVE) HEART FAILURE (HCC): Primary | ICD-10-CM

## 2025-01-29 PROBLEM — I50.23 ACUTE ON CHRONIC HFREF (HEART FAILURE WITH REDUCED EJECTION FRACTION) (HCC): Status: RESOLVED | Noted: 2024-02-13 | Resolved: 2025-01-29

## 2025-01-29 LAB
DISTANCE WALKED: NORMAL
SPO2: NORMAL

## 2025-01-29 RX ORDER — HYDRALAZINE HYDROCHLORIDE 100 MG/1
100 TABLET, FILM COATED ORAL EVERY 8 HOURS SCHEDULED
Qty: 90 TABLET | Refills: 2 | Status: SHIPPED | OUTPATIENT
Start: 2025-01-29

## 2025-01-29 RX ORDER — ISOSORBIDE DINITRATE 20 MG/1
20 TABLET ORAL 3 TIMES DAILY
Qty: 90 TABLET | Refills: 2 | Status: SHIPPED | OUTPATIENT
Start: 2025-01-29

## 2025-01-29 ASSESSMENT — ENCOUNTER SYMPTOMS
ABDOMINAL PAIN: 0
BLOOD IN STOOL: 0
EYE PAIN: 0
SHORTNESS OF BREATH: 0
CHEST TIGHTNESS: 0
COUGH: 0
SORE THROAT: 0
ABDOMINAL DISTENTION: 0

## 2025-01-29 NOTE — PROGRESS NOTES
[facility]  [facility] in [place], [place]  Outpatient Clinic Note    Patient name: Nolvia Silver  Patient : 1950  Patient MRN: [id number]  Date of service: 25  Primary Cardiologist: Dr. Guardado     Chief Complaint   Patient presents with    Follow-up        ASSESSMENT/PLAN:  Nolvia Silver is a 74 y.o. female with a history of chronic systolic heart failure (EF 15-20%), thought to be NICM based on clean coronaries on LHC in  s/p ICD 2017, DM2, CKD 4   recently lost her daughter in August and is trying to navigate the loss.      INTERVAL HISTORY:  -VSS  -labs from 25 with nephrology:  Mg 1.9; creat 2.72; K 4.3  -weight up 1.5lbs  -Nolvia Silver is here for heart failure follow up.  She is feeling quite well other than still having a lot of grief from the loss of her daughter.   It impacts her sleep, but when she finally falls asleep she sleeps well.   Ms. Silver denies SOB, chest pain, palpitations,fatigue, leg swelling, dizziness.  She walks with a  walker.         ASSESSMENT AND PLAN:  Chronic systolic heart failure   Continue current medical therapy for heart failure:  Beta-blocker: Continue Carvedilol 12.5 mg BID  ACE/ARB/ARNi: unable to tolerate due to renal dysfunction  Hydralazine/Isordil: Continue Hydralazine 100 mg TID and Isordil  20 mg TID  MRA: Unable to tolerate due to renal dysfunction  SGLT2 inhibitor: Unable to tolerate due to renal dysfunction  2 g sodium limit  1500cc fluid limit  Trend home weights   Labs: Nephrology labs from  reviewed  Repeat TTE previously ordered -will need to schedule yet  6MW- obtain today =208m    Volume management  Continue Bumex 2 mg PO BID  Per patient, nephrology recently took her off potassium.  Labs are normal.  Continue to follow.      Moderate mitral regurg:  Continue to follow    CKD stage 4  Diuretics as above  Continue to follow up with nephrology     Arrhythmia/ICD:  S/p [employer] single chamber

## 2025-01-29 NOTE — PATIENT INSTRUCTIONS
Medication changes:    -no medication changes today       Testing Ordered:    -please call to schedule  your ECHO. You may contact [phone] to schedule.     -6 minute walk today         Other Recommendations:      - Record blood pressure and heart rate daily before medication and two hours after medication  - Record weight daily upon waking/after using the bathroom.   - Keep a written records of your weights and blood pressure and bring to your next appointment.   - Call the clinic at if you have a weight gain of 3 or more pounds overnight OR 5 or more pounds in one week, new/worsened shortness of breath or swelling, or if your blood pressure begins to consistently run below 90/60 and/or you begin to experience dizziness or lightheadedness. Our office phone number [phone] option 2.  - Ensure you are drinking an adequate amount of water with a goal of 6-8 eight ounce glasses (1.5-2 liters) of fluid daily. Your urine should be clear and light yellow straw colored.       Follow up in 3 months with [facility]    Thank you for allowing us the privilege of being a part of your healthcare team! Please do not hesitate to contact our office at [phone] option 2 with any questions or concerns.     We are restarting a monthly heart failure support group, this will be the last Wednesday of every month from 5-6pm at [facility]. If you would like to attend you will need to RSVP to [email or web address]

## 2025-02-18 ENCOUNTER — HOSPITAL ENCOUNTER (OUTPATIENT)
Facility: HOSPITAL | Age: 75
Discharge: HOME | End: 2025-02-20
Payer: MEDICARE

## 2025-02-18 DIAGNOSIS — I50.22 CHRONIC SYSTOLIC (CONGESTIVE) HEART FAILURE (HCC): ICD-10-CM

## 2025-02-18 DIAGNOSIS — I42.9 CARDIOMYOPATHY, UNSPECIFIED TYPE (HCC): ICD-10-CM

## 2025-02-18 PROCEDURE — 93306 TTE W/DOPPLER COMPLETE: CPT

## 2025-02-20 LAB
ECHO AO ASC DIAM: 3.4 CM
ECHO AO ROOT DIAM: 2.7 CM
ECHO AV AREA PEAK VELOCITY: 1.6 CM2
ECHO AV AREA VTI: 1.6 CM2
ECHO AV MEAN GRADIENT: 3 MMHG
ECHO AV MEAN VELOCITY: 0.9 M/S
ECHO AV PEAK GRADIENT: 6 MMHG
ECHO AV PEAK VELOCITY: 1.2 M/S
ECHO AV VELOCITY RATIO: 0.58
ECHO AV VTI: 26.3 CM
ECHO EST RA PRESSURE: 3 MMHG
ECHO LA DIAMETER: 4.6 CM
ECHO LA TO AORTIC ROOT RATIO: 1.7
ECHO LV EF PHYSICIAN: 15 %
ECHO LV FRACTIONAL SHORTENING: 21 % (ref 28–44)
ECHO LV INTERNAL DIMENSION DIASTOLIC: 6.1 CM (ref 3.9–5.3)
ECHO LV INTERNAL DIMENSION SYSTOLIC: 4.8 CM
ECHO LV IVSD: 0.9 CM (ref 0.6–0.9)
ECHO LV MASS 2D: 222 G (ref 67–162)
ECHO LV POSTERIOR WALL DIASTOLIC: 0.9 CM (ref 0.6–0.9)
ECHO LV RELATIVE WALL THICKNESS RATIO: 0.3
ECHO LVOT AREA: 2.5 CM2
ECHO LVOT AV VTI INDEX: 0.65
ECHO LVOT DIAM: 1.8 CM
ECHO LVOT MEAN GRADIENT: 1 MMHG
ECHO LVOT PEAK GRADIENT: 2 MMHG
ECHO LVOT PEAK VELOCITY: 0.7 M/S
ECHO LVOT SV: 43.2 ML
ECHO LVOT VTI: 17 CM
ECHO MV A VELOCITY: 0.64 M/S
ECHO MV AREA VTI: 2.2 CM2
ECHO MV E DECELERATION TIME (DT): 88.5 MS
ECHO MV E VELOCITY: 0.76 M/S
ECHO MV E/A RATIO: 1.19
ECHO MV LVOT VTI INDEX: 1.18
ECHO MV MAX VELOCITY: 1 M/S
ECHO MV MEAN GRADIENT: 2 MMHG
ECHO MV MEAN VELOCITY: 0.8 M/S
ECHO MV PEAK GRADIENT: 4 MMHG
ECHO MV REGURGITANT PEAK GRADIENT: 0 MILLIMETER OF MERCURY COLUMN
ECHO MV REGURGITANT PEAK VELOCITY: 0 M/S
ECHO MV VTI: 20.1 CM
ECHO PV MAX VELOCITY: 0.7 M/S
ECHO PV MEAN GRADIENT: 2 MMHG
ECHO PV MEAN VELOCITY: 0.6 M/S
ECHO PV PEAK GRADIENT: 2 MMHG
ECHO RIGHT VENTRICULAR SYSTOLIC PRESSURE (RVSP): 18 MMHG
ECHO RV INTERNAL DIMENSION: 2.6 CM
ECHO RV TAPSE: 1.8 CM (ref 1.7–?)
ECHO TV REGURGITANT MAX VELOCITY: 1.91 M/S
ECHO TV REGURGITANT PEAK GRADIENT: 15 MMHG

## 2025-02-21 ENCOUNTER — TELEPHONE (OUTPATIENT)
Age: 75
End: 2025-02-21

## 2025-02-21 NOTE — TELEPHONE ENCOUNTER
----- Message from DOUGIE Dangelo NP sent at 2/21/2025 10:17 AM EST -----  TTE shows no significant changes at this time, please have her continue her current regimen.  ----- Message -----  From: Stacey Deleon MD  Sent: 2/20/2025   5:30 PM EST  To: DOUGIE Garcia NP      Called patient using two patient identifiers. Reviewed above information per SEJAL Machado NP. Patient states understanding of results and had no further questions.

## 2025-04-25 RX ORDER — BUMETANIDE 2 MG/1
TABLET ORAL
Qty: 180 TABLET | Refills: 0 | Status: SHIPPED | OUTPATIENT
Start: 2025-04-25

## 2025-04-25 NOTE — TELEPHONE ENCOUNTER
Requested Prescriptions     Pending Prescriptions Disp Refills    bumetanide (BUMEX) 2 MG tablet [Pharmacy Med Name: BUMETANIDE 2 MG TABLET] 180 tablet 0     Sig: TAKE 1 TABLET BY MOUTH TWICE A DAY IN THE MORNING AND IN THE EVENING     Last appointment 1/29/25 next appt scheduled 5/19/25

## 2025-05-15 ENCOUNTER — TELEPHONE (OUTPATIENT)
Age: 75
End: 2025-05-15

## 2025-05-15 NOTE — TELEPHONE ENCOUNTER
Telephone Call RE:  Appointment reminder     Outcome:     [x] Patient confirmed appointment   [] Patient rescheduled appointment for    [] Unable to reach  [] Left message              [x] Other: [facility] Copay $20

## 2025-05-19 ENCOUNTER — OFFICE VISIT (OUTPATIENT)
Age: 75
End: 2025-05-19

## 2025-05-19 VITALS
BODY MASS INDEX: 25.72 KG/M2 | SYSTOLIC BLOOD PRESSURE: 100 MMHG | HEART RATE: 60 BPM | DIASTOLIC BLOOD PRESSURE: 66 MMHG | HEIGHT: 60 IN | RESPIRATION RATE: 16 BRPM | WEIGHT: 131 LBS | OXYGEN SATURATION: 95 %

## 2025-05-19 DIAGNOSIS — R06.2 WHEEZING: ICD-10-CM

## 2025-05-19 DIAGNOSIS — I50.22 CHRONIC SYSTOLIC (CONGESTIVE) HEART FAILURE (HCC): Primary | ICD-10-CM

## 2025-05-19 RX ORDER — BUMETANIDE 2 MG/1
TABLET ORAL
Qty: 180 TABLET | Refills: 0
Start: 2025-05-19

## 2025-05-19 ASSESSMENT — ENCOUNTER SYMPTOMS
ABDOMINAL PAIN: 0
SORE THROAT: 0
CHEST TIGHTNESS: 0
EYE PAIN: 0
COUGH: 0
ABDOMINAL DISTENTION: 0
BLOOD IN STOOL: 0
SHORTNESS OF BREATH: 1

## 2025-05-19 ASSESSMENT — PATIENT HEALTH QUESTIONNAIRE - PHQ9
SUM OF ALL RESPONSES TO PHQ QUESTIONS 1-9: 0
1. LITTLE INTEREST OR PLEASURE IN DOING THINGS: NOT AT ALL
SUM OF ALL RESPONSES TO PHQ QUESTIONS 1-9: 0
2. FEELING DOWN, DEPRESSED OR HOPELESS: NOT AT ALL

## 2025-05-19 NOTE — PATIENT INSTRUCTIONS
Medication changes:    -I have updated the bumex prescription to reflect how you are currently taking it, which is 1 tablet in the AM and a 1/2 tablet in the PM.           Testing Ordered:    -I will request your lab work from your kidney doctor.         Referrals:  -I will place a referral to [facility].  Please reach out to them if you do not get a call back within 1 week. [phone]       Other Recommendations:      - Record blood pressure and heart rate daily before medication and two hours after medication  - Record weight daily upon waking/after using the bathroom.   - Keep a written records of your weights and blood pressure and bring to your next appointment.   - Call the clinic at if you have a weight gain of 3 or more pounds overnight OR 5 or more pounds in one week, new/worsened shortness of breath or swelling, or if your blood pressure begins to consistently run below 90/60 and/or you begin to experience dizziness or lightheadedness. Our office phone number [phone] option 2.  - Ensure you are drinking an adequate amount of water with a goal of 6-8 eight ounce glasses (1.5-2 liters) of fluid daily. Your urine should be clear and light yellow straw colored.       Follow up in 3 months with [facility]    Thank you for allowing us the privilege of being a part of your healthcare team! Please do not hesitate to contact our office at [phone] option 2 with any questions or concerns.     We are restarting a monthly heart failure support group, this will be the last Wednesday of every month from 5-6pm at [facility]. If you would like to attend you will need to RSVP to [email or web address]

## 2025-05-19 NOTE — PROGRESS NOTES
Systolic function was  severely reduced by visual assessment. Ejection fraction was estimated to  be 25 %. There was severe diffuse hypokinesis with distinct regional wall  motion abnormalities. Wall thickness was at the upper limits of normal.    NM stress test 2/14/2023  IMPRESSION:  No ischemia. Inferolateral infarct. LVEF 27%.    LHC- apparently done in 2010 at [facility] and showed clean coronaries--> need to request records    RHC 7/1/24: RA 9, RV 72/7, RVEDP 13, PA 74/26 (46), PCW 20, residential 2.52, TDCI 2.49, PVR 6.96        1/29/2025     1:00 PM   AMB 6 Minute Walk Report   O2 Saturation - Before walk 92   Heart Rate (PRE) 86   O2 Saturation - After walk 93   Heart Rate (POST) 98   Distance Walked in Feet (ft) 684 ft.   Distance in Meters 208.48 meters        REVIEW OF SYSTEMS:  Review of Systems   Constitutional:  Negative for activity change, appetite change, chills, fatigue and fever.   HENT:  Negative for nosebleeds and sore throat.    Eyes:  Negative for pain and visual disturbance.   Respiratory:  Positive for shortness of breath. Negative for cough and chest tightness.         LEÓN with far distances.  Does well with stairs.     Cardiovascular:  Negative for chest pain, palpitations and leg swelling.        Sometimes feet   Gastrointestinal:  Negative for abdominal distention, abdominal pain and blood in stool.   Genitourinary:  Negative for dysuria and hematuria.   Musculoskeletal:  Negative for arthralgias and myalgias.   Skin:  Negative for rash and wound.   Allergic/Immunologic: Positive for environmental allergies.   Neurological:  Negative for dizziness, weakness, light-headedness and headaches.   Psychiatric/Behavioral:  Negative for confusion, dysphoric mood and sleep disturbance.         Grieving the loss of her daughter      PHYSICAL EXAM:  /66 (BP Site: Left Upper Arm, Patient Position: Sitting, BP Cuff Size: Medium Adult)   Pulse 60   Resp 16   Ht 1.524 m (5')   Wt 59.4 kg (131

## 2025-05-20 ENCOUNTER — TELEPHONE (OUTPATIENT)
Age: 75
End: 2025-05-20

## 2025-05-20 NOTE — TELEPHONE ENCOUNTER
----- Message from ANTHONY BOLTON RN sent at 5/19/2025  3:09 PM EDT -----  Request labs from dr [facility]    Called nephrology office at [phone] request most recent lab results. LM requesting above

## 2025-06-03 ENCOUNTER — TELEPHONE (OUTPATIENT)
Age: 75
End: 2025-06-03

## 2025-06-03 NOTE — TELEPHONE ENCOUNTER
Left msg requesting rtn call to [facility] pt's 3 month f/u in Aug with Dr. Zhong at [facility].

## 2025-08-07 ENCOUNTER — TELEPHONE (OUTPATIENT)
Age: 75
End: 2025-08-07

## 2025-08-08 ENCOUNTER — TELEPHONE (OUTPATIENT)
Age: 75
End: 2025-08-08

## 2025-08-11 ENCOUNTER — OFFICE VISIT (OUTPATIENT)
Age: 75
End: 2025-08-11
Payer: MEDICARE

## 2025-08-11 VITALS
WEIGHT: 128 LBS | HEART RATE: 83 BPM | HEIGHT: 59 IN | SYSTOLIC BLOOD PRESSURE: 126 MMHG | OXYGEN SATURATION: 97 % | RESPIRATION RATE: 16 BRPM | BODY MASS INDEX: 25.8 KG/M2 | DIASTOLIC BLOOD PRESSURE: 64 MMHG

## 2025-08-11 DIAGNOSIS — I50.22 CHRONIC SYSTOLIC (CONGESTIVE) HEART FAILURE (HCC): Primary | ICD-10-CM

## 2025-08-11 DIAGNOSIS — I50.22 CHRONIC SYSTOLIC (CONGESTIVE) HEART FAILURE (HCC): ICD-10-CM

## 2025-08-11 PROCEDURE — 1126F AMNT PAIN NOTED NONE PRSNT: CPT | Performed by: NURSE PRACTITIONER

## 2025-08-11 PROCEDURE — 3074F SYST BP LT 130 MM HG: CPT | Performed by: NURSE PRACTITIONER

## 2025-08-11 PROCEDURE — 99214 OFFICE O/P EST MOD 30 MIN: CPT | Performed by: NURSE PRACTITIONER

## 2025-08-11 PROCEDURE — 1123F ACP DISCUSS/DSCN MKR DOCD: CPT | Performed by: NURSE PRACTITIONER

## 2025-08-11 PROCEDURE — 1159F MED LIST DOCD IN RCRD: CPT | Performed by: NURSE PRACTITIONER

## 2025-08-11 PROCEDURE — 3078F DIAST BP <80 MM HG: CPT | Performed by: NURSE PRACTITIONER

## 2025-08-11 ASSESSMENT — ENCOUNTER SYMPTOMS
ABDOMINAL DISTENTION: 0
EYE PAIN: 0
SHORTNESS OF BREATH: 0
ABDOMINAL PAIN: 0
COUGH: 0
CHEST TIGHTNESS: 0
BLOOD IN STOOL: 0
SORE THROAT: 0

## 2025-08-11 ASSESSMENT — PATIENT HEALTH QUESTIONNAIRE - PHQ9
SUM OF ALL RESPONSES TO PHQ QUESTIONS 1-9: 0
2. FEELING DOWN, DEPRESSED OR HOPELESS: NOT AT ALL
SUM OF ALL RESPONSES TO PHQ QUESTIONS 1-9: 0
1. LITTLE INTEREST OR PLEASURE IN DOING THINGS: NOT AT ALL

## 2025-08-21 ENCOUNTER — TELEPHONE (OUTPATIENT)
Age: 75
End: 2025-08-21

## 2025-08-29 RX ORDER — HYDRALAZINE HYDROCHLORIDE 100 MG/1
100 TABLET, FILM COATED ORAL EVERY 8 HOURS SCHEDULED
Qty: 270 TABLET | Refills: 1 | Status: SHIPPED | OUTPATIENT
Start: 2025-08-29

## (undated) DEVICE — PRESSURE MONITORING SET: Brand: TRUWAVE

## (undated) DEVICE — CATHETER ART THERMODILUTION 6 FRX110 CM 4 LUMEN SWAN

## (undated) DEVICE — CO-SET DELIVERY SYSTEM FOR 123 ROOM TEMPATURE INJECTATE: Brand: CO-SET+

## (undated) DEVICE — KIT MED IMAG CNTRST AGNT W/ IOPAMIDOL REUSE

## (undated) DEVICE — GLIDESHEATH SLENDER STAINLESS STEEL KIT: Brand: GLIDESHEATH SLENDER

## (undated) DEVICE — KIT MFLD ISOLATN NACL CNTRST PRT TBNG SPIK W/ PRSS TRNSDUC

## (undated) DEVICE — PACK PROCEDURE SURG HRT CATH

## (undated) DEVICE — ANGIOGRAPHY KIT

## (undated) DEVICE — KIT HND CTRL 3 W STPCOCK ROT END 54IN PREM HI PRSS TBNG AT

## (undated) DEVICE — PROVE COVER: Brand: UNBRANDED

## (undated) DEVICE — SPECIAL PROCEDURE DRAPE 32" X 34": Brand: SPECIAL PROCEDURE DRAPE